# Patient Record
Sex: FEMALE | Race: WHITE | NOT HISPANIC OR LATINO | Employment: FULL TIME | URBAN - METROPOLITAN AREA
[De-identification: names, ages, dates, MRNs, and addresses within clinical notes are randomized per-mention and may not be internally consistent; named-entity substitution may affect disease eponyms.]

---

## 2024-08-05 ENCOUNTER — HOSPITAL ENCOUNTER (EMERGENCY)
Facility: HOSPITAL | Age: 34
Discharge: HOME | End: 2024-08-05
Attending: EMERGENCY MEDICINE | Admitting: EMERGENCY MEDICINE
Payer: COMMERCIAL

## 2024-08-05 VITALS
TEMPERATURE: 97.7 F | HEIGHT: 63 IN | SYSTOLIC BLOOD PRESSURE: 187 MMHG | HEART RATE: 122 BPM | BODY MASS INDEX: 48.73 KG/M2 | DIASTOLIC BLOOD PRESSURE: 149 MMHG | RESPIRATION RATE: 16 BRPM | WEIGHT: 275 LBS | OXYGEN SATURATION: 95 %

## 2024-08-05 DIAGNOSIS — I80.8 SUPERFICIAL THROMBOPHLEBITIS OF RIGHT UPPER EXTREMITY: Primary | ICD-10-CM

## 2024-08-05 PROCEDURE — 99282 EMERGENCY DEPT VISIT SF MDM: CPT

## 2024-08-06 NOTE — ED PROVIDER NOTES
Subjective   History of Present Illness  34-year-old female presents emergency department today with painful vein on the back of her right hand.  She reports that she does get back from a cruise had a lot of nausea and vomiting ended up having to have an IV started after several attempts.  She noticed a painful tender palpable mass on the dorsal aspect of her hand.  No redness no induration.  No chest pain or shortness of breath.    History provided by:  Patient   used: No    Hand Pain  Location:  Dorsal aspect of hand where IV had been started.  Quality:  Tender  Severity:  Moderate  Onset quality:  Gradual  Duration:  2 days  Timing:  Constant  Progression:  Unchanged  Chronicity:  New  Context:  Occurred after an IV was attempted to be started.  Associated symptoms: no shortness of breath and no wheezing        Review of Systems   Respiratory:  Negative for chest tightness, shortness of breath and wheezing.        No past medical history on file.    Allergies   Allergen Reactions    Codeine Hives       No past surgical history on file.    No family history on file.    Social History     Socioeconomic History    Marital status:            Objective   Physical Exam  Vitals and nursing note reviewed.   Constitutional:       General: She is not in acute distress.     Appearance: She is well-developed. She is not diaphoretic.   HENT:      Head: Normocephalic and atraumatic.      Nose: Nose normal.   Eyes:      General: No scleral icterus.     Conjunctiva/sclera: Conjunctivae normal.   Pulmonary:      Effort: Pulmonary effort is normal. No respiratory distress.   Musculoskeletal:         General: Normal range of motion.      Cervical back: Normal range of motion and neck supple.      Comments: Dorsal aspect of the hand revealed 2 palpable chordae thrombophlebitis no redness no induration.  Cap refill less than 2 seconds sensation are intact.   Skin:     General: Skin is warm and dry.  "  Neurological:      Mental Status: She is alert and oriented to person, place, and time.   Psychiatric:         Behavior: Behavior normal.         Procedures           ED Course                                   No results found for this or any previous visit (from the past 24 hour(s)).  Note: In addition to lab results from this visit, the labs listed above may include labs taken at another facility or during a different encounter within the last 24 hours. Please correlate lab times with ED admission and discharge times for further clarification of the services performed during this visit.    No orders to display     Vitals:    08/05/24 1841   BP: (!) 187/149   BP Location: Right arm   Patient Position: Sitting   Pulse: (!) 122   Resp: 16   Temp: 97.7 °F (36.5 °C)   TempSrc: Oral   SpO2: 95%   Weight: 125 kg (275 lb)   Height: 160 cm (63\")     Medications - No data to display  ECG/EMG Results (last 24 hours)       ** No results found for the last 24 hours. **          No orders to display                 Medical Decision Making  Problems Addressed:  Superficial thrombophlebitis of right upper extremity: acute illness or injury        Final diagnoses:   Superficial thrombophlebitis of right upper extremity       ED Disposition  ED Disposition       ED Disposition   Discharge    Condition   Stable    Comment   --               Cuong Parks MD  [address] [address] [phone]               Medication List      No changes were made to your prescriptions during this visit.            Rick Sim PA  08/05/24 2053    "

## 2024-08-23 ENCOUNTER — ANESTHESIA EVENT (OUTPATIENT)
Dept: CARDIOLOGY | Facility: HOSPITAL | Age: 34
End: 2024-08-23
Payer: COMMERCIAL

## 2024-08-23 ENCOUNTER — ANESTHESIA (OUTPATIENT)
Dept: CARDIOLOGY | Facility: HOSPITAL | Age: 34
End: 2024-08-23
Payer: COMMERCIAL

## 2024-08-23 ENCOUNTER — APPOINTMENT (OUTPATIENT)
Dept: GENERAL RADIOLOGY | Facility: HOSPITAL | Age: 34
DRG: 023 | End: 2024-08-23
Payer: COMMERCIAL

## 2024-08-23 ENCOUNTER — HOSPITAL ENCOUNTER (INPATIENT)
Facility: HOSPITAL | Age: 34
LOS: 7 days | Discharge: NURSING, REHAB OR LONG-TERM CARE | DRG: 023 | End: 2024-08-30
Attending: EMERGENCY MEDICINE | Admitting: INTERNAL MEDICINE
Payer: COMMERCIAL

## 2024-08-23 ENCOUNTER — APPOINTMENT (OUTPATIENT)
Dept: CT IMAGING | Facility: HOSPITAL | Age: 34
DRG: 023 | End: 2024-08-23
Payer: COMMERCIAL

## 2024-08-23 DIAGNOSIS — I63.9 ACUTE STROKE DUE TO ISCHEMIA: Primary | ICD-10-CM

## 2024-08-23 DIAGNOSIS — I50.21 ACUTE SYSTOLIC HEART FAILURE: ICD-10-CM

## 2024-08-23 DIAGNOSIS — R06.89: ICD-10-CM

## 2024-08-23 DIAGNOSIS — G47.9 SLEEP DISTURBANCES: ICD-10-CM

## 2024-08-23 DIAGNOSIS — I51.3 LEFT VENTRICULAR THROMBUS: ICD-10-CM

## 2024-08-23 DIAGNOSIS — R13.11 ORAL PHASE DYSPHAGIA: ICD-10-CM

## 2024-08-23 DIAGNOSIS — R40.0 SOMNOLENCE: ICD-10-CM

## 2024-08-23 DIAGNOSIS — R41.841 COGNITIVE COMMUNICATION DEFICIT: ICD-10-CM

## 2024-08-23 DIAGNOSIS — R47.1 DYSARTHRIA: ICD-10-CM

## 2024-08-23 DIAGNOSIS — R53.1 ACUTE LEFT-SIDED WEAKNESS: ICD-10-CM

## 2024-08-23 PROBLEM — I10 HTN (HYPERTENSION): Status: ACTIVE | Noted: 2024-08-23

## 2024-08-23 PROBLEM — I63.511: Status: ACTIVE | Noted: 2024-08-23

## 2024-08-23 PROBLEM — E66.01 MORBID OBESITY WITH BMI OF 45.0-49.9, ADULT: Chronic | Status: ACTIVE | Noted: 2024-08-23

## 2024-08-23 PROBLEM — R56.9 SEIZURE: Status: ACTIVE | Noted: 2024-08-23

## 2024-08-23 PROBLEM — J96.00 ACUTE RESPIRATORY FAILURE: Status: ACTIVE | Noted: 2024-08-23

## 2024-08-23 PROBLEM — J96.90 RESPIRATORY FAILURE: Status: RESOLVED | Noted: 2024-08-23 | Resolved: 2024-08-23

## 2024-08-23 PROBLEM — G47.33 OSA (OBSTRUCTIVE SLEEP APNEA): Chronic | Status: ACTIVE | Noted: 2024-08-23

## 2024-08-23 PROBLEM — E66.01 MORBID OBESITY WITH BMI OF 45.0-49.9, ADULT: Status: ACTIVE | Noted: 2024-08-23

## 2024-08-23 PROBLEM — I16.0 HYPERTENSIVE URGENCY: Status: ACTIVE | Noted: 2024-08-23

## 2024-08-23 PROBLEM — J96.90 RESPIRATORY FAILURE: Status: ACTIVE | Noted: 2024-08-23

## 2024-08-23 PROBLEM — E11.9 T2DM (TYPE 2 DIABETES MELLITUS): Chronic | Status: ACTIVE | Noted: 2024-08-23

## 2024-08-23 PROBLEM — E11.9 T2DM (TYPE 2 DIABETES MELLITUS): Status: ACTIVE | Noted: 2024-08-23

## 2024-08-23 PROBLEM — I10 HTN (HYPERTENSION): Chronic | Status: ACTIVE | Noted: 2024-08-23

## 2024-08-23 PROBLEM — G47.33 OSA (OBSTRUCTIVE SLEEP APNEA): Status: ACTIVE | Noted: 2024-08-23

## 2024-08-23 LAB
ALT SERPL W P-5'-P-CCNC: 15 U/L (ref 1–33)
APTT PPP: 25.3 SECONDS (ref 22–39)
AST SERPL-CCNC: 18 U/L (ref 1–32)
BASOPHILS # BLD AUTO: 0.09 10*3/MM3 (ref 0–0.2)
BASOPHILS NFR BLD AUTO: 0.8 % (ref 0–1.5)
CK SERPL-CCNC: 260 U/L (ref 20–180)
DEPRECATED RDW RBC AUTO: 46.4 FL (ref 37–54)
EOSINOPHIL # BLD AUTO: 0.33 10*3/MM3 (ref 0–0.4)
EOSINOPHIL NFR BLD AUTO: 3 % (ref 0.3–6.2)
ERYTHROCYTE [DISTWIDTH] IN BLOOD BY AUTOMATED COUNT: 12.9 % (ref 12.3–15.4)
ETHANOL BLD-MCNC: <10 MG/DL (ref 0–10)
GLUCOSE BLDC GLUCOMTR-MCNC: 404 MG/DL (ref 70–130)
HCT VFR BLD AUTO: 47.2 % (ref 34–46.6)
HGB BLD-MCNC: 16.1 G/DL (ref 12–15.9)
HOLD SPECIMEN: NORMAL
IMM GRANULOCYTES # BLD AUTO: 0.07 10*3/MM3 (ref 0–0.05)
IMM GRANULOCYTES NFR BLD AUTO: 0.6 % (ref 0–0.5)
LYMPHOCYTES # BLD AUTO: 2.77 10*3/MM3 (ref 0.7–3.1)
LYMPHOCYTES NFR BLD AUTO: 24.8 % (ref 19.6–45.3)
MCH RBC QN AUTO: 33.3 PG (ref 26.6–33)
MCHC RBC AUTO-ENTMCNC: 34.1 G/DL (ref 31.5–35.7)
MCV RBC AUTO: 97.7 FL (ref 79–97)
MONOCYTES # BLD AUTO: 0.85 10*3/MM3 (ref 0.1–0.9)
MONOCYTES NFR BLD AUTO: 7.6 % (ref 5–12)
NEUTROPHILS NFR BLD AUTO: 63.2 % (ref 42.7–76)
NEUTROPHILS NFR BLD AUTO: 7.05 10*3/MM3 (ref 1.7–7)
NRBC BLD AUTO-RTO: 0 /100 WBC (ref 0–0.2)
PLATELET # BLD AUTO: 417 10*3/MM3 (ref 140–450)
PMV BLD AUTO: 8.6 FL (ref 6–12)
RBC # BLD AUTO: 4.83 10*6/MM3 (ref 3.77–5.28)
TROPONIN T SERPL HS-MCNC: 64 NG/L
WBC NRBC COR # BLD AUTO: 11.16 10*3/MM3 (ref 3.4–10.8)
WHOLE BLOOD HOLD COAG: NORMAL
WHOLE BLOOD HOLD SPECIMEN: NORMAL

## 2024-08-23 PROCEDURE — 25810000003 SODIUM CHLORIDE 0.9 % SOLUTION: Performed by: ANESTHESIOLOGY

## 2024-08-23 PROCEDURE — C1887 CATHETER, GUIDING: HCPCS | Performed by: NEUROLOGICAL SURGERY

## 2024-08-23 PROCEDURE — 03CG3ZZ EXTIRPATION OF MATTER FROM INTRACRANIAL ARTERY, PERCUTANEOUS APPROACH: ICD-10-PCS | Performed by: NEUROLOGICAL SURGERY

## 2024-08-23 PROCEDURE — 84484 ASSAY OF TROPONIN QUANT: CPT | Performed by: EMERGENCY MEDICINE

## 2024-08-23 PROCEDURE — 5A1935Z RESPIRATORY VENTILATION, LESS THAN 24 CONSECUTIVE HOURS: ICD-10-PCS | Performed by: EMERGENCY MEDICINE

## 2024-08-23 PROCEDURE — 36415 COLL VENOUS BLD VENIPUNCTURE: CPT

## 2024-08-23 PROCEDURE — 3E03317 INTRODUCTION OF OTHER THROMBOLYTIC INTO PERIPHERAL VEIN, PERCUTANEOUS APPROACH: ICD-10-PCS | Performed by: NURSE PRACTITIONER

## 2024-08-23 PROCEDURE — C2628 CATHETER, OCCLUSION: HCPCS | Performed by: NEUROLOGICAL SURGERY

## 2024-08-23 PROCEDURE — 82077 ASSAY SPEC XCP UR&BREATH IA: CPT | Performed by: NURSE PRACTITIONER

## 2024-08-23 PROCEDURE — 82948 REAGENT STRIP/BLOOD GLUCOSE: CPT

## 2024-08-23 PROCEDURE — 25010000002 DEXAMETHASONE PER 1 MG: Performed by: ANESTHESIOLOGY

## 2024-08-23 PROCEDURE — 0 LABETALOL 5 MG/ML SOLUTION: Performed by: EMERGENCY MEDICINE

## 2024-08-23 PROCEDURE — 0BH17EZ INSERTION OF ENDOTRACHEAL AIRWAY INTO TRACHEA, VIA NATURAL OR ARTIFICIAL OPENING: ICD-10-PCS | Performed by: EMERGENCY MEDICINE

## 2024-08-23 PROCEDURE — 0042T HC CT CEREBRAL PERFUSION W/WO CONTRAST: CPT

## 2024-08-23 PROCEDURE — 85025 COMPLETE CBC W/AUTO DIFF WBC: CPT | Performed by: EMERGENCY MEDICINE

## 2024-08-23 PROCEDURE — C1894 INTRO/SHEATH, NON-LASER: HCPCS | Performed by: NEUROLOGICAL SURGERY

## 2024-08-23 PROCEDURE — C1769 GUIDE WIRE: HCPCS | Performed by: NEUROLOGICAL SURGERY

## 2024-08-23 PROCEDURE — 0 IODIXANOL PER 1 ML: Performed by: NEUROLOGICAL SURGERY

## 2024-08-23 PROCEDURE — 82550 ASSAY OF CK (CPK): CPT | Performed by: NURSE PRACTITIONER

## 2024-08-23 PROCEDURE — 99223 1ST HOSP IP/OBS HIGH 75: CPT | Performed by: NURSE PRACTITIONER

## 2024-08-23 PROCEDURE — 85610 PROTHROMBIN TIME: CPT

## 2024-08-23 PROCEDURE — 70496 CT ANGIOGRAPHY HEAD: CPT

## 2024-08-23 PROCEDURE — 31500 INSERT EMERGENCY AIRWAY: CPT

## 2024-08-23 PROCEDURE — B3161ZZ FLUOROSCOPY OF RIGHT INTERNAL CAROTID ARTERY USING LOW OSMOLAR CONTRAST: ICD-10-PCS | Performed by: NEUROLOGICAL SURGERY

## 2024-08-23 PROCEDURE — 25010000002 TENECTEPLASE PER 50 MG: Performed by: NURSE PRACTITIONER

## 2024-08-23 PROCEDURE — 71045 X-RAY EXAM CHEST 1 VIEW: CPT

## 2024-08-23 PROCEDURE — 70450 CT HEAD/BRAIN W/O DYE: CPT

## 2024-08-23 PROCEDURE — 25010000002 PROPOFOL 1000 MG/100ML EMULSION

## 2024-08-23 PROCEDURE — 94002 VENT MGMT INPAT INIT DAY: CPT

## 2024-08-23 PROCEDURE — 25010000002 PROPOFOL 10 MG/ML EMULSION: Performed by: ANESTHESIOLOGY

## 2024-08-23 PROCEDURE — 99291 CRITICAL CARE FIRST HOUR: CPT

## 2024-08-23 PROCEDURE — 85730 THROMBOPLASTIN TIME PARTIAL: CPT | Performed by: EMERGENCY MEDICINE

## 2024-08-23 PROCEDURE — 70498 CT ANGIOGRAPHY NECK: CPT

## 2024-08-23 PROCEDURE — 93005 ELECTROCARDIOGRAM TRACING: CPT | Performed by: EMERGENCY MEDICINE

## 2024-08-23 PROCEDURE — 25010000002 PHENYLEPHRINE 10 MG/ML SOLUTION: Performed by: ANESTHESIOLOGY

## 2024-08-23 PROCEDURE — 99291 CRITICAL CARE FIRST HOUR: CPT | Performed by: INTERNAL MEDICINE

## 2024-08-23 PROCEDURE — B3131ZZ FLUOROSCOPY OF RIGHT COMMON CAROTID ARTERY USING LOW OSMOLAR CONTRAST: ICD-10-PCS | Performed by: NEUROLOGICAL SURGERY

## 2024-08-23 PROCEDURE — 80047 BASIC METABLC PNL IONIZED CA: CPT

## 2024-08-23 PROCEDURE — 84450 TRANSFERASE (AST) (SGOT): CPT | Performed by: EMERGENCY MEDICINE

## 2024-08-23 PROCEDURE — 85014 HEMATOCRIT: CPT

## 2024-08-23 PROCEDURE — 84460 ALANINE AMINO (ALT) (SGPT): CPT | Performed by: EMERGENCY MEDICINE

## 2024-08-23 PROCEDURE — C1760 CLOSURE DEV, VASC: HCPCS | Performed by: NEUROLOGICAL SURGERY

## 2024-08-23 PROCEDURE — 99222 1ST HOSP IP/OBS MODERATE 55: CPT | Performed by: NEUROLOGICAL SURGERY

## 2024-08-23 PROCEDURE — 61645 PERQ ART M-THROMBECT &/NFS: CPT | Performed by: NEUROLOGICAL SURGERY

## 2024-08-23 PROCEDURE — 25010000002 VASOPRESSIN 20 UNIT/ML SOLUTION: Performed by: ANESTHESIOLOGY

## 2024-08-23 PROCEDURE — 25010000002 HYDRALAZINE PER 20 MG

## 2024-08-23 PROCEDURE — 25510000001 IOPAMIDOL PER 1 ML: Performed by: EMERGENCY MEDICINE

## 2024-08-23 RX ORDER — EPHEDRINE SULFATE 50 MG/ML
INJECTION, SOLUTION INTRAVENOUS AS NEEDED
Status: DISCONTINUED | OUTPATIENT
Start: 2024-08-23 | End: 2024-08-23 | Stop reason: SURG

## 2024-08-23 RX ORDER — ASPIRIN 325 MG
325 TABLET ORAL DAILY
Status: DISCONTINUED | OUTPATIENT
Start: 2024-08-24 | End: 2024-08-24

## 2024-08-23 RX ORDER — SODIUM CHLORIDE 0.9 % (FLUSH) 0.9 %
10 SYRINGE (ML) INJECTION EVERY 12 HOURS SCHEDULED
Status: DISCONTINUED | OUTPATIENT
Start: 2024-08-23 | End: 2024-08-24

## 2024-08-23 RX ORDER — IBUPROFEN 600 MG/1
1 TABLET ORAL
Status: DISCONTINUED | OUTPATIENT
Start: 2024-08-23 | End: 2024-08-25

## 2024-08-23 RX ORDER — ETOMIDATE 2 MG/ML
INJECTION INTRAVENOUS
Status: COMPLETED | OUTPATIENT
Start: 2024-08-23 | End: 2024-08-23

## 2024-08-23 RX ORDER — LABETALOL HYDROCHLORIDE 5 MG/ML
5 INJECTION, SOLUTION INTRAVENOUS ONCE
Status: DISCONTINUED | OUTPATIENT
Start: 2024-08-23 | End: 2024-08-23

## 2024-08-23 RX ORDER — IOPAMIDOL 755 MG/ML
115 INJECTION, SOLUTION INTRAVASCULAR
Status: COMPLETED | OUTPATIENT
Start: 2024-08-23 | End: 2024-08-23

## 2024-08-23 RX ORDER — NICOTINE POLACRILEX 4 MG
15 LOZENGE BUCCAL
Status: DISCONTINUED | OUTPATIENT
Start: 2024-08-23 | End: 2024-08-24

## 2024-08-23 RX ORDER — PHENYLEPHRINE HYDROCHLORIDE 10 MG/ML
INJECTION INTRAVENOUS AS NEEDED
Status: DISCONTINUED | OUTPATIENT
Start: 2024-08-23 | End: 2024-08-23 | Stop reason: SURG

## 2024-08-23 RX ORDER — ROCURONIUM BROMIDE 10 MG/ML
INJECTION, SOLUTION INTRAVENOUS
Status: COMPLETED | OUTPATIENT
Start: 2024-08-23 | End: 2024-08-23

## 2024-08-23 RX ORDER — SODIUM CHLORIDE 9 MG/ML
INJECTION, SOLUTION INTRAVENOUS CONTINUOUS PRN
Status: DISCONTINUED | OUTPATIENT
Start: 2024-08-23 | End: 2024-08-23 | Stop reason: SURG

## 2024-08-23 RX ORDER — IODIXANOL 320 MG/ML
INJECTION, SOLUTION INTRAVASCULAR
Status: DISCONTINUED | OUTPATIENT
Start: 2024-08-23 | End: 2024-08-23 | Stop reason: HOSPADM

## 2024-08-23 RX ORDER — SODIUM CHLORIDE 0.9 % (FLUSH) 0.9 %
10 SYRINGE (ML) INJECTION AS NEEDED
Status: DISCONTINUED | OUTPATIENT
Start: 2024-08-23 | End: 2024-08-24

## 2024-08-23 RX ORDER — HYDRALAZINE HYDROCHLORIDE 20 MG/ML
5 INJECTION INTRAMUSCULAR; INTRAVENOUS ONCE
Status: COMPLETED | OUTPATIENT
Start: 2024-08-23 | End: 2024-08-23

## 2024-08-23 RX ORDER — ASPIRIN 300 MG/1
300 SUPPOSITORY RECTAL DAILY
Status: DISCONTINUED | OUTPATIENT
Start: 2024-08-24 | End: 2024-08-24

## 2024-08-23 RX ORDER — LABETALOL HYDROCHLORIDE 5 MG/ML
INJECTION, SOLUTION INTRAVENOUS
Status: COMPLETED | OUTPATIENT
Start: 2024-08-23 | End: 2024-08-23

## 2024-08-23 RX ORDER — SODIUM CHLORIDE 9 MG/ML
40 INJECTION, SOLUTION INTRAVENOUS AS NEEDED
Status: DISCONTINUED | OUTPATIENT
Start: 2024-08-23 | End: 2024-08-24

## 2024-08-23 RX ORDER — SODIUM CHLORIDE 0.9 % (FLUSH) 0.9 %
10 SYRINGE (ML) INJECTION AS NEEDED
Status: DISCONTINUED | OUTPATIENT
Start: 2024-08-23 | End: 2024-08-25

## 2024-08-23 RX ORDER — ROCURONIUM BROMIDE 10 MG/ML
INJECTION, SOLUTION INTRAVENOUS AS NEEDED
Status: DISCONTINUED | OUTPATIENT
Start: 2024-08-23 | End: 2024-08-23 | Stop reason: SURG

## 2024-08-23 RX ORDER — DEXTROSE MONOHYDRATE 25 G/50ML
10-50 INJECTION, SOLUTION INTRAVENOUS
Status: DISCONTINUED | OUTPATIENT
Start: 2024-08-23 | End: 2024-08-25

## 2024-08-23 RX ORDER — PROPOFOL 10 MG/ML
INJECTION, EMULSION INTRAVENOUS
Status: COMPLETED
Start: 2024-08-23 | End: 2024-08-23

## 2024-08-23 RX ORDER — ATORVASTATIN CALCIUM 40 MG/1
80 TABLET, FILM COATED ORAL NIGHTLY
Status: DISCONTINUED | OUTPATIENT
Start: 2024-08-23 | End: 2024-08-24

## 2024-08-23 RX ORDER — SODIUM CHLORIDE 0.9 % (FLUSH) 0.9 %
10 SYRINGE (ML) INJECTION EVERY 12 HOURS SCHEDULED
Status: DISCONTINUED | OUTPATIENT
Start: 2024-08-23 | End: 2024-08-25

## 2024-08-23 RX ORDER — HYDRALAZINE HYDROCHLORIDE 20 MG/ML
INJECTION INTRAMUSCULAR; INTRAVENOUS
Status: COMPLETED
Start: 2024-08-23 | End: 2024-08-23

## 2024-08-23 RX ORDER — DEXAMETHASONE SODIUM PHOSPHATE 4 MG/ML
INJECTION, SOLUTION INTRA-ARTICULAR; INTRALESIONAL; INTRAMUSCULAR; INTRAVENOUS; SOFT TISSUE AS NEEDED
Status: DISCONTINUED | OUTPATIENT
Start: 2024-08-23 | End: 2024-08-23 | Stop reason: SURG

## 2024-08-23 RX ORDER — SODIUM CHLORIDE 9 MG/ML
40 INJECTION, SOLUTION INTRAVENOUS AS NEEDED
Status: DISCONTINUED | OUTPATIENT
Start: 2024-08-23 | End: 2024-08-25

## 2024-08-23 RX ORDER — NITROGLYCERIN 0.4 MG/1
0.4 TABLET SUBLINGUAL
Status: DISCONTINUED | OUTPATIENT
Start: 2024-08-23 | End: 2024-08-30 | Stop reason: HOSPADM

## 2024-08-23 RX ORDER — SODIUM CHLORIDE 0.9 % (FLUSH) 0.9 %
10 SYRINGE (ML) INJECTION ONCE
Status: COMPLETED | OUTPATIENT
Start: 2024-08-23 | End: 2024-08-23

## 2024-08-23 RX ORDER — SODIUM CHLORIDE 0.9 % (FLUSH) 0.9 %
10 SYRINGE (ML) INJECTION
Status: COMPLETED | OUTPATIENT
Start: 2024-08-23 | End: 2024-08-23

## 2024-08-23 RX ORDER — VASOPRESSIN 20 U/ML
INJECTION PARENTERAL AS NEEDED
Status: DISCONTINUED | OUTPATIENT
Start: 2024-08-23 | End: 2024-08-23 | Stop reason: SURG

## 2024-08-23 RX ADMIN — Medication 10 ML: at 23:32

## 2024-08-23 RX ADMIN — EPHEDRINE SULFATE 10 MG: 50 INJECTION INTRAVENOUS at 21:50

## 2024-08-23 RX ADMIN — INSULIN HUMAN 6.9 UNITS/HR: 1 INJECTION, SOLUTION INTRAVENOUS at 23:31

## 2024-08-23 RX ADMIN — EPHEDRINE SULFATE 10 MG: 50 INJECTION INTRAVENOUS at 21:47

## 2024-08-23 RX ADMIN — EPHEDRINE SULFATE 10 MG: 50 INJECTION INTRAVENOUS at 21:39

## 2024-08-23 RX ADMIN — ROCURONIUM BROMIDE 50 MG: 10 SOLUTION INTRAVENOUS at 21:30

## 2024-08-23 RX ADMIN — DEXAMETHASONE SODIUM PHOSPHATE 8 MG: 4 INJECTION, SOLUTION INTRAMUSCULAR; INTRAVENOUS at 21:36

## 2024-08-23 RX ADMIN — PROPOFOL 10 MCG/KG/MIN: 10 INJECTION, EMULSION INTRAVENOUS at 21:01

## 2024-08-23 RX ADMIN — PHENYLEPHRINE HYDROCHLORIDE 200 MCG: 10 INJECTION INTRAVENOUS at 21:36

## 2024-08-23 RX ADMIN — VASOPRESSIN 0.4 UNITS: 20 INJECTION INTRAVENOUS at 21:56

## 2024-08-23 RX ADMIN — EPHEDRINE SULFATE 10 MG: 50 INJECTION INTRAVENOUS at 21:44

## 2024-08-23 RX ADMIN — IOPAMIDOL 115 ML: 755 INJECTION, SOLUTION INTRAVENOUS at 20:51

## 2024-08-23 RX ADMIN — PROPOFOL 50 MCG/KG/MIN: 10 INJECTION, EMULSION INTRAVENOUS at 22:01

## 2024-08-23 RX ADMIN — HYDRALAZINE HYDROCHLORIDE 5 MG: 20 INJECTION INTRAMUSCULAR; INTRAVENOUS at 21:19

## 2024-08-23 RX ADMIN — PHENYLEPHRINE HYDROCHLORIDE 200 MCG: 10 INJECTION INTRAVENOUS at 21:47

## 2024-08-23 RX ADMIN — Medication 10 ML: at 20:50

## 2024-08-23 RX ADMIN — PHENYLEPHRINE HYDROCHLORIDE 200 MCG: 10 INJECTION INTRAVENOUS at 21:38

## 2024-08-23 RX ADMIN — Medication 25 MG: at 20:51

## 2024-08-23 RX ADMIN — PHENYLEPHRINE HYDROCHLORIDE 200 MCG: 10 INJECTION INTRAVENOUS at 21:43

## 2024-08-23 RX ADMIN — SODIUM CHLORIDE: 9 INJECTION, SOLUTION INTRAVENOUS at 21:25

## 2024-08-23 RX ADMIN — ROCURONIUM BROMIDE 100 MG: 10 SOLUTION INTRAVENOUS at 20:55

## 2024-08-23 RX ADMIN — Medication 20 MG: at 21:12

## 2024-08-23 RX ADMIN — PHENYLEPHRINE HYDROCHLORIDE 200 MCG: 10 INJECTION INTRAVENOUS at 21:51

## 2024-08-23 RX ADMIN — VASOPRESSIN 0.6 UNITS: 20 INJECTION INTRAVENOUS at 21:54

## 2024-08-23 RX ADMIN — Medication 5 MG: at 20:49

## 2024-08-23 RX ADMIN — ROCURONIUM BROMIDE 50 MG: 10 SOLUTION INTRAVENOUS at 21:36

## 2024-08-23 RX ADMIN — ETOMIDATE 38 MG: 40 INJECTION, SOLUTION INTRAVENOUS at 20:53

## 2024-08-23 RX ADMIN — VASOPRESSIN 0.4 UNITS: 20 INJECTION INTRAVENOUS at 22:11

## 2024-08-23 RX ADMIN — Medication 10 ML: at 20:51

## 2024-08-23 NOTE — Clinical Note
A 8 fr sheath was successfully inserted using micropuncture technique with ultrasound guidance into the right femoral artery.

## 2024-08-24 ENCOUNTER — APPOINTMENT (OUTPATIENT)
Dept: CT IMAGING | Facility: HOSPITAL | Age: 34
DRG: 023 | End: 2024-08-24
Payer: COMMERCIAL

## 2024-08-24 ENCOUNTER — APPOINTMENT (OUTPATIENT)
Dept: GENERAL RADIOLOGY | Facility: HOSPITAL | Age: 34
DRG: 023 | End: 2024-08-24
Payer: COMMERCIAL

## 2024-08-24 ENCOUNTER — APPOINTMENT (OUTPATIENT)
Dept: MRI IMAGING | Facility: HOSPITAL | Age: 34
DRG: 023 | End: 2024-08-24
Payer: COMMERCIAL

## 2024-08-24 ENCOUNTER — APPOINTMENT (OUTPATIENT)
Dept: CARDIOLOGY | Facility: HOSPITAL | Age: 34
DRG: 023 | End: 2024-08-24
Payer: COMMERCIAL

## 2024-08-24 PROBLEM — I63.9 ACUTE STROKE DUE TO ISCHEMIA: Status: ACTIVE | Noted: 2024-08-24

## 2024-08-24 LAB
ALBUMIN SERPL-MCNC: 3.7 G/DL (ref 3.5–5.2)
ALBUMIN/GLOB SERPL: 1.4 G/DL
ALP SERPL-CCNC: 60 U/L (ref 39–117)
ALT SERPL W P-5'-P-CCNC: 28 U/L (ref 1–33)
AMPHET+METHAMPHET UR QL: NEGATIVE
AMPHETAMINES UR QL: POSITIVE
ANION GAP SERPL CALCULATED.3IONS-SCNC: 16 MMOL/L (ref 5–15)
APTT PPP: 23.2 SECONDS (ref 60–90)
ARTERIAL PATENCY WRIST A: POSITIVE
AST SERPL-CCNC: 53 U/L (ref 1–32)
ATMOSPHERIC PRESS: ABNORMAL MM[HG]
B PARAPERT DNA SPEC QL NAA+PROBE: NOT DETECTED
B PERT DNA SPEC QL NAA+PROBE: NOT DETECTED
B-HCG UR QL: NEGATIVE
BACTERIA UR QL AUTO: NORMAL /HPF
BARBITURATES UR QL SCN: NEGATIVE
BASE EXCESS BLDA CALC-SCNC: -0.8 MMOL/L (ref 0–2)
BASOPHILS # BLD AUTO: 0.04 10*3/MM3 (ref 0–0.2)
BASOPHILS NFR BLD AUTO: 0.2 % (ref 0–1.5)
BDY SITE: ABNORMAL
BENZODIAZ UR QL SCN: NEGATIVE
BH CV ECHO MEAS - AO MAX PG: 8.3 MMHG
BH CV ECHO MEAS - AO MEAN PG: 4.5 MMHG
BH CV ECHO MEAS - AO ROOT DIAM: 3.5 CM
BH CV ECHO MEAS - AO V2 MAX: 144 CM/SEC
BH CV ECHO MEAS - AO V2 VTI: 24.6 CM
BH CV ECHO MEAS - AVA(I,D): 2.08 CM2
BH CV ECHO MEAS - EDV(CUBED): 216 ML
BH CV ECHO MEAS - EDV(MOD-SP2): 209 ML
BH CV ECHO MEAS - EDV(MOD-SP4): 224 ML
BH CV ECHO MEAS - EF(MOD-BP): 36.7 %
BH CV ECHO MEAS - EF(MOD-SP2): 33 %
BH CV ECHO MEAS - EF(MOD-SP4): 36.2 %
BH CV ECHO MEAS - ESV(CUBED): 110.6 ML
BH CV ECHO MEAS - ESV(MOD-SP2): 140 ML
BH CV ECHO MEAS - ESV(MOD-SP4): 143 ML
BH CV ECHO MEAS - FS: 20 %
BH CV ECHO MEAS - IVS/LVPW: 1 CM
BH CV ECHO MEAS - IVSD: 1.3 CM
BH CV ECHO MEAS - LA DIMENSION: 4.1 CM
BH CV ECHO MEAS - LAT PEAK E' VEL: 10.1 CM/SEC
BH CV ECHO MEAS - LV MASS(C)D: 350.1 GRAMS
BH CV ECHO MEAS - LV MAX PG: 3.7 MMHG
BH CV ECHO MEAS - LV MEAN PG: 2 MMHG
BH CV ECHO MEAS - LV V1 MAX: 95.7 CM/SEC
BH CV ECHO MEAS - LV V1 VTI: 16.3 CM
BH CV ECHO MEAS - LVIDD: 6 CM
BH CV ECHO MEAS - LVIDS: 4.8 CM
BH CV ECHO MEAS - LVOT AREA: 3.1 CM2
BH CV ECHO MEAS - LVOT DIAM: 2 CM
BH CV ECHO MEAS - LVPWD: 1.3 CM
BH CV ECHO MEAS - MED PEAK E' VEL: 9 CM/SEC
BH CV ECHO MEAS - MV DEC SLOPE: 545.5 CM/SEC2
BH CV ECHO MEAS - MV DEC TIME: 0.47 SEC
BH CV ECHO MEAS - MV E MAX VEL: 128.5 CM/SEC
BH CV ECHO MEAS - MV MAX PG: 9 MMHG
BH CV ECHO MEAS - MV MEAN PG: 2.5 MMHG
BH CV ECHO MEAS - MV P1/2T: 76.8 MSEC
BH CV ECHO MEAS - MV V2 VTI: 30.1 CM
BH CV ECHO MEAS - MVA(P1/2T): 2.9 CM2
BH CV ECHO MEAS - MVA(VTI): 1.69 CM2
BH CV ECHO MEAS - RAP SYSTOLE: 3 MMHG
BH CV ECHO MEAS - RVSP: 20 MMHG
BH CV ECHO MEAS - SV(LVOT): 51.1 ML
BH CV ECHO MEAS - SV(MOD-SP2): 69 ML
BH CV ECHO MEAS - SV(MOD-SP4): 81 ML
BH CV ECHO MEAS - TAPSE (>1.6): 2.14 CM
BH CV ECHO MEAS - TR MAX PG: 17.2 MMHG
BH CV ECHO MEAS - TR MAX VEL: 206.5 CM/SEC
BH CV ECHO MEASUREMENTS AVERAGE E/E' RATIO: 13.46
BH CV VAS BP LEFT ARM: NORMAL MMHG
BH CV XLRA - RV BASE: 3.5 CM
BH CV XLRA - RV MID: 2.6 CM
BH CV XLRA - TDI S': 11.9 CM/SEC
BILIRUB SERPL-MCNC: 0.6 MG/DL (ref 0–1.2)
BILIRUB UR QL STRIP: NEGATIVE
BODY TEMPERATURE: 37
BUN SERPL-MCNC: 16 MG/DL (ref 6–20)
BUN/CREAT SERPL: 11.5 (ref 7–25)
BUPRENORPHINE SERPL-MCNC: NEGATIVE NG/ML
C PNEUM DNA NPH QL NAA+NON-PROBE: NOT DETECTED
CALCIUM SPEC-SCNC: 9.1 MG/DL (ref 8.6–10.5)
CANNABINOIDS SERPL QL: POSITIVE
CHLORIDE SERPL-SCNC: 105 MMOL/L (ref 98–107)
CHOLEST SERPL-MCNC: 177 MG/DL (ref 0–200)
CLARITY UR: CLEAR
CO2 BLDA-SCNC: 25.1 MMOL/L (ref 22–33)
CO2 SERPL-SCNC: 20 MMOL/L (ref 22–29)
COCAINE UR QL: NEGATIVE
COHGB MFR BLD: 1.4 % (ref 0–2)
COLOR UR: YELLOW
CPAP: 8 CMH2O
CREAT SERPL-MCNC: 1.39 MG/DL (ref 0.57–1)
D-LACTATE SERPL-SCNC: 1.9 MMOL/L (ref 0.5–2)
D-LACTATE SERPL-SCNC: 3.4 MMOL/L (ref 0.5–2)
D-LACTATE SERPL-SCNC: 3.5 MMOL/L (ref 0.5–2)
D-LACTATE SERPL-SCNC: 3.9 MMOL/L (ref 0.5–2)
D-LACTATE SERPL-SCNC: 4.2 MMOL/L (ref 0.5–2)
DEPRECATED RDW RBC AUTO: 46.5 FL (ref 37–54)
EGFRCR SERPLBLD CKD-EPI 2021: 51.8 ML/MIN/1.73
EOSINOPHIL # BLD AUTO: 0 10*3/MM3 (ref 0–0.4)
EOSINOPHIL NFR BLD AUTO: 0 % (ref 0.3–6.2)
EPAP: 0
ERYTHROCYTE [DISTWIDTH] IN BLOOD BY AUTOMATED COUNT: 13 % (ref 12.3–15.4)
FENTANYL UR-MCNC: NEGATIVE NG/ML
FLUAV SUBTYP SPEC NAA+PROBE: NOT DETECTED
FLUBV RNA ISLT QL NAA+PROBE: NOT DETECTED
GEN 5 2HR TROPONIN T REFLEX: 73 NG/L
GLOBULIN UR ELPH-MCNC: 2.7 GM/DL
GLUCOSE BLDC GLUCOMTR-MCNC: 108 MG/DL (ref 70–130)
GLUCOSE BLDC GLUCOMTR-MCNC: 111 MG/DL (ref 70–130)
GLUCOSE BLDC GLUCOMTR-MCNC: 116 MG/DL (ref 70–130)
GLUCOSE BLDC GLUCOMTR-MCNC: 118 MG/DL (ref 70–130)
GLUCOSE BLDC GLUCOMTR-MCNC: 122 MG/DL (ref 70–130)
GLUCOSE BLDC GLUCOMTR-MCNC: 125 MG/DL (ref 70–130)
GLUCOSE BLDC GLUCOMTR-MCNC: 131 MG/DL (ref 70–130)
GLUCOSE BLDC GLUCOMTR-MCNC: 139 MG/DL (ref 70–130)
GLUCOSE BLDC GLUCOMTR-MCNC: 141 MG/DL (ref 70–130)
GLUCOSE BLDC GLUCOMTR-MCNC: 145 MG/DL (ref 70–130)
GLUCOSE BLDC GLUCOMTR-MCNC: 152 MG/DL (ref 70–130)
GLUCOSE BLDC GLUCOMTR-MCNC: 155 MG/DL (ref 70–130)
GLUCOSE BLDC GLUCOMTR-MCNC: 170 MG/DL (ref 70–130)
GLUCOSE BLDC GLUCOMTR-MCNC: 173 MG/DL (ref 70–130)
GLUCOSE BLDC GLUCOMTR-MCNC: 195 MG/DL (ref 70–130)
GLUCOSE BLDC GLUCOMTR-MCNC: 202 MG/DL (ref 70–130)
GLUCOSE BLDC GLUCOMTR-MCNC: 207 MG/DL (ref 70–130)
GLUCOSE BLDC GLUCOMTR-MCNC: 208 MG/DL (ref 70–130)
GLUCOSE BLDC GLUCOMTR-MCNC: 214 MG/DL (ref 70–130)
GLUCOSE BLDC GLUCOMTR-MCNC: 227 MG/DL (ref 70–130)
GLUCOSE SERPL-MCNC: 206 MG/DL (ref 65–99)
GLUCOSE UR STRIP-MCNC: ABNORMAL MG/DL
HADV DNA SPEC NAA+PROBE: NOT DETECTED
HBA1C MFR BLD: 6.7 % (ref 4.8–5.6)
HCO3 BLDA-SCNC: 23.9 MMOL/L (ref 20–26)
HCOV 229E RNA SPEC QL NAA+PROBE: NOT DETECTED
HCOV HKU1 RNA SPEC QL NAA+PROBE: NOT DETECTED
HCOV NL63 RNA SPEC QL NAA+PROBE: NOT DETECTED
HCOV OC43 RNA SPEC QL NAA+PROBE: NOT DETECTED
HCT VFR BLD AUTO: 44.2 % (ref 34–46.6)
HCT VFR BLD CALC: 45.5 % (ref 38–51)
HCYS SERPL-MCNC: 12 UMOL/L (ref 0–15)
HDLC SERPL-MCNC: 31 MG/DL (ref 40–60)
HGB BLD-MCNC: 14.9 G/DL (ref 12–15.9)
HGB BLDA-MCNC: 14.8 G/DL (ref 14–18)
HGB UR QL STRIP.AUTO: NEGATIVE
HMPV RNA NPH QL NAA+NON-PROBE: NOT DETECTED
HPIV1 RNA ISLT QL NAA+PROBE: NOT DETECTED
HPIV2 RNA SPEC QL NAA+PROBE: NOT DETECTED
HPIV3 RNA NPH QL NAA+PROBE: NOT DETECTED
HPIV4 P GENE NPH QL NAA+PROBE: NOT DETECTED
HYALINE CASTS UR QL AUTO: NORMAL /LPF
IMM GRANULOCYTES # BLD AUTO: 0.12 10*3/MM3 (ref 0–0.05)
IMM GRANULOCYTES NFR BLD AUTO: 0.7 % (ref 0–0.5)
INHALED O2 CONCENTRATION: 30 %
INR PPP: 1.16 (ref 0.89–1.12)
IPAP: 0
KETONES UR QL STRIP: NEGATIVE
LDLC SERPL CALC-MCNC: 115 MG/DL (ref 0–100)
LDLC/HDLC SERPL: 3.58 {RATIO}
LEFT ATRIUM VOLUME INDEX: 26.1 ML/M2
LEUKOCYTE ESTERASE UR QL STRIP.AUTO: NEGATIVE
LYMPHOCYTES # BLD AUTO: 0.91 10*3/MM3 (ref 0.7–3.1)
LYMPHOCYTES NFR BLD AUTO: 5.5 % (ref 19.6–45.3)
M PNEUMO IGG SER IA-ACNC: NOT DETECTED
MCH RBC QN AUTO: 33 PG (ref 26.6–33)
MCHC RBC AUTO-ENTMCNC: 33.7 G/DL (ref 31.5–35.7)
MCV RBC AUTO: 97.8 FL (ref 79–97)
METHADONE UR QL SCN: NEGATIVE
METHGB BLD QL: 0.1 % (ref 0–1.5)
MODALITY: ABNORMAL
MONOCYTES # BLD AUTO: 0.51 10*3/MM3 (ref 0.1–0.9)
MONOCYTES NFR BLD AUTO: 3.1 % (ref 5–12)
MRSA DNA SPEC QL NAA+PROBE: NEGATIVE
NEUTROPHILS NFR BLD AUTO: 15.03 10*3/MM3 (ref 1.7–7)
NEUTROPHILS NFR BLD AUTO: 90.5 % (ref 42.7–76)
NITRITE UR QL STRIP: NEGATIVE
NRBC BLD AUTO-RTO: 0 /100 WBC (ref 0–0.2)
OPIATES UR QL: NEGATIVE
OXYCODONE UR QL SCN: NEGATIVE
OXYHGB MFR BLDV: 95.9 % (ref 94–99)
PAW @ PEAK INSP FLOW SETTING VENT: 0 CMH2O
PCO2 BLDA: 39 MM HG (ref 35–45)
PCO2 TEMP ADJ BLD: 39 MM HG (ref 35–45)
PCP UR QL SCN: NEGATIVE
PH BLDA: 7.4 PH UNITS (ref 7.35–7.45)
PH UR STRIP.AUTO: 6.5 [PH] (ref 5–8)
PH, TEMP CORRECTED: 7.4 PH UNITS
PLATELET # BLD AUTO: 340 10*3/MM3 (ref 140–450)
PMV BLD AUTO: 8.4 FL (ref 6–12)
PO2 BLDA: 83.2 MM HG (ref 83–108)
PO2 TEMP ADJ BLD: 83.2 MM HG (ref 83–108)
POTASSIUM SERPL-SCNC: 4.4 MMOL/L (ref 3.5–5.2)
PROT SERPL-MCNC: 6.4 G/DL (ref 6–8.5)
PROT UR QL STRIP: ABNORMAL
PROTHROMBIN TIME: 14.9 SECONDS (ref 12.2–14.5)
RBC # BLD AUTO: 4.52 10*6/MM3 (ref 3.77–5.28)
RBC # UR STRIP: NORMAL /HPF
REF LAB TEST METHOD: NORMAL
RHINOVIRUS RNA SPEC NAA+PROBE: NOT DETECTED
RSV RNA NPH QL NAA+NON-PROBE: NOT DETECTED
SARS-COV-2 RNA NPH QL NAA+NON-PROBE: NOT DETECTED
SODIUM SERPL-SCNC: 141 MMOL/L (ref 136–145)
SP GR UR STRIP: >=1.03 (ref 1–1.03)
SQUAMOUS #/AREA URNS HPF: NORMAL /HPF
TOTAL RATE: 0 BREATHS/MINUTE
TRICYCLICS UR QL SCN: NEGATIVE
TRIGL SERPL-MCNC: 175 MG/DL (ref 0–150)
TROPONIN T DELTA: 9 NG/L
UFH PPP CHRO-ACNC: 0.1 IU/ML (ref 0.3–0.7)
UROBILINOGEN UR QL STRIP: ABNORMAL
VENTILATOR MODE: ABNORMAL
VLDLC SERPL-MCNC: 31 MG/DL (ref 5–40)
WBC # UR STRIP: NORMAL /HPF
WBC NRBC COR # BLD AUTO: 16.61 10*3/MM3 (ref 3.4–10.8)

## 2024-08-24 PROCEDURE — 25010000002 SULFUR HEXAFLUORIDE MICROSPH 60.7-25 MG RECONSTITUTED SUSPENSION: Performed by: NURSE PRACTITIONER

## 2024-08-24 PROCEDURE — 82948 REAGENT STRIP/BLOOD GLUCOSE: CPT

## 2024-08-24 PROCEDURE — 80307 DRUG TEST PRSMV CHEM ANLYZR: CPT | Performed by: NURSE PRACTITIONER

## 2024-08-24 PROCEDURE — 25010000002 HEPARIN (PORCINE) 25000-0.45 UT/250ML-% SOLUTION: Performed by: NURSE PRACTITIONER

## 2024-08-24 PROCEDURE — 25010000002 AZITHROMYCIN PER 500 MG: Performed by: INTERNAL MEDICINE

## 2024-08-24 PROCEDURE — 85730 THROMBOPLASTIN TIME PARTIAL: CPT | Performed by: NURSE PRACTITIONER

## 2024-08-24 PROCEDURE — 25010000002 CEFTRIAXONE PER 250 MG: Performed by: INTERNAL MEDICINE

## 2024-08-24 PROCEDURE — 81025 URINE PREGNANCY TEST: CPT | Performed by: NURSE PRACTITIONER

## 2024-08-24 PROCEDURE — 85520 HEPARIN ASSAY: CPT | Performed by: NURSE PRACTITIONER

## 2024-08-24 PROCEDURE — 86148 ANTI-PHOSPHOLIPID ANTIBODY: CPT | Performed by: NURSE PRACTITIONER

## 2024-08-24 PROCEDURE — 85302 CLOT INHIBIT PROT C ANTIGEN: CPT | Performed by: NURSE PRACTITIONER

## 2024-08-24 PROCEDURE — 36600 WITHDRAWAL OF ARTERIAL BLOOD: CPT

## 2024-08-24 PROCEDURE — 99233 SBSQ HOSP IP/OBS HIGH 50: CPT | Performed by: INTERNAL MEDICINE

## 2024-08-24 PROCEDURE — 93306 TTE W/DOPPLER COMPLETE: CPT | Performed by: INTERNAL MEDICINE

## 2024-08-24 PROCEDURE — 83605 ASSAY OF LACTIC ACID: CPT | Performed by: NURSE PRACTITIONER

## 2024-08-24 PROCEDURE — 82805 BLOOD GASES W/O2 SATURATION: CPT

## 2024-08-24 PROCEDURE — 5A09457 ASSISTANCE WITH RESPIRATORY VENTILATION, 24-96 CONSECUTIVE HOURS, CONTINUOUS POSITIVE AIRWAY PRESSURE: ICD-10-PCS | Performed by: INTERNAL MEDICINE

## 2024-08-24 PROCEDURE — 84484 ASSAY OF TROPONIN QUANT: CPT | Performed by: EMERGENCY MEDICINE

## 2024-08-24 PROCEDURE — 94799 UNLISTED PULMONARY SVC/PX: CPT

## 2024-08-24 PROCEDURE — 99222 1ST HOSP IP/OBS MODERATE 55: CPT | Performed by: INTERNAL MEDICINE

## 2024-08-24 PROCEDURE — 80053 COMPREHEN METABOLIC PANEL: CPT | Performed by: NURSE PRACTITIONER

## 2024-08-24 PROCEDURE — 0202U NFCT DS 22 TRGT SARS-COV-2: CPT | Performed by: NURSE PRACTITIONER

## 2024-08-24 PROCEDURE — 70450 CT HEAD/BRAIN W/O DYE: CPT

## 2024-08-24 PROCEDURE — 86146 BETA-2 GLYCOPROTEIN ANTIBODY: CPT | Performed by: NURSE PRACTITIONER

## 2024-08-24 PROCEDURE — 70551 MRI BRAIN STEM W/O DYE: CPT

## 2024-08-24 PROCEDURE — 85732 THROMBOPLASTIN TIME PARTIAL: CPT | Performed by: NURSE PRACTITIONER

## 2024-08-24 PROCEDURE — 85610 PROTHROMBIN TIME: CPT | Performed by: NURSE PRACTITIONER

## 2024-08-24 PROCEDURE — 86038 ANTINUCLEAR ANTIBODIES: CPT | Performed by: NURSE PRACTITIONER

## 2024-08-24 PROCEDURE — 85670 THROMBIN TIME PLASMA: CPT | Performed by: NURSE PRACTITIONER

## 2024-08-24 PROCEDURE — 85303 CLOT INHIBIT PROT C ACTIVITY: CPT | Performed by: NURSE PRACTITIONER

## 2024-08-24 PROCEDURE — 83036 HEMOGLOBIN GLYCOSYLATED A1C: CPT | Performed by: NURSE PRACTITIONER

## 2024-08-24 PROCEDURE — 81001 URINALYSIS AUTO W/SCOPE: CPT | Performed by: NURSE PRACTITIONER

## 2024-08-24 PROCEDURE — 82375 ASSAY CARBOXYHB QUANT: CPT

## 2024-08-24 PROCEDURE — 94660 CPAP INITIATION&MGMT: CPT

## 2024-08-24 PROCEDURE — 71045 X-RAY EXAM CHEST 1 VIEW: CPT

## 2024-08-24 PROCEDURE — 25810000003 SODIUM CHLORIDE 0.9 % SOLUTION 250 ML FLEX CONT: Performed by: INTERNAL MEDICINE

## 2024-08-24 PROCEDURE — 85025 COMPLETE CBC W/AUTO DIFF WBC: CPT | Performed by: NURSE PRACTITIONER

## 2024-08-24 PROCEDURE — 85613 RUSSELL VIPER VENOM DILUTED: CPT | Performed by: NURSE PRACTITIONER

## 2024-08-24 PROCEDURE — 25810000003 SODIUM CHLORIDE 0.9 % SOLUTION 250 ML FLEX CONT: Performed by: NURSE PRACTITIONER

## 2024-08-24 PROCEDURE — 85520 HEPARIN ASSAY: CPT

## 2024-08-24 PROCEDURE — 25010000002 NICARDIPINE 2.5 MG/ML SOLUTION 10 ML VIAL: Performed by: NURSE PRACTITIONER

## 2024-08-24 PROCEDURE — 87641 MR-STAPH DNA AMP PROBE: CPT | Performed by: NURSE PRACTITIONER

## 2024-08-24 PROCEDURE — 83090 ASSAY OF HOMOCYSTEINE: CPT | Performed by: NURSE PRACTITIONER

## 2024-08-24 PROCEDURE — 83605 ASSAY OF LACTIC ACID: CPT

## 2024-08-24 PROCEDURE — 99233 SBSQ HOSP IP/OBS HIGH 50: CPT | Performed by: STUDENT IN AN ORGANIZED HEALTH CARE EDUCATION/TRAINING PROGRAM

## 2024-08-24 PROCEDURE — 83050 HGB METHEMOGLOBIN QUAN: CPT

## 2024-08-24 PROCEDURE — 93306 TTE W/DOPPLER COMPLETE: CPT

## 2024-08-24 PROCEDURE — 81241 F5 GENE: CPT | Performed by: NURSE PRACTITIONER

## 2024-08-24 PROCEDURE — 81240 F2 GENE: CPT | Performed by: NURSE PRACTITIONER

## 2024-08-24 PROCEDURE — 85306 CLOT INHIBIT PROT S FREE: CPT | Performed by: NURSE PRACTITIONER

## 2024-08-24 PROCEDURE — 85220 BLOOC CLOT FACTOR V TEST: CPT | Performed by: NURSE PRACTITIONER

## 2024-08-24 PROCEDURE — 80061 LIPID PANEL: CPT | Performed by: NURSE PRACTITIONER

## 2024-08-24 PROCEDURE — 85705 THROMBOPLASTIN INHIBITION: CPT | Performed by: NURSE PRACTITIONER

## 2024-08-24 RX ORDER — DEXMEDETOMIDINE HYDROCHLORIDE 4 UG/ML
.2-1.5 INJECTION, SOLUTION INTRAVENOUS
Status: DISCONTINUED | OUTPATIENT
Start: 2024-08-24 | End: 2024-08-28

## 2024-08-24 RX ORDER — PANTOPRAZOLE SODIUM 40 MG/10ML
40 INJECTION, POWDER, LYOPHILIZED, FOR SOLUTION INTRAVENOUS
Status: DISCONTINUED | OUTPATIENT
Start: 2024-08-25 | End: 2024-08-27

## 2024-08-24 RX ORDER — ATORVASTATIN CALCIUM 40 MG/1
80 TABLET, FILM COATED ORAL NIGHTLY
Status: DISCONTINUED | OUTPATIENT
Start: 2024-08-24 | End: 2024-08-27

## 2024-08-24 RX ORDER — METOPROLOL SUCCINATE 25 MG/1
25 TABLET, EXTENDED RELEASE ORAL
Status: DISCONTINUED | OUTPATIENT
Start: 2024-08-24 | End: 2024-08-26

## 2024-08-24 RX ORDER — LISINOPRIL 40 MG/1
40 TABLET ORAL DAILY
COMMUNITY
End: 2024-08-30 | Stop reason: HOSPADM

## 2024-08-24 RX ORDER — METOPROLOL TARTRATE 1 MG/ML
2.5 INJECTION, SOLUTION INTRAVENOUS EVERY 6 HOURS
Status: DISCONTINUED | OUTPATIENT
Start: 2024-08-24 | End: 2024-08-25

## 2024-08-24 RX ORDER — HEPARIN SODIUM 10000 [USP'U]/100ML
17.5 INJECTION, SOLUTION INTRAVENOUS
Status: DISCONTINUED | OUTPATIENT
Start: 2024-08-24 | End: 2024-08-26

## 2024-08-24 RX ADMIN — AZITHROMYCIN DIHYDRATE 500 MG: 500 INJECTION, POWDER, LYOPHILIZED, FOR SOLUTION INTRAVENOUS at 05:09

## 2024-08-24 RX ADMIN — NICARDIPINE HYDROCHLORIDE 7.5 MG/HR: 25 INJECTION, SOLUTION INTRAVENOUS at 00:42

## 2024-08-24 RX ADMIN — INSULIN HUMAN 1.9 UNITS/HR: 1 INJECTION, SOLUTION INTRAVENOUS at 01:44

## 2024-08-24 RX ADMIN — HEPARIN SODIUM 8 UNITS/KG/HR: 10000 INJECTION, SOLUTION INTRAVENOUS at 09:07

## 2024-08-24 RX ADMIN — METOPROLOL TARTRATE 2.5 MG: 5 INJECTION INTRAVENOUS at 16:13

## 2024-08-24 RX ADMIN — INSULIN HUMAN 1.3 UNITS/HR: 1 INJECTION, SOLUTION INTRAVENOUS at 02:59

## 2024-08-24 RX ADMIN — SULFUR HEXAFLUORIDE 5 ML: KIT at 08:34

## 2024-08-24 RX ADMIN — Medication 10 ML: at 09:21

## 2024-08-24 RX ADMIN — Medication 10 ML: at 21:50

## 2024-08-24 RX ADMIN — INSULIN HUMAN 1.5 UNITS/HR: 1 INJECTION, SOLUTION INTRAVENOUS at 04:51

## 2024-08-24 RX ADMIN — SODIUM CHLORIDE 2000 MG: 900 INJECTION INTRAVENOUS at 05:10

## 2024-08-24 RX ADMIN — INSULIN HUMAN 1.8 UNITS/HR: 1 INJECTION, SOLUTION INTRAVENOUS at 06:16

## 2024-08-24 RX ADMIN — METOPROLOL TARTRATE 2.5 MG: 5 INJECTION INTRAVENOUS at 21:46

## 2024-08-24 NOTE — ANESTHESIA PREPROCEDURE EVALUATION
Anesthesia Evaluation     Patient summary reviewed and Nursing notes reviewed   no history of anesthetic complications:   NPO Solid Status: Waived due to emergency  NPO Liquid Status: Waived due to emergency           Airway   Comment: intubated  Dental - normal exam     Pulmonary    (+) ,decreased breath sounds    ROS comment: Respiratory failure  Intubated in ER  Cardiovascular - normal exam    ECG reviewed    (+) hypertension      Neuro/Psych    ROS Comment: MCA occlusion     acute left-sided weakness, dysarthria, and facial droop by EMS   GI/Hepatic/Renal/Endo    (+) morbid obesity, renal disease (single kidney)-, diabetes mellitus    Musculoskeletal     Abdominal    Substance History      OB/GYN          Other                      Anesthesia Plan    ASA 4 - emergent     general       Anesthetic plan, risks, benefits, and alternatives have been provided, discussed and informed consent has been obtained with: other.    CODE STATUS:

## 2024-08-24 NOTE — PROGRESS NOTES
NEUROSURGERY CONSULTATION    Referring Provider: Alma Song    Patient Care Team:  Cuong Parks MD as PCP - General (Family Medicine)    Chief Complaint: Stroke    History of Present Illness: This unfortunate 34-year-old female with a vaping history and a history of reported teratoma who presented with acute onset of left-sided hemiplegia.  CTA was obtained that demonstrated a right-sided MCA occlusion.  She developed respiratory failure and required emergency intubation in the emergency room.  I was consulted for MCA stroke perfusion was quite horrible and she had NIH stroke scale of 11      Review of Systems:  Unobtainable secondary to patient's state    History:  History reviewed. No pertinent past medical history., History reviewed. No pertinent surgical history., History reviewed. No pertinent family history.,  , (Not in a hospital admission)   Allergies:  Codeine      Physical Exam:  Vital Signs: Blood pressure (!) 168/127, pulse 104, resp. rate 20, SpO2 98%.  Patient is critically on ventilator  Has significant facial plethora  Pupils are symmetric  She is chemically paralyzed and sedated is a GCS of 3T she is markedly obese    Data Review:    CTA CT perfusion demonstrates right-sided holohemispheric deficit with a M1 occlusion    Diagnosis:  Right MCA syndrome  Morbid obesity  History of teratoma      Treatment Recommendations:  1.  Urgent mechanical thrombectomy right MCA is indicated      Lemuel Medina MD  08/23/24  22:10 EDT

## 2024-08-24 NOTE — PLAN OF CARE
Problem: Skin Injury Risk Increased  Goal: Skin Health and Integrity  Intervention: Optimize Skin Protection  Recent Flowsheet Documentation  Taken 8/24/2024 0600 by Latonya Pleitez RN  Pressure Reduction Techniques:   heels elevated off bed   pressure points protected   weight shift assistance provided  Head of Bed (HOB) Positioning: HOB at 30-45 degrees  Pressure Reduction Devices:   heel offloading device utilized   positioning supports utilized   pressure-redistributing mattress utilized  Skin Protection:   adhesive use limited   incontinence pads utilized   silicone foam dressing in place   skin-to-device areas padded   skin-to-skin areas padded   tubing/devices free from skin contact  Taken 8/24/2024 0400 by Latonya Pleitez RN  Pressure Reduction Techniques:   positioned off wounds   pressure points protected   weight shift assistance provided  Head of Bed (HOB) Positioning: HOB at 30 degrees  Pressure Reduction Devices:   heel offloading device utilized   positioning supports utilized   pressure-redistributing mattress utilized  Skin Protection:   adhesive use limited   incontinence pads utilized   silicone foam dressing in place   skin-to-device areas padded   skin-to-skin areas padded   tubing/devices free from skin contact  Taken 8/24/2024 0200 by Latonya Pleitez RN  Pressure Reduction Techniques:   weight shift assistance provided   pressure points protected   positioned off wounds  Head of Bed (HOB) Positioning: HOB at 30 degrees  Pressure Reduction Devices:   heel offloading device utilized   positioning supports utilized  Skin Protection:   adhesive use limited   incontinence pads utilized   protective footwear used   pulse oximeter probe site changed   skin-to-device areas padded   skin-to-skin areas padded   transparent dressing maintained   tubing/devices free from skin contact  Taken 8/24/2024 0000 by Latonya Pleitez RN  Pressure Reduction Techniques:   weight shift assistance provided   pressure points  protected   heels elevated off bed  Head of Bed (HOB) Positioning: HOB at 30 degrees  Pressure Reduction Devices:   heel offloading device utilized   positioning supports utilized   pressure-redistributing mattress utilized  Skin Protection:   adhesive use limited   incontinence pads utilized   silicone foam dressing in place   skin-to-device areas padded   skin-to-skin areas padded   tubing/devices free from skin contact  Taken 8/23/2024 2220 by Latonya Pleitez RN  Pressure Reduction Techniques:   frequent weight shift encouraged   weight shift assistance provided  Head of Bed (HOB) Positioning: HOB at 20 degrees  Pressure Reduction Devices:   positioning supports utilized   pressure-redistributing mattress utilized   heel offloading device utilized  Skin Protection:   adhesive use limited   incontinence pads utilized   protective footwear used   silicone foam dressing in place   skin sealant/moisture barrier applied   skin-to-device areas padded   skin-to-skin areas padded   tubing/devices free from skin contact   Goal Outcome Evaluation:

## 2024-08-24 NOTE — PROGRESS NOTES
Stroke Progress Note       Chief Complaint: Left-sided weakness    Subjective    Subjective     Subjective:  The patient is lying down in the bed in NAD. Family were at the bedside. The patient is intubated but not sedated.  The findings from the echo and the MRI of the brain were discussed with the  at the bedside and was updated regarding the need to start heparin drip.  All questions from the patient's  were answered.  No other concerns from the nursing staff.    No other acute complains at this time    Review of Systems   Unable to obtain secondary to intubation  Objective      Temp:  [98.4 °F (36.9 °C)] 98.4 °F (36.9 °C)  Heart Rate:  [] 97  Resp:  [20] 20  BP: ()/() 134/87  FiO2 (%):  [100 %] 100 %        Objective    GEN: intubated but not sedated  HEENT: Moist mucous membranes  CV: RRR, no peripheral edema  PULM: intubated, non-labored breathing, symmetric chest expansion  ABD: Soft, non-distended  EXT: no clubbing, cyanosis, edema appreciated  SKIN: no rashes appreciated    NEURO:  Mental Status: intubated but not sedated. Not responding to verbal stimuli but some response to noxious stimuli  Speech: CHAPARRO 2/2 intubation  CN 2-12: pupils are --- mm in diameter and sluggishly reacting to light. Gaze deviation present/absen  Motor and sensory:  Normal muscle tone and bulk.  The patient was able to mildly right upper extremity and bilateral lower extremities to noxious stimuli.  Minimal movement in the left upper extremity to noxious stimuli.  Reflexes: 1+ throughout; plantars downgoing b/l  Coordination: CHAPARRO 2/2 intubation  Gait/Station: CHAPARRO 2/2 intubation  Cortical: CHAPARRO 2/2 intubation      Results Review:    I reviewed the patient's new clinical results.    WBC   Date Value Ref Range Status   08/23/2024 11.16 (H) 3.40 - 10.80 10*3/mm3 Final     RBC   Date Value Ref Range Status   08/23/2024 4.83 3.77 - 5.28 10*6/mm3 Final     Hemoglobin   Date Value Ref Range Status   08/23/2024  16.1 (H) 12.0 - 15.9 g/dL Final     Hematocrit   Date Value Ref Range Status   08/23/2024 47.2 (H) 34.0 - 46.6 % Final     MCV   Date Value Ref Range Status   08/23/2024 97.7 (H) 79.0 - 97.0 fL Final     MCH   Date Value Ref Range Status   08/23/2024 33.3 (H) 26.6 - 33.0 pg Final     MCHC   Date Value Ref Range Status   08/23/2024 34.1 31.5 - 35.7 g/dL Final     RDW   Date Value Ref Range Status   08/23/2024 12.9 12.3 - 15.4 % Final     RDW-SD   Date Value Ref Range Status   08/23/2024 46.4 37.0 - 54.0 fl Final     MPV   Date Value Ref Range Status   08/23/2024 8.6 6.0 - 12.0 fL Final     Platelets   Date Value Ref Range Status   08/23/2024 417 140 - 450 10*3/mm3 Final     Neutrophil %   Date Value Ref Range Status   08/23/2024 63.2 42.7 - 76.0 % Final     Lymphocyte %   Date Value Ref Range Status   08/23/2024 24.8 19.6 - 45.3 % Final     Monocyte %   Date Value Ref Range Status   08/23/2024 7.6 5.0 - 12.0 % Final     Eosinophil %   Date Value Ref Range Status   08/23/2024 3.0 0.3 - 6.2 % Final     Basophil %   Date Value Ref Range Status   08/23/2024 0.8 0.0 - 1.5 % Final     Immature Grans %   Date Value Ref Range Status   08/23/2024 0.6 (H) 0.0 - 0.5 % Final     Neutrophils, Absolute   Date Value Ref Range Status   08/23/2024 7.05 (H) 1.70 - 7.00 10*3/mm3 Final     Lymphocytes, Absolute   Date Value Ref Range Status   08/23/2024 2.77 0.70 - 3.10 10*3/mm3 Final     Monocytes, Absolute   Date Value Ref Range Status   08/23/2024 0.85 0.10 - 0.90 10*3/mm3 Final     Eosinophils, Absolute   Date Value Ref Range Status   08/23/2024 0.33 0.00 - 0.40 10*3/mm3 Final     Basophils, Absolute   Date Value Ref Range Status   08/23/2024 0.09 0.00 - 0.20 10*3/mm3 Final     Immature Grans, Absolute   Date Value Ref Range Status   08/23/2024 0.07 (H) 0.00 - 0.05 10*3/mm3 Final     nRBC   Date Value Ref Range Status   08/23/2024 0.0 0.0 - 0.2 /100 WBC Final       Lab Results   Component Value Date    GLUCOSE 206 (H) 08/24/2024     BUN 16 08/24/2024    CREATININE 1.39 (H) 08/24/2024     08/24/2024    K 4.4 08/24/2024     08/24/2024    CALCIUM 9.1 08/24/2024    PROTEINTOT 6.4 08/24/2024    ALBUMIN 3.7 08/24/2024    ALT 28 08/24/2024    AST 53 (H) 08/24/2024    ALKPHOS 60 08/24/2024    BILITOT 0.6 08/24/2024    GLOB 2.7 08/24/2024    AGRATIO 1.4 08/24/2024    BCR 11.5 08/24/2024    ANIONGAP 16.0 (H) 08/24/2024    EGFR 51.8 (L) 08/24/2024     MRI Brain Without Contrast    Result Date: 8/24/2024  Impression: Patchy areas of diffusion restriction present within the right periventricular white matter, the right basal ganglia and the right temporal lobe and the right occipital lobe consistent with recent or acute ischemia. Given the degree of signal intensity in presence of FLAIR signal change, findings likely more subacute. No evidence of hemorrhage. No significant mass effect or midline shift. Electronically Signed: Kayla Negron MD  8/24/2024 4:29 AM EDT  Workstation ID: [id number]    XR Chest 1 View    Result Date: 8/24/2024  Impression: Retraction of the endotracheal tube with the tip in the mid trachea. Improved aeration of the lungs bilaterally with mild pulmonary edema pattern present. Electronically Signed: Kayla Negron MD  8/24/2024 2:50 AM EDT  Workstation ID: [id number]    XR Chest 1 View    Result Date: 8/23/2024  Impression: 1.Tip of the endotracheal tube extends into the right mainstem bronchus. Recommend retraction to the mid thoracic trachea. I called this result to Dr. Lezama at 9:20 p.m. on 8/23/2024, and he was aware of this finding. 2.Diffuse bilateral airspace opacities, which may be due to pulmonary edema, atelectasis, and/or pneumonia. Electronically Signed: Latanya Evans  8/23/2024 9:32 PM EDT  Workstation ID: [id number]    CT Angiogram Head w AI Analysis of LVO    Result Date: 8/23/2024  There is a very severe stenosis of the right M1 segment just proximal to the trifurcation with some distal reconstitution of flow. CT  perfusion demonstrates slow flow in the right MCA territory with no decreased vertebral blood flow or blood volume noted. Neurosurgical consult recommended. Findings were discussed with Dr. Garcia at 9:03 p.m. on 8/23/2024 Electronically Signed: David Fernández MD  8/23/2024 9:05 PM EDT  Workstation ID: [id number]    CT Angiogram Neck    Result Date: 8/23/2024  There is a very severe stenosis of the right M1 segment just proximal to the trifurcation with some distal reconstitution of flow. CT perfusion demonstrates slow flow in the right MCA territory with no decreased vertebral blood flow or blood volume noted. Neurosurgical consult recommended. Findings were discussed with Dr. Garcia at 9:03 p.m. on 8/23/2024 Electronically Signed: David Fernández MD  8/23/2024 9:05 PM EDT  Workstation ID: [id number]    CT CEREBRAL PERFUSION WITH & WITHOUT CONTRAST    Result Date: 8/23/2024  There is a very severe stenosis of the right M1 segment just proximal to the trifurcation with some distal reconstitution of flow. CT perfusion demonstrates slow flow in the right MCA territory with no decreased vertebral blood flow or blood volume noted. Neurosurgical consult recommended. Findings were discussed with Dr. Garcia at 9:03 p.m. on 8/23/2024 Electronically Signed: David Fernández MD  8/23/2024 9:05 PM EDT  Workstation ID: [id number]    CT Head Without Contrast Stroke Protocol    Result Date: 8/23/2024  Impression: No acute intracranial finding. Electronically Signed: Medardo Wheat  8/23/2024 8:35 PM EDT  Workstation ID: [id number]     -MRI brain from 8-24-24 images were personally reviewed and showed an acute ischemic stroke in the R putamen and R head of caudate.   -LDL from 8-24-24 was 115  -A1c from 8-24-24 was 6.7  -Echo on 8-24-24 with concern for IV thrombus  -Na on 8-24-24 am was 142    Assessment/Plan   This is a 34-year-old white female, right-handed with multiple vascular risk factor including hypertension, diabetes, right renal  atrophy obesity who presents to [facility] ED via EMS from [place] as a stroke alert for right MCA syndrome.  Patient was a candidate for IV thrombolytic therapy within the window discussed benefits and risk with patient and family both agreeable to receive medication patient received medication after controlling blood pressure tenecteplase at 2051 with blood pressure at 167/105.  Patient was a candidate for mechanical thrombectomy as CTA head and neck revealed right M1 occlusion.  Cath Lab was initiated. At that time patient started to have altered mental status patient was intubated to protect her airway.       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  -Risk factor: Hypertension, hypertension lipidemia, obesity, diabetes, marijuana use, on progesterone(Mirena IUD)  -MRI brain from 8-24-24 images were personally reviewed and showed an acute ischemic stroke in the R putamen and R head of caudate.   -LDL from 8-24-24 was 115  -A1c from 8-24-24 was 6.7  -Na on 8-24-24 am was 142  -Echo on 8-24-24 with concern for IV thrombus    Recommendations:  - Given the IV thrombus on the ECHO, will start heparin gtt (low dose, no bolus protocol)  - Discontinue Aspirin 81 mg.  -TIA\ischemic stroke with IV thrombolytic therapy order set in place  -Postprocedural order set in place  -Systolic blood pressure goal less than 140 (130-150) Cardene drip ordered to maintain goal.  -CT head 24 hours ordered and pending scheduled 8/24/2024 at 2100  -NIH\neurocheck per protocol  -PT\OT\SLP evaluation  -Continue high intensity statin Lipitor 80 mg p.o. at night and daily for secondary stroke prevention  -Neurology stroke will continue to follow-up.     #Essential hypertension  -Patient was hypertensive on admission 190/106, received 5 mg of labetalol IV,  currently on Cardene drip to keep systolic blood pressure less than 140 (130-150).  Management by intensivist.     #Diabetes hypertension complicated by hyperglycemia  -On admission blood glucose was reported 400 will recommend diabetes lab rule out any ketones currently patient is on insulin drip management by intensivist.  -A1c ordered and pending  -Blood glucose goal 1 40-1 80, avoid hypoglycemia-diabetes education is needed when appropriate.  Worsen outcome     #Marijuana use  #Substance abuse  -Patient was reported to use marijuana.  -Counseling on cessation  -UDS ordered and revealed methamphetamine and THC in the urine.     #Nicotine product use via vaping  -Daily vaping  -Counseling on cessation     #Morbid obesity  -BMI 49.44  -Complicates all aspects of care  -Counseling on healthy diet exercise and weight loss when appropriate.     #Medication noncompliance  -Discussed with patient and family importance of compliance with medication, verbalized understanding.    Stroke will continue to follow. Please call for any further questions or concerns  =================================  Crow Day MD, Msc, PhD  Vascular Neurologist  [facility]

## 2024-08-24 NOTE — SIGNIFICANT NOTE
08/24/24 1030   SLP Deferred Reason   SLP Deferred Reason Unable to evaluate, medical status change  (Events and chart reviewed. Pt is orally intubated. SLP will place on hold. D/w RN.)

## 2024-08-24 NOTE — PROGRESS NOTES
Intensive Care Follow-up     Hospital:  LOS: 1 day   Ms. Keya Hamilton, 34 y.o. female is followed for:   Cerebral infarction due to stenosis of right middle cerebral artery        Subjective     Keya Hamilton is a 34 y.o. female with PMH of T2DM, right renal atrophy, HTN, morbid obesity, and SCOTT on PAP who presents to [facility] ED on 8/23/24 for evaluation of somnolence, dysarthria, left-hemiparesis, and rightward gaze deviation concerning for stroke.      The patient began to experience the aforementioned symptoms with LKW reportedly at 1900. She presented with NIH 11. CT head unrevealing and CTP displayed a R MCA territory perfusion deficit with the CTA head/neck revealing severe stenosis of the right M1 segment just proximal to the trifurcation with some distal reconstitution flow. EKG shows ST and BP was elevated at 185/148 given a total of 25 mg IV labetalol and 5 mg IV hydralazine. She was evaluated by our stroke neurology services deemed a candidate for thrombolytic therapy given TNKase. She will be admitted to the ICU for higher level of care.      Labs did show an initial HS troponin of 64. EKG did show sinus tach without obvious ischemic changes.      In the ED she was intubated. ETT does appear to project into the right mainstem also noted to have diffuse airspace disease bilaterally. CK was elevated at 260.   Interval History:  The chart has been reviewed.  The patient has remained afebrile.  Chest x-ray has been reviewed and does show improvement in the bilateral congestion.  The patient is currently on 100% FiO2 with a PEEP of 14.  I have been able to rapidly de-escalate her oxygen and PEEP support at the bedside.  She is following commands.  Currently on a heparin drip secondary to the finding of a intracardiac thrombus.    The patient's past medical, surgical and social history were reviewed and updated in Epic as appropriate.        Objective     Infusions:  dexmedetomidine, 0.2-1.5  "mcg/kg/hr  heparin, 8 Units/kg/hr, Last Rate: 8 Units/kg/hr (08/24/24 0907)  insulin, 0-100 Units/hr, Last Rate: 5.5 Units/hr (08/24/24 1022)  niCARdipine, 5-15 mg/hr, Last Rate: 7.5 mg/hr (08/24/24 0042)  Pharmacy to Dose Heparin,   propofol, 5-50 mcg/kg/min, Last Rate: 15 mcg/kg/min (08/24/24 0043)      Medications:  atorvastatin, 80 mg, Nasogastric, Nightly  [START ON 8/25/2024] azithromycin, 500 mg, Intravenous, Q24H  [START ON 8/25/2024] cefTRIAXone, 2,000 mg, Intravenous, Q24H  [START ON 8/25/2024] pantoprazole, 40 mg, Intravenous, QAM AC  sodium chloride, 10 mL, Intravenous, Q12H        Vital Sign Min/Max for last 24 hours  Temp  Min: 98.4 °F (36.9 °C)  Max: 98.4 °F (36.9 °C)   BP  Min: 86/51  Max: 187/131   Pulse  Min: 92  Max: 115   Resp  Min: 20  Max: 23   SpO2  Min: 92 %  Max: 100 %   No data recorded       Input/Output for last 24 hour shift  08/23 0701 - 08/24 0700  In: 866.2 [I.V.:516.2]  Out: 1050 [Urine:1050]   Mode: VC+/AC  FiO2 (%):  [100 %] 100 %  S RR:  [10-20] 20  S VT:  [360 mL] 360 mL  PEEP/CPAP (cm H2O):  [14 cm H20] 14 cm H20  MAP (cm H2O):  [16-18] 16  Objective:  General Appearance:  Uncomfortable, ill-appearing and in no acute distress.    Vital signs: (most recent): Blood pressure 134/73, pulse 99, temperature 98.4 °F (36.9 °C), temperature source Oral, resp. rate 23, height 160 cm (62.99\"), weight 127 kg (279 lb 1.6 oz), SpO2 100%.    HEENT: (Endotracheal tube in place.)    Lungs:  Normal effort and normal respiratory rate.  Breath sounds clear to auscultation.  She is not in respiratory distress.    Heart: Normal rate.  Regular rhythm.  S1 normal and S2 normal.  No murmur.   Chest: Symmetric chest wall expansion.   Abdomen: Abdomen is soft and non-distended.  Bowel sounds are normal.   There is no abdominal tenderness.   There is no mass.   Neurological: (Arousable on the ventilator.  Follows commands.).    Pupils:  Pupils are equal, round, and reactive to light.  Pupils are equal. "   Skin:  Warm.                Results from last 7 days   Lab Units 08/24/24  0833 08/23/24  2055   WBC 10*3/mm3 16.61* 11.16*   HEMOGLOBIN g/dL 14.9 16.1*   PLATELETS 10*3/mm3 340 417     Results from last 7 days   Lab Units 08/24/24  0102   SODIUM mmol/L 141   POTASSIUM mmol/L 4.4   CO2 mmol/L 20.0*   BUN mg/dL 16   CREATININE mg/dL 1.39*   GLUCOSE mg/dL 206*     Estimated Creatinine Clearance: 75.4 mL/min (A) (by C-G formula based on SCr of 1.39 mg/dL (H)).            I reviewed the patient's results and images.     Assessment & Plan   Impression        R M1 stenosis    T2DM    HTN    Morbid obesity with BMI of 45.0-49.9, adult    SCOTT    Hypertensive urgency    R/O Seizure    Acute respiratory failure    Acute stroke due to ischemia       Plan        Continue with heparin drip for now.  Continue to wean oxygen support and we will place her on spontaneous mode and attempts to liberate from the ventilator today.  Continue with antimicrobial therapy for now though I suspect we are dealing more with respiratory failure secondary to pulmonary edema rather than actual pneumonia.  Patient does remain at very high risk of worsening secondary to acute CVA and respiratory failure.    Plan of care and goals reviewed with mulitdisciplinary/antibiotic stewardship team during rounds.   I discussed the patient's findings and my recommendations with patient, family, nursing staff, and consulting provider     High level of risk due to:  drug(s) requiring intensive monitoring for toxicity, parenteral controlled substances, and decision to de-escalate care.        Blayne Haddad MD, [facility]  Pulmonology and Critical Care Medicine

## 2024-08-24 NOTE — CONSULTS
[facility]   [address], [address]  [place], [place], [address]    [phone]  [email or web address]           INPATIENT CONSULTATION NOTE    Patient Care Team:  Patient Care Team:  Cuong Parks MD as PCP - General (Family Medicine)    Requesting Provider and Reason for consultation: The patient is being seen today at the request of Dr. Haddad for LV thrombus.     Chief complaint:   Chief Complaint   Patient presents with    Stroke            Subjective:     Cardiac focused problem list:  LV thrombus  Echocardiogram, 8/24/2024:  LVEF 36.7%. LVH. Diastolic dysfunction noted. Thrombus present in LV 2.22 cm x 0.96 cm. Dilated LA. Indeterminate bubble study.   Right MCA syndrome  Presented to [facility] ED with left sided hemiparesis, dysarthria, somnolence, rightward gaze deviation, 8/23/2024. CT head unrevealing, CTP displayed right MCA perfusion deficit with M1 occlusion.  Status post TNK.   Status post urgent mechanical thrombectomy, 8/23/2024, Dr. Medina.   Hypertension   Type 2 diabetes mellitus   Right renal atrophy  SCOTT on CPAP  Substance abuse   Obesity     HPI:      Keya Hamilton is a 34 y.o. female.  Patient presented to [facility] ED with left sided hemiparesis, somnolence, dysarthria, and rightward gaze deviation.  CT head unremarkable.  CTP displayed a right MCA territory  perfusion deficit with the CTA head/nec revealing severe stenosis of the right M1 segment.  Deemed suitable candidate for thrombolytic therapy and received TNK.  Was intubated due to inability to protect airway. Then urgently taken to the cath lab for mechanical thrombectomy per Dr. Medina.  Echocardiogram revealed LV thrombus measuring 2.22 cm x 0.96 cm. Patient started in heparin infusion and cardiology consulted for management of LV thrombus.     Conversation with patient's  via telephone.  He reports that they recently went on a cruise in early July.  His wife was ill when they returned  "with respiratory symptoms.  Was evaluated in the ED at [place] mid July and treated for pneumonia.  He reports a second ED visit 1 week later for superficial thrombophlebitis from IV site.  He reports that the patient was evaluated by her primary care 2 days ago for a regular checkup.  Discussed urine drug screen positive for THC and methamphetamines.  Patient's has been reports no known history of meth use, considers \"laced\" marijuana as possibility.  Patient remains intubated today.  Echocardiogram also with decreased EF of 36%.    Review of Systems:  As noted in the HPI    PFSH:  Patient Active Problem List   Diagnosis    R M1 stenosis    T2DM    HTN    Morbid obesity with BMI of 45.0-49.9, adult    SCOTT    Hypertensive urgency    R/O Seizure    Acute respiratory failure    Acute stroke due to ischemia       No current facility-administered medications on file prior to encounter.     Current Outpatient Medications on File Prior to Encounter   Medication Sig Dispense Refill    lisinopril (PRINIVIL,ZESTRIL) 40 MG tablet Take 1 tablet by mouth Daily.         Social History     Socioeconomic History    Marital status:    Tobacco Use    Smoking status: Never    Smokeless tobacco: Never   Vaping Use    Vaping status: Every Day    Substances: Nicotine    Devices: Disposable   Substance and Sexual Activity    Alcohol use: Not Currently    Drug use: Yes     Types: Marijuana     Comment: occasional    Sexual activity: Defer            Objective:     Vital Sign Min/Max for last 24 hours  Temp  Min: 98.4 °F (36.9 °C)  Max: 98.4 °F (36.9 °C)   BP  Min: 86/51  Max: 187/131   Pulse  Min: 92  Max: 115   Resp  Min: 20  Max: 23   SpO2  Min: 92 %  Max: 100 %   No data recorded      Intake/Output Summary (Last 24 hours) at 8/24/2024 1203  Last data filed at 8/24/2024 0600  Gross per 24 hour   Intake 866.24 ml   Output 1050 ml   Net -183.76 ml           Vitals:    08/24/24 1100   BP: 134/73   Pulse: 99   Resp:    Temp:  "   SpO2: 100%     CONSTITUTIONAL: No acute distress  RESPIRATORY: Mechanically ventilated. Clear to auscultation bilaterally without wheezing or rales  CARDIOVASCULAR: Regular rate and rhythm with normal S1 and S2. Soft systolic murmur noted.  PERIPHERAL VASCULAR: No carotid bruit bilaterally.  Normal radial pulse. There is mild lower extremity edema bilaterally.    Labs and radiologic results:  Today's results were reviewed by myself.    Cardiac Data:    EKG 8/23/2024:  Sinus tachycardia with nonspecific ST and T wave changes.     Results for orders placed during the hospital encounter of 08/23/24    Adult Transthoracic Echo Complete W/ Cont if Necessary Per Protocol (With Agitated Saline)    Interpretation Summary  Images from the original result were not included.      Left ventricular systolic function is moderately decreased. Calculated left ventricular EF = 36.7%  global hypokinesis.  LV cavity mildly dilated.  RV size and function normal    Left ventricular wall thickness is consistent with hypertrophy.    Left ventricular diastolic dysfunction is noted.    There is a thrombus present in the left ventricle in apex. 2.22 cm by 0.96 cm    The left atrial cavity is dilated.    Estimated right ventricular systolic pressure from tricuspid regurgitation is normal (<35 mmHg).    Indeterminate bubble study      Tele:  Sinus tachycardia          Assessment and Plan:     Problem list:    R M1 stenosis    T2DM    HTN    Morbid obesity with BMI of 45.0-49.9, adult    SCOTT    Hypertensive urgency    R/O Seizure    Acute respiratory failure    Acute stroke due to ischemia      ASSESSMENT:  LV thrombus  Echocardiogram shows decreased LVEF 36%, LV thrombus, indeterminate bubble study.   Acute HFrEF  New diagnosis.  Unclear etiology  Possible contributing causes include recent pneumonia (returned from cruise from [place], given antibiotics), positive meth and marijuana and UDS  LVEF 36% with global hypokinesis on  echo.   CVA secondary to right MCA occlusion   Status post thrombolytic therapy, TNK  Status post urgent mechanical thrombectomy, right MCA  Hypertension   Type 2 diabetes mellitus   ANA  Acute respiratory failure   Substance abuse   UDS positive for THC, methamphetamines (possibly laced marijuana?, no known history of meth use per patient's )    PLAN:  LV thrombus noted on echocardiogram, likely source of stroke.   Continue heparin infusion.   Cause of LV thrombus is systolic HF; new diagnosis; chronicity not known.  Possibly due to recent pneumonia following return from a cruise, [place].  Addition of heart failure medications as tolerated.  Off nicardipine drip currently.  Start low-dose beta-blocker, Toprol 25 mg daily.  Consider addition of Jardiance 10 mg daily tomorrow.  If renal function stable would add Entresto, spironolactone next.    Scribed for Dr. Hooker by Faustina Reyes, APRN. 8/24/2024  12:03 EDT      I, Dion Hooker MD, personally performed the services as scribed by the above named individual. I have made any necessary edits and it is both accurate and complete.     Dion Hooker MD, MSc, FACC, [facility]  Interventional Cardiology  [facility]

## 2024-08-24 NOTE — ED TRIAGE NOTES
Patient presents for possible stroke. Last known normal is 1900 today. Patient was sitting at the computer when she started experiencing left sided weakness, slurred speech, sonorous breathing, and altered mental status

## 2024-08-24 NOTE — PROGRESS NOTES
HEPARIN INFUSION  Keya Hamilton is a  34 y.o. female receiving heparin infusion.     Therapy for (VTE/Cardiac):  Cardiac  ?  IV thrombus on ECHO; heparin gtt protocol ordered per Stroke-Neuro  Patient Weight:  127 kg  Initial Bolus (Y/N):  NO BOLUSES EVER  Any Bolus (Y/N):  NO BOLUSES EVER        Signs or Symptoms of Bleeding: No S/Sx overt bleeding noted - d/w RN.    Cardiac or Other (Not VTE)  Initial rate: 12 units/kg/hr (Max 1,000 units/hr)   Anti Xa Infusion Hold time Change infusion Dose (Units/kg/hr) Next Anti Xa or aPTT Level Due   < 0.11 None Increase by  3 Units/kg/hr 6 hours   0.11- 0.19 None Increase by  2 Units/kg/hr 6 hours   0.2 - 0.29 0 None Increase by  1 Units/kg/hr 6 hours   0.3 - 0.5 0 None No Change 6 hours (after 2 consecutive levels in range check qAM)   0.51 - 0.6 0 None Decrease by  1 Units/kg/hr 6 hours   0.61 - 0.8 0 30 Minutes Decrease by  2 Units/kg/hr 6 hours   0.81 - 1 0 60 Minutes Decrease by  3 Units/kg/hr 6 hours   >1 0 Hold  After Anti Xa less than 0.5 decrease previous rate by  4 Units/kg/hr  Every 2 hours until Anti Xa  less than 0.5 then when infusion restarts in 6 hours     Recommend Xa every 6 hours.     Results from last 7 days   Lab Units 08/23/24  2055   HEMOGLOBIN g/dL 16.1*   HEMATOCRIT % 47.2*   PLATELETS 10*3/mm3 417       Date   Time   Anti-Xa Current Rate (Unit/kg/hr) Bolus   (Units) Rate Change   (Unit/kg/hr) New Rate (Unit/kg/hr) Next   Anti-Xa Comments  Pump Check Daily   8/24 0826 STAT 0 -- +8 8 1500 Rate verified; D/w ALEYDA Aparicio       --           --           --           --           --           --           --           --           --           --           --           --           --           --           --           --           --           --           --           --         Diony Fernandez [facility]  8/24/2024  08:26 EDT

## 2024-08-24 NOTE — CONSULTS
Stroke Consult Note    Patient Name: Keya Hamilton   MRN: [id number]  Age: 34 y.o.  Sex: female  : 1990    Primary Care Physician: Cuong Parks MD  Referring Physician:  Teja CAMARA    TIME STROKE TEAM CALLED:  EST     TIME PATIENT SEEN:  EST    Handedness: Right   Race:     Chief Complaint/Reason for Consultation: Right MCA syndrome    HPI:   Keya Hamilton 34-year-old white female, right-handed with significant health diagnosis for diabetes, 1 kidney, hypertension and obesity, SCOTT on CPAP, substance use marijuana, daily vaping who presents to [facility] ED via EMS from [place] for left hemiparesis, dysarthria, right gaze, right partial hemianopsia, L side neglect -- right MCA syndrome.  Upon arrival patient was taken immediately to CT suite for stat CT head without contrast which was negative for acute abnormality, CTA CTP in process.  Upon exam patient noted to have somnolence, dysarthria, left-hemiparesis, and rightward gaze.   Patient blood pressure was elevated 195/106, CT head without contrast negative for acute abnormality no hemorrhage, patient was a candidate for TNK, discussed risk and benefit of IV thrombolytic therapy with patient patient is agreeable to receive it. current NIH 11, given 10 mg of labetalol brought the blood pressure down to 156/106 TNK was given at , CTA head and neck remarkable for right MCA syndrome M1 occlusion Cath Lab was initiated, anesthesia was as well alerted as patient started to have level of conscious decline, risk for inability to protect her airway patient was intubation/    Patient does live with her , daughter, brother.  Patient is independent with all ADL.  Noncompliant with her medication.  Daily vape and THC use.  Denied any use of illicit drug or alcohol use.    Last Known Normal Date/Time:  EST     Review of Systems   Constitutional:  Positive for activity change.   HENT:  Positive for trouble swallowing.    Eyes:  Positive  for visual disturbance.   Respiratory:  Positive for shortness of breath.    Cardiovascular: Negative.    Gastrointestinal: Negative.    Endocrine: Negative.    Genitourinary: Negative.    Musculoskeletal:  Positive for gait problem.   Skin: Negative.    Allergic/Immunologic: Negative.    Neurological:  Positive for facial asymmetry, speech difficulty and weakness.   Hematological: Negative.    Psychiatric/Behavioral: Negative.        No past medical history on file.  No past surgical history on file.  No family history on file.  Social History     Socioeconomic History    Marital status:      Allergies   Allergen Reactions    Codeine Hives     Prior to Admission medications    Not on File         Heart Rate:  [107-111] 109  BP: (164-168)/(115-121) 168/121  Neurological Exam  Mental Status  Arousable to verbal stimuli. Oriented to person, place and time. Oriented to person, place, and time. Moderate dysarthria present. Expressive aphasia present. Attention and concentration are normal.    Cranial Nerves  CN II: Right homonymous hemianopsia. Left homonymous hemianopsia.  CN III, IV, VI: Pupils equal round and reactive to light bilaterally.  CN V:  Right: Normal corneal reflex.  Left: Diminished sensation of the entire left side of the face. Normal corneal reflex.  CN VII:  Left: There is central facial weakness.  CN VIII: Intact hearing bilateral.  CN XI:  Left: Sternocleidomastoid strength is weak.  CN XII: Tongue midline without atrophy or fasciculations.    Motor  Normal muscle bulk throughout. No fasciculations present. Increased muscle tone. No abnormal involuntary movements. Left pronator drift. Left hemiparesis.  Patient noted to be posturing decorticate left arm.    Sensory  Light touch abnormality:     Reflexes  Right Plantar: downgoing  Left Plantar: upgoing    Coordination    Unable to participate.    Gait   Abnormal gait.  Unable to participate.      Physical Exam  Constitutional:       General:  She is in acute distress.      Appearance: She is obese. She is ill-appearing.      Comments: Drowsy, opens eyes for verbal stimulation, follow simple commands   HENT:      Head: Normocephalic and atraumatic.      Mouth/Throat:      Mouth: Mucous membranes are moist.   Eyes:      Pupils: Pupils are equal, round, and reactive to light.   Cardiovascular:      Rate and Rhythm: Regular rhythm. Tachycardia present.   Pulmonary:      Effort: Respiratory distress present.      Comments: Patient was intubated  Abdominal:      Tenderness: There is no abdominal tenderness.   Musculoskeletal:         General: Normal range of motion.      Cervical back: Normal range of motion and neck supple.   Skin:     General: Skin is warm and dry.      Capillary Refill: Capillary refill takes less than 2 seconds.   Neurological:      Mental Status: She is oriented to person, place, and time.      Cranial Nerves: Cranial nerve deficit and dysarthria present.      Sensory: Sensory deficit present.      Motor: Weakness present.      Coordination: Coordination abnormal.      Gait: Gait abnormal.      Deep Tendon Reflexes: Reflexes abnormal.   Psychiatric:         Mood and Affect: Mood normal.         Behavior: Behavior normal.         Thought Content: Thought content normal.         Judgment: Judgment normal.         Acute Stroke Data    Thrombolytic Inclusion / Exclusion Criteria    Time: 21:10 EDT  Person Administering Scale: MICHELLE Pinto    Inclusion Criteria  [x]   18 years of age or greater   []   Onset of symptoms < 4.5 hours before beginning treatment (stroke onset = time patient was last seen well or without symptoms).   []   Diagnosis of acute ischemic stroke causing measurable disabling deficit (Complete Hemianopia, Any Aphasia, Visual or Sensory Extinction, Any weakness limiting sustained effort against gravity)   []   Any remaining deficit considered potentially disabling in view of patient and practitioner   Exclusion  criteria (Do not proceed with Alteplase if any are checked under exclusion criteria)  []   Onset unknown or GREATER than 4.5 hours   []   ICH on CT/MRI   []   CT demonstrates hypodensity representing acute or subacute infarct   []   Significant head trauma or prior stroke in the previous 3 months   []   Symptoms suggestive of subarachnoid hemorrhage   []   History of un-ruptured intracranial aneurysm GREATER than 10 mm   []   Recent intracranial or intraspinal surgery within the last 3 months   []   Arterial puncture at a non-compressible site in the previous 7 days   []   Active internal bleeding   []   Acute bleeding tendency   []   Platelet count LESS than 100,000 for known hematological diseases such as leukemia, thrombocytopenia or chronic cirrhosis   []   Current use of anticoagulant with INR GREATER than 1.7 or PT GREATER than 15 seconds, aPTT GREATER than 40 seconds   []   Heparin received within 48 hours, resulting in abnormally elevated aPTT GREATER than upper limit of normal   []   Current use of direct thrombin inhibitors or direct factor Xa inhibitors in the past 48 hours   []   Elevated blood pressure refractory to treatment (systolic GREATER than 185 mm/Hg or diastolic  GREATER than 110 mm/Hg   []   Suspected infective endocarditis and aortic arch dissection   []   Current use of therapeutic treatment dose of low-molecular-weight heparin (LMWH) within the previous 24 hours   []   Structural GI malignancy or bleed   Relative exclusion for all patients  []   Only minor non-disabling symptoms   []   Pregnancy   []   Seizure at onset with postictal residual neurological impairments   []   Major surgery or previous trauma within past 14 days   []   History of previous spontaneous ICH, intracranial neoplasm, or AV malformation   []   Postpartum (within previous 14 days)   []   Recent GI or urinary tract hemorrhage (within previous 21 days)   []   Recent acute MI (within previous 3 months)   []   History of  un-ruptured intracranial aneurysm LESS than 10 mm   []   History of ruptured intracranial aneurysm   []   Blood glucose LESS than 50 mg/dL (2.7 mmol/L)   []   Dural puncture within the last 7 days   []   Known GREATER than 10 cerebral microbleeds   Additional exclusions for patients with symptoms onset between 3 and 4.5 hours.  []   Age > 80.   []   On any anticoagulants regardless of INR  >>> Warfarin (Coumadin), Heparin, Enoxaparin (Lovenox), fondaparinux (Arixtra), bivalirudin (Angiomax), Argatroban, dabigatran (Pradaxa), rivaroxaban (Xarelto), or apixaban (Eliquis)   []   Severe stroke (NIHSS > 25).   []   History of BOTH diabetes and previous ischemic stroke.   [x]   The risks and benefits have been discussed with the patient or family related to the administration of IV thrombolytic therapy for stroke symptoms.   [x]   I have discussed and reviewed the patient's case and imaging with the attending prior to IV thrombolytic therapy.   2051 Time IV thrombolytic administered       Hospital Meds:  Scheduled- [START ON 8/24/2024] aspirin, 325 mg, Oral, Daily   Or  [START ON 8/24/2024] aspirin, 300 mg, Rectal, Daily  atorvastatin, 80 mg, Oral, Nightly  labetalol, 5 mg, Intravenous, Once  Propofol, , ,   tenecteplase for stroke, 25 mg, Intravenous, Once   Followed by  sodium chloride, 10 mL, Intravenous, Once  sodium chloride, 10 mL, Intravenous, Q12H      Infusions- propofol, 5-50 mcg/kg/min       PRNs-   Propofol    sodium chloride    sodium chloride    sodium chloride    Functional Status Prior to Current Stroke/Rochelle Score: 0    NIH Stroke Scale  Time: 21:10 EDT  Person Administering Scale: MICHELLE Pinto    Interval: baseline  1a. Level of Consciousness: 0-->Alert, keenly responsive  1b. LOC Questions: 0-->Answers both questions correctly  1c. LOC Commands: 1-->Performs one task correctly  2. Best Gaze: 1-->Partial gaze palsy, gaze is abnormal in one or both eyes, but forced deviation or total gaze  paresis is not present  3. Visual: 1-->Partial hemianopia  4. Facial Palsy: 2-->Partial paralysis (total or near-total paralysis of lower face)  5a. Motor Arm, Left: 2-->Some effort against gravity, limb cannot get to or maintain (if cued) 90 (or 45) degrees, drifts down to bed, but has some effort against gravity  5b. Motor Arm, Right: 0-->No drift, limb holds 90 (or 45) degrees for full 10 secs  6a. Motor Leg, Left: 2-->Some effort against gravity, leg falls to bed by 5 secs, but has some effort against gravity  6b. Motor Leg, Right: 0-->No drift, leg holds 30 degree position for full 5 secs  7. Limb Ataxia: 0-->Absent  8. Sensory: 0-->Normal, no sensory loss  9. Best Language: 1-->Mild-to-moderate aphasia, some obvious loss of fluency or facility of comprehension, without significant limitation on ideas expressed or form of expression. Reduction of speech and/or comprehension, however, makes conversation. . . (see row details)  10. Dysarthria: 1-->Mild-to-moderate dysarthria, patient slurs at least some words and, at worst, can be understood with some difficulty  11. Extinction and Inattention (formerly Neglect): 0-->No abnormality    Total (NIH Stroke Scale): 11       Results Reviewed:  I have personally reviewed current lab, radiology, and data and agree with results.  Glucose 404-207  Sodium 140  Potassium 4.3  BUN 12  Creatinine 0.9  GFR 90  AST 18  ALT 15  WBC 11.16  H&H 16.1\47.2  Platelet 417    XR Chest 1 View    Result Date: 8/23/2024  Impression: 1.Tip of the endotracheal tube extends into the right mainstem bronchus. Recommend retraction to the mid thoracic trachea. I called this result to Dr. Lezama at 9:20 p.m. on 8/23/2024, and he was aware of this finding. 2.Diffuse bilateral airspace opacities, which may be due to pulmonary edema, atelectasis, and/or pneumonia. Electronically Signed: Latanya Evans  8/23/2024 9:32 PM EDT  Workstation ID: [id number]    CT Angiogram Head w AI Analysis of LVO    Result  Date: 8/23/2024  There is a very severe stenosis of the right M1 segment just proximal to the trifurcation with some distal reconstitution of flow. CT perfusion demonstrates slow flow in the right MCA territory with no decreased vertebral blood flow or blood volume noted. Neurosurgical consult recommended. Findings were discussed with Dr. Garcia at 9:03 p.m. on 8/23/2024 Electronically Signed: David Fernández MD  8/23/2024 9:05 PM EDT  Workstation ID: [id number]    CT Angiogram Neck    Result Date: 8/23/2024  There is a very severe stenosis of the right M1 segment just proximal to the trifurcation with some distal reconstitution of flow. CT perfusion demonstrates slow flow in the right MCA territory with no decreased vertebral blood flow or blood volume noted. Neurosurgical consult recommended. Findings were discussed with Dr. Garcia at 9:03 p.m. on 8/23/2024 Electronically Signed: David Fernández MD  8/23/2024 9:05 PM EDT  Workstation ID: [id number]    CT CEREBRAL PERFUSION WITH & WITHOUT CONTRAST    Result Date: 8/23/2024  There is a very severe stenosis of the right M1 segment just proximal to the trifurcation with some distal reconstitution of flow. CT perfusion demonstrates slow flow in the right MCA territory with no decreased vertebral blood flow or blood volume noted. Neurosurgical consult recommended. Findings were discussed with Dr. Garcia at 9:03 p.m. on 8/23/2024 Electronically Signed: David Fernández MD  8/23/2024 9:05 PM EDT  Workstation ID: [id number]    CT Head Without Contrast Stroke Protocol    Result Date: 8/23/2024  Impression: No acute intracranial finding. Electronically Signed: Medardo Wheat  8/23/2024 8:35 PM EDT  Workstation ID: [id number]          Assessment/Plan:    This is a 34-year-old white female, right-handed with multiple vascular risk factor including hypertension, diabetes, right renal atrophy obesity who presents to [facility] ED via EMS from [place] as a stroke alert for right MCA syndrome.   Patient was a candidate for IV thrombolytic therapy within the window discussed benefits and risk with patient and family both agreeable to receive medication patient received medication after controlling blood pressure tenecteplase at 2051 with blood pressure at 167/105.  Patient was a candidate for mechanical thrombectomy as CTA head and neck revealed right M1 occlusion.  Cath Lab was initiated. At that time patient started to have altered mental status patient was intubated to protect her airway.      Antiplatelet PTA: None  Anticoagulant PTA: None        Right MCA syndrome secondary to right M1 occlusion status post TNK status post MT per Dr. Adam CAPELLAN 3 full recannulization.  -Risk factor: Hypertension, hypertension lipidemia, obesity, diabetes, marijuana use, on progesterone(Mirena IUD)  -Etiology possibly cardioembolic versus hypercoagulable states versus Esus  -TIA\ischemic stroke with IV thrombolytic therapy order set in place  -Postprocedural order set in place  -Systolic blood pressure goal less than 140 (130-150) Cardene drip ordered to maintain goal.  -CT head a.m. dual energy status post mechanical thrombectomy scheduled at 5 AM.  -CT head 24 hours ordered and pending scheduled 8/24/2024 at 2100  -Aspirin 81 mg p.o. or 300 mg rectal 24 hours status post TNK protocol scheduled 8/24/2024 at 2100 if CT head without contrast negative for hemorrhage.  -Admit to the ICU.  -MRI ordered and pending  -Echo ordered and pending  -A1c and lipid profile ordered and pending  -NIH\neurocheck per protocol  -PT\OT\SLP evaluation  -Hypercoagulable labs ordered and pending  -Will initiate high intensity statin Lipitor 80 mg p.o. at night and daily for secondary stroke prevention  -Neurology stroke will continue to follow-up.    Essential hypertension  -Patient was hypertensive on admission 190/106, received 5 mg of labetalol IV, currently on Cardene drip to keep systolic blood pressure less than 140 (130-150).   Management by intensivist.    Diabetes hypertension complicated by hyperglycemia  -On admission blood glucose was reported 400 will recommend diabetes lab rule out any ketones currently patient is on insulin drip management by intensivist.  -A1c ordered and pending  -Blood glucose goal 1 40-1 80, avoid hypoglycemia-diabetes education is needed when appropriate.  Worsen outcome    Marijuana use  Substance abuse  -Patient was reported to use marijuana.  -Counseling on cessation  -UDS ordered and revealed methamphetamine and THC in the urine.    Nicotine product use via vaping  -Daily vaping  -Counseling on cessation    Morbid obesity  -BMI 49.44  -Complicates all aspects of care  -Counseling on healthy diet exercise and weight loss when appropriate.    Medication noncompliance  -Discussed with patient and family importance of compliance with medication, verbalized understanding.    I discussed plan of care with patient, family, ED physician, Dr. Medina neurointerventionist,  neurology attending.  Neurology stroke will continue to follow-up.  Thank you for letting us participate in patient care    MICHELLE Pinto  August 23, 2024  21:10 EDT

## 2024-08-24 NOTE — PROGRESS NOTES
"  [facility]    NEUROSURGERY PROGRESS NOTE    08/24/24    Chief Complaint: Stroke    Subjective: Keya Hamilton is a 34 y.o. female who unfortunately suffered a CVA secondary to a right-sided MCA occlusion.  She presented to [facility] ED yesterday via EMS from [place] for left hemiparesis, rightward gaze, dysarthria, and right partial hemianopsia consistent with right MCA syndrome.  Patient blood pressure on arrival 195/106.  TNK was given at 2051 yesterday.  She was ultimately intubated due to inability to protect her airway.  Ultimately Cath Lab was initiated and she underwent urgent mechanical thrombectomy with Dr. Medina.    She was seen and examined at the bedside this morning.  Family is at the bedside.  NIH 19 this morning.  She is intubated and mechanically ventilated.  She is somewhat able to follow commands.  Still presenting with left hemiparesis.  No events overnight.    1 Day Post-Op    Objective:     /91   Pulse 102   Temp 98.4 °F (36.9 °C) (Oral)   Resp 23   Ht 160 cm (62.99\")   Wt 127 kg (279 lb 1.6 oz)   LMP  (LMP Unknown)   SpO2 99%   BMI 49.45 kg/m²        Physical Exam  General: She is intubated and mechanically ventilated.  She is able to follow prompted and one-step commands, concentric on the right side.  No response to commands on the left side.  Overall she is drowsy secondary to sedation.  I was able to get her to move her right upper and right lower extremities.  She was able to hold up her right lower extremity against gravity.  Unable to hold her left lower extremity against gravity or left upper extremity.   strength intact on the right side only.  Pupils are pinpoint but reactive.  Groin incision is healing as expected.        Intake/Output Summary (Last 24 hours) at 8/24/2024 0928  Last data filed at 8/24/2024 0600  Gross per 24 hour   Intake 866.24 ml   Output 1050 ml   Net -183.76 ml "         Current Facility-Administered Medications:     atorvastatin (LIPITOR) tablet 80 mg, 80 mg, Oral, Nightly, Olayinka May, APRN    azithromycin (ZITHROMAX) 500 mg in sodium chloride 0.9 % 250 mL IVPB-VTB, 500 mg, Intravenous, Q24H, Cinthia Banegas MD, 500 mg at 08/24/24 0509    cefTRIAXone (ROCEPHIN) 2,000 mg in sodium chloride 0.9 % 100 mL MBP, 2,000 mg, Intravenous, Q24H, Cinthia Banegas MD, Last Rate: 200 mL/hr at 08/24/24 0510, 2,000 mg at 08/24/24 0510    dexmedetomidine (PRECEDEX) 400 mcg in 100 mL NS infusion, 0.2-1.5 mcg/kg/hr, Intravenous, Titrated, Cinthia Banegas MD    dextrose (D50W) (25 g/50 mL) IV injection 10-50 mL, 10-50 mL, Intravenous, Q15 Min PRN, Aida Holbrook APRN    dextrose (GLUTOSE) oral gel 15 g, 15 g, Oral, Q15 Min PRN, Aida Holbrook APRN    glucagon (GLUCAGEN) injection 1 mg, 1 mg, Intramuscular, Q15 Min PRN, Aida Holbrook APRN    heparin 67892 units/250 mL (100 units/mL) in 0.45 % NaCl infusion, 8 Units/kg/hr, Intravenous, Titrated, Josie Delgado APRN, Last Rate: 10.16 mL/hr at 08/24/24 0907, 8 Units/kg/hr at 08/24/24 0907    insulin regular 1 unit/mL in 0.9% sodium chloride (Glucommander), 0-100 Units/hr, Intravenous, Titrated, Aida Holbrook APRN, Last Rate: 4.2 mL/hr at 08/24/24 0827, 4.2 Units/hr at 08/24/24 0827    niCARdipine (CARDENE) 25mg in 250mL NS infusion, 5-15 mg/hr, Intravenous, Titrated, Olayinka May, APRNEAL, Last Rate: 75 mL/hr at 08/24/24 0042, 7.5 mg/hr at 08/24/24 0042    nitroglycerin (NITROSTAT) SL tablet 0.4 mg, 0.4 mg, Sublingual, Q5 Min PRN, Alma Bhagat, APRN    Pharmacy to Dose Heparin, , Does not apply, Continuous PRN, Josie Delgado, APRNEAL    Potassium Replacement - Follow Nurse / BPA Driven Protocol, , Does not apply, PRN, Aida Holbrook, APRN    propofol (DIPRIVAN) infusion 10 mg/mL 100 mL, 5-50 mcg/kg/min, Intravenous, Titrated, Alton Lezama MD, Last Rate: 11.25 mL/hr at 08/24/24 0043, 15  mcg/kg/min at 08/24/24 0043    sodium chloride 0.9 % flush 10 mL, 10 mL, Intravenous, PRN, Alton Lezama MD    sodium chloride 0.9 % flush 10 mL, 10 mL, Intravenous, Q12H, Ghanim-Moustafa, May, APRN, 10 mL at 08/24/24 0921    sodium chloride 0.9 % flush 10 mL, 10 mL, Intravenous, PRN, Ghanim-Moustafa, May, APRN    sodium chloride 0.9 % flush 10 mL, 10 mL, Intravenous, Q12H, Mala Holbrooka W, APRN, 10 mL at 08/23/24 2332    sodium chloride 0.9 % flush 10 mL, 10 mL, Intravenous, PRN, Aida Holbrook W, APRN    sodium chloride 0.9 % infusion 40 mL, 40 mL, Intravenous, PRN, Ghanim-Moustafa, May, APRN    sodium chloride 0.9 % infusion 40 mL, 40 mL, Intravenous, PRN, Sheron, Aida W, APRN    Laboratory Results:        Lab 08/24/24  0833 08/24/24  0611 08/24/24  0102 08/23/24  2055   WBC 16.61*  --   --  11.16*   HEMOGLOBIN 14.9  --   --  16.1*   HEMATOCRIT 44.2  --   --  47.2*   PLATELETS 340  --   --  417   NEUTROS ABS 15.03*  --   --  7.05*   IMMATURE GRANS (ABS) 0.12*  --   --  0.07*   LYMPHS ABS 0.91  --   --  2.77   MONOS ABS 0.51  --   --  0.85   EOS ABS 0.00  --   --  0.33   MCV 97.8*  --   --  97.7*   LACTATE 3.9* 3.4* 3.5*  --    PROTIME 14.9*  --   --   --    APTT 23.2*  --   --  25.3   HEPARIN ANTI-XA 0.10*  --   --   --          Lab 08/24/24  0102   SODIUM 141   POTASSIUM 4.4   CHLORIDE 105   CO2 20.0*   ANION GAP 16.0*   BUN 16   CREATININE 1.39*   EGFR 51.8*   GLUCOSE 206*   CALCIUM 9.1   HEMOGLOBIN A1C 6.70*         Lab 08/24/24  0102 08/23/24 2055   TOTAL PROTEIN 6.4  --    ALBUMIN 3.7  --    GLOBULIN 2.7  --    ALT (SGPT) 28 15   AST (SGOT) 53* 18   BILIRUBIN 0.6  --    ALK PHOS 60  --          Lab 08/24/24  0833 08/24/24  0102 08/23/24 2055   HSTROP T  --  73* 64*   PROTIME 14.9*  --   --    INR 1.16*  --   --          Lab 08/24/24 0102   CHOLESTEROL 177   LDL CHOL 115*   HDL CHOL 31*   TRIGLYCERIDES 175*             Brief Urine Lab Results  (Last result in the past 365 days)        Color   Clarity    Blood   Leuk Est   Nitrite   Protein   CREAT   Urine HCG        08/24/24 0124 Yellow   Clear   Negative   Negative   Negative   100 mg/dL (2+)           08/24/24 0124               Negative             Microbiology Results (last 10 days)       Procedure Component Value - Date/Time    Respiratory Panel PCR w/COVID-19(SARS-CoV-2) KAVEH/BRIAN/DOROTHEA/PAD/COR/VASYL In-House, NP Swab in UTM/VTM, 2 HR TAT - Swab, Nasopharynx [[id number]]  (Normal) Collected: 08/24/24 0109    Lab Status: Final result Specimen: Swab from Nasopharynx Updated: 08/24/24 0242     ADENOVIRUS, PCR Not Detected     Coronavirus 229E Not Detected     Coronavirus HKU1 Not Detected     Coronavirus NL63 Not Detected     Coronavirus OC43 Not Detected     COVID19 Not Detected     Human Metapneumovirus Not Detected     Human Rhinovirus/Enterovirus Not Detected     Influenza A PCR Not Detected     Influenza B PCR Not Detected     Parainfluenza Virus 1 Not Detected     Parainfluenza Virus 2 Not Detected     Parainfluenza Virus 3 Not Detected     Parainfluenza Virus 4 Not Detected     RSV, PCR Not Detected     Bordetella pertussis pcr Not Detected     Bordetella parapertussis PCR Not Detected     Chlamydophila pneumoniae PCR Not Detected     Mycoplasma pneumo by PCR Not Detected    Narrative:      In the setting of a positive respiratory panel with a viral infection PLUS a negative procalcitonin without other underlying concern for bacterial infection, consider observing off antibiotics or discontinuation of antibiotics and continue supportive care. If the respiratory panel is positive for atypical bacterial infection (Bordetella pertussis, Chlamydophila pneumoniae, or Mycoplasma pneumoniae), consider antibiotic de-escalation to target atypical bacterial infection.    MRSA Screen, PCR (Inpatient) - Swab, Nares [[id number]]  (Normal) Collected: 08/24/24 0109    Lab Status: Final result Specimen: Swab from Nares Updated: 08/24/24 0728     MRSA PCR Negative    Narrative:       The negative predictive value of this diagnostic test is high and should only be used to consider de-escalating anti-MRSA therapy. A positive result may indicate colonization with MRSA and must be correlated clinically.  MRSA Negative                     Diagnostic Imaging: I personally reviewed and interpreted the new imaging.    Assessment and plan:  1.  Status postop day 1 urgent mechanical thrombectomy, right MCA  2.  Right MCA syndrome  3.  Morbid obesity    She was found to have LV thrombus. Patient started on heparin this morning. Continue blood pressure parameters of 130-150 and stroke recommendations per stroke neurology. Groin incision looks dry and intact this morning. Discussed plan of care moving forward with family at the bedside this morning. Neurosurgery will continue to follow.       Any copied data from previous notes included in the (1) HPI, (2) PE, (3) MDM and/or Assessment and Plan has been reviewed and accurate as of 08/24/24.    Nadeen Nettles PA-C  08/24/24  09:28 EDT

## 2024-08-24 NOTE — ED PROVIDER NOTES
Subjective   History of Present Illness  34-year-old female who presents via EMS for evaluation of left-sided weakness.  The patient reported was sitting at her computer at 7 PM was noted to be normal by her .  Upon returning a short time later the patient was noted to be somnolent, with minimal responsiveness.  Was noted to have acute left-sided weakness, dysarthria, and facial droop by EMS and route.  Blood sugar was 167 and route.  The patient has a preceding history of diabetes, a single kidney, and hypertension.  She takes lisinopril for hypertension.  Systolics were between 150-190 enroute.  The patient is able to talk and interact.  She does appear somnolent.  She reports that she had a surgery removal of her kidney which she was 5 to 6 years of age but is not sure why.  She denies a previous history of seizures.  No chest pain or abdominal pain.  No fever or infectious symptoms.  No reported change in bowel or urinary function.  She does not take anticoagulation.  No other acute complaints.      Review of Systems   Constitutional:  Negative for chills, fatigue and fever.   HENT:  Negative for congestion, ear pain, postnasal drip, sinus pressure and sore throat.    Eyes:  Negative for pain, redness and visual disturbance.   Respiratory:  Negative for cough, chest tightness and shortness of breath.    Cardiovascular:  Negative for chest pain, palpitations and leg swelling.   Gastrointestinal:  Negative for abdominal pain, anal bleeding, blood in stool, diarrhea, nausea and vomiting.   Endocrine: Negative for polydipsia and polyuria.   Genitourinary:  Negative for difficulty urinating, dysuria, frequency and urgency.   Musculoskeletal:  Negative for arthralgias, back pain and neck pain.   Skin:  Negative for pallor and rash.   Allergic/Immunologic: Negative for environmental allergies and immunocompromised state.   Neurological:  Positive for speech difficulty and weakness. Negative for dizziness and  headaches.   Hematological:  Negative for adenopathy.   Psychiatric/Behavioral:  Negative for confusion, self-injury and suicidal ideas. The patient is not nervous/anxious.    All other systems reviewed and are negative.      Past Medical History:   Diagnosis Date    Diabetes mellitus     Hypertension     Sleep apnea        Allergies   Allergen Reactions    Codeine Hives       History reviewed. No pertinent surgical history.    History reviewed. No pertinent family history.    Social History     Socioeconomic History    Marital status:    Tobacco Use    Smoking status: Never    Smokeless tobacco: Never   Vaping Use    Vaping status: Every Day    Substances: Nicotine    Devices: Disposable   Substance and Sexual Activity    Alcohol use: Not Currently    Drug use: Yes     Types: Marijuana     Comment: occasional    Sexual activity: Defer           Objective   Physical Exam  Vitals and nursing note reviewed.   Constitutional:       General: She is not in acute distress.     Appearance: Normal appearance. She is well-developed. She is not toxic-appearing or diaphoretic.   HENT:      Head: Normocephalic and atraumatic.      Right Ear: External ear normal.      Left Ear: External ear normal.      Nose: Nose normal.   Eyes:      General: Lids are normal.      Pupils: Pupils are equal, round, and reactive to light.   Neck:      Trachea: No tracheal deviation.   Cardiovascular:      Rate and Rhythm: Normal rate and regular rhythm.      Pulses: No decreased pulses.      Heart sounds: Normal heart sounds. No murmur heard.     No friction rub. No gallop.   Pulmonary:      Effort: Pulmonary effort is normal. No respiratory distress.      Breath sounds: Normal breath sounds. No decreased breath sounds, wheezing, rhonchi or rales.   Abdominal:      General: Bowel sounds are normal.      Palpations: Abdomen is soft.      Tenderness: There is no abdominal tenderness. There is no guarding or rebound.   Musculoskeletal:          General: No deformity. Normal range of motion.      Cervical back: Normal range of motion and neck supple.   Lymphadenopathy:      Cervical: No cervical adenopathy.   Skin:     General: Skin is warm and dry.      Findings: No rash.   Neurological:      Mental Status: She is alert and oriented to person, place, and time.      GCS: GCS eye subscore is 4. GCS verbal subscore is 5. GCS motor subscore is 6.      Cranial Nerves: No cranial nerve deficit.      Sensory: No sensory deficit.      Comments: Left lower facial droop, left upper extremity and left lower extremity weakness.    Dysarthria.    GCS 15.    Psychiatric:         Speech: Speech normal.         Behavior: Behavior normal.         Thought Content: Thought content normal.         Judgment: Judgment normal.         Critical Care    Performed by: Alton Lezama MD  Authorized by: Alton Lezama MD    Critical care provider statement:     Critical care time (minutes):  45    Critical care time was exclusive of:  Separately billable procedures and treating other patients    Critical care was necessary to treat or prevent imminent or life-threatening deterioration of the following conditions:  CNS failure or compromise    Critical care was time spent personally by me on the following activities:  Discussions with consultants, development of treatment plan with patient or surrogate, evaluation of patient's response to treatment, examination of patient, obtaining history from patient or surrogate, ordering and performing treatments and interventions, ordering and review of laboratory studies, ordering and review of radiographic studies, pulse oximetry, re-evaluation of patient's condition, review of old charts and blood draw for specimens    Care discussed with: admitting provider    Intubation    Date/Time: 8/23/2024 9:00 PM    Performed by: Alton Lezama MD  Authorized by: Alton Lezama MD    Consent:     Consent obtained:  Emergent  situation and verbal    Consent given by:  Patient    Risks, benefits, and alternatives were discussed: yes      Risks discussed:  Aspiration, brain injury, dental trauma, bleeding, hypoxia and laryngeal injury    Alternatives discussed:  No treatment, alternative treatment and observation  Universal protocol:     Procedure explained and questions answered to patient or proxy's satisfaction: yes      Relevant documents present and verified: yes      Test results available: yes      Imaging studies available: yes      Required blood products, implants, devices, and special equipment available: yes      Site/side marked: yes      Immediately prior to procedure, a time out was called: yes      Patient identity confirmed:  Verbally with patient and arm band  Pre-procedure details:     Indications: airway protection and altered consciousness      Patient status:  Altered mental status    Look externally: no concerns      Mouth opening - incisor distance:  2 finger widths    Hyoid-mental distance: 3 or more finger widths      Hyoid-thyroid distance: 2 or more finger widths      Mallampati score:  II    Obstruction: none      Neck mobility: reduced      Pharmacologic strategy: RSI      Induction agents:  Etomidate    Paralytics:  Rocuronium  Procedure details:     Preoxygenation:  Nonrebreather mask    CPR in progress: no      Number of attempts:  1  Successful intubation attempt details:     Intubation method:  Oral    Intubation technique: video assisted      Laryngoscope blade:  Mac 3    Bougie used: no      Grade view: II      Tube size (mm):  7.5    Tube type:  Cuffed    Tube visualized through cords: yes    Placement assessment:     ETT at teeth/gumline (cm):  25    Tube secured with:  ETT valencia    Breath sounds:  Equal    Placement verification: CXR verification      Placement verification comment:  This x-ray showed the tube to be deep.  It had slid down to 27 cm at the lip when being secured by respiratory.  It  was retracted to 25 cm.    CXR findings:  Mainstem    Tube repositioned: yes    Post-procedure details:     Procedure completion:  Tolerated well, no immediate complications             ED Course  ED Course as of 08/24/24 1627   Fri Aug 23, 2024   2042 The patient presents with last known well time of roughly 7 PM.  She has acute left-sided weakness left facial droop, somnolence, and dysarthria.  She has no preceding history of seizures.  Blood sugar was 167 en route.  She was hypertensive with systolics between 150-190 en route.  Initial CT scan head without contrast shows no acute abnormalities.  She is considered a tenecteplase candidate.  I discussed the risk and benefits of TNK and she agrees to proceed with tenecteplase.    I am concerned for acute ischemic stroke versus Ap's paralysis secondary to new onset seizure. [NS]      ED Course User Index  [NS] Alton Lezama MD                          Total (NIH Stroke Scale): 17                  Medical Decision Making  Differential diagnosis includes Ap's paralysis, new onset seizures, acute stroke, intracranial hemorrhage, intracranial mass, sepsis, dehydration, renal insufficiency, other unspecified etiology.    Viral respiratory panel is negative.    Labs show troponin that was elevated and trending up on recheck.  Lactic acid level was also elevated.  Urine drug screen positive for marijuana and methamphetamines.    Urine does not appear consistent with a urinary tract infection but does appear consistent with dehydration.    Labs show leukocytosis and elevated H&H concerning for dehydration.    CT scan of the head without contrast shows no acute intracranial abnormality.  CT perfusion shows very severe stenosis of the right M1 segment just proximal to trifurcation with some distal reconstitution of flow.  CT perfusion also demonstrates slow flow in the right MCA territory with no decreased vertebral blood flow or blood volume noted.  CT angiogram  the head and neck show severe stenosis of the right M1 segment just proximal to the trifurcation with some distal reconstitution of flow.    The stroke service was present at bedside.  The desire to administer tenecteplase was reported and the pharmacist was consulted while the patient was still in the CT suite.    Medication was administered for blood pressure control.    The patient slowly become more obtunded during the ER course therefore intubation was performed.    Dr. Medina of the neurosurgery service has been consulted and will proceed with Cath Lab intervention.    The patient's airway with was stabilized here in the ER.  She remained otherwise stable  but critical here in the ER    Problems Addressed:  Acute left-sided weakness: complicated acute illness or injury with systemic symptoms that poses a threat to life or bodily functions  Acute stroke due to ischemia: complicated acute illness or injury with systemic symptoms that poses a threat to life or bodily functions  Compromised respiratory status: complicated acute illness or injury with systemic symptoms that poses a threat to life or bodily functions  Dysarthria: complicated acute illness or injury with systemic symptoms that poses a threat to life or bodily functions  Somnolence: complicated acute illness or injury    Amount and/or Complexity of Data Reviewed  Independent Historian: spouse     Details: Children and  provide additional information.  External Data Reviewed: labs, radiology and ECG.  Labs: ordered. Decision-making details documented in ED Course.  Radiology: ordered and independent interpretation performed. Decision-making details documented in ED Course.  ECG/medicine tests: ordered and independent interpretation performed. Decision-making details documented in ED Course.     Details: EKG independently interpreted by myself shows sinus tachycardia, no acute abnormalities    Risk  Prescription drug management.        Final  diagnoses:   Acute stroke due to ischemia   Acute left-sided weakness   Dysarthria   Somnolence   Compromised respiratory status       ED Disposition  ED Disposition       None            No follow-up provider specified.       Medication List      No changes were made to your prescriptions during this visit.            Alton Lezama MD  08/24/24 4816

## 2024-08-24 NOTE — H&P
CRITICAL CARE ADMISSION NOTE     Patient's name Keya Hamilton MRN: [id number] : 1990-34 y.o.-female  Admission date 2024  Length of stay 0  ICU LOS 3h    REASON FOR CONSULTATION / MAIN COMPLAINT  Cerebral infarction due to stenosis of right middle cerebral artery   Stroke     HISTORY OF MAIN COMPLAINT    Keya Hamilton is a 34 y.o. female with PMH of T2DM, right renal atrophy, HTN, morbid obesity, and SCOTT on PAP who presents to [facility] ED on 24 for evaluation of somnolence, dysarthria, left-hemiparesis, and rightward gaze deviation concerning for stroke.     The patient began to experience the aforementioned symptoms with LKW reportedly at 1900. She presented with NIH 11. CT head unrevealing and CTP displayed a R MCA territory perfusion deficit with the CTA head/neck revealing severe stenosis of the right M1 segment just proximal to the trifurcation with some distal reconstitution flow. EKG shows ST and BP was elevated at 185/148 given a total of 25 mg IV labetalol and 5 mg IV hydralazine. She was evaluated by our stroke neurology services deemed a candidate for thrombolytic therapy given TNKase. She will be admitted to the ICU for higher level of care.     Labs did show an initial HS troponin of 64. EKG did show sinus tach without obvious ischemic changes.     In the ED she was intubated. ETT does appear to project into the right mainstem also noted to have diffuse airspace disease bilaterally. CK was elevated at 260.     Time spent: 10 minutes  Electronically signed by MICHELLE Dalal, 24, 9:41 PM EDT.    PAST MEDICAL HISTORY:  History reviewed. No pertinent past medical history.    PAST SURGICAL HISTORY:  History reviewed. No pertinent surgical history.    FAMILY HISTORY:  History reviewed. No pertinent family history.    SOCIAL HISTORY:  unknown    ALLERGIES:  Allergies   Allergen Reactions    Codeine Hives     HOME MEDS INCLUDE  No current outpatient medications    SCHEDULED  MEDS:  aspirin, 325 mg, Oral, Daily   Or  aspirin, 300 mg, Rectal, Daily  atorvastatin, 80 mg, Oral, Nightly  sodium chloride, 10 mL, Intravenous, Q12H  sodium chloride, 10 mL, Intravenous, Q12H      CONTINUOUS INFUSIONS:  dexmedetomidine, 0.2-1.5 mcg/kg/hr  insulin, 0-100 Units/hr, Last Rate: 1.9 Units/hr (08/24/24 0144)  niCARdipine, 5-15 mg/hr, Last Rate: 7.5 mg/hr (08/24/24 0042)  propofol, 5-50 mcg/kg/min, Last Rate: 15 mcg/kg/min (08/24/24 0043)       REVIEW OF SYSTEMS:  Unable to assess, patient is sedated    PHYSICAL EXAMINATION:    Temp:  [98.4 °F (36.9 °C)] 98.4 °F (36.9 °C)  Heart Rate:  [] 97  Resp:  [20] 20  BP: ()/() 112/67  FiO2 (%):  [100 %] 100 %  No intake or output data in the 24 hours ending 08/23/24 2150  S RR:  [10-12] 12    General appearance: ill appearing  Trachea: midline  Lymphatic: no lymphadenopathy.  Chest: intubated, full vent support  Heart: regular rate and rhythm, S1, S2 normal  Abdomen: soft, obese  Genitourinary: ordonez placed  Neurological: sedated  Psychiatric: sedated  Skin: warm, dry    DATA REVIEW:    1. Medical chart reviewed in detail  2. I reviewed the actual EKG rhythm strips and Pulse Oximetry data on the monitors  3. I reviewed today's lab values and the report of the most recent Chest X ray.  4. In addition, I have independently reviewed the actual image of the most recent chest Xray    Hematology:  Results from last 7 days   Lab Units 08/23/24 2055   WBC 10*3/mm3 11.16*   HEMOGLOBIN g/dL 16.1*   HEMATOCRIT % 47.2*   PLATELETS 10*3/mm3 417     Chemistry:  Estimated Creatinine Clearance: 75.4 mL/min (A) (by C-G formula based on SCr of 1.39 mg/dL (H)).          Hepatic Panel:  Results from last 7 days   Lab Units 08/23/24 2055   AST (SGOT) U/L 18   ALT (SGPT) U/L 15     Coagulation Labs:  Results from last 7 days   Lab Units 08/23/24 2055   APTT seconds 25.3      Cardiac Labs:  Results from last 7 days   Lab Units 08/23/24 2055   CK TOTAL U/L 260*    HSTROP T ng/L 64*     Biomarkers:  Results from last 7 days   Lab Units 08/24/24  0102   LACTATE mmol/L 3.5*     U/A  Results from last 7 days   Lab Units 08/24/24  0124   COLOR UA  Yellow   CLARITY UA  Clear   PH, URINE  6.5   SPECIFIC GRAVITY, URINE  >=1.030   GLUCOSE UA  500 mg/dL (2+)*   KETONES UA  Negative   BILIRUBIN UA  Negative   PROTEIN UA  100 mg/dL (2+)*   BLOOD UA  Negative   LEUKOCYTES UA  Negative   NITRITE UA  Negative   UROBILINOGEN UA  0.2 E.U./dL     Results from last 7 days   Lab Units 08/24/24  0124   RBC UA /HPF 0-2   WBC UA /HPF 0-2   BACTERIA UA /HPF None Seen   SQUAM EPITHEL UA /HPF 0-2   HYALINE CASTS UA /LPF None Seen     Images:  XR Chest 1 View    Result Date: 8/23/2024  Impression: 1.Tip of the endotracheal tube extends into the right mainstem bronchus. Recommend retraction to the mid thoracic trachea. I called this result to Dr. Lezama at 9:20 p.m. on 8/23/2024, and he was aware of this finding. 2.Diffuse bilateral airspace opacities, which may be due to pulmonary edema, atelectasis, and/or pneumonia. Electronically Signed: Latanya Evans  8/23/2024 9:32 PM EDT  Workstation ID: [id number]    CT Angiogram Head w AI Analysis of LVO    Result Date: 8/23/2024  There is a very severe stenosis of the right M1 segment just proximal to the trifurcation with some distal reconstitution of flow. CT perfusion demonstrates slow flow in the right MCA territory with no decreased vertebral blood flow or blood volume noted. Neurosurgical consult recommended. Findings were discussed with Dr. Garcia at 9:03 p.m. on 8/23/2024 Electronically Signed: David Fernández MD  8/23/2024 9:05 PM EDT  Workstation ID: [id number]    CT Angiogram Neck    Result Date: 8/23/2024  There is a very severe stenosis of the right M1 segment just proximal to the trifurcation with some distal reconstitution of flow. CT perfusion demonstrates slow flow in the right MCA territory with no decreased vertebral blood flow or blood volume  noted. Neurosurgical consult recommended. Findings were discussed with Dr. Garcia at 9:03 p.m. on 8/23/2024 Electronically Signed: David Fernández MD  8/23/2024 9:05 PM EDT  Workstation ID: [id number]    CT CEREBRAL PERFUSION WITH & WITHOUT CONTRAST    Result Date: 8/23/2024  There is a very severe stenosis of the right M1 segment just proximal to the trifurcation with some distal reconstitution of flow. CT perfusion demonstrates slow flow in the right MCA territory with no decreased vertebral blood flow or blood volume noted. Neurosurgical consult recommended. Findings were discussed with Dr. Garcia at 9:03 p.m. on 8/23/2024 Electronically Signed: David Fernández MD  8/23/2024 9:05 PM EDT  Workstation ID: [id number]    CT Head Without Contrast Stroke Protocol    Result Date: 8/23/2024  Impression: No acute intracranial finding. Electronically Signed: Medardo Wheat  8/23/2024 8:35 PM EDT  Workstation ID: [id number]       ASSESSMENT & PLAN     R M1 stenosis    T2DM    HTN    Morbid obesity with BMI of 45.0-49.9, adult    SCOTT    Hypertensive urgency    R/O Seizure    Acute respiratory failure   ANA    35 yo F with h/o morbid obesity, DM, R renal atrophy, HTN, SCOTT presented with somnolence, dysarthria and L-sided weakness. She was found to have a R MCA stenosis. She was thought to be a candidate for thrombolysis, and received TNK and then underwent a R MCA embolectomy. She was intubated. She was sedated with propofol. Patient also tested positive for THC and meth.     Neuro - we will unsedate, reassess neuro status, high risk for WD considering tox, CT at 24 hrs or earlier if warranted, ASA at 24 hrs, statin  CV - monitor hemodynamics, maintain SBP<180, probnp pending, echo pending  Pulm - full vent support, hypoxic, high O2 requirement   - ANA, lactic acidosis, unable to hydrate more due to profound hypoxia  GI - GI ppx, NPO  ID - rocephin/azithro empirically  Hem onc - s/p TNK, monitor coags and CBC  Endo - monitor BG,  ISS      Bowel regimen: prn  Diet: NPO Diet NPO Type: Strict NPO  GI ppx: ppi  DVT PPX: VTE Prophylaxis:  Mechanical VTE prophylaxis orders are present.       Advance Directives: FULL    Dispo: ICU    I have spent a total of 60 critical care minutes in the management of this patient, apart from any time taken to perform necessary procedures. Critical care time includes time reviewing chart, images, report of images, rounding with nurse, respiratory therapist and pharmacist, and discussions with available family at bedside.    Signed: Cinthia Banegas MD  21:50 EDT 8/23/2024

## 2024-08-24 NOTE — ANESTHESIA POSTPROCEDURE EVALUATION
Patient: Keya Hamilton    Procedure Summary       Date: 08/23/24 Room / Location: [facility] /  [facility]    Anesthesia Start: 2125 Anesthesia Stop: 2223    Procedure: IR mechanical thrombectomy Diagnosis:     Providers: Lemuel Medina MD Provider: Rylie Enciso MD    Anesthesia Type: general ASA Status: 4 - Emergent            Anesthesia Type: general    Vitals  Vitals Value Taken Time   /124 08/23/24 2230   Temp     Pulse 103 08/23/24 2235   Resp     SpO2 95 % 08/23/24 2235   Vitals shown include unfiled device data.        Post Anesthesia Care and Evaluation    Patient location during evaluation: ICU  Patient participation: complete - patient cannot participate  Post-procedure mental status: sedated and intubated.  Pain score: 0  Pain management: adequate    Airway patency: patent  Anesthetic complications: No anesthetic complications  PONV Status: none  Cardiovascular status: hemodynamically stable and acceptable  Respiratory status: ETT, intubated and ventilator  Hydration status: acceptable

## 2024-08-25 PROBLEM — I50.21 ACUTE SYSTOLIC HEART FAILURE: Status: ACTIVE | Noted: 2024-08-25

## 2024-08-25 PROBLEM — I51.3 LEFT VENTRICULAR THROMBUS: Status: ACTIVE | Noted: 2024-08-25

## 2024-08-25 LAB
ANION GAP SERPL CALCULATED.3IONS-SCNC: 14 MMOL/L (ref 5–15)
ARTERIAL PATENCY WRIST A: ABNORMAL
ATMOSPHERIC PRESS: ABNORMAL MM[HG]
BASE EXCESS BLDA CALC-SCNC: 1.4 MMOL/L (ref 0–2)
BASOPHILS # BLD AUTO: 0.05 10*3/MM3 (ref 0–0.2)
BASOPHILS NFR BLD AUTO: 0.2 % (ref 0–1.5)
BDY SITE: ABNORMAL
BODY TEMPERATURE: 37
BUN SERPL-MCNC: 17 MG/DL (ref 6–20)
BUN/CREAT SERPL: 14.8 (ref 7–25)
CALCIUM SPEC-SCNC: 9 MG/DL (ref 8.6–10.5)
CHLORIDE SERPL-SCNC: 108 MMOL/L (ref 98–107)
CO2 BLDA-SCNC: 26.2 MMOL/L (ref 22–33)
CO2 SERPL-SCNC: 22 MMOL/L (ref 22–29)
COHGB MFR BLD: 1.7 % (ref 0–2)
CREAT SERPL-MCNC: 1.15 MG/DL (ref 0.57–1)
DEPRECATED RDW RBC AUTO: 51.2 FL (ref 37–54)
EGFRCR SERPLBLD CKD-EPI 2021: 65 ML/MIN/1.73
EOSINOPHIL # BLD AUTO: 0.01 10*3/MM3 (ref 0–0.4)
EOSINOPHIL NFR BLD AUTO: 0 % (ref 0.3–6.2)
EPAP: 0
ERYTHROCYTE [DISTWIDTH] IN BLOOD BY AUTOMATED COUNT: 13.4 % (ref 12.3–15.4)
GLUCOSE BLDC GLUCOMTR-MCNC: 101 MG/DL (ref 70–130)
GLUCOSE BLDC GLUCOMTR-MCNC: 105 MG/DL (ref 70–130)
GLUCOSE BLDC GLUCOMTR-MCNC: 107 MG/DL (ref 70–130)
GLUCOSE BLDC GLUCOMTR-MCNC: 116 MG/DL (ref 70–130)
GLUCOSE BLDC GLUCOMTR-MCNC: 118 MG/DL (ref 70–130)
GLUCOSE BLDC GLUCOMTR-MCNC: 119 MG/DL (ref 70–130)
GLUCOSE BLDC GLUCOMTR-MCNC: 126 MG/DL (ref 70–130)
GLUCOSE BLDC GLUCOMTR-MCNC: 134 MG/DL (ref 70–130)
GLUCOSE BLDC GLUCOMTR-MCNC: 140 MG/DL (ref 70–130)
GLUCOSE BLDC GLUCOMTR-MCNC: 248 MG/DL (ref 70–130)
GLUCOSE BLDC GLUCOMTR-MCNC: 441 MG/DL (ref 70–130)
GLUCOSE SERPL-MCNC: 143 MG/DL (ref 65–99)
HCO3 BLDA-SCNC: 25.1 MMOL/L (ref 20–26)
HCT VFR BLD AUTO: 46.1 % (ref 34–46.6)
HCT VFR BLD CALC: 46.6 % (ref 38–51)
HGB BLD-MCNC: 14.7 G/DL (ref 12–15.9)
HGB BLDA-MCNC: 15.2 G/DL (ref 14–18)
IMM GRANULOCYTES # BLD AUTO: 0.18 10*3/MM3 (ref 0–0.05)
IMM GRANULOCYTES NFR BLD AUTO: 0.9 % (ref 0–0.5)
INHALED O2 CONCENTRATION: 28 %
IPAP: 0
LYMPHOCYTES # BLD AUTO: 2.82 10*3/MM3 (ref 0.7–3.1)
LYMPHOCYTES NFR BLD AUTO: 13.9 % (ref 19.6–45.3)
MAGNESIUM SERPL-MCNC: 2 MG/DL (ref 1.6–2.6)
MCH RBC QN AUTO: 33 PG (ref 26.6–33)
MCHC RBC AUTO-ENTMCNC: 31.9 G/DL (ref 31.5–35.7)
MCV RBC AUTO: 103.4 FL (ref 79–97)
METHGB BLD QL: 0.2 % (ref 0–1.5)
MODALITY: ABNORMAL
MONOCYTES # BLD AUTO: 1.24 10*3/MM3 (ref 0.1–0.9)
MONOCYTES NFR BLD AUTO: 6.1 % (ref 5–12)
NEUTROPHILS NFR BLD AUTO: 16.06 10*3/MM3 (ref 1.7–7)
NEUTROPHILS NFR BLD AUTO: 78.9 % (ref 42.7–76)
NRBC BLD AUTO-RTO: 0 /100 WBC (ref 0–0.2)
NT-PROBNP SERPL-MCNC: 1011 PG/ML (ref 0–450)
OXYHGB MFR BLDV: 91.8 % (ref 94–99)
PAW @ PEAK INSP FLOW SETTING VENT: 0 CMH2O
PCO2 BLDA: 36.2 MM HG (ref 35–45)
PCO2 TEMP ADJ BLD: 36.2 MM HG (ref 35–45)
PH BLDA: 7.45 PH UNITS (ref 7.35–7.45)
PH, TEMP CORRECTED: 7.45 PH UNITS
PLATELET # BLD AUTO: 374 10*3/MM3 (ref 140–450)
PMV BLD AUTO: 8.7 FL (ref 6–12)
PO2 BLDA: 62.9 MM HG (ref 83–108)
PO2 TEMP ADJ BLD: 62.9 MM HG (ref 83–108)
POTASSIUM SERPL-SCNC: 4.3 MMOL/L (ref 3.5–5.2)
PROCALCITONIN SERPL-MCNC: 0.15 NG/ML (ref 0–0.25)
RBC # BLD AUTO: 4.46 10*6/MM3 (ref 3.77–5.28)
SODIUM SERPL-SCNC: 144 MMOL/L (ref 136–145)
TOTAL RATE: 0 BREATHS/MINUTE
UFH PPP CHRO-ACNC: 0.1 IU/ML (ref 0.3–0.7)
UFH PPP CHRO-ACNC: 0.24 IU/ML (ref 0.3–0.7)
UFH PPP CHRO-ACNC: 0.33 IU/ML (ref 0.3–0.7)
WBC NRBC COR # BLD AUTO: 20.36 10*3/MM3 (ref 3.4–10.8)

## 2024-08-25 PROCEDURE — 82948 REAGENT STRIP/BLOOD GLUCOSE: CPT

## 2024-08-25 PROCEDURE — 97162 PT EVAL MOD COMPLEX 30 MIN: CPT

## 2024-08-25 PROCEDURE — 97530 THERAPEUTIC ACTIVITIES: CPT

## 2024-08-25 PROCEDURE — 99233 SBSQ HOSP IP/OBS HIGH 50: CPT | Performed by: INTERNAL MEDICINE

## 2024-08-25 PROCEDURE — 94799 UNLISTED PULMONARY SVC/PX: CPT

## 2024-08-25 PROCEDURE — 25810000003 SODIUM CHLORIDE 0.9 % SOLUTION 250 ML FLEX CONT: Performed by: INTERNAL MEDICINE

## 2024-08-25 PROCEDURE — 82375 ASSAY CARBOXYHB QUANT: CPT

## 2024-08-25 PROCEDURE — 92610 EVALUATE SWALLOWING FUNCTION: CPT

## 2024-08-25 PROCEDURE — 85520 HEPARIN ASSAY: CPT

## 2024-08-25 PROCEDURE — 97535 SELF CARE MNGMENT TRAINING: CPT

## 2024-08-25 PROCEDURE — 25010000002 NICARDIPINE 2.5 MG/ML SOLUTION 10 ML VIAL

## 2024-08-25 PROCEDURE — 63710000001 INSULIN GLARGINE PER 5 UNITS: Performed by: INTERNAL MEDICINE

## 2024-08-25 PROCEDURE — 82805 BLOOD GASES W/O2 SATURATION: CPT

## 2024-08-25 PROCEDURE — 25010000002 CEFTRIAXONE PER 250 MG: Performed by: INTERNAL MEDICINE

## 2024-08-25 PROCEDURE — 92523 SPEECH SOUND LANG COMPREHEN: CPT

## 2024-08-25 PROCEDURE — 25010000002 HYDRALAZINE PER 20 MG: Performed by: NURSE PRACTITIONER

## 2024-08-25 PROCEDURE — 99233 SBSQ HOSP IP/OBS HIGH 50: CPT | Performed by: STUDENT IN AN ORGANIZED HEALTH CARE EDUCATION/TRAINING PROGRAM

## 2024-08-25 PROCEDURE — 84145 PROCALCITONIN (PCT): CPT | Performed by: INTERNAL MEDICINE

## 2024-08-25 PROCEDURE — 36600 WITHDRAWAL OF ARTERIAL BLOOD: CPT

## 2024-08-25 PROCEDURE — 80048 BASIC METABOLIC PNL TOTAL CA: CPT | Performed by: INTERNAL MEDICINE

## 2024-08-25 PROCEDURE — 83735 ASSAY OF MAGNESIUM: CPT | Performed by: INTERNAL MEDICINE

## 2024-08-25 PROCEDURE — 63710000001 INSULIN LISPRO (HUMAN) PER 5 UNITS: Performed by: INTERNAL MEDICINE

## 2024-08-25 PROCEDURE — 25010000002 AZITHROMYCIN PER 500 MG: Performed by: INTERNAL MEDICINE

## 2024-08-25 PROCEDURE — 25010000002 HEPARIN (PORCINE) 25000-0.45 UT/250ML-% SOLUTION

## 2024-08-25 PROCEDURE — 97166 OT EVAL MOD COMPLEX 45 MIN: CPT

## 2024-08-25 PROCEDURE — 83880 ASSAY OF NATRIURETIC PEPTIDE: CPT | Performed by: HOSPITALIST

## 2024-08-25 PROCEDURE — 83050 HGB METHEMOGLOBIN QUAN: CPT

## 2024-08-25 PROCEDURE — 25810000003 SODIUM CHLORIDE 0.9 % SOLUTION 250 ML FLEX CONT

## 2024-08-25 PROCEDURE — 94660 CPAP INITIATION&MGMT: CPT

## 2024-08-25 PROCEDURE — 85025 COMPLETE CBC W/AUTO DIFF WBC: CPT | Performed by: NURSE PRACTITIONER

## 2024-08-25 RX ORDER — INSULIN LISPRO 100 [IU]/ML
2-7 INJECTION, SOLUTION INTRAVENOUS; SUBCUTANEOUS
Status: DISCONTINUED | OUTPATIENT
Start: 2024-08-25 | End: 2024-08-30 | Stop reason: HOSPADM

## 2024-08-25 RX ORDER — IBUPROFEN 600 MG/1
1 TABLET ORAL
Status: DISCONTINUED | OUTPATIENT
Start: 2024-08-25 | End: 2024-08-30 | Stop reason: HOSPADM

## 2024-08-25 RX ORDER — METOPROLOL TARTRATE 1 MG/ML
5 INJECTION, SOLUTION INTRAVENOUS EVERY 6 HOURS
Status: DISCONTINUED | OUTPATIENT
Start: 2024-08-25 | End: 2024-08-26

## 2024-08-25 RX ORDER — DEXTROSE MONOHYDRATE 25 G/50ML
25 INJECTION, SOLUTION INTRAVENOUS
Status: DISCONTINUED | OUTPATIENT
Start: 2024-08-25 | End: 2024-08-30 | Stop reason: HOSPADM

## 2024-08-25 RX ORDER — ACETAMINOPHEN 650 MG/1
650 SUPPOSITORY RECTAL EVERY 6 HOURS PRN
Status: DISCONTINUED | OUTPATIENT
Start: 2024-08-25 | End: 2024-08-30 | Stop reason: HOSPADM

## 2024-08-25 RX ORDER — NICOTINE POLACRILEX 4 MG
15 LOZENGE BUCCAL
Status: DISCONTINUED | OUTPATIENT
Start: 2024-08-25 | End: 2024-08-30 | Stop reason: HOSPADM

## 2024-08-25 RX ORDER — HYDRALAZINE HYDROCHLORIDE 20 MG/ML
10 INJECTION INTRAMUSCULAR; INTRAVENOUS EVERY 6 HOURS PRN
Status: DISCONTINUED | OUTPATIENT
Start: 2024-08-25 | End: 2024-08-30 | Stop reason: HOSPADM

## 2024-08-25 RX ORDER — ACETAMINOPHEN 325 MG/1
650 TABLET ORAL EVERY 6 HOURS PRN
Status: DISCONTINUED | OUTPATIENT
Start: 2024-08-25 | End: 2024-08-30 | Stop reason: HOSPADM

## 2024-08-25 RX ADMIN — HYDRALAZINE HYDROCHLORIDE 10 MG: 20 INJECTION INTRAMUSCULAR; INTRAVENOUS at 18:21

## 2024-08-25 RX ADMIN — METOPROLOL TARTRATE 5 MG: 5 INJECTION INTRAVENOUS at 10:31

## 2024-08-25 RX ADMIN — Medication 10 ML: at 09:30

## 2024-08-25 RX ADMIN — SACUBITRIL AND VALSARTAN 1 TABLET: 24; 26 TABLET, FILM COATED ORAL at 20:57

## 2024-08-25 RX ADMIN — METOPROLOL TARTRATE 5 MG: 5 INJECTION INTRAVENOUS at 16:31

## 2024-08-25 RX ADMIN — AZITHROMYCIN DIHYDRATE 500 MG: 500 INJECTION, POWDER, LYOPHILIZED, FOR SOLUTION INTRAVENOUS at 09:29

## 2024-08-25 RX ADMIN — NICARDIPINE HYDROCHLORIDE 5 MG/HR: 25 INJECTION, SOLUTION INTRAVENOUS at 08:12

## 2024-08-25 RX ADMIN — ACETAMINOPHEN 650 MG: 650 SUPPOSITORY RECTAL at 05:09

## 2024-08-25 RX ADMIN — PANTOPRAZOLE SODIUM 40 MG: 40 INJECTION, POWDER, FOR SOLUTION INTRAVENOUS at 09:25

## 2024-08-25 RX ADMIN — ATORVASTATIN CALCIUM 80 MG: 40 TABLET, FILM COATED ORAL at 20:57

## 2024-08-25 RX ADMIN — METOPROLOL TARTRATE 5 MG: 5 INJECTION INTRAVENOUS at 22:27

## 2024-08-25 RX ADMIN — INSULIN LISPRO 3 UNITS: 100 INJECTION, SOLUTION INTRAVENOUS; SUBCUTANEOUS at 12:06

## 2024-08-25 RX ADMIN — METOPROLOL TARTRATE 2.5 MG: 5 INJECTION INTRAVENOUS at 04:27

## 2024-08-25 RX ADMIN — HEPARIN SODIUM 14 UNITS/KG/HR: 10000 INJECTION, SOLUTION INTRAVENOUS at 04:27

## 2024-08-25 RX ADMIN — SODIUM CHLORIDE 2000 MG: 900 INJECTION INTRAVENOUS at 09:25

## 2024-08-25 RX ADMIN — ACETAMINOPHEN 650 MG: 325 TABLET ORAL at 17:39

## 2024-08-25 RX ADMIN — EMPAGLIFLOZIN 10 MG: 10 TABLET, FILM COATED ORAL at 10:31

## 2024-08-25 RX ADMIN — NICARDIPINE HYDROCHLORIDE 5 MG/HR: 25 INJECTION, SOLUTION INTRAVENOUS at 00:52

## 2024-08-25 RX ADMIN — INSULIN GLARGINE 10 UNITS: 100 INJECTION, SOLUTION SUBCUTANEOUS at 12:07

## 2024-08-25 RX ADMIN — HEPARIN SODIUM 18 UNITS/KG/HR: 10000 INJECTION, SOLUTION INTRAVENOUS at 17:39

## 2024-08-25 RX ADMIN — SACUBITRIL AND VALSARTAN 1 TABLET: 24; 26 TABLET, FILM COATED ORAL at 10:31

## 2024-08-25 NOTE — PROGRESS NOTES
Intensive Care Follow-up     Hospital:  LOS: 2 days   Ms. Keya Hamilton, 34 y.o. female is followed for:   Cerebral infarction due to stenosis of right middle cerebral artery        Subjective     Keya Hamilton is a 34 y.o. female with PMH of T2DM, right renal atrophy, HTN, morbid obesity, and SCOTT on PAP who presents to [facility] ED on 8/23/24 for evaluation of somnolence, dysarthria, left-hemiparesis, and rightward gaze deviation concerning for stroke.      The patient began to experience the aforementioned symptoms with LKW reportedly at 1900. She presented with NIH 11. CT head unrevealing and CTP displayed a R MCA territory perfusion deficit with the CTA head/neck revealing severe stenosis of the right M1 segment just proximal to the trifurcation with some distal reconstitution flow. EKG shows ST and BP was elevated at 185/148 given a total of 25 mg IV labetalol and 5 mg IV hydralazine. She was evaluated by our stroke neurology services deemed a candidate for thrombolytic therapy given TNKase. She will be admitted to the ICU for higher level of care.      Labs did show an initial HS troponin of 64. EKG did show sinus tach without obvious ischemic changes.      In the ED she was intubated. ETT does appear to project into the right mainstem also noted to have diffuse airspace disease bilaterally. CK was elevated at 260.   Interval History:  The chart is been reviewed.  The patient has remained afebrile.  She was extubated successfully yesterday and is currently on several liters nasal cannula.  She is drowsy but easily arousable and follows commands.  She remains with some weakness of her left upper extremity.  She remains on heparin, insulin, and Cardene drips.  Currently on 0.3 units/h of insulin.    The patient's past medical, surgical and social history were reviewed and updated in Epic as appropriate.        Objective     Infusions:  dexmedetomidine, 0.2-1.5 mcg/kg/hr  heparin, 17 Units/kg/hr, Last Rate: 17  "Units/kg/hr (08/25/24 0748)  insulin, 0-100 Units/hr, Last Rate: 0.3 Units/hr (08/25/24 0752)  niCARdipine, 5-15 mg/hr, Last Rate: 5 mg/hr (08/25/24 0812)  Pharmacy to Dose Heparin,       Medications:  atorvastatin, 80 mg, Nasogastric, Nightly  azithromycin, 500 mg, Intravenous, Q24H  cefTRIAXone, 2,000 mg, Intravenous, Q24H  empagliflozin, 10 mg, Oral, Daily  [Held by provider] metoprolol succinate XL, 25 mg, Oral, Q24H  metoprolol tartrate, 5 mg, Intravenous, Q6H  pantoprazole, 40 mg, Intravenous, QAM AC  sacubitril-valsartan, 1 tablet, Oral, Q12H  sodium chloride, 10 mL, Intravenous, Q12H        Vital Sign Min/Max for last 24 hours  Temp  Min: 98.1 °F (36.7 °C)  Max: 98.8 °F (37.1 °C)   BP  Min: 106/69  Max: 160/112   Pulse  Min: 103  Max: 125   Resp  Min: 18  Max: 30   SpO2  Min: 90 %  Max: 99 %   Flow (L/min)  Min: 2  Max: 4       Input/Output for last 24 hour shift  08/24 0701 - 08/25 0700  In: 799.1 [I.V.:614.5]  Out: 2085 [Urine:2085]   Mode: PS  FiO2 (%):  [40 %] 40 %  PEEP/CPAP (cm H2O):  [5 cm H20] 5 cm H20  MA SUP:  [8 cm H20] 8 cm H20  MAP (cm H2O):  [7.5-9.8] 9.8  Objective:  General Appearance:  Uncomfortable, ill-appearing, in no acute distress and not in pain.    Vital signs: (most recent): Blood pressure 134/93, pulse 112, temperature 98.8 °F (37.1 °C), temperature source Oral, resp. rate 26, height 160 cm (62.99\"), weight 121 kg (267 lb 10.2 oz), SpO2 96%.    Lungs:  Normal effort and normal respiratory rate.  She is not in respiratory distress.  There are decreased breath sounds.    Heart: Tachycardia.  Regular rhythm.  S1 normal and S2 normal.  No murmur.   Chest: Symmetric chest wall expansion.   Abdomen: Abdomen is soft and non-distended.  Bowel sounds are normal.   There is no abdominal tenderness.   There is no mass.   Neurological: (Arousable.  Answers most questions.  Speech fluent.  She does remain weak with minimal antigravity movement of the left upper extremity and approximately 3 out " of 5  strength.).    Pupils:  Pupils are equal, round, and reactive to light.  Pupils are equal.   Skin:  Warm.                Results from last 7 days   Lab Units 08/25/24  0542 08/24/24  0833 08/23/24  2055   WBC 10*3/mm3 20.36* 16.61* 11.16*   HEMOGLOBIN g/dL 14.7 14.9 16.1*   PLATELETS 10*3/mm3 374 340 417     Results from last 7 days   Lab Units 08/25/24  0542 08/24/24  0102   SODIUM mmol/L 144 141   POTASSIUM mmol/L 4.3 4.4   CO2 mmol/L 22.0 20.0*   BUN mg/dL 17 16   CREATININE mg/dL 1.15* 1.39*   MAGNESIUM mg/dL 2.0  --    GLUCOSE mg/dL 143* 206*     Estimated Creatinine Clearance: 88.5 mL/min (A) (by C-G formula based on SCr of 1.15 mg/dL (H)).    Results from last 7 days   Lab Units 08/25/24  0538   PH, ARTERIAL pH units 7.450   PCO2, ARTERIAL mm Hg 36.2   PO2 ART mm Hg 62.9*         I reviewed the patient's results and images.     Assessment & Plan   Impression        R M1 stenosis    T2DM    HTN    Morbid obesity with BMI of 45.0-49.9, adult    SCOTT    Hypertensive urgency    R/O Seizure    Acute respiratory failure    Acute stroke due to ischemia    Acute systolic heart failure    Left ventricular thrombus       Plan        Will wean Cardene and transition from insulin drip over to subcutaneous coverage.  Continue with heparin infusion.  Currently subtherapeutic.  Mobilize with physical and Occupational Therapy.  Advance diet once cleared by speech therapy.  Will go ahead and check a procalcitonin.  Continue antimicrobials for now.  I suspect that her bilateral infiltrates were most likely pulmonary edema.  Recheck chest x-ray tomorrow morning.  She will remain in the intensive care unit secondary to her high risk of worsening secondary to acute CVA as well as respiratory failure.    Plan of care and goals reviewed with mulitdisciplinary/antibiotic stewardship team during rounds.   I discussed the patient's findings and my recommendations with patient and nursing staff     High level of risk due to:   drug(s) requiring intensive monitoring for toxicity and parenteral controlled substances.        Blayne Haddad MD, [facility]  Pulmonology and Critical Care Medicine

## 2024-08-25 NOTE — PLAN OF CARE
Goal Outcome Evaluation:  Plan of Care Reviewed With: patient, spouse        Progress: no change  Outcome Evaluation: PT initial evaluation completed. Pt demnstrates L sided weakness (UE >LE) and decreased sensation/ coordination LLE, impacting independence re: functional mobility and warranting further skilled PT services to promote PLOF. Min x2 for STS and short distance gait to recliner. Recommend IP rehab at d/c. Pt very motivated to participate and progress.      Anticipated Discharge Disposition (PT): inpatient rehabilitation facility

## 2024-08-25 NOTE — PLAN OF CARE
Goal Outcome Evaluation:  Plan of Care Reviewed With: patient           Outcome Evaluation: OT initial eval and expanded chart review completed. Pt presents with multiple comorbidities and decreased strength, balane, activity tolerance, vision and coordination limiting independence with ADL's and mobility from baseline status. Recommend continued skilled OT services and transfer to IRF at d/c.      Anticipated Discharge Disposition (OT): inpatient rehabilitation facility

## 2024-08-25 NOTE — PROGRESS NOTES
Stroke Progress Note       Chief Complaint: Left-sided weakness    Subjective    Subjective     Subjective:  The patient is lying down in the bed in NAD.   was at the bedside. The patient was extubated yesterday.  Updated  while patient conditions all questions concerns were answered.  No other concerns from the nursing staff.    No other acute complains at this time    Review of Systems   Constitutional: No fatigue  Eyes: Negative for redness.   Respiratory: Negative for cough.    Musculoskeletal: Negative for joint swelling.   Neurological: Negative for tremors.     Objective      Temp:  [98.1 °F (36.7 °C)-98.8 °F (37.1 °C)] 98.8 °F (37.1 °C)  Heart Rate:  [] 116  Resp:  [18-30] 26  BP: (106-160)/() 144/104  FiO2 (%):  [40 %-100 %] 40 %        Objective    GEN: Laying in the bed in no acute distress  HEENT: Moist mucous membranes  CV: RRR, no peripheral edema  PULM: non-labored breathing, symmetric chest expansion  ABD: Soft, non-distended  EXT: no clubbing, cyanosis, edema appreciated  SKIN: no rashes appreciated    NEURO:  MENTAL STATUS: AAOx1, arouses to tactile stimulus, able to follow most commands  LANG/SPEECH: The patient was not talking much but was able to say bye at the end of the visit  CN:    II: Pupils equal and reactive,     III, IV, VI: Limited as patient was sleeping frequently    V: Limited as patient was sleeping frequently    VII: Mild left facial droop    VIII: normal hearing to speech    IX, X: normal palatal elevation, no uvular deviation    XI: Can shrug shoulders bilaterally    XII: midline tongue protrusion    MOTOR: The patient was able to move her right upper and lower extremities against gravity.  She was able to squeeze my hand with her left hand (3/5).  The patient was able to bend her knee on the left lower extremity.  REFLEXES: no Monahan's, no clonus  SENSORY: Normal to light touch all throughout   COORDINATION: Deferred  STATION: Not assessed due to  patient condition  GAIT: Not assessed due to patient condition    Results Review:    I reviewed the patient's new clinical results.    WBC   Date Value Ref Range Status   08/25/2024 20.36 (H) 3.40 - 10.80 10*3/mm3 Final     RBC   Date Value Ref Range Status   08/25/2024 4.46 3.77 - 5.28 10*6/mm3 Final     Hemoglobin   Date Value Ref Range Status   08/25/2024 14.7 12.0 - 15.9 g/dL Final     Hematocrit   Date Value Ref Range Status   08/25/2024 46.1 34.0 - 46.6 % Final     MCV   Date Value Ref Range Status   08/25/2024 103.4 (H) 79.0 - 97.0 fL Final     MCH   Date Value Ref Range Status   08/25/2024 33.0 26.6 - 33.0 pg Final     MCHC   Date Value Ref Range Status   08/25/2024 31.9 31.5 - 35.7 g/dL Final     RDW   Date Value Ref Range Status   08/25/2024 13.4 12.3 - 15.4 % Final     RDW-SD   Date Value Ref Range Status   08/25/2024 51.2 37.0 - 54.0 fl Final     MPV   Date Value Ref Range Status   08/25/2024 8.7 6.0 - 12.0 fL Final     Platelets   Date Value Ref Range Status   08/25/2024 374 140 - 450 10*3/mm3 Final     Neutrophil %   Date Value Ref Range Status   08/25/2024 78.9 (H) 42.7 - 76.0 % Final     Lymphocyte %   Date Value Ref Range Status   08/25/2024 13.9 (L) 19.6 - 45.3 % Final     Monocyte %   Date Value Ref Range Status   08/25/2024 6.1 5.0 - 12.0 % Final     Eosinophil %   Date Value Ref Range Status   08/25/2024 0.0 (L) 0.3 - 6.2 % Final     Basophil %   Date Value Ref Range Status   08/25/2024 0.2 0.0 - 1.5 % Final     Immature Grans %   Date Value Ref Range Status   08/25/2024 0.9 (H) 0.0 - 0.5 % Final     Neutrophils, Absolute   Date Value Ref Range Status   08/25/2024 16.06 (H) 1.70 - 7.00 10*3/mm3 Final     Lymphocytes, Absolute   Date Value Ref Range Status   08/25/2024 2.82 0.70 - 3.10 10*3/mm3 Final     Monocytes, Absolute   Date Value Ref Range Status   08/25/2024 1.24 (H) 0.10 - 0.90 10*3/mm3 Final     Eosinophils, Absolute   Date Value Ref Range Status   08/25/2024 0.01 0.00 - 0.40 10*3/mm3  Final     Basophils, Absolute   Date Value Ref Range Status   08/25/2024 0.05 0.00 - 0.20 10*3/mm3 Final     Immature Grans, Absolute   Date Value Ref Range Status   08/25/2024 0.18 (H) 0.00 - 0.05 10*3/mm3 Final     nRBC   Date Value Ref Range Status   08/25/2024 0.0 0.0 - 0.2 /100 WBC Final       Lab Results   Component Value Date    GLUCOSE 143 (H) 08/25/2024    BUN 17 08/25/2024    CREATININE 1.15 (H) 08/25/2024     08/25/2024    K 4.3 08/25/2024     (H) 08/25/2024    CALCIUM 9.0 08/25/2024    PROTEINTOT 6.4 08/24/2024    ALBUMIN 3.7 08/24/2024    ALT 28 08/24/2024    AST 53 (H) 08/24/2024    ALKPHOS 60 08/24/2024    BILITOT 0.6 08/24/2024    GLOB 2.7 08/24/2024    AGRATIO 1.4 08/24/2024    BCR 14.8 08/25/2024    ANIONGAP 14.0 08/25/2024    EGFR 65.0 08/25/2024     CT Head Without Contrast    Result Date: 8/24/2024  Impression: Known right-sided infarcts are better seen on prior MRI. No evidence of hemorrhage. Electronically Signed: Denis Gaines MD  8/24/2024 9:09 AM EDT  Workstation ID: [id number]    MRI Brain Without Contrast    Result Date: 8/24/2024  Impression: Patchy areas of diffusion restriction present within the right periventricular white matter, the right basal ganglia and the right temporal lobe and the right occipital lobe consistent with recent or acute ischemia. Given the degree of signal intensity in presence of FLAIR signal change, findings likely more subacute. No evidence of hemorrhage. No significant mass effect or midline shift. Electronically Signed: Kayla Negron MD  8/24/2024 4:29 AM EDT  Workstation ID: [id number]    XR Chest 1 View    Result Date: 8/24/2024  Impression: Retraction of the endotracheal tube with the tip in the mid trachea. Improved aeration of the lungs bilaterally with mild pulmonary edema pattern present. Electronically Signed: Kayla Negron MD  8/24/2024 2:50 AM EDT  Workstation ID: [id number]    XR Chest 1 View    Result Date: 8/23/2024  Impression: 1.Tip of  the endotracheal tube extends into the right mainstem bronchus. Recommend retraction to the mid thoracic trachea. I called this result to Dr. Lezama at 9:20 p.m. on 8/23/2024, and he was aware of this finding. 2.Diffuse bilateral airspace opacities, which may be due to pulmonary edema, atelectasis, and/or pneumonia. Electronically Signed: Latanya Evans  8/23/2024 9:32 PM EDT  Workstation ID: [id number]    CT Angiogram Head w AI Analysis of LVO    Result Date: 8/23/2024  There is a very severe stenosis of the right M1 segment just proximal to the trifurcation with some distal reconstitution of flow. CT perfusion demonstrates slow flow in the right MCA territory with no decreased vertebral blood flow or blood volume noted. Neurosurgical consult recommended. Findings were discussed with Dr. Garcia at 9:03 p.m. on 8/23/2024 Electronically Signed: David Fernández MD  8/23/2024 9:05 PM EDT  Workstation ID: [id number]    CT Angiogram Neck    Result Date: 8/23/2024  There is a very severe stenosis of the right M1 segment just proximal to the trifurcation with some distal reconstitution of flow. CT perfusion demonstrates slow flow in the right MCA territory with no decreased vertebral blood flow or blood volume noted. Neurosurgical consult recommended. Findings were discussed with Dr. Garcia at 9:03 p.m. on 8/23/2024 Electronically Signed: David Fernández MD  8/23/2024 9:05 PM EDT  Workstation ID: [id number]    CT CEREBRAL PERFUSION WITH & WITHOUT CONTRAST    Result Date: 8/23/2024  There is a very severe stenosis of the right M1 segment just proximal to the trifurcation with some distal reconstitution of flow. CT perfusion demonstrates slow flow in the right MCA territory with no decreased vertebral blood flow or blood volume noted. Neurosurgical consult recommended. Findings were discussed with Dr. Garcia at 9:03 p.m. on 8/23/2024 Electronically Signed: David Fernández MD  8/23/2024 9:05 PM EDT  Workstation ID: [id number]    CT Head  Without Contrast Stroke Protocol    Result Date: 8/23/2024  Impression: No acute intracranial finding. Electronically Signed: Medardo Wheat  8/23/2024 8:35 PM EDT  Workstation ID: [id number]   Results for orders placed during the hospital encounter of 08/23/24    Adult Transthoracic Echo Complete W/ Cont if Necessary Per Protocol (With Agitated Saline)    Interpretation Summary  Images from the original result were not included.      Left ventricular systolic function is moderately decreased. Calculated left ventricular EF = 36.7%  global hypokinesis.  LV cavity mildly dilated.  RV size and function normal    Left ventricular wall thickness is consistent with hypertrophy.    Left ventricular diastolic dysfunction is noted.    There is a thrombus present in the left ventricle in apex. 2.22 cm by 0.96 cm    The left atrial cavity is dilated.    Estimated right ventricular systolic pressure from tricuspid regurgitation is normal (<35 mmHg).    Indeterminate bubble study    -MRI brain from 8-24-24 images were personally reviewed and showed an acute ischemic stroke in the R putamen and R head of caudate.   -LDL from 8-24-24 was 115  -A1c from 8-24-24 was 6.7  -Echo on 8-24-24 with concern for IV thrombus  -Na on 8-24-24 am was 142  -Repeated CTH on 8-24-24 images were personally reviewed and showed evolving R BG AIS with no concern for hemorrhagic conversion at this time.      Assessment/Plan   This is a 34-year-old white female, right-handed with multiple vascular risk factor including hypertension, diabetes, right renal atrophy obesity who presents to [facility] ED via EMS from [place] as a stroke alert for right MCA syndrome.  Patient was a candidate for IV thrombolytic therapy within the window discussed benefits and risk with patient and family both agreeable to receive medication patient received medication after controlling blood pressure tenecteplase at 2051 with blood pressure at 167/105.  Patient was a candidate  for mechanical thrombectomy as CTA head and neck revealed right M1 occlusion.  Cath Lab was initiated. At that time patient started to have altered mental status patient was intubated to protect her airway.       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  -Risk factor: Hypertension, hypertension lipidemia, obesity, diabetes, marijuana use, on progesterone(Mirena IUD)  -MRI brain from 8-24-24 images were personally reviewed and showed an acute ischemic stroke in the R putamen and R head of caudate.   -LDL from 8-24-24 was 115  -A1c from 8-24-24 was 6.7  -Na on 8-24-24 am was 142  -Echo on 8-24-24 with concern for IV thrombus  -Repeated CTH on 8-24-24 images were personally reviewed and showed evolving R BG AIS with no concern for hemorrhagic conversion at this time.  -Most recent Xa level in the morning of August 25, 2024 was 0.1    Recommendations:  -Continue heparin gtt (low dose, no bolus protocol) Target Xa level of 0.3-0.5.  -Continue high intensity statin Lipitor 80 mg p.o. at night and daily for secondary stroke prevention.  -Target sodium level of 145-150.  -Target blood pressure of less than 140/90.  -TIA\ischemic stroke with IV thrombolytic therapy order set in place  -Postprocedural order set in place  -NIH\neurocheck per protocol  -PT\OT\SLP evaluation  -Neurology stroke will continue to follow-up.     #Essential hypertension  -Patient was hypertensive on admission 190/106, received 5 mg of labetalol IV, currently on Cardene drip to keep systolic blood pressure less than 140 (130-150).  Management by intensivist.     #Diabetes hypertension complicated by hyperglycemia  -On admission blood glucose was reported 400 will recommend diabetes lab rule out any ketones currently patient is on insulin drip management by  intensivist.  -A1c ordered and pending  -Blood glucose goal 1 40-1 80, avoid hypoglycemia-diabetes education is needed when appropriate.  Worsen outcome     #Marijuana use  #Substance abuse  -Patient was reported to use marijuana.  -Counseling on cessation  -UDS ordered and revealed methamphetamine and THC in the urine.     #Nicotine product use via vaping  -Daily vaping  -Counseling on cessation     #Morbid obesity  -BMI 49.44  -Complicates all aspects of care  -Counseling on healthy diet exercise and weight loss when appropriate.     #Medication noncompliance  -Discussed with patient and family importance of compliance with medication, verbalized understanding.    Stroke will continue to follow. Please call for any further questions or concerns  =================================  Crow Day MD, Msc, PhD  Vascular Neurologist  [facility]

## 2024-08-25 NOTE — PROGRESS NOTES
[facility] Cardiology    Inpatient Progress Note      Chief Complaint/Reason for service:    HF         Subjective:       Extubated, able to be awoken    Past medical, surgical, social and family history reviewed in the patient's electronic medical record.         Objective:      Infusions:  dexmedetomidine, 0.2-1.5 mcg/kg/hr  heparin, 17 Units/kg/hr, Last Rate: 17 Units/kg/hr (08/25/24 0748)  insulin, 0-100 Units/hr, Last Rate: 0.3 Units/hr (08/25/24 0752)  niCARdipine, 5-15 mg/hr, Last Rate: 5 mg/hr (08/25/24 0812)  Pharmacy to Dose Heparin,          Medications:    Current Facility-Administered Medications:     acetaminophen (TYLENOL) suppository 650 mg, 650 mg, Rectal, Q6H PRN, Russ Gorman PA-C, 650 mg at 08/25/24 0509    atorvastatin (LIPITOR) tablet 80 mg, 80 mg, Nasogastric, Nightly, Cinthia Banegas MD    azithromycin (ZITHROMAX) 500 mg in sodium chloride 0.9 % 250 mL IVPB-VTB, 500 mg, Intravenous, Q24H, Cinthia Banegas MD, 500 mg at 08/25/24 0929    cefTRIAXone (ROCEPHIN) 2,000 mg in sodium chloride 0.9 % 100 mL MBP, 2,000 mg, Intravenous, Q24H, Cinthia Banegas MD, Last Rate: 200 mL/hr at 08/25/24 0925, 2,000 mg at 08/25/24 0925    dexmedetomidine (PRECEDEX) 400 mcg in 100 mL NS infusion, 0.2-1.5 mcg/kg/hr, Intravenous, Titrated, Cinthia Banegas MD    dextrose (D50W) (25 g/50 mL) IV injection 10-50 mL, 10-50 mL, Intravenous, Q15 Min PRN, Aida Holbrook APRN    empagliflozin (JARDIANCE) tablet 10 mg, 10 mg, Oral, Daily, Dion Hooker MD    glucagon (GLUCAGEN) injection 1 mg, 1 mg, Intramuscular, Q15 Min PRN, Aida Holbrook APRN    heparin 75996 units/250 mL (100 units/mL) in 0.45 % NaCl infusion, 17 Units/kg/hr, Intravenous, Titrated, Chapin Villavicencio, PharmD, Last Rate: 21.5 mL/hr at 08/25/24 0748, 17 Units/kg/hr at 08/25/24 0748    insulin regular 1 unit/mL in 0.9% sodium chloride (Glucommander), 0-100 Units/hr, Intravenous, Titrated, Aida Holbrook,  MICHELLE, Last Rate: 0.3 mL/hr at 08/25/24 0752, 0.3 Units/hr at 08/25/24 0752    [Held by provider] metoprolol succinate XL (TOPROL-XL) 24 hr tablet 25 mg, 25 mg, Oral, Q24H, Faustina Reyes APRN    metoprolol tartrate (LOPRESSOR) injection 5 mg, 5 mg, Intravenous, Q6H, Dion Hooker MD    niCARdipine (CARDENE) 25mg in 250mL NS infusion, 5-15 mg/hr, Intravenous, Titrated, Stanton Alonso APRN, Last Rate: 50 mL/hr at 08/25/24 0812, 5 mg/hr at 08/25/24 0812    nitroglycerin (NITROSTAT) SL tablet 0.4 mg, 0.4 mg, Sublingual, Q5 Min PRN, Olayinka May, MICHELLE    pantoprazole (PROTONIX) injection 40 mg, 40 mg, Intravenous, [facility], Cinthia Banegas MD, 40 mg at 08/25/24 0925    Pharmacy to Dose Heparin, , Does not apply, Continuous PRN, Josie Delgado APRN    Potassium Replacement - Follow Nurse / BPA Driven Protocol, , Does not apply, PRNEAL, Aida Holbrook APRN    sacubitril-valsartan (ENTRESTO) 24-26 MG tablet 1 tablet, 1 tablet, Oral, Q12H, Dion Hooker MD    sodium chloride 0.9 % flush 10 mL, 10 mL, Intravenous, Q12H, Aida Holbrook APRN, 10 mL at 08/25/24 0930    sodium chloride 0.9 % flush 10 mL, 10 mL, Intravenous, PRN, Aida Holbrook APRN    sodium chloride 0.9 % infusion 40 mL, 40 mL, Intravenous, Sheron FUENTES Sara W, APRN    Vital Sign Min/Max for last 24 hours  Temp  Min: 98.1 °F (36.7 °C)  Max: 98.8 °F (37.1 °C)   BP  Min: 106/69  Max: 160/112   Pulse  Min: 99  Max: 125   Resp  Min: 18  Max: 30   SpO2  Min: 90 %  Max: 100 %   No data recorded      Intake/Output Summary (Last 24 hours) at 8/25/2024 0941  Last data filed at 8/25/2024 0630  Gross per 24 hour   Intake 799.1 ml   Output 1685 ml   Net -885.9 ml           CONSTITUTIONAL: No acute distress    Labs/studies:  Available lab and imaging results were reviewed by myself today    Results for orders placed during the hospital encounter of 08/23/24    Adult Transthoracic Echo Complete W/ Cont if Necessary Per Protocol (With Agitated  Saline)    Interpretation Summary  Images from the original result were not included.      Left ventricular systolic function is moderately decreased. Calculated left ventricular EF = 36.7%  global hypokinesis.  LV cavity mildly dilated.  RV size and function normal    Left ventricular wall thickness is consistent with hypertrophy.    Left ventricular diastolic dysfunction is noted.    There is a thrombus present in the left ventricle in apex. 2.22 cm by 0.96 cm    The left atrial cavity is dilated.    Estimated right ventricular systolic pressure from tricuspid regurgitation is normal (<35 mmHg).    Indeterminate bubble study      Tele: Sinus rhythm         Assessment/Plan:         R M1 stenosis    T2DM    HTN    Morbid obesity with BMI of 45.0-49.9, adult    SCOTT    Hypertensive urgency    R/O Seizure    Acute respiratory failure    Acute stroke due to ischemia       ASSESSMENT:  LV thrombus  Echocardiogram shows decreased LVEF 36%, LV thrombus, indeterminate bubble study.   Acute HFrEF, severe exacerbation  New diagnosis.  Unclear etiology  Possible contributing causes include recent pneumonia (returned from cruise from [place], given antibiotics), positive meth and marijuana and UDS  LVEF 36% with global hypokinesis on echo.   CVA secondary to right MCA occlusion   Status post thrombolytic therapy, TNK  Status post urgent mechanical thrombectomy, right MCA  Hypertension   Type 2 diabetes mellitus   ANA  Acute respiratory failure   Substance abuse   UDS positive for THC, methamphetamines (possibly laced marijuana?, no known history of meth use per patient's )    PLAN:  Continue heparin  Speech swallow testing today  Increased metoprolol to 5 mg every 6 hours IV push  Initiate Jardiance and Entresto if able to swallow safely or NG placed  If able to obtain Jardiance/Entresto when stable, will consider spironolactone in the next couple days  Daily BMP to monitor for drug toxicity  Continue statin    Future ischemic evaluation and possible cardiac MRI  Findings and plan discussed with patient's  at bedside       Dion Hooker MD, MSc, FACC, [facility]  Interventional Cardiology  [facility]

## 2024-08-25 NOTE — THERAPY EVALUATION
Acute Care - Speech Language Pathology   Swallow Initial Evaluation [facility]  Clinical Swallow Evaluation  Cognitive-Communication Evaluation     Patient Name: Keya Hmailton  : 1990  MRN: [id number]  Today's Date: 2024               Admit Date: 2024    Visit Dx:     ICD-10-CM ICD-9-CM   1. Acute stroke due to ischemia  I63.9 434.91   2. Acute left-sided weakness  R53.1 728.87   3. Dysarthria  R47.1 784.51   4. Somnolence  R40.0 780.09   5. Compromised respiratory status  R06.89 786.09   6. Oral phase dysphagia  R13.11 787.21   7. Cognitive communication deficit  R41.841 799.52     Patient Active Problem List   Diagnosis    R M1 stenosis    T2DM    HTN    Morbid obesity with BMI of 45.0-49.9, adult    SCOTT    Hypertensive urgency    R/O Seizure    Acute respiratory failure    Acute stroke due to ischemia     Past Medical History:   Diagnosis Date    Diabetes mellitus     Hypertension     Sleep apnea      History reviewed. No pertinent surgical history.    SLP Recommendation and Plan  SLP Swallowing Diagnosis: mild, oral dysphagia (24)  SLP Diet Recommendation: soft to chew textures, chopped, thin liquids, other (see comments) (can advance to regular solids if pt prefers. Pt opted chopped for ease of intake.) (24)  Recommended Precautions and Strategies: general aspiration precautions (24)  SLP Rec. for Method of Medication Administration: meds whole, with puree (24)     Monitor for Signs of Aspiration: yes, notify SLP if any concerns (24)     Swallow Criteria for Skilled Therapeutic Interventions Met: demonstrates skilled criteria (24)  Anticipated Discharge Disposition (SLP): inpatient rehabilitation facility (24)  Rehab Potential/Prognosis, Swallowing: good, to achieve stated therapy goals (24)  Therapy Frequency (Swallow): 5 days per week (24)  Predicted Duration Therapy Intervention (Days): 1  week (08/25/24 0930)  Oral Care Recommendations: Oral Care BID/PRN, Toothbrush (08/25/24 0930)                                        Plan of Care Reviewed With: patient, spouse      SWALLOW EVALUATION (Last 72 Hours)       SLP Adult Swallow Evaluation       Row Name 08/25/24 0930                   Rehab Evaluation    Document Type evaluation  -SM        Subjective Information no complaints  -SM        Patient Observations alert;cooperative  -SM        Patient Effort good  -SM           General Information    Patient Profile Reviewed yes  -SM        Pertinent History Of Current Problem R MCA CVA s/p TNKase. Intubated 8/23-8/24.  -SM        Current Method of Nutrition NPO  -SM        Prior Level of Function-Communication WFL  -        Prior Level of Function-Swallowing safe, efficient swallowing in all situations  -SM        Plans/Goals Discussed with patient;spouse/S.O.;agreed upon  -        Barriers to Rehab none identified  -        Patient's Goals for Discharge return to PO diet  -        Family Goals for Discharge patient able to return to PO diet  -SM           Pain    Additional Documentation Pain Scale: Numbers Pre/Post-Treatment (Group)  -SM           Pain Scale: Numbers Pre/Post-Treatment    Pretreatment Pain Rating 2/10  -SM        Posttreatment Pain Rating 2/10  -SM        Pain Location - Side/Orientation Bilateral  -SM        Pain Location generalized  -SM        Pain Location - back  -SM        Pre/Posttreatment Pain Comment Discomfort per pt. Did not have any needs.  -SM           Oral Musculature and Cranial Nerve Assessment    Mandibular Impairment Detail, Cranial Nerve V (Trigeminal) reduced strength bilaterally;other (see comments)  -SM        Oral Labial or Buccal Impairment, Detail, Cranial Nerve VII (Facial): left labial droop  -SM        Lingual Impairment, Detail. Cranial Nerves IX, XII (Glossopharyngeal and Hypoglossal) reduced strength left  -SM        Velar Impairment, Detail,  Cranial Nerves X, XI (Vagus and Accessory) other (see comments)  -           Clinical Swallow Eval    Oral Prep Phase impaired  -        Oral Transit impaired  -        Oral Residue impaired  -        Pharyngeal Phase no overt signs/symptoms of pharyngeal impairment  -           Oral Prep Concerns    Oral Prep Concerns increased prep time  -        Increased Prep Time regular consistencies  -           Oral Transit Concerns    Oral Transit Concerns increased oral transit time  -        Increased Oral Transit Time regular consistencies  -SM           Oral Residue Concerns    Oral Residue Concerns lateral sulcus residue, left  -SM        Lateral Sulcus Residue, Left regular consistencies  -        Oral Residue Concerns, Comment cleared with liquid wash  -           SLP Evaluation Clinical Impression    SLP Swallowing Diagnosis mild;oral dysphagia  -        Functional Impact risk of aspiration/pneumonia  -        Rehab Potential/Prognosis, Swallowing good, to achieve stated therapy goals  -        Swallow Criteria for Skilled Therapeutic Interventions Met demonstrates skilled criteria  -           Recommendations    Therapy Frequency (Swallow) 5 days per week  -        Predicted Duration Therapy Intervention (Days) 1 week  -        SLP Diet Recommendation soft to chew textures;chopped;thin liquids;other (see comments)  can advance to regular solids if pt prefers. Pt opted chopped for ease of intake.  -        Recommended Precautions and Strategies general aspiration precautions  -        Oral Care Recommendations Oral Care BID/PRN;Toothbrush  -        SLP Rec. for Method of Medication Administration meds whole;with puree  -        Monitor for Signs of Aspiration yes;notify SLP if any concerns  -        Anticipated Discharge Disposition (SLP) inpatient rehabilitation facility  -                  User Key  (r) = Recorded By, (t) = Taken By, (c) = Cosigned By      Initials Name  Effective Dates    Reyna Hernandez, MS CCC-SLP 02/03/23 -                     EDUCATION  The patient has been educated in the following areas:   Dysphagia (Swallowing Impairment) Modified Diet Instruction.        SLP GOALS       Row Name 08/25/24 0988             (LTG) Patient will demonstrate functional swallow for    Diet Texture (Demonstrate functional swallow) regular textures  -SM      Liquid viscosity (Demonstrate functional swallow) thin liquids  -SM      [place] (Demonstrate functional swallow) independently (over 90% accuracy)  -SM      Time Frame (Demonstrate functional swallow) 1 week  -SM         (STG) Patient will tolerate trials of    Consistencies Trialed (Tolerate trials) soft to chew (chopped) textures;thin liquids  -SM      Desired Outcome (Tolerate trials) with adequate oral prep/transit/clearance  -SM      [place] (Tolerate trials) independently (over 90% accuracy)  -SM      Time Frame (Tolerate trials) 1 week  -SM         (STG) Patient will tolerate therapeutic trials of    Consistencies Trialed (Tolerate therapeutic trials) regular textures  -SM      Desired Outcome (Tolerate therapeutic trials) with adequate oral prep/transit/clearance;without signs/symptoms of aspiration  -SM      [place] (Tolerate therapeutic trials) independently (over 90% accuracy)  -SM      Time Frame (Tolerate therapeutic trials) 1 week  -SM         (STG) Labial Strengthening Goal 1 (SLP)    Activity (Labial Strengthening Goal 1, SLP) increase labial tone  -SM      Increase Labial Tone swallow trials;labial resistance exercises  -SM      [place]/Accuracy (Labial Strengthening Goal 1, SLP) independently (over 90% accuracy)  -SM      Time Frame (Labial Strengthening Goal 1, SLP) 1 week  -SM         (STG) Lingual Strengthening Goal 1 (SLP)    Activity (Lingual Strengthening Goal 1, SLP) increase lingual tone/sensation/control/coordination/movement  -SM      Increase Lingual  Tone/Sensation/Control/Coordination/Movement swallow trials;lingual resistance exercises  -      [place]/Accuracy (Lingual Strengthening Goal 1, SLP) independently (over 90% accuracy)  -SM      Time Frame (Lingual Strengthening Goal 1, SLP) 1 week  -         (STG) Swallow Compensatory Strategies Goal 1 (SLP)    Activity (Swallow Compensatory Strategies/Techniques Goal 1, SLP) aspiration precautions;food/liquid placed on stronger right side  -      [place]/Accuracy (Swallow Compensatory Strategies/Techniques Goal 1, SLP) independently (over 90% accuracy)  -SM      Time Frame (Swallow Compensatory Strategies/Techniques Goal 1, SLP) 1 week  -SM         Patient will demonstrate functional cognitive-linguistic skills for return to discharge environment    [place] with minimal cues  -SM      Time frame 1 week  -SM         SLP Diagnostic Treatment     Patient will participate in further assessment in the following areas reading comprehension;graphic expression;higher-level cognitive-linguistic  -      Time Frame (Diagnostic) 1 week  -SM         Comprehend Questions Goal 1 (SLP)    Improve Ability to Comprehend Questions Goal 1 (SLP) complex yes/no questions;complex wh questions;100%;independently (over 90% accuracy)  -SM      Time Frame (Comprehend Questions Goal 1, SLP) 1 week  -SM         Articulation Goal 1 (SLP)    Improve Articulation Goal 1 (SLP) by over-articulating at word level;90%;with minimal cues (75-90%)  -SM      Time Frame (Articulation Goal 1, SLP) 1 week  -SM         Right Hemisphere Function Goal 1 (SLP)    Improve Right Hemisphere Function Through Goal 1 (SLP) demonstrate awareness of communication partner in left visual field;100%;independently (over 90% accuracy)  -      Time Frame (Right Hemisphere Function Goal 1, SLP) 1 week  -                User Key  (r) = Recorded By, (t) = Taken By, (c) = Cosigned By      Initials Name Provider Type    SM Reyna Curry, MS  CCC-SLP Speech and Language Pathologist                         Time Calculation:    Time Calculation- SLP       Row Name 24 1054             Time Calculation- SLP    SLP Start Time 0930  -SM      SLP Received On 24  -SM         Untimed Charges    96475-AR Eval Speech and Production w/ Language Minutes 26  -SM      63314-YO Eval Oral Pharyng Swallow Minutes 25  -SM         Total Minutes    Untimed Charges Total Minutes 51  -SM       Total Minutes 51  -SM                User Key  (r) = Recorded By, (t) = Taken By, (c) = Cosigned By      Initials Name Provider Type    Reyna Hernandez, MS CCC-SLP Speech and Language Pathologist                    Therapy Charges for Today       Code Description Service Date Service Provider Modifiers Qty    01778903416 HC ST EVAL ORAL PHARYNG SWALLOW 2 2024 Reyna Curry MS CCC-SLP GN 1    [id number] HC ST EVAL SPEECH AND PROD W LANG  2 2024 Reyna Curyr MS CCC-SLP GN 1                 Reyna Curry MS CCC-SLP  2024   and Acute Care - Speech Language Pathology Initial Evaluation  [facility]     Patient Name: Keya Hamilton  : 1990  MRN: [id number]  Today's Date: 2024               Admit Date: 2024     Visit Dx:    ICD-10-CM ICD-9-CM   1. Acute stroke due to ischemia  I63.9 434.91   2. Acute left-sided weakness  R53.1 728.87   3. Dysarthria  R47.1 784.51   4. Somnolence  R40.0 780.09   5. Compromised respiratory status  R06.89 786.09   6. Oral phase dysphagia  R13.11 787.21   7. Cognitive communication deficit  R41.841 799.52     Patient Active Problem List   Diagnosis    R M1 stenosis    T2DM    HTN    Morbid obesity with BMI of 45.0-49.9, adult    SCOTT    Hypertensive urgency    R/O Seizure    Acute respiratory failure    Acute stroke due to ischemia     Past Medical History:   Diagnosis Date    Diabetes mellitus     Hypertension     Sleep apnea      History reviewed. No pertinent surgical history.    SLP  Recommendation and Plan  SLP Diagnosis: cognitive-linguistic disorder, mild, moderate, dysarthria (08/25/24 0930)        Monitor for Signs of Aspiration: yes, notify SLP if any concerns (08/25/24 0930)  Swallow Criteria for Skilled Therapeutic Interventions Met: demonstrates skilled criteria (08/25/24 0930)  SLC Criteria for Skilled Therapy Interventions Met: yes (08/25/24 0930)  Anticipated Discharge Disposition (SLP): inpatient rehabilitation facility (08/25/24 0930)     Therapy Frequency (Swallow): 5 days per week (08/25/24 0930)  Therapy Frequency (SLP SLC): 5 days per week (08/25/24 0930)  Predicted Duration Therapy Intervention (Days): 1 week (08/25/24 0930)  Oral Care Recommendations: Oral Care BID/PRN, Toothbrush (08/25/24 0930)                          Plan of Care Reviewed With: patient, spouse (08/25/24 2131)      SLP EVALUATION (Last 72 Hours)       SLP SLC Evaluation       Row Name 08/25/24 0930                   Comprehension Assessment/Intervention    Comprehension Assessment/Intervention Auditory Comprehension  -SM           Auditory Comprehension Assessment/Intervention    Answers Questions (Communication) yes/no;wh questions;personal;simple;WFL;other (see comments)  -SM        Able to Follow Commands (Communication) 1-step;WFL  -SM        Narrative Discourse conversational level;mild impairment  -SM        Successful Auditory Strategies (Communication) repetition  -SM        Auditory Comprehension Communication, Comment Sleepy, may be primary impact. Slow to respond at times.  -SM           Expression Assessment/Intervention    Expression Assessment/Intervention verbal expression  -SM           Verbal Expression Assessment/Intervention    Conversational Discourse/Fluency WFL  -SM           Motor Speech Assessment/Intervention    Motor Speech Function mild impairment;moderate impairment  -SM        Characteristics Consistent with Dysarthria decreased articulation  -SM        Speech intelligibility  90%;in quiet environment;with unfamiliar listener  -SM           Cursory Voice Assessment/Intervention    Quality and Resonance (Voice) WFL  -SM           Cognitive Assessment Intervention- SLP    Orientation Status (Cognition) person;time;situation;WFL;place;other (see comments)  delayed response and prompting for place  -SM        Attention (Cognitive) selective;WFL;sustained;mild impairment  -SM        Right Hemisphere Function left neglect  -SM        Cognition, Comment Pt sleepy, will continue dx tx next session.  -SM           SLP Evaluation Clinical Impressions    SLP Diagnosis cognitive-linguistic disorder;mild;moderate;dysarthria  -SM        Rehab Potential/Prognosis good  -SM        SLC Criteria for Skilled Therapy Interventions Met yes  -SM        Functional Impact difficulty completing home management task  -SM           Recommendations    Therapy Frequency (SLP SLC) 5 days per week  -SM                  User Key  (r) = Recorded By, (t) = Taken By, (c) = Cosigned By      Initials Name Effective Dates    Reyna Hernandez MS CCC-SLP 02/03/23 -                        EDUCATION  The patient has been educated in the following areas:     Cognitive Impairment Communication Impairment.           SLP GOALS       Row Name 08/25/24 1530             (LTG) Patient will demonstrate functional swallow for    Diet Texture (Demonstrate functional swallow) regular textures  -SM      Liquid viscosity (Demonstrate functional swallow) thin liquids  -SM      [place] (Demonstrate functional swallow) independently (over 90% accuracy)  -SM      Time Frame (Demonstrate functional swallow) 1 week  -SM         (STG) Patient will tolerate trials of    Consistencies Trialed (Tolerate trials) soft to chew (chopped) textures;thin liquids  -SM      Desired Outcome (Tolerate trials) with adequate oral prep/transit/clearance  -SM      [place] (Tolerate trials) independently (over 90% accuracy)  -SM      Time Frame  (Tolerate trials) 1 week  -SM         (STG) Patient will tolerate therapeutic trials of    Consistencies Trialed (Tolerate therapeutic trials) regular textures  -SM      Desired Outcome (Tolerate therapeutic trials) with adequate oral prep/transit/clearance;without signs/symptoms of aspiration  -SM      [place] (Tolerate therapeutic trials) independently (over 90% accuracy)  -SM      Time Frame (Tolerate therapeutic trials) 1 week  -SM         (STG) Labial Strengthening Goal 1 (SLP)    Activity (Labial Strengthening Goal 1, SLP) increase labial tone  -SM      Increase Labial Tone swallow trials;labial resistance exercises  -SM      [place]/Accuracy (Labial Strengthening Goal 1, SLP) independently (over 90% accuracy)  -SM      Time Frame (Labial Strengthening Goal 1, SLP) 1 week  -SM         (STG) Lingual Strengthening Goal 1 (SLP)    Activity (Lingual Strengthening Goal 1, SLP) increase lingual tone/sensation/control/coordination/movement  -SM      Increase Lingual Tone/Sensation/Control/Coordination/Movement swallow trials;lingual resistance exercises  -SM      [place]/Accuracy (Lingual Strengthening Goal 1, SLP) independently (over 90% accuracy)  -SM      Time Frame (Lingual Strengthening Goal 1, SLP) 1 week  -SM         (STG) Swallow Compensatory Strategies Goal 1 (SLP)    Activity (Swallow Compensatory Strategies/Techniques Goal 1, SLP) aspiration precautions;food/liquid placed on stronger right side  -SM      [place]/Accuracy (Swallow Compensatory Strategies/Techniques Goal 1, SLP) independently (over 90% accuracy)  -SM      Time Frame (Swallow Compensatory Strategies/Techniques Goal 1, SLP) 1 week  -SM         Patient will demonstrate functional cognitive-linguistic skills for return to discharge environment    [place] with minimal cues  -SM      Time frame 1 week  -SM         SLP Diagnostic Treatment     Patient will participate in further assessment in the following areas reading  comprehension;graphic expression;higher-level cognitive-linguistic  -      Time Frame (Diagnostic) 1 week  -SM         Comprehend Questions Goal 1 (SLP)    Improve Ability to Comprehend Questions Goal 1 (SLP) complex yes/no questions;complex wh questions;100%;independently (over 90% accuracy)  -      Time Frame (Comprehend Questions Goal 1, SLP) 1 week  -SM         Articulation Goal 1 (SLP)    Improve Articulation Goal 1 (SLP) by over-articulating at word level;90%;with minimal cues (75-90%)  -      Time Frame (Articulation Goal 1, SLP) 1 week  -SM         Right Hemisphere Function Goal 1 (SLP)    Improve Right Hemisphere Function Through Goal 1 (SLP) demonstrate awareness of communication partner in left visual field;100%;independently (over 90% accuracy)  -      Time Frame (Right Hemisphere Function Goal 1, SLP) 1 week  -                User Key  (r) = Recorded By, (t) = Taken By, (c) = Cosigned By      Initials Name Provider Type    Reyna Hernandez MS CCC-SLP Speech and Language Pathologist                              Time Calculation:      Time Calculation- SLP       Row Name 08/25/24 1054             Time Calculation- SLP    SLP Start Time 0930  -      SLP Received On 08/25/24  -         Untimed Charges    92891-DD Eval Speech and Production w/ Language Minutes 26  -SM      54043-IP Eval Oral Pharyng Swallow Minutes 25  -SM         Total Minutes    Untimed Charges Total Minutes 51  -SM       Total Minutes 51  -SM                User Key  (r) = Recorded By, (t) = Taken By, (c) = Cosigned By      Initials Name Provider Type    Reyna Hernandez MS CCC-SLP Speech and Language Pathologist                    Therapy Charges for Today       Code Description Service Date Service Provider Modifiers Qty    49313368751 HC ST EVAL ORAL PHARYNG SWALLOW 2 8/25/2024 Reyna Curry MS CCC-SLP GN 1    [id number] HC ST EVAL SPEECH AND PROD W LANG  2 8/25/2024 Reyna Curry MS  CCC-SLP GN 1                       Reyna Curry, MS CCC-SLP  8/25/2024

## 2024-08-25 NOTE — PLAN OF CARE
Goal Outcome Evaluation:  Plan of Care Reviewed With: patient, spouse                  Anticipated Discharge Disposition (SLP): inpatient rehabilitation facility    SLP Diagnosis: cognitive-linguistic disorder, mild, moderate, dysarthria (08/25/24 0930)     SLP Swallowing Diagnosis: mild, oral dysphagia (08/25/24 0930)

## 2024-08-25 NOTE — THERAPY EVALUATION
Patient Name: Keya Hamilton  : 1990    MRN: [id number]                              Today's Date: 2024       Admit Date: 2024    Visit Dx:     ICD-10-CM ICD-9-CM   1. Acute stroke due to ischemia  I63.9 434.91   2. Acute left-sided weakness  R53.1 728.87   3. Dysarthria  R47.1 784.51   4. Somnolence  R40.0 780.09   5. Compromised respiratory status  R06.89 786.09   6. Oral phase dysphagia  R13.11 787.21   7. Cognitive communication deficit  R41.841 799.52     Patient Active Problem List   Diagnosis    R M1 stenosis    T2DM    HTN    Morbid obesity with BMI of 45.0-49.9, adult    SCOTT    Hypertensive urgency    R/O Seizure    Acute respiratory failure    Acute stroke due to ischemia    Acute systolic heart failure    Left ventricular thrombus     Past Medical History:   Diagnosis Date    Diabetes mellitus     Hypertension     Sleep apnea      History reviewed. No pertinent surgical history.   General Information       Row Name 24 0932          OT Time and Intention    Document Type evaluation  -     Mode of Treatment occupational therapy  -       Row Name 24 0932          General Information    Patient Profile Reviewed yes  -JR     Prior Level of Function independent:;gait;transfer;bed mobility;ADL's;driving;using stairs  -     Existing Precautions/Restrictions fall;oxygen therapy device and L/min;other (see comments)  L sided weakness, decreased vision L eye  -     Barriers to Rehab medically complex;visual deficit  -       Row Name 24 0932          Living Environment    People in Home spouse  -       Row Name 24 0932          Home Main Entrance    Number of Stairs, Main Entrance other (see comments)  15 to enter  -JR     Stair Railings, Main Entrance railing on left side (ascending)  -       Row Name 24 0932          Stairs Within Home, Primary    Number of Stairs, Within Home, Primary other (see comments)  bedroom upstairs  -     Stair Railings,  Within Home, Primary railing on left side (ascending)  -       Row Name 08/25/24 0932          Cognition    Orientation Status (Cognition) oriented x 3  -       Row Name 08/25/24 0932          Safety Issues, Functional Mobility    Safety Issues Affecting Function (Mobility) awareness of need for assistance;insight into deficits/self-awareness;safety precaution awareness;safety precautions follow-through/compliance;sequencing abilities  -     Impairments Affecting Function (Mobility) balance;coordination;endurance/activity tolerance;grasp;motor control;motor planning;visual/perceptual;strength;shortness of breath;postural/trunk control  -               User Key  (r) = Recorded By, (t) = Taken By, (c) = Cosigned By      Initials Name Provider Type     Tg, Mariaa MURRELL, OT Occupational Therapist                     Mobility/ADL's       Row Name 08/25/24 0940          Bed Mobility    Bed Mobility supine-sit  -     Supine-Sit Uriah (Bed Mobility) moderate assist (50% patient effort);verbal cues  -     Bed Mobility, Safety Issues decreased use of arms for pushing/pulling;decreased use of legs for bridging/pushing;impaired trunk control for bed mobility  -     Assistive Device (Bed Mobility) bed rails;draw sheet;head of bed elevated  -     Comment, (Bed Mobility) Pt required increased time and verbal cues for supine to sit on EOB. Pt required assist for positioning EOB.  -       Row Name 08/25/24 0940          Transfers    Transfers sit-stand transfer;bed-chair transfer  -       Row Name 08/25/24 0940          Bed-Chair Transfer    Bed-Chair Uriah (Transfers) minimum assist (75% patient effort);2 person assist;verbal cues  -     Assistive Device (Bed-Chair Transfers) other (see comments)  BUE support  -       Row Name 08/25/24 0940          Sit-Stand Transfer    Sit-Stand Uriah (Transfers) minimum assist (75% patient effort);2 person assist;verbal cues  -     Assistive  Device (Sit-Stand Transfers) other (see comments)  B UE support  -     Comment, (Sit-Stand Transfer) Pt required verbal cues and increased time for transfer to chair.  -[facility] Name 08/25/24 0940          Activities of Daily Living    BADL Assessment/Intervention lower body dressing  -[facility] Name 08/25/24 0940          Lower Body Dressing Assessment/Training    Yell Level (Lower Body Dressing) don;socks;dependent (less than 25% patient effort)  -     Position (Lower Body Dressing) supine  -               User Key  (r) = Recorded By, (t) = Taken By, (c) = Cosigned By      Initials Name Provider Type    JR Tg, Mariaa MURRELL, OT Occupational Therapist                   Obj/Interventions       [facility] Name 08/25/24 0944          Sensory Assessment (Somatosensory)    Sensory Assessment (Somatosensory) UE sensation intact  -[facility] Name 08/25/24 0944          Vision Assessment/Intervention    Vision Assessment Comment Pt with R gaze preference, unable to ID # of fingers in L eye, able to track in all quadrants, decreased peripheral vision B eyes, R >L  -[facility] Name 08/25/24 0944          Range of Motion Comprehensive    General Range of Motion bilateral upper extremity ROM WFL  -[facility] Name 08/25/24 0944          Strength Comprehensive (MMT)    Comment, General Manual Muscle Testing (MMT) Assessment L UE shoulder flexion 1+/5, biceps/triceps 0/5, supination/pronation 1+/5,  2+/5, R UE 4+/5  -[facility] Name 08/25/24 0944          Motor Skills    Motor Skills coordination  -     Coordination left;upper extremity;fine motor deficit;gross motor deficit;severe impairment  -[facility] Name 08/25/24 0944          Balance    Balance Assessment sitting static balance;standing dynamic balance  -     Static Sitting Balance minimal assist  -     Position, Sitting Balance sitting edge of bed  -     Dynamic Standing Balance minimal assist;2-person assist  -     Position/Device  Used, Standing Balance supported  -JR     Comment, Balance slight lean to L EOB  -JR               User Key  (r) = Recorded By, (t) = Taken By, (c) = Cosigned By      Initials Name Provider Type    Mariaa Gregg, OT Occupational Therapist                   Goals/Plan       Row Name 08/25/24 0954          Bed Mobility Goal 1 (OT)    Activity/Assistive Device (Bed Mobility Goal 1, OT) bed mobility activities, all  -JR     [place] Level/Cues Needed (Bed Mobility Goal 1, OT) minimum assist (75% or more patient effort);verbal cues required  -JR     Time Frame (Bed Mobility Goal 1, OT) short term goal (STG);5 days  -JR     Progress/Outcomes (Bed Mobility Goal 1, OT) new goal  -       Row Name 08/25/24 0954          Strength Goal 1 (OT)    Strength Goal 1 (OT) Pt to increase L UE strength by 1/2 muscle grade to support ADL independence.  -JR     Time Frame (Strength Goal 1, OT) long term goal (LTG);by discharge  -JR     Progress/Outcome (Strength Goal 1, OT) new goal  -       Row Name 08/25/24 0954          Problem Specific Goal 1 (OT)    Problem Specific Goal 1 (OT) Pt to attend to visual stimuli on L with min verbal cues to support ADL independence.  -JR     Time Frame (Problem Specific Goal 1, OT) long term goal (LTG);by discharge  -JR     Progress/Outcome (Problem Specific Goal 1, OT) new goal  -       Row Name 08/25/24 0954          Therapy Assessment/Plan (OT)    Planned Therapy Interventions (OT) activity tolerance training;adaptive equipment training;BADL retraining;functional balance retraining;neuromuscular control/coordination retraining;patient/caregiver education/training;transfer/mobility retraining;strengthening exercise;ROM/therapeutic exercise;occupation/activity based interventions  -JR               User Key  (r) = Recorded By, (t) = Taken By, (c) = Cosigned By      Initials Name Provider Type    Mariaa Gregg, OT Occupational Therapist                   Clinical Impression        [facility] Name 08/25/24 0952          Pain Assessment    Pretreatment Pain Rating 0/10 - no pain  -JR     Posttreatment Pain Rating 0/10 - no pain  -JR     Additional Documentation Pain Scale: Word Pre/Post-Treatment (Group)  -       [facility] Name 08/25/24 0952          Plan of Care Review    Plan of Care Reviewed With patient  -JR     Outcome Evaluation OT initial eval and expanded chart review completed. Pt presents with multiple comorbidities and decreased strength, balane, activity tolerance, vision and coordination limiting independence with ADL's and mobility from baseline status. Recommend continued skilled OT services and transfer to IRF at d/c.  -       [facility] Name 08/25/24 0952          Therapy Assessment/Plan (OT)    Patient/Family Therapy Goal Statement (OT) go home  -     Rehab Potential (OT) good, to achieve stated therapy goals  -     Criteria for Skilled Therapeutic Interventions Met (OT) skilled treatment is necessary;meets criteria  -     Therapy Frequency (OT) daily  -JR     Predicted Duration of Therapy Intervention (OT) 10 days  -       [facility] Name 08/25/24 0952          Therapy Plan Review/Discharge Plan (OT)    Anticipated Discharge Disposition (OT) inpatient rehabilitation facility  -[facility] Name 08/25/24 0952          Vital Signs    Pre Systolic BP Rehab 137  -JR     Pre Treatment Diastolic BP 96  -JR     Post Systolic BP Rehab 134  -JR     Post Treatment Diastolic BP 90  -JR     Pretreatment Heart Rate (beats/min) 113  -JR     Posttreatment Heart Rate (beats/min) 113  -JR     Pre SpO2 (%) 95  -JR     O2 Delivery Pre Treatment nasal cannula  -JR     Post SpO2 (%) 93  -JR     O2 Delivery Post Treatment nasal cannula  -JR     Pre Patient Position Supine  -JR     Intra Patient Position Standing  -JR     Post Patient Position Sitting  -       [facility] Name 08/25/24 0952          Positioning and Restraints    Pre-Treatment Position in bed  -JR     Post Treatment Position chair  -JR     In Chair  notified nsg;reclined;call light within reach;encouraged to call for assist;exit alarm on;with family/caregiver;waffle cushion;on mechanical lift sling;legs elevated;LUE elevated  -               User Key  (r) = Recorded By, (t) = Taken By, (c) = Cosigned By      Initials Name Provider Type    JR Mariaa Mas, OT Occupational Therapist                   Outcome Measures       Row Name 08/25/24 0956          How much help from another is currently needed...    Putting on and taking off regular lower body clothing? 1  -JR     Bathing (including washing, rinsing, and drying) 2  -JR     Toileting (which includes using toilet bed pan or urinal) 1  -JR     Putting on and taking off regular upper body clothing 2  -JR     Taking care of personal grooming (such as brushing teeth) 3  -JR     Eating meals 3  -JR     AM-PAC 6 Clicks Score (OT) 12  -       Row Name 08/25/24 1003          How much help from another person do you currently need...    Turning from your back to your side while in flat bed without using bedrails? 3  -LS     Moving from lying on back to sitting on the side of a flat bed without bedrails? 2  -LS     Moving to and from a bed to a chair (including a wheelchair)? 3  -LS     Standing up from a chair using your arms (e.g., wheelchair, bedside chair)? 3  -LS     Climbing 3-5 steps with a railing? 1  -LS     To walk in hospital room? 3  -LS     AM-PAC 6 Clicks Score (PT) 15  -LS     Highest Level of Mobility Goal 4 --> Transfer to chair/commode  -       Row Name 08/25/24 1003 08/25/24 0956       Modified Tonio Scale    Pre-Stroke Modified Tonio Scale 0 - No Symptoms at all.  -LS --    Modified Tonio Scale 4 - Moderately severe disability.  Unable to walk without assistance, and unable to attend to own bodily needs without assistance.  -LS 4 - Moderately severe disability.  Unable to walk without assistance, and unable to attend to own bodily needs without assistance.  -      Row Name 08/25/24  1003 08/25/24 0956       Functional Assessment    Outcome Measure Options AM-PAC 6 Clicks Basic Mobility (PT);Modified Tonio  -LS AM-PAC 6 Clicks Daily Activity (OT);Modified Tonio  -JR              User Key  (r) = Recorded By, (t) = Taken By, (c) = Cosigned By      Initials Name Provider Type    Mariaa Gregg, OT Occupational Therapist    Latanya Leonardo, PT Physical Therapist                    Occupational Therapy Education       Title: PT OT SLP Therapies (In Progress)       Topic: Occupational Therapy (In Progress)       Point: ADL training (Done)       Description:   Instruct learner(s) on proper safety adaptation and remediation techniques during self care or transfers.   Instruct in proper use of assistive devices.                  Learning Progress Summary             Patient Acceptance, E, VU by  at 8/25/2024 0825    Comment: role of therapy, ongoing treatment plan, discharge recs   Family Acceptance, E, VU by  at 8/25/2024 0825    Comment: role of therapy, ongoing treatment plan, discharge recs                         Point: Home exercise program (Done)       Description:   Instruct learner(s) on appropriate technique for monitoring, assisting and/or progressing therapeutic exercises/activities.                  Learning Progress Summary             Patient Acceptance, E, VU by  at 8/25/2024 0825    Comment: role of therapy, ongoing treatment plan, discharge recs   Family Acceptance, E, VU by  at 8/25/2024 0825    Comment: role of therapy, ongoing treatment plan, discharge recs                         Point: Precautions (Not Started)       Description:   Instruct learner(s) on prescribed precautions during self-care and functional transfers.                  Learner Progress:  Not documented in this visit.              Point: Body mechanics (Not Started)       Description:   Instruct learner(s) on proper positioning and spine alignment during self-care, functional mobility activities  and/or exercises.                  Learner Progress:  Not documented in this visit.                              User Key       Initials Effective Dates Name Provider Type Discipline     02/03/23 -  Mariaa Mas, OT Occupational Therapist OT                  OT Recommendation and Plan  Planned Therapy Interventions (OT): activity tolerance training, adaptive equipment training, BADL retraining, functional balance retraining, neuromuscular control/coordination retraining, patient/caregiver education/training, transfer/mobility retraining, strengthening exercise, ROM/therapeutic exercise, occupation/activity based interventions  Therapy Frequency (OT): daily  Plan of Care Review  Plan of Care Reviewed With: patient  Outcome Evaluation: OT initial eval and expanded chart review completed. Pt presents with multiple comorbidities and decreased strength, balane, activity tolerance, vision and coordination limiting independence with ADL's and mobility from baseline status. Recommend continued skilled OT services and transfer to IRF at d/c.     Time Calculation:   Evaluation Complexity (OT)  Review Occupational Profile/Medical/Therapy History Complexity: expanded/moderate complexity  Assessment, Occupational Performance/Identification of Deficit Complexity: 3-5 performance deficits  Clinical Decision Making Complexity (OT): detailed assessment/moderate complexity  Overall Complexity of Evaluation (OT): moderate complexity     Time Calculation- OT       Row Name 08/25/24 0957             Time Calculation- OT    OT Start Time 0825  -JR      OT Received On 08/25/24  -JR      OT Goal Re-Cert Due Date 09/04/24  -         Timed Charges    98821 - OT Self Care/Mgmt Minutes 8  -JR         Untimed Charges    OT Eval/Re-eval Minutes 36  -JR         Total Minutes    Timed Charges Total Minutes 8  -JR      Untimed Charges Total Minutes 36  -JR       Total Minutes 44  -JR                User Key  (r) = Recorded By, (t) = Taken  By, (c) = Cosigned By      Initials Name Provider Type    JR Mariaa Mas, OT Occupational Therapist                  Therapy Charges for Today       Code Description Service Date Service Provider Modifiers Qty    [id number] HC OT SELF CARE/MGMT/TRAIN EA 15 MIN 8/25/2024 Mariaa Mas, OT GO 1    [id number]  OT EVAL MOD COMPLEXITY 3 8/25/2024 Mariaa Mas OT GO 1                 Mariaa Mas, OT  8/25/2024

## 2024-08-25 NOTE — THERAPY EVALUATION
Patient Name: Keya Hamilton  : 1990    MRN: [id number]                              Today's Date: 2024       Admit Date: 2024    Visit Dx:     ICD-10-CM ICD-9-CM   1. Acute stroke due to ischemia  I63.9 434.91   2. Acute left-sided weakness  R53.1 728.87   3. Dysarthria  R47.1 784.51   4. Somnolence  R40.0 780.09   5. Compromised respiratory status  R06.89 786.09     Patient Active Problem List   Diagnosis    R M1 stenosis    T2DM    HTN    Morbid obesity with BMI of 45.0-49.9, adult    SCOTT    Hypertensive urgency    R/O Seizure    Acute respiratory failure    Acute stroke due to ischemia     Past Medical History:   Diagnosis Date    Diabetes mellitus     Hypertension     Sleep apnea      History reviewed. No pertinent surgical history.   General Information       Row Name 24 0952          Physical Therapy Time and Intention    Document Type evaluation  -LS     Mode of Treatment physical therapy  -       Row Name 2452          General Information    Patient Profile Reviewed yes  -LS     Prior Level of Function independent:;all household mobility;ADL's  -LS     Existing Precautions/Restrictions fall;oxygen therapy device and L/min;other (see comments)  L weakness, L visual deficits  -     Barriers to Rehab medically complex;visual deficit  -       Row Name 2452          Living Environment    People in Home spouse  -       Row Name 2452          Home Main Entrance    Number of Stairs, Main Entrance other (see comments)  15  -LS       Row Name 2452          Stairs Within Home, Primary    Stairs, Within Home, Primary 15; bedroom upstairs  -     Number of Stairs, Within Home, Primary other (see comments)  -LS       Row Name 2452          Cognition    Orientation Status (Cognition) oriented x 4  -LS       Row Name 2452          Safety Issues, Functional Mobility    Impairments Affecting Function (Mobility)  balance;coordination;endurance/activity tolerance;motor control;motor planning;visual/perceptual;strength;shortness of breath;postural/trunk control;sensation/sensory awareness  -LS               User Key  (r) = Recorded By, (t) = Taken By, (c) = Cosigned By      Initials Name Provider Type    LS Latanya Shelley, PT Physical Therapist                   Mobility       Row Name 08/25/24 0954          Bed Mobility    Comment, (Bed Mobility) EOB with OT upon transition to PT evaluation.  -LS       Row Name 08/25/24 0954          Sit-Stand Transfer    Sit-Stand [place] (Transfers) minimum assist (75% patient effort);2 person assist;verbal cues  -LS     Assistive Device (Sit-Stand Transfers) other (see comments)  BUE support  -LS       Row Name 08/25/24 0954          Gait/Stairs (Locomotion)    [place] Level (Gait) minimum assist (75% patient effort);2 person assist;verbal cues  -LS     Assistive Device (Gait) other (see comments)  BUE support  -LS     Patient was able to Ambulate yes  -LS     Distance in Feet (Gait) 3  -LS     Deviations/Abnormal Patterns (Gait) weight shifting decreased  -LS     Comment, (Gait/Stairs) EOB to recliner. VC for upright posture and appropriate advancement of LEs.  -LS               User Key  (r) = Recorded By, (t) = Taken By, (c) = Cosigned By      Initials Name Provider Type    Latanya Leonardo, PT Physical Therapist                   Obj/Interventions       Row Name 08/25/24 0957          Range of Motion Comprehensive    General Range of Motion bilateral lower extremity ROM WFL  -LS       Row Name 08/25/24 0957          Strength Comprehensive (MMT)    General Manual Muscle Testing (MMT) Assessment lower extremity strength deficits identified  -LS     Comment, General Manual Muscle Testing (MMT) Assessment RLE 4+/5; LLE grossly 3+/5  -LS       Row Name 08/25/24 0957          Motor Skills    Motor Skills coordination  -LS     Coordination left;lower extremity;minimal  impairment;heel to shin  -       Row Name 08/25/24 0957          Balance    Balance Assessment standing static balance  -LS     Static Sitting Balance minimal assist  -LS     Position, Sitting Balance sitting in chair  -LS     Static Standing Balance minimal assist;2-person assist;verbal cues  -     Balance Interventions standing;dynamic;weight shifting activity  -LS     Comment, Balance R/L weightshifting at EOB prior to transfer to recliner  -       Row Name 08/25/24 0957          Sensory Assessment (Somatosensory)    Sensory Assessment (Somatosensory) left LE  -LS     Left LE Sensory Assessment light touch awareness;impaired  -LS               User Key  (r) = Recorded By, (t) = Taken By, (c) = Cosigned By      Initials Name Provider Type     Latanya Shelley, PT Physical Therapist                   Goals/Plan       [facility] Name 08/25/24 1002          Bed Mobility Goal 1 (PT)    Activity/Assistive Device (Bed Mobility Goal 1, PT) sit to supine/supine to sit  -LS     Price Level/Cues Needed (Bed Mobility Goal 1, PT) supervision required  -LS     Time Frame (Bed Mobility Goal 1, PT) 1 week;short term goal (STG)  -       Row Name 08/25/24 1002          Transfer Goal 1 (PT)    Activity/Assistive Device (Transfer Goal 1, PT) sit-to-stand/stand-to-sit  -LS     Price Level/Cues Needed (Transfer Goal 1, PT) supervision required;contact guard required  -LS     Time Frame (Transfer Goal 1, PT) 2 weeks  -       Row Name 08/25/24 1002          Gait Training Goal 1 (PT)    Activity/Assistive Device (Gait Training Goal 1, PT) gait (walking locomotion);assistive device use  -LS     Price Level (Gait Training Goal 1, PT) contact guard required  -LS     Distance (Gait Training Goal 1, PT) 150  -LS     Time Frame (Gait Training Goal 1, PT) 2 weeks;long term goal (LTG)  -       Row Name 08/25/24 1002          Therapy Assessment/Plan (PT)    Planned Therapy Interventions (PT) balance training;bed mobility  training;gait training;home exercise program;stair training;transfer training;strengthening;patient/family education  -               User Key  (r) = Recorded By, (t) = Taken By, (c) = Cosigned By      Initials Name Provider Type    Latanya Leonardo, PT Physical Therapist                   Clinical Impression       Row Name 08/25/24 0959          Pain    Pretreatment Pain Rating 0/10 - no pain  -LS     Posttreatment Pain Rating 0/10 - no pain  -LS       Row Name 08/25/24 0959          Plan of Care Review    Plan of Care Reviewed With patient;spouse  -     Progress no change  -LS     Outcome Evaluation PT initial evaluation completed. Pt demnstrates L sided weakness (UE >LE) and decreased sensation/ coordination LLE, impacting independence re: functional mobility and warranting further skilled PT services to promote PLOF. Min x2 for STS and short distance gait to recliner. Recommend IP rehab at d/c. Pt very motivated to participate and progress.  -       Row Name 08/25/24 0959          Therapy Assessment/Plan (PT)    Patient/Family Therapy Goals Statement (PT) return to PLOF  -LS     Rehab Potential (PT) good, to achieve stated therapy goals  -     Criteria for Skilled Interventions Met (PT) yes;skilled treatment is necessary  -LS     Therapy Frequency (PT) daily  -LS     Predicted Duration of Therapy Intervention (PT) 2 weeks  -       Row Name 08/25/24 0959          Vital Signs    Pre Systolic BP Rehab 137  -LS     Pre Treatment Diastolic BP 96  -LS     Post Systolic BP Rehab 134  -LS     Post Treatment Diastolic BP 90  -LS     Pretreatment Heart Rate (beats/min) 114  -LS     Posttreatment Heart Rate (beats/min) 115  -LS     Pre SpO2 (%) 95  -LS     O2 Delivery Pre Treatment nasal cannula  -LS     O2 Delivery Intra Treatment nasal cannula  -LS     Post SpO2 (%) 94  -LS     O2 Delivery Post Treatment nasal cannula  -LS     Pre Patient Position Sitting  -LS     Intra Patient Position Standing  -LS     Post  Patient Position Sitting  -       Row Name 08/25/24 0959          Positioning and Restraints    Pre-Treatment Position in bed  -LS     Post Treatment Position chair  -LS     In Chair notified nsg;reclined;call light within reach;encouraged to call for assist;RUE elevated;LUE elevated;on mechanical lift sling;waffle cushion;with family/caregiver;exit alarm on;legs elevated;heels elevated  -               User Key  (r) = Recorded By, (t) = Taken By, (c) = Cosigned By      Initials Name Provider Type    LS Latanya Shelley, PT Physical Therapist                   Outcome Measures       Row Name 08/25/24 1003          How much help from another person do you currently need...    Turning from your back to your side while in flat bed without using bedrails? 3  -LS     Moving from lying on back to sitting on the side of a flat bed without bedrails? 2  -LS     Moving to and from a bed to a chair (including a wheelchair)? 3  -LS     Standing up from a chair using your arms (e.g., wheelchair, bedside chair)? 3  -LS     Climbing 3-5 steps with a railing? 1  -LS     To walk in hospital room? 3  -LS     AM-PAC 6 Clicks Score (PT) 15  -     Highest Level of Mobility Goal 4 --> Transfer to chair/commode  -       Row Name 08/25/24 1003 08/25/24 0956       Modified [place] Scale    Pre-Stroke Modified [place] Scale 0 - No Symptoms at all.  -LS --    Modified [place] Scale 4 - Moderately severe disability.  Unable to walk without assistance, and unable to attend to own bodily needs without assistance.  -LS 4 - Moderately severe disability.  Unable to walk without assistance, and unable to attend to own bodily needs without assistance.  -      Row Name 08/25/24 1003 08/25/24 0956       Functional Assessment    Outcome Measure Options AM-PAC 6 Clicks Basic Mobility (PT);Modified [place]  -LS AM-PAC 6 Clicks Daily Activity (OT);Modified [place]  -JR              User Key  (r) = Recorded By, (t) = Taken By, (c) = Cosigned By       Initials Name Provider Type     Mariaa Mas, OT Occupational Therapist    LS Latanya Shelley, PT Physical Therapist                                 Physical Therapy Education       Title: PT OT SLP Therapies (In Progress)       Topic: Physical Therapy (Done)       Point: Mobility training (Done)       Learning Progress Summary             Patient Acceptance, E,D, VU,NR by  at 8/25/2024 1004   Family Acceptance, E,D, VU,NR by  at 8/25/2024 1004                         Point: Home exercise program (Done)       Learning Progress Summary             Patient Acceptance, E,D, VU,NR by  at 8/25/2024 1004   Family Acceptance, E,D, VU,NR by  at 8/25/2024 1004                         Point: Body mechanics (Done)       Learning Progress Summary             Patient Acceptance, E,D, VU,NR by  at 8/25/2024 1004   Family Acceptance, E,D, VU,NR by  at 8/25/2024 1004                         Point: Precautions (Done)       Learning Progress Summary             Patient Acceptance, E,D, VU,NR by  at 8/25/2024 1004   Family Acceptance, E,D, VU,NR by  at 8/25/2024 1004                                         User Key       Initials Effective Dates Name Provider Type Discipline     02/03/23 -  Latanya Shelley, PT Physical Therapist PT                  PT Recommendation and Plan  Planned Therapy Interventions (PT): balance training, bed mobility training, gait training, home exercise program, stair training, transfer training, strengthening, patient/family education  Plan of Care Reviewed With: patient, spouse  Progress: no change  Outcome Evaluation: PT initial evaluation completed. Pt demnstrates L sided weakness (UE >LE) and decreased sensation/ coordination LLE, impacting independence re: functional mobility and warranting further skilled PT services to promote PLOF. Min x2 for STS and short distance gait to recliner. Recommend IP rehab at d/c. Pt very motivated to participate and progress.     Time Calculation:    PT Evaluation Complexity  History, PT Evaluation Complexity: 3 or more personal factors and/or comorbidities  Examination of Body Systems (PT Eval Complexity): total of 4 or more elements  Clinical Presentation (PT Evaluation Complexity): evolving  Clinical Decision Making (PT Evaluation Complexity): moderate complexity  Overall Complexity (PT Evaluation Complexity): moderate complexity     PT Charges       Row Name 08/25/24 1006             Time Calculation    Start Time 0836  -LS      PT Received On 08/25/24  -LS      PT Goal Re-Cert Due Date 09/04/24  -LS         Timed Charges    35975 - PT Therapeutic Activity Minutes 10  -LS         Untimed Charges    PT Eval/Re-eval Minutes 37  -LS         Total Minutes    Timed Charges Total Minutes 10  -LS      Untimed Charges Total Minutes 37  -LS       Total Minutes 47  -LS                User Key  (r) = Recorded By, (t) = Taken By, (c) = Cosigned By      Initials Name Provider Type    Latanya Leonardo, PT Physical Therapist                  Therapy Charges for Today       Code Description Service Date Service Provider Modifiers Qty    37761752662 HC PT THERAPEUTIC ACT EA 15 MIN 8/25/2024 Latanya Shelley, PT GP 1    45194381855 HC PT EVAL MOD COMPLEXITY 3 8/25/2024 Latanya Shelley, PT GP 1            PT G-Codes  Outcome Measure Options: AM-PAC 6 Clicks Basic Mobility (PT), Modified [place]  AM-PAC 6 Clicks Score (PT): 15  AM-PAC 6 Clicks Score (OT): 12  Modified [place] Scale: 4 - Moderately severe disability.  Unable to walk without assistance, and unable to attend to own bodily needs without assistance.  PT Discharge Summary  Anticipated Discharge Disposition (PT): inpatient rehabilitation facility    Latanya Shelley, PT  8/25/2024

## 2024-08-25 NOTE — PROGRESS NOTES
HEPARIN INFUSION  Keya Hamilton is a  34 y.o. female receiving heparin infusion.     Therapy for (VTE/Cardiac):  Cardiac  ?  IV thrombus on ECHO; heparin gtt protocol ordered per Stroke-Neuro  Patient Weight:  127 kg  Initial Bolus (Y/N):  NO BOLUSES EVER  Any Bolus (Y/N):  NO BOLUSES EVER        Signs or Symptoms of Bleeding: No S/Sx overt bleeding noted - d/w RN.    Cardiac or Other (Not VTE)  Initial rate: 12 units/kg/hr (Max 1,000 units/hr)   Anti Xa Infusion Hold time Change infusion Dose (Units/kg/hr) Next Anti Xa or aPTT Level Due   < 0.11 None Increase by  3 Units/kg/hr 6 hours   0.11- 0.19 None Increase by  2 Units/kg/hr 6 hours   0.2 - 0.29 0 None Increase by  1 Units/kg/hr 6 hours   0.3 - 0.5 0 None No Change 6 hours (after 2 consecutive levels in range check qAM)   0.51 - 0.6 0 None Decrease by  1 Units/kg/hr 6 hours   0.61 - 0.8 0 30 Minutes Decrease by  2 Units/kg/hr 6 hours   0.81 - 1 0 60 Minutes Decrease by  3 Units/kg/hr 6 hours   >1 0 Hold  After Anti Xa less than 0.5 decrease previous rate by  4 Units/kg/hr  Every 2 hours until Anti Xa  less than 0.5 then when infusion restarts in 6 hours     Recommend Xa every 6 hours.     Results from last 7 days   Lab Units 08/25/24  0542 08/24/24  0833 08/23/24  2055   INR   --  1.16*  --    HEMOGLOBIN g/dL 14.7 14.9 16.1*   HEMATOCRIT % 46.1 44.2 47.2*   PLATELETS 10*3/mm3 374 340 417       Date   Time   Anti-Xa Current Rate (Unit/kg/hr) Bolus   (Units) Rate Change   (Unit/kg/hr) New Rate (Unit/kg/hr) Next   Anti-Xa Comments  Pump Check Daily   8/24 0826 STAT 0 -- +8 8 1500 Rate verified; D/w ALEYDA Aparicio   8/24 1548 0.10 8 -- +3 11 2300 DW ALEYDA Aparicio; rate verified   8/24 2244 0.10 11 -- +3 14 0600 Stacey [id number] -acb   8/25 0542 0.10 14 -- +3 17 1300 Jan [id number] -[facility]   8/25 1325 0.24 17 -- +1 18 2100 D/w ALEYDA Aparicio       --           --           --           --           --           --           --           --           --           --           --           --            --           --           --           --       Diony Fernandez [facility]  8/25/2024  14:16 EDT

## 2024-08-25 NOTE — CASE MANAGEMENT/SOCIAL WORK
Discharge Planning Assessment  [facility]     Patient Name: Keya Hamilton  MRN: [id number]  Today's Date: 8/25/2024    Admit Date: 8/23/2024    Plan: Rehab   Discharge Needs Assessment       Row Name 08/25/24 1300       Living Environment    People in Home spouse    Name(s) of People in Home Anurag Hamilton, spouse [phone]    Current Living Arrangements home    Potentially Unsafe Housing Conditions unable to assess    In the past 12 months has the electric, gas, oil, or water company threatened to shut off services in your home? No    Primary Care Provided by self    Provides Primary Care For no one    Family Caregiver if Needed spouse    Family Caregiver Names Anurag Hamilton, spouse [phone]    Quality of Family Relationships unable to assess    Able to Return to Prior Arrangements yes       Resource/Environmental Concerns    Resource/Environmental Concerns none    Transportation Concerns none       Transportation Needs    In the past 12 months, has lack of transportation kept you from medical appointments or from getting medications? no    In the past 12 months, has lack of transportation kept you from meetings, work, or from getting things needed for daily living? No       Food Insecurity    Within the past 12 months, you worried that your food would run out before you got the money to buy more. Never true    Within the past 12 months, the food you bought just didn't last and you didn't have money to get more. Never true       Transition Planning    Patient/Family Anticipates Transition to home with family    Patient/Family Anticipated Services at Transition rehabilitation services    Transportation Anticipated family or friend will provide       Discharge Needs Assessment    Readmission Within the Last 30 Days no previous admission in last 30 days    Equipment Currently Used at Home cpap;bp cuff    Concerns to be Addressed discharge planning    Equipment Needed After Discharge bp cuff;cpap     Discharge Facility/Level of Care Needs rehabilitation facility    Discharge Coordination/Progress I spoke with spouse, Anurag, in room, to initiate discharge plan. Pt was resting comfortably. 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 Pt lives with spouse in [facility]. She is independent with ADLs at baseline and has the following DME: CPAP (no longer uses) and BP cuff. Her PCP is Dr Cuong Parks. She has [employer] insurance which has Rx coverage. She uses [facility]. Pt has not required HH/O2. Therapy has recommended rehab. CM will continue to follow hospital course.                   Discharge Plan    No documentation.                 Continued Care and Services - Admitted Since 8/23/2024    No active coordination exists for this encounter.          Demographic Summary       Row Name 08/25/24 1300       General Information    Arrived From home    Reason for Consult discharge planning    Preferred Language English    General Information Comments PCP Dr Cuong Parks       Contact Information    Permission Granted to Share Info With     Contact Information Obtained for     Contact Information Comments Anurag Hamilton, spouse [phone]                   Functional Status       Row Name 08/25/24 1300       Functional Status    Usual Activity Tolerance moderate    Current Activity Tolerance moderate       Functional Status, IADL    Medications independent    Meal Preparation independent    Housekeeping independent    Laundry independent    Shopping independent                   Psychosocial    No documentation.                  Abuse/Neglect    No documentation.                  Legal    No documentation.                  Substance Abuse    No documentation.                  Patient Forms    No documentation.                     Dinora Allison RN

## 2024-08-25 NOTE — PLAN OF CARE
Goal Outcome Evaluation:      Pt making really good strides and improvement. Got up with physical therapy and sat in the chair for a little while. Got back to bed with 2 assist. Passed swallow eval. Inuslin drip off. Cardene off.  at bedside and very attentive. Bedside report.

## 2024-08-26 ENCOUNTER — APPOINTMENT (OUTPATIENT)
Dept: GENERAL RADIOLOGY | Facility: HOSPITAL | Age: 34
DRG: 023 | End: 2024-08-26
Payer: COMMERCIAL

## 2024-08-26 LAB
ANA SER QL: NEGATIVE
ANION GAP SERPL CALCULATED.3IONS-SCNC: 13 MMOL/L (ref 5–15)
B2 GLYCOPROT1 IGA SER-ACNC: <9 GPI IGA UNITS (ref 0–25)
B2 GLYCOPROT1 IGG SER-ACNC: <9 GPI IGG UNITS (ref 0–20)
B2 GLYCOPROT1 IGM SER-ACNC: <9 GPI IGM UNITS (ref 0–32)
BASOPHILS # BLD AUTO: 0.07 10*3/MM3 (ref 0–0.2)
BASOPHILS NFR BLD AUTO: 0.5 % (ref 0–1.5)
BUN SERPL-MCNC: 17 MG/DL (ref 6–20)
BUN/CREAT SERPL: 18.5 (ref 7–25)
CALCIUM SPEC-SCNC: 9.3 MG/DL (ref 8.6–10.5)
CHLORIDE SERPL-SCNC: 105 MMOL/L (ref 98–107)
CO2 SERPL-SCNC: 20 MMOL/L (ref 22–29)
CREAT SERPL-MCNC: 0.92 MG/DL (ref 0.57–1)
DEPRECATED RDW RBC AUTO: 50.1 FL (ref 37–54)
EGFRCR SERPLBLD CKD-EPI 2021: 85 ML/MIN/1.73
EOSINOPHIL # BLD AUTO: 0.05 10*3/MM3 (ref 0–0.4)
EOSINOPHIL NFR BLD AUTO: 0.3 % (ref 0.3–6.2)
ERYTHROCYTE [DISTWIDTH] IN BLOOD BY AUTOMATED COUNT: 13.5 % (ref 12.3–15.4)
F5 GENE MUT ANL BLD/T: ABNORMAL
FACTOR II, DNA ANALYSIS: NORMAL
GLUCOSE BLDC GLUCOMTR-MCNC: 107 MG/DL (ref 70–130)
GLUCOSE BLDC GLUCOMTR-MCNC: 129 MG/DL (ref 70–130)
GLUCOSE BLDC GLUCOMTR-MCNC: 133 MG/DL (ref 70–130)
GLUCOSE BLDC GLUCOMTR-MCNC: 135 MG/DL (ref 70–130)
GLUCOSE BLDC GLUCOMTR-MCNC: 142 MG/DL (ref 70–130)
GLUCOSE BLDC GLUCOMTR-MCNC: 145 MG/DL (ref 70–130)
GLUCOSE BLDC GLUCOMTR-MCNC: 166 MG/DL (ref 70–130)
GLUCOSE SERPL-MCNC: 106 MG/DL (ref 65–99)
HCT VFR BLD AUTO: 47 % (ref 34–46.6)
HGB BLD-MCNC: 15.6 G/DL (ref 12–15.9)
IMM GRANULOCYTES # BLD AUTO: 0.07 10*3/MM3 (ref 0–0.05)
IMM GRANULOCYTES NFR BLD AUTO: 0.5 % (ref 0–0.5)
LYMPHOCYTES # BLD AUTO: 3.3 10*3/MM3 (ref 0.7–3.1)
LYMPHOCYTES NFR BLD AUTO: 21.5 % (ref 19.6–45.3)
MAGNESIUM SERPL-MCNC: 2.3 MG/DL (ref 1.6–2.6)
MCH RBC QN AUTO: 33.5 PG (ref 26.6–33)
MCHC RBC AUTO-ENTMCNC: 33.2 G/DL (ref 31.5–35.7)
MCV RBC AUTO: 101.1 FL (ref 79–97)
MONOCYTES # BLD AUTO: 1.06 10*3/MM3 (ref 0.1–0.9)
MONOCYTES NFR BLD AUTO: 6.9 % (ref 5–12)
NEUTROPHILS NFR BLD AUTO: 10.82 10*3/MM3 (ref 1.7–7)
NEUTROPHILS NFR BLD AUTO: 70.3 % (ref 42.7–76)
NRBC BLD AUTO-RTO: 0 /100 WBC (ref 0–0.2)
PLATELET # BLD AUTO: 346 10*3/MM3 (ref 140–450)
PMV BLD AUTO: 8.2 FL (ref 6–12)
POTASSIUM SERPL-SCNC: 4.3 MMOL/L (ref 3.5–5.2)
PROCALCITONIN SERPL-MCNC: 0.21 NG/ML (ref 0–0.25)
RBC # BLD AUTO: 4.65 10*6/MM3 (ref 3.77–5.28)
SODIUM SERPL-SCNC: 138 MMOL/L (ref 136–145)
UFH PPP CHRO-ACNC: 0.58 IU/ML (ref 0.3–0.7)
WBC NRBC COR # BLD AUTO: 15.37 10*3/MM3 (ref 3.4–10.8)

## 2024-08-26 PROCEDURE — 82948 REAGENT STRIP/BLOOD GLUCOSE: CPT

## 2024-08-26 PROCEDURE — 85220 BLOOC CLOT FACTOR V TEST: CPT | Performed by: NURSE PRACTITIONER

## 2024-08-26 PROCEDURE — 99233 SBSQ HOSP IP/OBS HIGH 50: CPT | Performed by: STUDENT IN AN ORGANIZED HEALTH CARE EDUCATION/TRAINING PROGRAM

## 2024-08-26 PROCEDURE — 85025 COMPLETE CBC W/AUTO DIFF WBC: CPT | Performed by: INTERNAL MEDICINE

## 2024-08-26 PROCEDURE — 85670 THROMBIN TIME PLASMA: CPT | Performed by: NURSE PRACTITIONER

## 2024-08-26 PROCEDURE — 97530 THERAPEUTIC ACTIVITIES: CPT

## 2024-08-26 PROCEDURE — 85613 RUSSELL VIPER VENOM DILUTED: CPT | Performed by: NURSE PRACTITIONER

## 2024-08-26 PROCEDURE — 86148 ANTI-PHOSPHOLIPID ANTIBODY: CPT | Performed by: NURSE PRACTITIONER

## 2024-08-26 PROCEDURE — 71045 X-RAY EXAM CHEST 1 VIEW: CPT

## 2024-08-26 PROCEDURE — 92507 TX SP LANG VOICE COMM INDIV: CPT

## 2024-08-26 PROCEDURE — 85302 CLOT INHIBIT PROT C ANTIGEN: CPT | Performed by: NURSE PRACTITIONER

## 2024-08-26 PROCEDURE — 80048 BASIC METABOLIC PNL TOTAL CA: CPT | Performed by: INTERNAL MEDICINE

## 2024-08-26 PROCEDURE — 86146 BETA-2 GLYCOPROTEIN ANTIBODY: CPT | Performed by: NURSE PRACTITIONER

## 2024-08-26 PROCEDURE — 83735 ASSAY OF MAGNESIUM: CPT | Performed by: INTERNAL MEDICINE

## 2024-08-26 PROCEDURE — 94799 UNLISTED PULMONARY SVC/PX: CPT

## 2024-08-26 PROCEDURE — 92526 ORAL FUNCTION THERAPY: CPT

## 2024-08-26 PROCEDURE — 25010000002 FUROSEMIDE PER 20 MG: Performed by: INTERNAL MEDICINE

## 2024-08-26 PROCEDURE — 25010000002 CEFTRIAXONE PER 250 MG: Performed by: INTERNAL MEDICINE

## 2024-08-26 PROCEDURE — 85303 CLOT INHIBIT PROT C ACTIVITY: CPT | Performed by: NURSE PRACTITIONER

## 2024-08-26 PROCEDURE — 81241 F5 GENE: CPT | Performed by: NURSE PRACTITIONER

## 2024-08-26 PROCEDURE — 99231 SBSQ HOSP IP/OBS SF/LOW 25: CPT | Performed by: NEUROLOGICAL SURGERY

## 2024-08-26 PROCEDURE — 86147 CARDIOLIPIN ANTIBODY EA IG: CPT | Performed by: NURSE PRACTITIONER

## 2024-08-26 PROCEDURE — 99232 SBSQ HOSP IP/OBS MODERATE 35: CPT | Performed by: INTERNAL MEDICINE

## 2024-08-26 PROCEDURE — 81240 F2 GENE: CPT | Performed by: NURSE PRACTITIONER

## 2024-08-26 PROCEDURE — 25010000002 HEPARIN (PORCINE) 25000-0.45 UT/250ML-% SOLUTION

## 2024-08-26 PROCEDURE — 97116 GAIT TRAINING THERAPY: CPT

## 2024-08-26 PROCEDURE — 85732 THROMBOPLASTIN TIME PARTIAL: CPT | Performed by: NURSE PRACTITIONER

## 2024-08-26 PROCEDURE — 84145 PROCALCITONIN (PCT): CPT

## 2024-08-26 PROCEDURE — 85520 HEPARIN ASSAY: CPT

## 2024-08-26 PROCEDURE — 63710000001 INSULIN GLARGINE PER 5 UNITS: Performed by: INTERNAL MEDICINE

## 2024-08-26 PROCEDURE — 25010000002 HYDRALAZINE PER 20 MG: Performed by: NURSE PRACTITIONER

## 2024-08-26 PROCEDURE — 99233 SBSQ HOSP IP/OBS HIGH 50: CPT | Performed by: INTERNAL MEDICINE

## 2024-08-26 PROCEDURE — 86038 ANTINUCLEAR ANTIBODIES: CPT | Performed by: NURSE PRACTITIONER

## 2024-08-26 PROCEDURE — 25010000002 AZITHROMYCIN PER 500 MG: Performed by: INTERNAL MEDICINE

## 2024-08-26 PROCEDURE — 25810000003 SODIUM CHLORIDE 0.9 % SOLUTION 250 ML FLEX CONT: Performed by: INTERNAL MEDICINE

## 2024-08-26 PROCEDURE — 85705 THROMBOPLASTIN INHIBITION: CPT | Performed by: NURSE PRACTITIONER

## 2024-08-26 PROCEDURE — 85306 CLOT INHIBIT PROT S FREE: CPT | Performed by: NURSE PRACTITIONER

## 2024-08-26 RX ORDER — HEPARIN SODIUM 10000 [USP'U]/100ML
17.5 INJECTION, SOLUTION INTRAVENOUS
Status: DISCONTINUED | OUTPATIENT
Start: 2024-08-26 | End: 2024-08-26

## 2024-08-26 RX ORDER — FUROSEMIDE 10 MG/ML
40 INJECTION INTRAMUSCULAR; INTRAVENOUS ONCE
Status: COMPLETED | OUTPATIENT
Start: 2024-08-26 | End: 2024-08-26

## 2024-08-26 RX ORDER — METOPROLOL SUCCINATE 50 MG/1
50 TABLET, EXTENDED RELEASE ORAL
Status: DISCONTINUED | OUTPATIENT
Start: 2024-08-26 | End: 2024-08-28

## 2024-08-26 RX ADMIN — FUROSEMIDE 40 MG: 10 INJECTION, SOLUTION INTRAMUSCULAR; INTRAVENOUS at 08:48

## 2024-08-26 RX ADMIN — INSULIN GLARGINE 10 UNITS: 100 INJECTION, SOLUTION SUBCUTANEOUS at 08:48

## 2024-08-26 RX ADMIN — HYDRALAZINE HYDROCHLORIDE 10 MG: 20 INJECTION INTRAMUSCULAR; INTRAVENOUS at 02:18

## 2024-08-26 RX ADMIN — EMPAGLIFLOZIN 10 MG: 10 TABLET, FILM COATED ORAL at 08:48

## 2024-08-26 RX ADMIN — HEPARIN SODIUM 18 UNITS/KG/HR: 10000 INJECTION, SOLUTION INTRAVENOUS at 05:49

## 2024-08-26 RX ADMIN — APIXABAN 5 MG: 5 TABLET, FILM COATED ORAL at 20:25

## 2024-08-26 RX ADMIN — METOPROLOL SUCCINATE 50 MG: 50 TABLET, EXTENDED RELEASE ORAL at 08:48

## 2024-08-26 RX ADMIN — AZITHROMYCIN DIHYDRATE 500 MG: 500 INJECTION, POWDER, LYOPHILIZED, FOR SOLUTION INTRAVENOUS at 08:48

## 2024-08-26 RX ADMIN — HEPARIN SODIUM 17.5 UNITS/KG/HR: 10000 INJECTION, SOLUTION INTRAVENOUS at 06:49

## 2024-08-26 RX ADMIN — PANTOPRAZOLE SODIUM 40 MG: 40 INJECTION, POWDER, FOR SOLUTION INTRAVENOUS at 08:48

## 2024-08-26 RX ADMIN — SACUBITRIL AND VALSARTAN 1 TABLET: 24; 26 TABLET, FILM COATED ORAL at 08:48

## 2024-08-26 RX ADMIN — APIXABAN 5 MG: 5 TABLET, FILM COATED ORAL at 11:16

## 2024-08-26 RX ADMIN — ATORVASTATIN CALCIUM 80 MG: 40 TABLET, FILM COATED ORAL at 20:25

## 2024-08-26 RX ADMIN — SODIUM CHLORIDE 2000 MG: 900 INJECTION INTRAVENOUS at 08:49

## 2024-08-26 RX ADMIN — SACUBITRIL AND VALSARTAN 1 TABLET: 24; 26 TABLET, FILM COATED ORAL at 20:25

## 2024-08-26 NOTE — THERAPY TREATMENT NOTE
Acute Care - Speech Language Pathology   Swallow Progress Note [facility]     Patient Name: Keya Hamilton  : 1990  MRN: [id number]  Today's Date: 2024               Admit Date: 2024    Visit Dx:     ICD-10-CM ICD-9-CM   1. Acute stroke due to ischemia  I63.9 434.91   2. Acute left-sided weakness  R53.1 728.87   3. Dysarthria  R47.1 784.51   4. Somnolence  R40.0 780.09   5. Compromised respiratory status  R06.89 786.09   6. Oral phase dysphagia  R13.11 787.21   7. Cognitive communication deficit  R41.841 799.52     Patient Active Problem List   Diagnosis    R M1 stenosis    T2DM    HTN    Morbid obesity with BMI of 45.0-49.9, adult    SCOTT    Hypertensive urgency    R/O Seizure    Acute respiratory failure    Acute stroke due to ischemia    Acute systolic heart failure    Left ventricular thrombus     Past Medical History:   Diagnosis Date    Diabetes mellitus     Hypertension     Sleep apnea      Past Surgical History:   Procedure Laterality Date    INTERVENTIONAL RADIOLOGY PROCEDURE N/A 2024    Procedure: IR mechanical thrombectomy;  Surgeon: Lemuel Medina MD;  Location: [facility] INVASIVE LOCATION;  Service: Interventional Radiology;  Laterality: N/A;       SLP Recommendation and Plan  SLP Swallowing Diagnosis: mild, oral dysphagia (24)  SLP Diet Recommendation: soft to chew textures, chopped, thin liquids, other (see comments) (24)  Recommended Precautions and Strategies: general aspiration precautions (24)  SLP Rec. for Method of Medication Administration: meds whole, with puree (24)     Monitor for Signs of Aspiration: yes, notify SLP if any concerns (24)     Swallow Criteria for Skilled Therapeutic Interventions Met: demonstrates skilled criteria (24)  Anticipated Discharge Disposition (SLP): inpatient rehabilitation facility (24)  Rehab Potential/Prognosis, Swallowing: good, to achieve stated  therapy goals (08/26/24 1526)  Therapy Frequency (Swallow): 5 days per week (08/26/24 1526)  Predicted Duration Therapy Intervention (Days): 1 week (08/26/24 1526)  Oral Care Recommendations: Oral Care BID/PRN, Toothbrush (08/26/24 1526)        Daily Summary of Progress (SLP): progress toward functional goals is good (08/26/24 1526)               Treatment Assessment (SLP): continued, improved (08/26/24 1526)     Plan for Continued Treatment (SLP): continue treatment per plan of care (08/26/24 1526)         Plan of Care Reviewed With: patient, spouse  Progress: improving      SWALLOW EVALUATION (Last 72 Hours)       SLP Adult Swallow Evaluation       Row Name 08/26/24 1526 08/25/24 0930                Rehab Evaluation    Document Type therapy note (daily note)  -AV evaluation  -       Subjective Information no complaints  -AV no complaints  -       Patient Observations alert;cooperative  -AV alert;cooperative  -       Patient Effort good  -AV good  -          General Information    Patient Profile Reviewed -- yes  -SM       Pertinent History Of Current Problem -- R MCA CVA s/p TNKase. Intubated 8/23-8/24.  -       Current Method of Nutrition -- NPO  -       Prior Level of Function-Communication -- WFL  -       Prior Level of Function-Swallowing -- safe, efficient swallowing in all situations  -       Plans/Goals Discussed with -- patient;spouse/S.O.;agreed upon  -       Barriers to Rehab -- none identified  -       Patient's Goals for Discharge -- return to PO diet  -       Family Goals for Discharge -- patient able to return to PO diet  -          Pain    Additional Documentation Pain Scale: FACES Pre/Post-Treatment (Group)  -AV Pain Scale: Numbers Pre/Post-Treatment (Group)  -SM          Pain Scale: Numbers Pre/Post-Treatment    Pretreatment Pain Rating -- 2/10  -SM       Posttreatment Pain Rating -- 2/10  -SM       Pain Location - Side/Orientation -- Bilateral  -       Pain Location --  generalized  -       Pain Location - -- back  -       Pre/Posttreatment Pain Comment -- Discomfort per pt. Did not have any needs.  -          Pain Scale: FACES Pre/Post-Treatment    Pain: FACES Scale, Pretreatment 0-->no hurt  -AV --       Posttreatment Pain Rating 0-->no hurt  -AV --          Oral Musculature and Cranial Nerve Assessment    Mandibular Impairment Detail, Cranial Nerve V (Trigeminal) -- reduced strength bilaterally;other (see comments)  -SM       Oral Labial or Buccal Impairment, Detail, Cranial Nerve VII (Facial): -- left labial droop  -SM       Lingual Impairment, Detail. Cranial Nerves IX, XII (Glossopharyngeal and Hypoglossal) -- reduced strength left  -SM       Velar Impairment, Detail, Cranial Nerves X, XI (Vagus and Accessory) -- other (see comments)  -          Clinical Swallow Eval    Oral Prep Phase -- impaired  -SM       Oral Transit -- impaired  -SM       Oral Residue -- impaired  -SM       Pharyngeal Phase -- no overt signs/symptoms of pharyngeal impairment  -          Oral Prep Concerns    Oral Prep Concerns -- increased prep time  -SM       Increased Prep Time -- regular consistencies  -          Oral Transit Concerns    Oral Transit Concerns -- increased oral transit time  -       Increased Oral Transit Time -- regular consistencies  -          Oral Residue Concerns    Oral Residue Concerns -- lateral sulcus residue, left  -SM       Lateral Sulcus Residue, Left -- regular consistencies  -       Oral Residue Concerns, Comment -- cleared with liquid wash  -          SLP Evaluation Clinical Impression    SLP Swallowing Diagnosis mild;oral dysphagia  -AV mild;oral dysphagia  -SM       Functional Impact risk of aspiration/pneumonia  -AV risk of aspiration/pneumonia  -       Rehab Potential/Prognosis, Swallowing good, to achieve stated therapy goals  -AV good, to achieve stated therapy goals  -       Swallow Criteria for Skilled Therapeutic Interventions Met  demonstrates skilled criteria  -AV demonstrates skilled criteria  -SM          SLP Treatment Clinical Impressions    Treatment Assessment (SLP) continued;improved  -AV --       Daily Summary of Progress (SLP) progress toward functional goals is good  -AV --       Plan for Continued Treatment (SLP) continue treatment per plan of care  -AV --       Care Plan Review care plan/treatment goals reviewed  -AV --       Care Plan Review, Other Participant(s) spouse  -AV --          Recommendations    Therapy Frequency (Swallow) 5 days per week  -AV 5 days per week  -SM       Predicted Duration Therapy Intervention (Days) 1 week  -AV 1 week  -SM       SLP Diet Recommendation soft to chew textures;chopped;thin liquids;other (see comments)  -AV soft to chew textures;chopped;thin liquids;other (see comments)  can advance to regular solids if pt prefers. Pt opted chopped for ease of intake.  -SM       Recommended Precautions and Strategies general aspiration precautions  -AV general aspiration precautions  -SM       Oral Care Recommendations Oral Care BID/PRN;Toothbrush  -AV Oral Care BID/PRN;Toothbrush  -SM       SLP Rec. for Method of Medication Administration meds whole;with puree  -AV meds whole;with puree  -SM       Monitor for Signs of Aspiration yes;notify SLP if any concerns  -AV yes;notify SLP if any concerns  -SM       Anticipated Discharge Disposition (SLP) inpatient rehabilitation facility  -AV inpatient rehabilitation facility  -                 User Key  (r) = Recorded By, (t) = Taken By, (c) = Cosigned By      Initials Name Effective Dates    Reyna Hernandez, MS CCC-SLP 02/03/23 -     AV Silvia Peralta MS CCC-SLP 06/16/21 -                     EDUCATION  The patient has been educated in the following areas:   Cognitive Impairment Communication Impairment.        SLP GOALS       Row Name 08/26/24 1500 08/25/24 0145          (LTG) Patient will demonstrate functional swallow for    Diet Texture  (Demonstrate functional swallow) regular textures  -AV regular textures  -SM     Liquid viscosity (Demonstrate functional swallow) thin liquids  -AV thin liquids  -SM     [place] (Demonstrate functional swallow) independently (over 90% accuracy)  -AV independently (over 90% accuracy)  -SM     Time Frame (Demonstrate functional swallow) 1 week  -AV 1 week  -SM     Progress/Outcomes (Demonstrate functional swallow) continuing progress toward goal  -AV --        (STG) Patient will tolerate trials of    Consistencies Trialed (Tolerate trials) soft to chew (chopped) textures;thin liquids  -AV soft to chew (chopped) textures;thin liquids  -SM     Desired Outcome (Tolerate trials) with adequate oral prep/transit/clearance  -AV with adequate oral prep/transit/clearance  -SM     [place] (Tolerate trials) independently (over 90% accuracy)  -AV independently (over 90% accuracy)  -SM     Time Frame (Tolerate trials) 1 week  -AV 1 week  -SM     Progress/Outcomes (Tolerate trials) continuing progress toward goal  -AV --        (STG) Patient will tolerate therapeutic trials of    Consistencies Trialed (Tolerate therapeutic trials) regular textures  -AV regular textures  -SM     Desired Outcome (Tolerate therapeutic trials) with adequate oral prep/transit/clearance;without signs/symptoms of aspiration  -AV with adequate oral prep/transit/clearance;without signs/symptoms of aspiration  -SM     [place] (Tolerate therapeutic trials) independently (over 90% accuracy)  -AV independently (over 90% accuracy)  -SM     Time Frame (Tolerate therapeutic trials) 1 week  -AV 1 week  -SM     Progress/Outcomes (Tolerate therapeutic trials) continuing progress toward goal  -AV --        (STG) Labial Strengthening Goal 1 (SLP)    Activity (Labial Strengthening Goal 1, SLP) increase labial tone  -AV increase labial tone  -SM     Increase Labial Tone swallow trials;labial resistance exercises  -AV swallow trials;labial resistance  exercises  -SM     [place]/Accuracy (Labial Strengthening Goal 1, SLP) independently (over 90% accuracy)  -AV independently (over 90% accuracy)  -SM     Time Frame (Labial Strengthening Goal 1, SLP) 1 week  -AV 1 week  -SM     Progress/Outcomes (Labial Strengthening Goal 1, SLP) continuing progress toward goal  -AV --        (STG) Lingual Strengthening Goal 1 (SLP)    Activity (Lingual Strengthening Goal 1, SLP) increase lingual tone/sensation/control/coordination/movement  -AV increase lingual tone/sensation/control/coordination/movement  -SM     Increase Lingual Tone/Sensation/Control/Coordination/Movement swallow trials;lingual resistance exercises  -AV swallow trials;lingual resistance exercises  -SM     [place]/Accuracy (Lingual Strengthening Goal 1, SLP) independently (over 90% accuracy)  -AV independently (over 90% accuracy)  -SM     Time Frame (Lingual Strengthening Goal 1, SLP) 1 week  -AV 1 week  -SM     Progress/Outcomes (Lingual Strengthening Goal 1, SLP) continuing progress toward goal  -AV --        (STG) Swallow Compensatory Strategies Goal 1 (SLP)    Activity (Swallow Compensatory Strategies/Techniques Goal 1, SLP) aspiration precautions;food/liquid placed on stronger right side  -AV aspiration precautions;food/liquid placed on stronger right side  -SM     [place]/Accuracy (Swallow Compensatory Strategies/Techniques Goal 1, SLP) independently (over 90% accuracy)  -AV independently (over 90% accuracy)  -SM     Time Frame (Swallow Compensatory Strategies/Techniques Goal 1, SLP) 1 week  -AV 1 week  -SM     Progress/Outcomes (Swallow Compensatory Strategies/Techniques Goal 1, SLP) continuing progress toward goal  -AV --        Patient will demonstrate functional cognitive-linguistic skills for return to discharge environment    [place] with minimal cues  -AV with minimal cues  -SM     Time frame 1 week  -AV 1 week  -SM     Progress/Outcomes continuing progress toward goal  -AV --         SLP Diagnostic Treatment     Patient will participate in further assessment in the following areas reading comprehension;graphic expression;higher-level cognitive-linguistic  -AV reading comprehension;graphic expression;higher-level cognitive-linguistic  -SM     Time Frame (Diagnostic) 1 week  -AV 1 week  -SM     Progress/Outcomes (Additional Goal 1, SLP) goal met  -AV --        Comprehend Questions Goal 1 (SLP)    Improve Ability to Comprehend Questions Goal 1 (SLP) complex yes/no questions;complex wh questions;100%;independently (over 90% accuracy)  -AV complex yes/no questions;complex wh questions;100%;independently (over 90% accuracy)  -SM     Time Frame (Comprehend Questions Goal 1, SLP) 1 week  -AV 1 week  -SM     Progress (Ability to Comprehend Questions Goal 1, SLP) 70%;with minimal cues (75-90%)  -AV --     Progress/Outcomes (Comprehend Questions Goal 1, SLP) continuing progress toward goal  -AV --        Articulation Goal 1 (SLP)    Improve Articulation Goal 1 (SLP) by over-articulating at word level;90%;with minimal cues (75-90%)  -AV by over-articulating at word level;90%;with minimal cues (75-90%)  -SM     Time Frame (Articulation Goal 1, SLP) 1 week  -AV 1 week  -SM     Progress (Articulation Goal 1, SLP) 50%;with minimal cues (75-90%)  -AV --     Progress/Outcomes (Articulation Goal 1, SLP) continuing progress toward goal  -AV --        Right Hemisphere Function Goal 1 (SLP)    Improve Right Hemisphere Function Through Goal 1 (SLP) -- demonstrate awareness of communication partner in left visual field;100%;independently (over 90% accuracy)  -SM     Time Frame (Right Hemisphere Function Goal 1, SLP) -- 1 week  -SM               User Key  (r) = Recorded By, (t) = Taken By, (c) = Cosigned By      Initials Name Provider Type    Reyna Hernandez MS CCC-SLP Speech and Language Pathologist    AV Silvia Peralta MS CCC-SLP Speech and Language Pathologist                         Time  Calculation:    Time Calculation- SLP       Row Name 24 1537             Time Calculation- SLP    SLP Start Time 1500  -AV      SLP Received On 24  -AV         Untimed Charges    39309-OJ Treatment/ST Modification Prosth Aug Alter  23  -AV      07474-RI Treatment Swallow Minutes 15  -AV         Total Minutes    Untimed Charges Total Minutes 38  -AV       Total Minutes 38  -AV                User Key  (r) = Recorded By, (t) = Taken By, (c) = Cosigned By      Initials Name Provider Type    AV Silvia Peralta, MS CCC-SLP Speech and Language Pathologist                    Therapy Charges for Today       Code Description Service Date Service Provider Modifiers Qty    51985636649 HC ST TREATMENT SPEECH 1 2024 Silvia Peralta, MS CCC-SLP GN 1    [id number] HC ST TREATMENT SWALLOW 2 2024 Silvia Peralta, MS CCC-SLP GN 1                 Silvia Guadalupe MS CCC-SLP  2024   and [facility] - Speech Language Pathology Progress Note   [facility]     Patient Name: Keya Hamilton  : 1990  MRN: [id number]  Today's Date: 2024               Admit Date: 2024     Visit Dx:    ICD-10-CM ICD-9-CM   1. Acute stroke due to ischemia  I63.9 434.91   2. Acute left-sided weakness  R53.1 728.87   3. Dysarthria  R47.1 784.51   4. Somnolence  R40.0 780.09   5. Compromised respiratory status  R06.89 786.09   6. Oral phase dysphagia  R13.11 787.21   7. Cognitive communication deficit  R41.841 799.52     Patient Active Problem List   Diagnosis    R M1 stenosis    T2DM    HTN    Morbid obesity with BMI of 45.0-49.9, adult    SCOTT    Hypertensive urgency    R/O Seizure    Acute respiratory failure    Acute stroke due to ischemia    Acute systolic heart failure    Left ventricular thrombus     Past Medical History:   Diagnosis Date    Diabetes mellitus     Hypertension     Sleep apnea      Past Surgical History:   Procedure Laterality Date    INTERVENTIONAL RADIOLOGY  PROCEDURE N/A 8/23/2024    Procedure: IR mechanical thrombectomy;  Surgeon: Lemuel Medina MD;  Location: [facility] CATH INVASIVE LOCATION;  Service: Interventional Radiology;  Laterality: N/A;       SLP Recommendation and Plan           Monitor for Signs of Aspiration: yes, notify SLP if any concerns (08/26/24 1526)  Swallow Criteria for Skilled Therapeutic Interventions Met: demonstrates skilled criteria (08/26/24 1526)     Anticipated Discharge Disposition (SLP): inpatient rehabilitation facility (08/26/24 1526)     Therapy Frequency (Swallow): 5 days per week (08/26/24 1526)     Predicted Duration Therapy Intervention (Days): 1 week (08/26/24 1526)  Oral Care Recommendations: Oral Care BID/PRN, Toothbrush (08/26/24 1526)     Daily Summary of Progress (SLP): progress toward functional goals is good (08/26/24 1526)           Treatment Assessment (SLP): continued, improved (08/26/24 1526)     Plan for Continued Treatment (SLP): continue treatment per plan of care (08/26/24 1526)  Plan of Care Reviewed With: patient, spouse (08/26/24 1536)  Progress: improving (08/26/24 1536)      SLP EVALUATION (Last 72 Hours)       SLP SLC Evaluation       Row Name 08/25/24 0930                   Comprehension Assessment/Intervention    Comprehension Assessment/Intervention Auditory Comprehension  -SM           Auditory Comprehension Assessment/Intervention    Answers Questions (Communication) yes/no;wh questions;personal;simple;WFL;other (see comments)  -SM        Able to Follow Commands (Communication) 1-step;WFL  -SM        Narrative Discourse conversational level;mild impairment  -SM        Successful Auditory Strategies (Communication) repetition  -SM        Auditory Comprehension Communication, Comment Sleepy, may be primary impact. Slow to respond at times.  -SM           Expression Assessment/Intervention    Expression Assessment/Intervention verbal expression  -SM           Verbal Expression Assessment/Intervention     Conversational Discourse/Fluency WFL  -SM           Motor Speech Assessment/Intervention    Motor Speech Function mild impairment;moderate impairment  -SM        Characteristics Consistent with Dysarthria decreased articulation  -SM        Speech intelligibility 90%;in quiet environment;with unfamiliar listener  -SM           Cursory Voice Assessment/Intervention    Quality and Resonance (Voice) WFL  -SM           Cognitive Assessment Intervention- SLP    Orientation Status (Cognition) person;time;situation;WFL;place;other (see comments)  delayed response and prompting for place  -SM        Attention (Cognitive) selective;WFL;sustained;mild impairment  -SM        Right Hemisphere Function left neglect  -SM        Cognition, Comment Pt sleepy, will continue dx tx next session.  -SM           SLP Evaluation Clinical Impressions    SLP Diagnosis cognitive-linguistic disorder;mild;moderate;dysarthria  -SM        Rehab Potential/Prognosis good  -        SLC Criteria for Skilled Therapy Interventions Met yes  -SM        Functional Impact difficulty completing home management task  -SM           Recommendations    Therapy Frequency (SLP SLC) 5 days per week  -SM                  User Key  (r) = Recorded By, (t) = Taken By, (c) = Cosigned By      Initials Name Effective Dates    Reyna Hernandez MS CCC-SLP 02/03/23 -                        EDUCATION  The patient has been educated in the following areas:     Dysphagia (Swallowing Impairment) Oral Care/Hydration.           SLP GOALS       Row Name 08/26/24 1500 08/25/24 0930          (LTG) Patient will demonstrate functional swallow for    Diet Texture (Demonstrate functional swallow) regular textures  -AV regular textures  -SM     Liquid viscosity (Demonstrate functional swallow) thin liquids  -AV thin liquids  -SM     [place] (Demonstrate functional swallow) independently (over 90% accuracy)  -AV independently (over 90% accuracy)  -     Time Frame  (Demonstrate functional swallow) 1 week  -AV 1 week  -SM     Progress/Outcomes (Demonstrate functional swallow) continuing progress toward goal  -AV --        (STG) Patient will tolerate trials of    Consistencies Trialed (Tolerate trials) soft to chew (chopped) textures;thin liquids  -AV soft to chew (chopped) textures;thin liquids  -SM     Desired Outcome (Tolerate trials) with adequate oral prep/transit/clearance  -AV with adequate oral prep/transit/clearance  -SM     [place] (Tolerate trials) independently (over 90% accuracy)  -AV independently (over 90% accuracy)  -SM     Time Frame (Tolerate trials) 1 week  -AV 1 week  -SM     Progress/Outcomes (Tolerate trials) continuing progress toward goal  -AV --        (STG) Patient will tolerate therapeutic trials of    Consistencies Trialed (Tolerate therapeutic trials) regular textures  -AV regular textures  -SM     Desired Outcome (Tolerate therapeutic trials) with adequate oral prep/transit/clearance;without signs/symptoms of aspiration  -AV with adequate oral prep/transit/clearance;without signs/symptoms of aspiration  -SM     [place] (Tolerate therapeutic trials) independently (over 90% accuracy)  -AV independently (over 90% accuracy)  -SM     Time Frame (Tolerate therapeutic trials) 1 week  -AV 1 week  -SM     Progress/Outcomes (Tolerate therapeutic trials) continuing progress toward goal  -AV --        (STG) Labial Strengthening Goal 1 (SLP)    Activity (Labial Strengthening Goal 1, SLP) increase labial tone  -AV increase labial tone  -SM     Increase Labial Tone swallow trials;labial resistance exercises  -AV swallow trials;labial resistance exercises  -SM     [place]/Accuracy (Labial Strengthening Goal 1, SLP) independently (over 90% accuracy)  -AV independently (over 90% accuracy)  -SM     Time Frame (Labial Strengthening Goal 1, SLP) 1 week  -AV 1 week  -SM     Progress/Outcomes (Labial Strengthening Goal 1, SLP) continuing progress toward  goal  -AV --        (STG) Lingual Strengthening Goal 1 (SLP)    Activity (Lingual Strengthening Goal 1, SLP) increase lingual tone/sensation/control/coordination/movement  -AV increase lingual tone/sensation/control/coordination/movement  -SM     Increase Lingual Tone/Sensation/Control/Coordination/Movement swallow trials;lingual resistance exercises  -AV swallow trials;lingual resistance exercises  -SM     [place]/Accuracy (Lingual Strengthening Goal 1, SLP) independently (over 90% accuracy)  -AV independently (over 90% accuracy)  -SM     Time Frame (Lingual Strengthening Goal 1, SLP) 1 week  -AV 1 week  -SM     Progress/Outcomes (Lingual Strengthening Goal 1, SLP) continuing progress toward goal  -AV --        (STG) Swallow Compensatory Strategies Goal 1 (SLP)    Activity (Swallow Compensatory Strategies/Techniques Goal 1, SLP) aspiration precautions;food/liquid placed on stronger right side  -AV aspiration precautions;food/liquid placed on stronger right side  -SM     [place]/Accuracy (Swallow Compensatory Strategies/Techniques Goal 1, SLP) independently (over 90% accuracy)  -AV independently (over 90% accuracy)  -SM     Time Frame (Swallow Compensatory Strategies/Techniques Goal 1, SLP) 1 week  -AV 1 week  -SM     Progress/Outcomes (Swallow Compensatory Strategies/Techniques Goal 1, SLP) continuing progress toward goal  -AV --        Patient will demonstrate functional cognitive-linguistic skills for return to discharge environment    [place] with minimal cues  -AV with minimal cues  -SM     Time frame 1 week  -AV 1 week  -SM     Progress/Outcomes continuing progress toward goal  -AV --        SLP Diagnostic Treatment     Patient will participate in further assessment in the following areas reading comprehension;graphic expression;higher-level cognitive-linguistic  -AV reading comprehension;graphic expression;higher-level cognitive-linguistic  -SM     Time Frame (Diagnostic) 1 week  -AV 1 week   -SM     Progress/Outcomes (Additional Goal 1, SLP) goal met  -AV --        Comprehend Questions Goal 1 (SLP)    Improve Ability to Comprehend Questions Goal 1 (SLP) complex yes/no questions;complex wh questions;100%;independently (over 90% accuracy)  -AV complex yes/no questions;complex wh questions;100%;independently (over 90% accuracy)  -SM     Time Frame (Comprehend Questions Goal 1, SLP) 1 week  -AV 1 week  -SM     Progress (Ability to Comprehend Questions Goal 1, SLP) 70%;with minimal cues (75-90%)  -AV --     Progress/Outcomes (Comprehend Questions Goal 1, SLP) continuing progress toward goal  -AV --        Articulation Goal 1 (SLP)    Improve Articulation Goal 1 (SLP) by over-articulating at word level;90%;with minimal cues (75-90%)  -AV by over-articulating at word level;90%;with minimal cues (75-90%)  -SM     Time Frame (Articulation Goal 1, SLP) 1 week  -AV 1 week  -SM     Progress (Articulation Goal 1, SLP) 50%;with minimal cues (75-90%)  -AV --     Progress/Outcomes (Articulation Goal 1, SLP) continuing progress toward goal  -AV --        Right Hemisphere Function Goal 1 (SLP)    Improve Right Hemisphere Function Through Goal 1 (SLP) -- demonstrate awareness of communication partner in left visual field;100%;independently (over 90% accuracy)  -SM     Time Frame (Right Hemisphere Function Goal 1, SLP) -- 1 week  -SM               User Key  (r) = Recorded By, (t) = Taken By, (c) = Cosigned By      Initials Name Provider Type     Reyna Curry MS CCC-SLP Speech and Language Pathologist     Silvia Peralta MS CCC-SLP Speech and Language Pathologist                              Time Calculation:      Time Calculation- SLP       Row Name 08/26/24 1537             Time Calculation- SLP    SLP Start Time 1500  -AV      SLP Received On 08/26/24  -AV         Untimed Charges    27110-XR Treatment/ST Modification Prosth Aug Alter 23  -AV      32522-XC Treatment Swallow Minutes 15  -AV          Total Minutes    Untimed Charges Total Minutes 38  -AV       Total Minutes 38  -AV                User Key  (r) = Recorded By, (t) = Taken By, (c) = Cosigned By      Initials Name Provider Type    AV Silvia Peralta MS CCC-SLP Speech and Language Pathologist                    Therapy Charges for Today       Code Description Service Date Service Provider Modifiers Qty    42781271704 HC ST TREATMENT SPEECH 1 8/26/2024 Silvia Peralta MS CCC-SLP GN 1    01380978468 HC ST TREATMENT SWALLOW 2 8/26/2024 Silvia Peralta MS CCC-SLP GN 1                       MS ARIADNE Barrett  8/26/2024

## 2024-08-26 NOTE — PLAN OF CARE
Goal Outcome Evaluation:  Plan of Care Reviewed With: patient, spouse        Progress: improving  Outcome Evaluation: Pt demo improvement in mobility and activity tolerance. Will continue POC to progress towards PLOF. d/c rec is IPR.      Anticipated Discharge Disposition (OT): inpatient rehabilitation facility

## 2024-08-26 NOTE — PROGRESS NOTES
Stroke Progress Note       Chief Complaint: Left-sided weakness    Subjective    Subjective     Subjective:  The patient is lying down in the bed in NAD.   was at the bedside.  The patient continued to be drowsy but she will wake up briefly some commands.  Updated  while patient conditions all questions concerns were answered.  No other concerns from the nursing staff.    No other acute complains at this time    Review of Systems   Constitutional: No fatigue  Eyes: Negative for redness.   Respiratory: Negative for cough.    Musculoskeletal: Negative for joint swelling.   Neurological: Negative for tremors.     Objective      Temp:  [97.6 °F (36.4 °C)-98.3 °F (36.8 °C)] 98 °F (36.7 °C)  Heart Rate:  [] 98  Resp:  [22-28] 22  BP: (116-153)/() 129/83        Objective    GEN: Laying in the bed in no acute distress  HEENT: Moist mucous membranes  CV: RRR, no peripheral edema  PULM: non-labored breathing, symmetric chest expansion  ABD: Soft, non-distended  EXT: no clubbing, cyanosis, edema appreciated  SKIN: no rashes appreciated    NEURO:  MENTAL STATUS: AAOx1, arouses to tactile stimulus, able to follow most commands  LANG/SPEECH: The patient was not talking much but was able to say bye at the end of the visit  CN:    II: Pupils equal and reactive,     III, IV, VI: Limited as patient was sleeping frequently    V: Limited as patient was sleeping frequently    VII: Mild left facial droop    VIII: normal hearing to speech    IX, X: normal palatal elevation, no uvular deviation    XI: Can shrug shoulders bilaterally    XII: midline tongue protrusion    MOTOR: The patient was able to move her right upper and lower extremities against gravity.  She was able to squeeze my hand with her left hand (2/5).  The patient was able to bend her knee on the left lower extremity.  REFLEXES: no Monahan's, no clonus  SENSORY: Normal to light touch all throughout   COORDINATION: Deferred  STATION: Not assessed due  to patient condition  GAIT: Not assessed due to patient condition    Results Review:    I reviewed the patient's new clinical results.    WBC   Date Value Ref Range Status   08/26/2024 15.37 (H) 3.40 - 10.80 10*3/mm3 Final     RBC   Date Value Ref Range Status   08/26/2024 4.65 3.77 - 5.28 10*6/mm3 Final     Hemoglobin   Date Value Ref Range Status   08/26/2024 15.6 12.0 - 15.9 g/dL Final     Hematocrit   Date Value Ref Range Status   08/26/2024 47.0 (H) 34.0 - 46.6 % Final     MCV   Date Value Ref Range Status   08/26/2024 101.1 (H) 79.0 - 97.0 fL Final     MCH   Date Value Ref Range Status   08/26/2024 33.5 (H) 26.6 - 33.0 pg Final     MCHC   Date Value Ref Range Status   08/26/2024 33.2 31.5 - 35.7 g/dL Final     RDW   Date Value Ref Range Status   08/26/2024 13.5 12.3 - 15.4 % Final     RDW-SD   Date Value Ref Range Status   08/26/2024 50.1 37.0 - 54.0 fl Final     MPV   Date Value Ref Range Status   08/26/2024 8.2 6.0 - 12.0 fL Final     Platelets   Date Value Ref Range Status   08/26/2024 346 140 - 450 10*3/mm3 Final     Neutrophil %   Date Value Ref Range Status   08/26/2024 70.3 42.7 - 76.0 % Final     Lymphocyte %   Date Value Ref Range Status   08/26/2024 21.5 19.6 - 45.3 % Final     Monocyte %   Date Value Ref Range Status   08/26/2024 6.9 5.0 - 12.0 % Final     Eosinophil %   Date Value Ref Range Status   08/26/2024 0.3 0.3 - 6.2 % Final     Basophil %   Date Value Ref Range Status   08/26/2024 0.5 0.0 - 1.5 % Final     Immature Grans %   Date Value Ref Range Status   08/26/2024 0.5 0.0 - 0.5 % Final     Neutrophils, Absolute   Date Value Ref Range Status   08/26/2024 10.82 (H) 1.70 - 7.00 10*3/mm3 Final     Lymphocytes, Absolute   Date Value Ref Range Status   08/26/2024 3.30 (H) 0.70 - 3.10 10*3/mm3 Final     Monocytes, Absolute   Date Value Ref Range Status   08/26/2024 1.06 (H) 0.10 - 0.90 10*3/mm3 Final     Eosinophils, Absolute   Date Value Ref Range Status   08/26/2024 0.05 0.00 - 0.40 10*3/mm3  Final     Basophils, Absolute   Date Value Ref Range Status   08/26/2024 0.07 0.00 - 0.20 10*3/mm3 Final     Immature Grans, Absolute   Date Value Ref Range Status   08/26/2024 0.07 (H) 0.00 - 0.05 10*3/mm3 Final     nRBC   Date Value Ref Range Status   08/26/2024 0.0 0.0 - 0.2 /100 WBC Final       Lab Results   Component Value Date    GLUCOSE 106 (H) 08/26/2024    BUN 17 08/26/2024    CREATININE 0.92 08/26/2024     08/26/2024    K 4.3 08/26/2024     08/26/2024    CALCIUM 9.3 08/26/2024    PROTEINTOT 6.4 08/24/2024    ALBUMIN 3.7 08/24/2024    ALT 28 08/24/2024    AST 53 (H) 08/24/2024    ALKPHOS 60 08/24/2024    BILITOT 0.6 08/24/2024    GLOB 2.7 08/24/2024    AGRATIO 1.4 08/24/2024    BCR 18.5 08/26/2024    ANIONGAP 13.0 08/26/2024    EGFR 85.0 08/26/2024     XR Chest 1 View    Result Date: 8/26/2024  Impression: 1.Interval removal of the endotracheal tube. 2.Evidence for increasing atelectasis versus infiltrates within the mid to lower lungs bilaterally. Electronically Signed: Edouard Krishna MD  8/26/2024 7:21 AM EDT  Workstation ID: [id number]    CT Head Without Contrast    Result Date: 8/25/2024  Impression: Evolving right-sided infarcts predominate of the basal ganglia. There is some minimal internal hyperdensity likely reflecting spared tissue or petechial hemorrhage. No overt hemorrhagic transformation. Electronically Signed: Denis Gaines MD  8/25/2024 8:38 AM EDT  Workstation ID: [id number]   Results for orders placed during the hospital encounter of 08/23/24    Adult Transthoracic Echo Complete W/ Cont if Necessary Per Protocol (With Agitated Saline)    Interpretation Summary  Images from the original result were not included.      Left ventricular systolic function is moderately decreased. Calculated left ventricular EF = 36.7%  global hypokinesis.  LV cavity mildly dilated.  RV size and function normal    Left ventricular wall thickness is consistent with hypertrophy.    Left ventricular  diastolic dysfunction is noted.    There is a thrombus present in the left ventricle in apex. 2.22 cm by 0.96 cm    The left atrial cavity is dilated.    Estimated right ventricular systolic pressure from tricuspid regurgitation is normal (<35 mmHg).    Indeterminate bubble study    -MRI brain from 8-24-24 images were personally reviewed and showed an acute ischemic stroke in the R putamen and R head of caudate.   -LDL from 8-24-24 was 115  -A1c from 8-24-24 was 6.7  -Echo on 8-24-24 with concern for IV thrombus  -Na on 8-24-24 am was 142  -Repeated CTH on 8-24-24 images were personally reviewed and showed evolving R BG AIS with no concern for hemorrhagic conversion at this time.      Assessment/Plan   This is a 34-year-old white female, right-handed with multiple vascular risk factor including hypertension, diabetes, right renal atrophy obesity who presents to [facility] ED via EMS from [place] as a stroke alert for right MCA syndrome.  Patient was a candidate for IV thrombolytic therapy within the window discussed benefits and risk with patient and family both agreeable to receive medication patient received medication after controlling blood pressure tenecteplase at 2051 with blood pressure at 167/105.  Patient was a candidate for mechanical thrombectomy as CTA head and neck revealed right M1 occlusion.  Cath Lab was initiated. At that time patient started to have altered mental status patient was intubated to protect her airway.       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  -Risk factor: Hypertension, hypertension lipidemia, obesity, diabetes, marijuana use, on progesterone(Mirena IUD)  -MRI brain from 8-24-24 images were personally reviewed and showed an acute ischemic stroke in the R putamen and R head of caudate.   -LDL from  8-24-24 was 115  -A1c from 8-24-24 was 6.7  -Na on 8-24-24 am was 142  -Echo on 8-24-24 with concern for IV thrombus  -Repeated CTH on 8-24-24 images were personally reviewed and showed evolving R BG AIS with no concern for hemorrhagic conversion at this time.  -Most recent Xa level in the morning of August 25, 2024 was 0.1    Recommendations:  -Switch heparin gtt to eliquis PO 5 mg BID for the intraventricular cardiac thrombus. Continue as per cardiology.  -Continue high intensity statin Lipitor 80 mg p.o. at night and daily for secondary stroke prevention.  -Target sodium level of 145-150.  -Target blood pressure of less than 140/90.  -If no Afib detected while on daily consider cardiac monitor for 14 days upon discharge.  -Would recommend checking for hypercoagulability disorders.  -TIA\ischemic stroke with IV thrombolytic therapy order set in place  -Postprocedural order set in place  -NIH\neurocheck per protocol  -PT\OT\SLP evaluation  -Neurology stroke will continue to follow-up.     #Essential hypertension  -Patient was hypertensive on admission 190/106, received 5 mg of labetalol IV, currently on Cardene drip to keep systolic blood pressure less than 140 (130-150).  Management by intensivist.     #Diabetes hypertension complicated by hyperglycemia  -On admission blood glucose was reported 400 will recommend diabetes lab rule out any ketones currently patient is on insulin drip management by intensivist.  -A1c ordered and pending  -Blood glucose less than 180, avoid hypoglycemia-diabetes education is needed when appropriate.  Worsen outcome     #Marijuana use  #Substance abuse  -Patient was reported to use marijuana.  -Counseling on cessation  -UDS ordered and revealed methamphetamine and THC in the urine.     #Nicotine product use via vaping  -Daily vaping  -Counseling on cessation     #Morbid obesity  -BMI 49.44  -Complicates all aspects of care  -Counseling on healthy diet exercise and weight loss when  appropriate.     #Medication noncompliance  -Discussed with patient and family importance of compliance with medication, verbalized understanding.    Stroke will continue to follow. Please call for any further questions or concerns  =================================  Crow Day MD, Msc, PhD  Vascular Neurologist  [facility]

## 2024-08-26 NOTE — PLAN OF CARE
Goal Outcome Evaluation:  Plan of Care Reviewed With: patient, spouse        Progress: improving         Anticipated Discharge Disposition (SLP): inpatient rehabilitation facility          SLP Swallowing Diagnosis: mild, oral dysphagia (08/26/24 1526)  Treatment Assessment (SLP): continued, improved (08/26/24 1526)     Plan for Continued Treatment (SLP): continue treatment per plan of care (08/26/24 1526)

## 2024-08-26 NOTE — THERAPY TREATMENT NOTE
Patient Name: Keya Hamilton  : 1990    MRN: [id number]                              Today's Date: 2024       Admit Date: 2024    Visit Dx:     ICD-10-CM ICD-9-CM   1. Acute stroke due to ischemia  I63.9 434.91   2. Acute left-sided weakness  R53.1 728.87   3. Dysarthria  R47.1 784.51   4. Somnolence  R40.0 780.09   5. Compromised respiratory status  R06.89 786.09   6. Oral phase dysphagia  R13.11 787.21   7. Cognitive communication deficit  R41.841 799.52     Patient Active Problem List   Diagnosis    R M1 stenosis    T2DM    HTN    Morbid obesity with BMI of 45.0-49.9, adult    SCOTT    Hypertensive urgency    R/O Seizure    Acute respiratory failure    Acute stroke due to ischemia    Acute systolic heart failure    Left ventricular thrombus     Past Medical History:   Diagnosis Date    Diabetes mellitus     Hypertension     Sleep apnea      Past Surgical History:   Procedure Laterality Date    INTERVENTIONAL RADIOLOGY PROCEDURE N/A 2024    Procedure: IR mechanical thrombectomy;  Surgeon: Lemuel Medina MD;  Location: [facility] INVASIVE LOCATION;  Service: Interventional Radiology;  Laterality: N/A;      General Information       Row Name 24 1524          Physical Therapy Time and Intention    Document Type therapy note (daily note)  -ES     Mode of Treatment physical therapy  -ES       Row Name 24 1524          General Information    Patient Profile Reviewed yes  -ES     Existing Precautions/Restrictions fall;oxygen therapy device and L/min;other (see comments)  L weakness, L visual deficits  -ES     Barriers to Rehab medically complex;visual deficit  -ES       Row Name 24 1524          Cognition    Orientation Status (Cognition) oriented x 4  -ES       Row Name 24 1524          Safety Issues, Functional Mobility    Safety Issues Affecting Function (Mobility) awareness of need for assistance;insight into deficits/self-awareness;safety precaution  awareness;safety precautions follow-through/compliance;sequencing abilities  -ES     Impairments Affecting Function (Mobility) balance;coordination;endurance/activity tolerance;motor control;motor planning;visual/perceptual;strength;shortness of breath;postural/trunk control;sensation/sensory awareness  -ES               User Key  (r) = Recorded By, (t) = Taken By, (c) = Cosigned By      Initials Name Provider Type    Aleisha Smith PT Physical Therapist                   Mobility       Row Name 08/26/24 1525          Bed Mobility    Comment, (Bed Mobility) received UIC  -ES       Row Name 08/26/24 1525          Sit-Stand Transfer    Sit-Stand Cotton (Transfers) minimum assist (75% patient effort);verbal cues  -ES     Assistive Device (Sit-Stand Transfers) other (see comments)  UE support  -ES       Row Name 08/26/24 1525          Gait/Stairs (Locomotion)    Cotton Level (Gait) minimum assist (75% patient effort);verbal cues;1 person to manage equipment  -ES     Assistive Device (Gait) other (see comments)  BUE support  -ES     Patient was able to Ambulate yes  -ES     Distance in Feet (Gait) 30  -ES     Deviations/Abnormal Patterns (Gait) bilateral deviations;left sided deviations;gait speed decreased;stride length decreased;weight shifting decreased  -ES     Left Sided Gait Deviations foot drop/toe drag  -ES     Comment, (Gait/Stairs) Pt amb with Annette and BUE support + chair follow. Required v/c for clearance of LLE and awareness to L side. Further mob limited by fatigue.  -ES               User Key  (r) = Recorded By, (t) = Taken By, (c) = Cosigned By      Initials Name Provider Type    Aleisha Smith PT Physical Therapist                   Obj/Interventions       Row Name 08/26/24 1526          Balance    Balance Assessment sitting static balance;sitting dynamic balance;sit to stand dynamic balance;standing static balance;standing dynamic balance  -ES     Static Sitting Balance contact  guard  -ES     Dynamic Sitting Balance minimal assist;verbal cues  -ES     Position, Sitting Balance supported;sitting in chair  -ES     Sit to Stand Dynamic Balance minimal assist;verbal cues  -ES     Static Standing Balance minimal assist  -ES     Dynamic Standing Balance minimal assist;2-person assist;verbal cues  -ES     Position/Device Used, Standing Balance supported  -ES     Balance Interventions sitting;standing;sit to stand;supported;static;dynamic;occupation based/functional task  -ES               User Key  (r) = Recorded By, (t) = Taken By, (c) = Cosigned By      Initials Name Provider Type    ES Aleisha Salcedo, PT Physical Therapist                   Goals/Plan    No documentation.                  Clinical Impression       Row Name 08/26/24 1526          Pain    Pretreatment Pain Rating 0/10 - no pain  -ES     Posttreatment Pain Rating 0/10 - no pain  -ES     Pre/Posttreatment Pain Comment tolerated  -ES     Pain Intervention(s) Repositioned;Ambulation/increased activity  -ES       Row Name 08/26/24 1526          Plan of Care Review    Plan of Care Reviewed With patient;spouse  -ES     Progress improving  -ES     Outcome Evaluation Pt demonstrated improved mobility this date, ambulating with Annette and BUE support. Pt continues to be limited by L side weakness/inattention and visual deficits warranting skilled IPPT services. PT rec IRF at d/c.  -ES       Row Name 08/26/24 1526          Therapy Assessment/Plan (PT)    Rehab Potential (PT) good, to achieve stated therapy goals  -ES     Criteria for Skilled Interventions Met (PT) yes;skilled treatment is necessary  -ES     Therapy Frequency (PT) daily  -ES     Predicted Duration of Therapy Intervention (PT) 10 days  -ES       Row Name 08/26/24 1526          Vital Signs    Pre Systolic BP Rehab 120  -ES     Pre Treatment Diastolic BP 91  -ES     Post Systolic BP Rehab 130  -ES     Post Treatment Diastolic BP 89  -ES     Pretreatment Heart Rate (beats/min)  104  -ES     Posttreatment Heart Rate (beats/min) 101  -ES     Pre SpO2 (%) 97  -ES     O2 Delivery Pre Treatment nasal cannula  -ES     O2 Delivery Intra Treatment nasal cannula  -ES     Post SpO2 (%) 99  -ES     O2 Delivery Post Treatment nasal cannula  -ES     Pre Patient Position Sitting  -ES     Intra Patient Position Standing  -ES     Post Patient Position Sitting  -ES       Row Name 08/26/24 1526          Positioning and Restraints    Pre-Treatment Position sitting in chair/recliner  -ES     Post Treatment Position chair  -ES     In Chair notified nsg;reclined;sitting;call light within reach;encouraged to call for assist;exit alarm on;waffle cushion;on mechanical lift sling;legs elevated;heels elevated;with family/caregiver  -ES               User Key  (r) = Recorded By, (t) = Taken By, (c) = Cosigned By      Initials Name Provider Type    Aleisha Smith, ELAN Physical Therapist                   Outcome Measures       Row Name 08/26/24 1529 08/26/24 0800       How much help from another person do you currently need...    Turning from your back to your side while in flat bed without using bedrails? 3  -ES 3  -QH    Moving from lying on back to sitting on the side of a flat bed without bedrails? 3  -ES 2  -QH    Moving to and from a bed to a chair (including a wheelchair)? 3  -ES 3  -QH    Standing up from a chair using your arms (e.g., wheelchair, bedside chair)? 3  -ES 3  -QH    Climbing 3-5 steps with a railing? 2  -ES 1  -QH    To walk in hospital room? 2  -ES 3  -QH    AM-PAC 6 Clicks Score (PT) 16  -ES 15  -QH    Highest Level of Mobility Goal 5 --> Static standing  -ES 4 --> Transfer to chair/commode  -QH      Row Name 08/26/24 1529          Functional Assessment    Outcome Measure Options AM-PAC 6 Clicks Basic Mobility (PT)  -ES               User Key  (r) = Recorded By, (t) = Taken By, (c) = Cosigned By      Initials Name Provider Type    Aleisha Smith, PT Physical Therapist    Q [place],  Asmita, RN Registered Nurse                                 Physical Therapy Education       Title: PT OT SLP Therapies (In Progress)       Topic: Physical Therapy (Done)       Point: Mobility training (Done)       Learning Progress Summary             Patient Acceptance, E,D, VU,NR by  at 8/25/2024 1004   Family Acceptance, E,D, VU,NR by LS at 8/25/2024 1004                         Point: Home exercise program (Done)       Learning Progress Summary             Patient Acceptance, E,D, VU,NR by LS at 8/25/2024 1004   Family Acceptance, E,D, VU,NR by LS at 8/25/2024 1004                         Point: Body mechanics (Done)       Learning Progress Summary             Patient Acceptance, E,D, VU,NR by  at 8/25/2024 1004   Family Acceptance, E,D, VU,NR by LS at 8/25/2024 1004                         Point: Precautions (Done)       Learning Progress Summary             Patient Acceptance, E,D, VU,NR by LS at 8/25/2024 1004   Family Acceptance, E,D, VU,NR by LS at 8/25/2024 1004                                         User Key       Initials Effective Dates Name Provider Type Discipline     02/03/23 -  Latanya Shelley, PT Physical Therapist PT                  PT Recommendation and Plan     Plan of Care Reviewed With: patient, spouse  Progress: improving  Outcome Evaluation: Pt demonstrated improved mobility this date, ambulating with Annette and BUE support. Pt continues to be limited by L side weakness/inattention and visual deficits warranting skilled IPPT services. PT rec IRF at d/c.     Time Calculation:         PT Charges       Row Name 08/26/24 1529             Time Calculation    Start Time 1439  -ES      PT Received On 08/26/24  -ES      PT Goal Re-Cert Due Date 09/04/24  -ES         Time Calculation- PT    Total Timed Code Minutes- PT 13 minute(s)  -ES         Timed Charges    48993 - Gait Training Minutes  13  -ES         Total Minutes    Timed Charges Total Minutes 13  -ES       Total Minutes 13  -ES                 User Key  (r) = Recorded By, (t) = Taken By, (c) = Cosigned By      Initials Name Provider Type    ES Aleisha Salcedo PT Physical Therapist                  Therapy Charges for Today       Code Description Service Date Service Provider Modifiers Qty    96082085959 HC GAIT TRAINING EA 15 MIN 8/26/2024 Aleisha Salcedo PT GP 1            PT G-Codes  Outcome Measure Options: AM-PAC 6 Clicks Basic Mobility (PT)  AM-PAC 6 Clicks Score (PT): 16  AM-PAC 6 Clicks Score (OT): 12  Modified Tonio Scale: 4 - Moderately severe disability.  Unable to walk without assistance, and unable to attend to own bodily needs without assistance.  PT Discharge Summary  Anticipated Discharge Disposition (PT): inpatient rehabilitation facility    Aleisha Salcedo PT  8/26/2024

## 2024-08-26 NOTE — CONSULTS
Diabetes Education    Patient Name:  Keya Hamilton  YOB: 1990  MRN: [id number]  Admit Date:  8/23/2024    Chart review for diabetes educator consult for stroke protocol.     At the time of this review patient A1c is 6.7%. Patient was sleeping with CPAP on. Spouse at bedside. Unable to perform education due to patient being drowsy. Will attempt as able.    Electronically signed by:  Tonie Jimenez RN  08/26/24 14:42 EDT

## 2024-08-26 NOTE — CASE MANAGEMENT/SOCIAL WORK
Continued Stay Note   Uma     Patient Name: Keya Hamilton  MRN: [id number]  Today's Date: 8/26/2024    Admit Date: 8/23/2024    Plan: [facility] acute   Discharge Plan       Row Name 08/26/24 1244       Plan    Plan [facility] acute    Patient/Family in Agreement with Plan yes    Plan Comments Spoke with patient's spouse at bedside to discuss therapy recommendations for IRF and he is agreeable to [facility].  Referral given to April with [facility].  CM faxed Mr. Hamilton's FMLA papers to [facility] office for completion.  CM will continue to follow.    Final Discharge Disposition Code 62 - inpatient rehab facility                   Discharge Codes    No documentation.                       Nicky Castellanos RN

## 2024-08-26 NOTE — PLAN OF CARE
Neurointerventional Metrics    Last Known Well: 1900    [facility] Arrival (ED/transfer):  2042    Code Stroke Initiated (Inpatient):  n/a    Initial NIHSS: 11    Baseline MRS:  0    IV thrombolytic:  Yes    CT Head: 2021    CT Perfusion: 2032    Arrival to Cath Lab:  2125    Groin Access: 2137    Initial Thrombectomy/Intervention (stent retriever deployment/aspiration/IA tPA):  2144    Reperfusion:  2150    Final Angiogram:  2150    End of Procedure:  2202    Pre-Procedure TICI flow:  0    Post-Procedure TICI flow:  3    Emboli to New Vascular Territory: No

## 2024-08-26 NOTE — PROGRESS NOTES
"Critical Care Note     LOS: 3 days   Patient Care Team:  Cuong Parks MD as PCP - General (Family Medicine)    Chief Complaint/Reason for visit:    Chief Complaint   Patient presents with    Stroke   Diabetes mellitus type 2  Hypertension  Acute respiratory failure   systolic heart failure  Left ventricular thrombus        Subjective     Interval History:     She wore CPAP last night.  This morning she is somnolent.  Her NIH stroke scale is a 7.  She remains on a heparin drip    Review of Systems:    All systems were reviewed and negative except as noted in subjective.    Medical history, surgical history, social history, family history reviewed    Objective     Intake/Output:    Intake/Output Summary (Last 24 hours) at 8/26/2024 1609  Last data filed at 8/26/2024 1400  Gross per 24 hour   Intake 2096.02 ml   Output 6975 ml   Net -4878.98 ml       Nutrition:  Diet: Cardiac, Diabetic; Healthy Heart (2-3 Na+); Consistent Carbohydrate; Texture: Soft to Chew (NDD 3); Soft to Chew: Chopped Meat; Fluid Consistency: Thin (IDDSI 0)    Infusions:  dexmedetomidine, 0.2-1.5 mcg/kg/hr  niCARdipine, 5-15 mg/hr, Last Rate: 5 mg/hr (08/25/24 0812)        Mechanical Ventilator Settings:            Resp Rate (Set): 20  Pressure Support (cm H2O): 8 cm H20  FiO2 (%): 40 %  PEEP/CPAP (cm H2O): 5 cm H20    Minute Ventilation (L/min) (Obs): 2.24 L/min  Resp Rate (Observed) Vent: 29  I:E Ratio (Set): 1:2.33  I:E Ratio (Obs): 14.29:1    PIP Observed (cm H2O): 15 cm H2O  Plateau Pressure (cm H2O): 25 cm H2O    Telemetry: Sinus rhythm             Vital Signs  Blood pressure 129/83, pulse 98, temperature 98 °F (36.7 °C), temperature source Axillary, resp. rate 22, height 160 cm (62.99\"), weight 117 kg (258 lb 6.1 oz), SpO2 97%.    Physical Exam:  General Appearance:  Overweight young woman   Head:  No visible trauma   Eyes:          No jaundice   Ears:     Throat: Oral mucosa moist   Neck: No carotid bruit   Back:      Lungs:   " Respirations are shallow, equal without wheeze     Heart:  regular rhythm, S1, S2 auscultated   Abdomen:   Bowel sounds present, soft   Rectal:   Deferred   Extremities: Trace edema   Pulses:    Skin: Multiple tattoos   Lymph nodes:    Neurologic: She is somnolent but will arouse and follow simple commands.  He will not answer my questions but she did answer some of the nursing questions.  Left arm weakness and left facial droop.  Interval: baseline (shift change)  1a. Level of Consciousness: 2-->Not alert, requires repeated stimulation to attend, or is obtunded and requires strong or painful stimulation to make movements (not stereotyped)  1b. LOC Questions: 0-->Answers both questions correctly  1c. LOC Commands: 0-->Performs both tasks correctly  2. Best Gaze: 0-->Normal  3. Visual: 1-->Partial hemianopia  4. Facial Palsy: 2-->Partial paralysis (total or near-total paralysis of lower face)  5a. Motor Arm, Left: 2-->Some effort against gravity, limb cannot get to or maintain (if cued) 90 (or 45) degrees, drifts down to bed, but has some effort against gravity  5b. Motor Arm, Right: 0-->No drift, limb holds 90 (or 45) degrees for full 10 secs  6a. Motor Leg, Left: 0-->No drift, leg holds 30 degree position for full 5 secs  6b. Motor Leg, Right: 0-->No drift, leg holds 30 degree position for full 5 secs  7. Limb Ataxia: 0-->Absent  8. Sensory: 1-->Mild-to-moderate sensory loss, patient feels pinprick is less sharp or is dull on the affected side, or there is a loss of superficial pain with pinprick, but patient is aware of being touched  9. Best Language: 0-->No aphasia, normal  10. Dysarthria: 1-->Mild-to-moderate dysarthria, patient slurs at least some words and, at worst, can be understood with some difficulty  11. Extinction and Inattention (formerly Neglect): 0-->No abnormality    Total (NIH Stroke Scale): 9       Results Review:     I reviewed the patient's new clinical results.   Results from last 7 days   Lab  "Units 08/26/24  0459 08/25/24  0542 08/24/24  0102 08/23/24 2055   SODIUM mmol/L 138 144 141  --    POTASSIUM mmol/L 4.3 4.3 4.4  --    CHLORIDE mmol/L 105 108* 105  --    CO2 mmol/L 20.0* 22.0 20.0*  --    BUN mg/dL 17 17 16  --    CREATININE mg/dL 0.92 1.15* 1.39*  --    CALCIUM mg/dL 9.3 9.0 9.1  --    BILIRUBIN mg/dL  --   --  0.6  --    ALK PHOS U/L  --   --  60  --    ALT (SGPT) U/L  --   --  28 15   AST (SGOT) U/L  --   --  53* 18   GLUCOSE mg/dL 106* 143* 206*  --      Results from last 7 days   Lab Units 08/26/24  0459 08/25/24  0542 08/24/24  0833   WBC 10*3/mm3 15.37* 20.36* 16.61*   HEMOGLOBIN g/dL 15.6 14.7 14.9   HEMATOCRIT % 47.0* 46.1 44.2   PLATELETS 10*3/mm3 346 374 340     Results from last 7 days   Lab Units 08/25/24  0538   PH, ARTERIAL pH units 7.450   PO2 ART mm Hg 62.9*   PCO2, ARTERIAL mm Hg 36.2   HCO3 ART mmol/L 25.1     No results found for: \"BLOODCX\"  No results found for: \"URINECX\"    I reviewed the patient's new imaging including images and reports.    XR CHEST 1 VW    Date of Exam: 8/26/2024 3:36 AM EDT    Indication: resp insuff    Comparison: 8/24/2024 at 0233 hours    Findings:  The endotracheal tube has been removed. There is interval increase in atelectasis versus infiltrates within the mid to lower lungs bilaterally. No pleural effusions are seen. The cardiac silhouette and mediastinum are stable. Stable cardiomegaly is  noted.   Impression:     Impression:  1.Interval removal of the endotracheal tube.  2.Evidence for increasing atelectasis versus infiltrates within the mid to lower lungs bilaterally.      Electronically Signed: Edouard Krishna MD   8/26/2024 7:21 AM EDT   Interpretation Summary         Left ventricular systolic function is moderately decreased. Calculated left ventricular EF = 36.7%  global hypokinesis.  LV cavity mildly dilated.  RV size and function normal    Left ventricular wall thickness is consistent with hypertrophy.    Left ventricular diastolic " dysfunction is noted.    There is a thrombus present in the left ventricle in apex. 2.22 cm by 0.96 cm    The left atrial cavity is dilated.    Estimated right ventricular systolic pressure from tricuspid regurgitation is normal (<35 mmHg).    Indeterminate bubble study    All medications reviewed.   apixaban, 5 mg, Oral, Q12H  atorvastatin, 80 mg, Nasogastric, Nightly  cefTRIAXone, 2,000 mg, Intravenous, Q24H  empagliflozin, 10 mg, Oral, Daily  insulin glargine, 10 Units, Subcutaneous, Daily  insulin lispro, 2-7 Units, Subcutaneous, 4x Daily AC & at Bedtime  metoprolol succinate XL, 50 mg, Oral, Q24H  pantoprazole, 40 mg, Intravenous, QAM AC  sacubitril-valsartan, 1 tablet, Oral, Q12H          Assessment & Plan       R M1 stenosis    T2DM    HTN    Morbid obesity with BMI of 45.0-49.9, adult    SCOTT    Hypertensive urgency    R/O Seizure    Acute respiratory failure    Acute stroke due to ischemia    Acute systolic heart failure    Left ventricular thrombus    34-year-old woman with diabetes, hypertension, right renal atrophy, presenting with somnolence, dysarthria, left hemiparesis.  Imaging revealed a right middle cerebral artery perfusion deficit and severe stenosis of the right M1 segment.  Blood pressure was elevated and treated with labetalol and hydralazine.  She was then given TNKase.  She required intubation in the emergency room.  She was extubated on August 25.  Her NIH stroke scale is a 9.  Blood pressures ranging from 124-139 today on a Cardene drip.  Echo revealed an EF of 36.7% with clot in the left ventricle apex.  She was placed on a heparin drip.  She is heterozygous for factor V Leiden  Her hemoglobin A1c is 6.7.  Blood glucoses have been in the 100s.  She passed her speech assessment and diet has been ordered.  Urine drug screen was positive for methamphetamine and marijuana.  In addition she does have a vape.        PLAN:    Transition heparin drip to Eliquis   Statin  Jardiance  Sliding scale  insulin  PT, OT, speech therapy  CPAP at night  Encourage vaping cessation  Entresto  Metoprolol  Hydralazine as needed  If she does well today and needs no additional blood pressure control transfer to the floor    VTE Prophylaxis: anticoagulated    Stress Ulcer Prophylaxis: Protonix    Katerin Barney MD  08/26/24  16:09 EDT      Time: Critical care 25 min  I personally provided care to this critically ill patient as documented above.  Critical care time does not include time spent on separately billed procedures.  None of my critical care time was concurrent with other critical care providers.

## 2024-08-26 NOTE — PROGRESS NOTES
[facility] Cardiology    Inpatient Progress Note      Chief Complaint/Reason for service:    HF         Subjective:       No acute events overnight    Past medical, surgical, social and family history reviewed in the patient's electronic medical record.         Objective:      Infusions:  dexmedetomidine, 0.2-1.5 mcg/kg/hr  heparin, 18 Units/kg/hr, Last Rate: 18 Units/kg/hr (08/25/24 1739)  niCARdipine, 5-15 mg/hr, Last Rate: 5 mg/hr (08/25/24 0812)  Pharmacy to Dose Heparin,          Medications:    Current Facility-Administered Medications:     acetaminophen (TYLENOL) suppository 650 mg, 650 mg, Rectal, Q6H PRN, Russ Gorman PA-C, 650 mg at 08/25/24 0509    acetaminophen (TYLENOL) tablet 650 mg, 650 mg, Oral, Q6H PRN, Quynh Jovel, APRN, 650 mg at 08/25/24 1739    atorvastatin (LIPITOR) tablet 80 mg, 80 mg, Nasogastric, Nightly, Cinthia Banegas MD, 80 mg at 08/25/24 2057    azithromycin (ZITHROMAX) 500 mg in sodium chloride 0.9 % 250 mL IVPB-VTB, 500 mg, Intravenous, Q24H, Cinthia Banegas MD, 500 mg at 08/25/24 0929    cefTRIAXone (ROCEPHIN) 2,000 mg in sodium chloride 0.9 % 100 mL MBP, 2,000 mg, Intravenous, Q24H, Cinthia Banegas MD, Last Rate: 200 mL/hr at 08/25/24 0925, 2,000 mg at 08/25/24 0925    dexmedetomidine (PRECEDEX) 400 mcg in 100 mL NS infusion, 0.2-1.5 mcg/kg/hr, Intravenous, Titrated, Cinthia Banegas MD    dextrose (D50W) (25 g/50 mL) IV injection 25 g, 25 g, Intravenous, Q15 Min PRN, Blayne Haddad MD    dextrose (GLUTOSE) oral gel 15 g, 15 g, Oral, Q15 Min PRN, Blayne Haddad MD    empagliflozin (JARDIANCE) tablet 10 mg, 10 mg, Oral, Daily, Dion Hooker MD, 10 mg at 08/25/24 1031    glucagon (GLUCAGEN) injection 1 mg, 1 mg, Intramuscular, Q15 Min PRN, Blayne Haddad MD    heparin 79514 units/250 mL (100 units/mL) in 0.45 % NaCl infusion, 18 Units/kg/hr, Intravenous, Titrated, Diony Fernandez, [facility], Last Rate: 22.8 mL/hr at  08/25/24 1739, 18 Units/kg/hr at 08/25/24 1739    hydrALAZINE (APRESOLINE) injection 10 mg, 10 mg, Intravenous, Q6H PRN, Quynh Jovel APRN, 10 mg at 08/25/24 1821    insulin glargine (LANTUS, SEMGLEE) injection 10 Units, 10 Units, Subcutaneous, Daily, Blayne Haddad MD, 10 Units at 08/25/24 1207    Insulin Lispro (humaLOG) injection 2-7 Units, 2-7 Units, Subcutaneous, 4x Daily AC & at Bedtime, Blayne Haddad MD, 3 Units at 08/25/24 1206    [Held by provider] metoprolol succinate XL (TOPROL-XL) 24 hr tablet 25 mg, 25 mg, Oral, Q24H, Faustina Reyes APRN    metoprolol tartrate (LOPRESSOR) injection 5 mg, 5 mg, Intravenous, Q6H, Dion Hooker MD, 5 mg at 08/25/24 2227    niCARdipine (CARDENE) 25mg in 250mL NS infusion, 5-15 mg/hr, Intravenous, Titrated, Stanton Alonso APRN, Last Rate: 50 mL/hr at 08/25/24 0812, 5 mg/hr at 08/25/24 0812    nitroglycerin (NITROSTAT) SL tablet 0.4 mg, 0.4 mg, Sublingual, Q5 Min PRN, Alma Bhagat APRN    pantoprazole (PROTONIX) injection 40 mg, 40 mg, Intravenous, QA Makenzie COLEY Natalia, MD, 40 mg at 08/25/24 0925    Pharmacy to Dose Heparin, , Does not apply, Continuous PRN, Josie Delgado APRN    sacubitril-valsartan (ENTRESTO) 24-26 MG tablet 1 tablet, 1 tablet, Oral, Q12H, Dion Hooker MD, 1 tablet at 08/25/24 2057    Vital Sign Min/Max for last 24 hours  Temp  Min: 97.7 °F (36.5 °C)  Max: 98.8 °F (37.1 °C)   BP  Min: 105/80  Max: 145/105   Pulse  Min: 95  Max: 118   Resp  Min: 20  Max: 30   SpO2  Min: 91 %  Max: 100 %   No data recorded      Intake/Output Summary (Last 24 hours) at 8/26/2024 0159  Last data filed at 8/26/2024 0141  Gross per 24 hour   Intake 1685.72 ml   Output 4325 ml   Net -2639.28 ml           CONSTITUTIONAL: No acute distress  Peripheral vascular: No significant lower extremity edema    Labs/studies:  Available lab and imaging results were reviewed by myself today    Results for orders placed during the hospital  encounter of 08/23/24    Adult Transthoracic Echo Complete W/ Cont if Necessary Per Protocol (With Agitated Saline)    Interpretation Summary  Images from the original result were not included.      Left ventricular systolic function is moderately decreased. Calculated left ventricular EF = 36.7%  global hypokinesis.  LV cavity mildly dilated.  RV size and function normal    Left ventricular wall thickness is consistent with hypertrophy.    Left ventricular diastolic dysfunction is noted.    There is a thrombus present in the left ventricle in apex. 2.22 cm by 0.96 cm    The left atrial cavity is dilated.    Estimated right ventricular systolic pressure from tricuspid regurgitation is normal (<35 mmHg).    Indeterminate bubble study      Tele: Sinus rhythm         Assessment/Plan:         R M1 stenosis    T2DM    HTN    Morbid obesity with BMI of 45.0-49.9, adult    SCOTT    Hypertensive urgency    R/O Seizure    Acute respiratory failure    Acute stroke due to ischemia    Acute systolic heart failure    Left ventricular thrombus       ASSESSMENT:  LV thrombus  Echocardiogram shows decreased LVEF 36%, LV thrombus, indeterminate bubble study.   Acute HFrEF, severe exacerbation  New diagnosis.  Unclear etiology  Possible contributing causes include recent pneumonia (returned from cruise from [place], given antibiotics), positive meth and marijuana and UDS  LVEF 36% with global hypokinesis on echo.   CVA secondary to right MCA occlusion   Status post thrombolytic therapy, TNK  Status post urgent mechanical thrombectomy, right MCA  Hypertension   Type 2 diabetes mellitus   ANA  Acute respiratory failure   Substance abuse   UDS positive for THC, methamphetamines (possibly laced marijuana?, no known history of meth use per patient's )    PLAN:  Continue heparin; switch to apixaban 5 mg twice daily when no additional invasive procedures are planned  Lasix 40mg IVP today  Switched metoprolol IV to Toprol XL  50 mg once daily  Continue Jardiance and Entresto  Daily BMP while admitted  Will consider adding spironolactone in the next couple days  Continue statin   Future ischemic evaluation and possible cardiac MRI      Dion Hooker MD, MSc, FACC, [facility]  Interventional Cardiology  [facility]

## 2024-08-26 NOTE — THERAPY TREATMENT NOTE
Patient Name: Keya Hamilton  : 1990    MRN: [id number]                              Today's Date: 2024       Admit Date: 2024    Visit Dx:     ICD-10-CM ICD-9-CM   1. Acute stroke due to ischemia  I63.9 434.91   2. Acute left-sided weakness  R53.1 728.87   3. Dysarthria  R47.1 784.51   4. Somnolence  R40.0 780.09   5. Compromised respiratory status  R06.89 786.09   6. Oral phase dysphagia  R13.11 787.21   7. Cognitive communication deficit  R41.841 799.52     Patient Active Problem List   Diagnosis    R M1 stenosis    T2DM    HTN    Morbid obesity with BMI of 45.0-49.9, adult    SCOTT    Hypertensive urgency    R/O Seizure    Acute respiratory failure    Acute stroke due to ischemia    Acute systolic heart failure    Left ventricular thrombus     Past Medical History:   Diagnosis Date    Diabetes mellitus     Hypertension     Sleep apnea      Past Surgical History:   Procedure Laterality Date    INTERVENTIONAL RADIOLOGY PROCEDURE N/A 2024    Procedure: IR mechanical thrombectomy;  Surgeon: Lemuel Medina MD;  Location: [facility] INVASIVE LOCATION;  Service: Interventional Radiology;  Laterality: N/A;      General Information       Row Name 24 1536          OT Time and Intention    Document Type therapy note (daily note)  -     Mode of Treatment occupational therapy  -[facility] Name 24 1536          General Information    Patient Profile Reviewed yes  -     Existing Precautions/Restrictions fall;oxygen therapy device and L/min;other (see comments)  L weakness, L visual deficits  -     Barriers to Rehab medically complex;visual deficit  -       Row Name 24 1536          Cognition    Orientation Status (Cognition) oriented x 4  -       Row Name 24 1536          Safety Issues, Functional Mobility    Safety Issues Affecting Function (Mobility) awareness of need for assistance;safety precaution awareness;safety precautions  follow-through/compliance;insight into deficits/self-awareness;sequencing abilities  -     Impairments Affecting Function (Mobility) balance;coordination;endurance/activity tolerance;motor control;motor planning;visual/perceptual;strength;shortness of breath;postural/trunk control;sensation/sensory awareness  -               User Key  (r) = Recorded By, (t) = Taken By, (c) = Cosigned By      Initials Name Provider Type     Loretta Pleitez OT Occupational Therapist                     Mobility/ADL's       Row Name 08/26/24 1536          Bed Mobility    Supine-Sit Bladen (Bed Mobility) minimum assist (75% patient effort);1 person assist;verbal cues;nonverbal cues (demo/gesture)  -     Assistive Device (Bed Mobility) bed rails;head of bed elevated  -[facility] Name 08/26/24 1536          Bed-Chair Transfer    Bed-Chair Bladen (Transfers) minimum assist (75% patient effort);2 person assist;verbal cues  -     Assistive Device (Bed-Chair Transfers) other (see comments)  BUE support  -[facility] Name 08/26/24 1536          Sit-Stand Transfer    Sit-Stand Bladen (Transfers) minimum assist (75% patient effort);verbal cues  -     Assistive Device (Sit-Stand Transfers) other (see comments)  BUE support  -       Row Name 08/26/24 1536          Functional Mobility    Functional Mobility- Ind. Level verbal cues required;contact guard assist;1 person  -     Functional Mobility- Device other (see comments)  UE support  -     Functional Mobility-Distance (Feet) --  < HH distances  -[facility] Name 08/26/24 1536          Activities of Daily Living    BADL Assessment/Intervention lower body dressing  -KL       Row Name 08/26/24 1536          Lower Body Dressing Assessment/Training    Bladen Level (Lower Body Dressing) don;shoes/slippers;minimum assist (75% patient effort)  -     Position (Lower Body Dressing) edge of bed sitting  -               User Key  (r) = Recorded By, (t) = Taken  By, (c) = Cosigned By      Initials Name Provider Type    Loretta Villalta OT Occupational Therapist                   Obj/Interventions       Row Name 08/26/24 1539          Shoulder (Therapeutic Exercise)    Shoulder AROM (Therapeutic Exercise) left;flexion;3 repetitions;sitting  -[facility] Name 08/26/24 1539          Balance    Static Sitting Balance contact guard  -     Dynamic Sitting Balance minimal assist  -     Position, Sitting Balance supported;sitting edge of bed  -     Static Standing Balance minimal assist  -     Dynamic Standing Balance minimal assist;2-person assist  -     Position/Device Used, Standing Balance supported  -     Balance Interventions sitting;standing;sit to stand;supported;static;dynamic;occupation based/functional task  -               User Key  (r) = Recorded By, (t) = Taken By, (c) = Cosigned By      Initials Name Provider Type    Loretta Villalta OT Occupational Therapist                   Goals/Plan    No documentation.                  Clinical Impression       [facility] Name 08/26/24 1540          Pain Assessment    Pretreatment Pain Rating 0/10 - no pain  -     Posttreatment Pain Rating 0/10 - no pain  -KL       Row Name 08/26/24 1540          Plan of Care Review    Plan of Care Reviewed With patient;spouse  -     Progress improving  -     Outcome Evaluation Pt demo improvement in mobility and activity tolerance. Will continue POC to progress towards PLOF. d/c rec is IPR.  -       Row Name 08/26/24 1540          Therapy Plan Review/Discharge Plan (OT)    Anticipated Discharge Disposition (OT) inpatient rehabilitation facility  -       Row Name 08/26/24 1540          Vital Signs    Pre Systolic BP Rehab 120  -KL     Pre Treatment Diastolic BP 91  -KL     Post Systolic BP Rehab 130  -KL     Post Treatment Diastolic BP 89  -KL     Pretreatment Heart Rate (beats/min) 104  -KL     Posttreatment Heart Rate (beats/min) 101  -KL     Pre SpO2 (%) 97  -KL     O2  Delivery Pre Treatment nasal cannula  -KL     Post SpO2 (%) 99  -KL     O2 Delivery Post Treatment nasal cannula  -KL     Pre Patient Position Supine  -KL     Intra Patient Position Standing  -KL     Post Patient Position Sitting  -       Row Name 08/26/24 1540          Positioning and Restraints    Pre-Treatment Position in bed  -KL     Post Treatment Position chair  -KL     In Chair notified nsg;reclined;sitting;call light within reach;encouraged to call for assist;exit alarm on;with family/caregiver;waffle cushion;on mechanical lift sling  -               User Key  (r) = Recorded By, (t) = Taken By, (c) = Cosigned By      Initials Name Provider Type    Loretta Villalta OT Occupational Therapist                   Outcome Measures       Row Name 08/26/24 1546          How much help from another is currently needed...    Putting on and taking off regular lower body clothing? 1  -KL     Bathing (including washing, rinsing, and drying) 2  -KL     Toileting (which includes using toilet bed pan or urinal) 2  -KL     Putting on and taking off regular upper body clothing 2  -KL     Taking care of personal grooming (such as brushing teeth) 3  -KL     Eating meals 3  -KL     AM-PAC 6 Clicks Score (OT) 13  -KL       Row Name 08/26/24 1529 08/26/24 0800       How much help from another person do you currently need...    Turning from your back to your side while in flat bed without using bedrails? 3  -ES 3  -QH    Moving from lying on back to sitting on the side of a flat bed without bedrails? 3  -ES 2  -QH    Moving to and from a bed to a chair (including a wheelchair)? 3  -ES 3  -QH    Standing up from a chair using your arms (e.g., wheelchair, bedside chair)? 3  -ES 3  -QH    Climbing 3-5 steps with a railing? 2  -ES 1  -QH    To walk in hospital room? 2  -ES 3  -QH    AM-PAC 6 Clicks Score (PT) 16  -ES 15  -QH    Highest Level of Mobility Goal 5 --> Static standing  -ES 4 --> Transfer to chair/commode  -QH      Row Name  08/26/24 1546 08/26/24 1529       Functional Assessment    Outcome Measure Options AM-PAC 6 Clicks Daily Activity (OT)  -SATISH AM-PAC 6 Clicks Basic Mobility (PT)  -ES              User Key  (r) = Recorded By, (t) = Taken By, (c) = Cosigned By      Initials Name Provider Type    Aleisha Smith, PT Physical Therapist    Asmita Laws, RN Registered Nurse    Loretta Villalta OT Occupational Therapist                    Occupational Therapy Education       Title: PT OT SLP Therapies (In Progress)       Topic: Occupational Therapy (In Progress)       Point: ADL training (In Progress)       Description:   Instruct learner(s) on proper safety adaptation and remediation techniques during self care or transfers.   Instruct in proper use of assistive devices.                  Learning Progress Summary             Patient Acceptance, E, NR by SATISH at 8/26/2024 1547    Acceptance, E, VU by  at 8/25/2024 0825    Comment: role of therapy, ongoing treatment plan, discharge recs   Family Acceptance, E, VU by  at 8/25/2024 0825    Comment: role of therapy, ongoing treatment plan, discharge recs                         Point: Home exercise program (Done)       Description:   Instruct learner(s) on appropriate technique for monitoring, assisting and/or progressing therapeutic exercises/activities.                  Learning Progress Summary             Patient Acceptance, E, VU by  at 8/25/2024 0825    Comment: role of therapy, ongoing treatment plan, discharge recs   Family Acceptance, E, VU by  at 8/25/2024 0825    Comment: role of therapy, ongoing treatment plan, discharge recs                         Point: Precautions (In Progress)       Description:   Instruct learner(s) on prescribed precautions during self-care and functional transfers.                  Learning Progress Summary             Patient Acceptance, E, NR by SATISH at 8/26/2024 1547                         Point: Body mechanics (In Progress)        Description:   Instruct learner(s) on proper positioning and spine alignment during self-care, functional mobility activities and/or exercises.                  Learning Progress Summary             Patient Acceptance, E, NR by  at 8/26/2024 1547                                         User Key       Initials Effective Dates Name Provider Type Discipline    JR 02/03/23 -  Mariaa Mas OT Occupational Therapist OT    SATISH 02/05/24 -  Loretta Pleitez OT Occupational Therapist OT                  OT Recommendation and Plan     Plan of Care Review  Plan of Care Reviewed With: patient, spouse  Progress: improving  Outcome Evaluation: Pt demo improvement in mobility and activity tolerance. Will continue POC to progress towards PLOF. d/c rec is IPR.     Time Calculation:         Time Calculation- OT       Row Name 08/26/24 1547 08/26/24 1529          Time Calculation- OT    OT Start Time 1430  -KL --     OT Received On 08/26/24  - --        Timed Charges    47830 - Gait Training Minutes  -- 13  -ES     72693 - OT Therapeutic Activity Minutes 15  -KL --        Total Minutes    Timed Charges Total Minutes 15  -KL 13  -ES      Total Minutes 15  -KL 13  -ES               User Key  (r) = Recorded By, (t) = Taken By, (c) = Cosigned By      Initials Name Provider Type    ES Aleisha Salcedo, PT Physical Therapist     Loretta Pleitez OT Occupational Therapist                  Therapy Charges for Today       Code Description Service Date Service Provider Modifiers Qty    [id number] HC OT THERAPEUTIC ACT EA 15 MIN 8/26/2024 Loretta Pleitez OT GO 1                 Loretta Pleitez OT  8/26/2024

## 2024-08-26 NOTE — PROGRESS NOTES
HOD# : 3    No events last night      R M1 stenosis    T2DM    HTN    Morbid obesity with BMI of 45.0-49.9, adult    SCOTT    Hypertensive urgency    R/O Seizure    Acute respiratory failure    Acute stroke due to ischemia    Acute systolic heart failure    Left ventricular thrombus      Temp:  [97.6 °F (36.4 °C)-98.4 °F (36.9 °C)] 97.7 °F (36.5 °C)  Heart Rate:  [] 98  Resp:  [22-28] 22  BP: (105-153)/() 130/91  I/O last 3 completed shifts:  In: 2439.1 [P.O.:420; I.V.:1484.5; IV Piggyback:534.6]  Out: 6185 [Urine:6185]  I/O this shift:  In: 336 [I.V.:336]  Out: 2200 [Urine:2200]  Vital signs were reviewed and documented in the chart      EXAM   Patient is moving left upper and lower extremity with hemiplegia    Face is symmetric mild motor facial weakness    Pupils symmetric    Speech is dysarthric but all points of examination are improved  Groin is intact  Assessment and plan    1.  Status post right-sided MCA carotid stroke status post embolectomy embolic in nature    2.  Marked improvement    Plan    1.  Rehab    2.  Likely require anticoagulation given embolic nature of stroke I will leave that up to neurology and cardiology to sort out    Quite pleased with progress answered 's questions

## 2024-08-26 NOTE — PLAN OF CARE
Goal Outcome Evaluation:  Plan of Care Reviewed With: patient, spouse        Progress: improving  Outcome Evaluation: Pt demonstrated improved mobility this date, ambulating with Annette and BUE support. Pt continues to be limited by L side weakness/inattention and visual deficits warranting skilled IPPT services. PT rec IRF at d/c.      Anticipated Discharge Disposition (PT): inpatient rehabilitation facility

## 2024-08-27 ENCOUNTER — APPOINTMENT (OUTPATIENT)
Dept: CT IMAGING | Facility: HOSPITAL | Age: 34
DRG: 023 | End: 2024-08-27
Payer: COMMERCIAL

## 2024-08-27 LAB
ANION GAP SERPL CALCULATED.3IONS-SCNC: 14 MMOL/L (ref 5–15)
APTT SCREEN TO CONFIRM RATIO: 1.09 RATIO (ref 0–1.34)
BUN BLDA-MCNC: 13 MG/DL (ref 8–26)
BUN SERPL-MCNC: 23 MG/DL (ref 6–20)
BUN/CREAT SERPL: 23.2 (ref 7–25)
CA-I BLDA-SCNC: 0.98 MMOL/L (ref 1.2–1.32)
CALCIUM SPEC-SCNC: 9.4 MG/DL (ref 8.6–10.5)
CHLORIDE BLDA-SCNC: 112 MMOL/L (ref 98–109)
CHLORIDE SERPL-SCNC: 105 MMOL/L (ref 98–107)
CO2 BLDA-SCNC: 19 MMOL/L (ref 24–29)
CO2 SERPL-SCNC: 20 MMOL/L (ref 22–29)
CONFIRM APTT/NORMAL: 40.8 SEC (ref 0–47.6)
CREAT BLDA-MCNC: 0.8 MG/DL (ref 0.6–1.3)
CREAT SERPL-MCNC: 0.99 MG/DL (ref 0.57–1)
EGFRCR SERPLBLD CKD-EPI 2021: 100.5 ML/MIN/1.73
EGFRCR SERPLBLD CKD-EPI 2021: 77.9 ML/MIN/1.73
FACT V ACT/NOR PPP: 85 % (ref 70–150)
GLUCOSE BLDC GLUCOMTR-MCNC: 110 MG/DL (ref 70–130)
GLUCOSE BLDC GLUCOMTR-MCNC: 122 MG/DL (ref 70–130)
GLUCOSE BLDC GLUCOMTR-MCNC: 124 MG/DL (ref 70–130)
GLUCOSE BLDC GLUCOMTR-MCNC: 157 MG/DL (ref 70–130)
GLUCOSE BLDC GLUCOMTR-MCNC: 201 MG/DL (ref 70–130)
GLUCOSE SERPL-MCNC: 144 MG/DL (ref 65–99)
HCT VFR BLDA CALC: 36 % (ref 38–51)
HGB BLDA-MCNC: 12.2 G/DL (ref 12–17)
INR PPP: 1.1 (ref 0.8–1.2)
LA 2 SCREEN W REFLEX-IMP: NORMAL
POTASSIUM BLDA-SCNC: 4.9 MMOL/L (ref 3.5–4.9)
POTASSIUM SERPL-SCNC: 4.5 MMOL/L (ref 3.5–5.2)
PROT C ACT/NOR PPP: 137 % (ref 73–180)
PROT S ACT/NOR PPP: 80 % (ref 63–140)
PROTHROMBIN TIME: 12.6 SECONDS (ref 12.8–15.2)
SCREEN APTT: 26.4 SEC (ref 0–43.5)
SCREEN DRVVT: 39.4 SEC (ref 0–47)
SODIUM BLD-SCNC: 145 MMOL/L (ref 138–146)
SODIUM SERPL-SCNC: 139 MMOL/L (ref 136–145)
THROMBIN TIME: 19 SEC (ref 0–23)

## 2024-08-27 PROCEDURE — 63710000001 INSULIN LISPRO (HUMAN) PER 5 UNITS: Performed by: INTERNAL MEDICINE

## 2024-08-27 PROCEDURE — 99233 SBSQ HOSP IP/OBS HIGH 50: CPT | Performed by: NURSE PRACTITIONER

## 2024-08-27 PROCEDURE — 99232 SBSQ HOSP IP/OBS MODERATE 35: CPT | Performed by: NURSE PRACTITIONER

## 2024-08-27 PROCEDURE — 82948 REAGENT STRIP/BLOOD GLUCOSE: CPT

## 2024-08-27 PROCEDURE — 25010000002 CEFTRIAXONE PER 250 MG: Performed by: INTERNAL MEDICINE

## 2024-08-27 PROCEDURE — 94799 UNLISTED PULMONARY SVC/PX: CPT

## 2024-08-27 PROCEDURE — 63710000001 INSULIN GLARGINE PER 5 UNITS: Performed by: INTERNAL MEDICINE

## 2024-08-27 PROCEDURE — 80048 BASIC METABOLIC PNL TOTAL CA: CPT | Performed by: INTERNAL MEDICINE

## 2024-08-27 PROCEDURE — 70450 CT HEAD/BRAIN W/O DYE: CPT

## 2024-08-27 PROCEDURE — 99232 SBSQ HOSP IP/OBS MODERATE 35: CPT | Performed by: INTERNAL MEDICINE

## 2024-08-27 RX ORDER — PANTOPRAZOLE SODIUM 40 MG/1
40 TABLET, DELAYED RELEASE ORAL
Status: DISCONTINUED | OUTPATIENT
Start: 2024-08-28 | End: 2024-08-30 | Stop reason: HOSPADM

## 2024-08-27 RX ORDER — ATORVASTATIN CALCIUM 40 MG/1
80 TABLET, FILM COATED ORAL NIGHTLY
Status: DISCONTINUED | OUTPATIENT
Start: 2024-08-27 | End: 2024-08-30 | Stop reason: HOSPADM

## 2024-08-27 RX ORDER — AMANTADINE HYDROCHLORIDE 100 MG/1
100 CAPSULE, GELATIN COATED ORAL EVERY 12 HOURS SCHEDULED
Status: DISCONTINUED | OUTPATIENT
Start: 2024-08-27 | End: 2024-08-30 | Stop reason: HOSPADM

## 2024-08-27 RX ADMIN — APIXABAN 5 MG: 5 TABLET, FILM COATED ORAL at 22:51

## 2024-08-27 RX ADMIN — ACETAMINOPHEN 650 MG: 325 TABLET ORAL at 00:49

## 2024-08-27 RX ADMIN — SODIUM CHLORIDE 2000 MG: 900 INJECTION INTRAVENOUS at 08:08

## 2024-08-27 RX ADMIN — INSULIN GLARGINE 10 UNITS: 100 INJECTION, SOLUTION SUBCUTANEOUS at 08:07

## 2024-08-27 RX ADMIN — ATORVASTATIN CALCIUM 80 MG: 40 TABLET, FILM COATED ORAL at 22:51

## 2024-08-27 RX ADMIN — EMPAGLIFLOZIN 10 MG: 10 TABLET, FILM COATED ORAL at 09:43

## 2024-08-27 RX ADMIN — PANTOPRAZOLE SODIUM 40 MG: 40 INJECTION, POWDER, FOR SOLUTION INTRAVENOUS at 08:08

## 2024-08-27 RX ADMIN — INSULIN LISPRO 2 UNITS: 100 INJECTION, SOLUTION INTRAVENOUS; SUBCUTANEOUS at 12:04

## 2024-08-27 RX ADMIN — METOPROLOL SUCCINATE 50 MG: 50 TABLET, EXTENDED RELEASE ORAL at 09:43

## 2024-08-27 RX ADMIN — SACUBITRIL AND VALSARTAN 1 TABLET: 49; 51 TABLET, FILM COATED ORAL at 22:51

## 2024-08-27 RX ADMIN — SACUBITRIL AND VALSARTAN 1 TABLET: 24; 26 TABLET, FILM COATED ORAL at 09:43

## 2024-08-27 RX ADMIN — SACUBITRIL AND VALSARTAN 1 TABLET: 24; 26 TABLET, FILM COATED ORAL at 12:04

## 2024-08-27 RX ADMIN — AMANTADINE HYDROCHLORIDE 100 MG: 100 CAPSULE ORAL at 22:51

## 2024-08-27 RX ADMIN — APIXABAN 5 MG: 5 TABLET, FILM COATED ORAL at 09:43

## 2024-08-27 RX ADMIN — INSULIN LISPRO 3 UNITS: 100 INJECTION, SOLUTION INTRAVENOUS; SUBCUTANEOUS at 08:06

## 2024-08-27 RX ADMIN — AMANTADINE HYDROCHLORIDE 100 MG: 100 CAPSULE ORAL at 13:59

## 2024-08-27 NOTE — PROGRESS NOTES
"Critical Care Note     LOS: 4 days   Patient Care Team:  Cuong Parks MD as PCP - General (Family Medicine)    Chief Complaint/Reason for visit:    Chief Complaint   Patient presents with    Stroke       Subjective     Interval History: No acute issues overnight. Sleepy this morning. Nursing staff states that  is requesting a medication for depression. She is not on any home meds for depression.     History taken from: patient    Review of Systems:    All systems were reviewed and negative except as noted in subjective.    Medical history, surgical history, social history, family history reviewed    Objective     Intake/Output:    Intake/Output Summary (Last 24 hours) at 8/27/2024 0755  Last data filed at 8/27/2024 0200  Gross per 24 hour   Intake 456 ml   Output 4275 ml   Net -3819 ml       Nutrition:  Diet: Cardiac, Diabetic; Healthy Heart (2-3 Na+); Consistent Carbohydrate; Texture: Soft to Chew (NDD 3); Soft to Chew: Chopped Meat; Fluid Consistency: Thin (IDDSI 0)    Infusions:  dexmedetomidine, 0.2-1.5 mcg/kg/hr  niCARdipine, 5-15 mg/hr, Last Rate: 5 mg/hr (08/25/24 0812)        Mechanical Ventilator Settings:            Resp Rate (Set): 20  Pressure Support (cm H2O): 8 cm H20  FiO2 (%): 40 %  PEEP/CPAP (cm H2O): 5 cm H20    Minute Ventilation (L/min) (Obs): 2.24 L/min  Resp Rate (Observed) Vent: 29  I:E Ratio (Set): 1:2.33  I:E Ratio (Obs): 14.29:1    PIP Observed (cm H2O): 15 cm H2O  Plateau Pressure (cm H2O): 25 cm H2O    Telemetry: NSR             Vital Signs  Blood pressure (!) 142/102, pulse 104, temperature 99.3 °F (37.4 °C), temperature source Axillary, resp. rate 20, height 160 cm (62.99\"), weight 117 kg (258 lb 6.1 oz), SpO2 93%.    Physical Exam:   General Appearance:  WD/WN/NAD   Head:  Atraumatic.    Lungs:   CTA. No wheezes, rhonchi or rales.     Heart:  RRR. S1, S2 normal. No r/m/g.    Abdomen:   S/NT/ND. + BS.    Rectal:   Deferred   Extremities: L extremities weaker than right.  " "  Pulses: DP 2+ bilaterally.    Skin: Warm, dry.    Neurologic: Sleepy. Arouses easily and follows commands.       Results Review:     I reviewed the patient's new clinical results.   Results from last 7 days   Lab Units 08/27/24  0358 08/26/24  0459 08/25/24  0542 08/24/24  0102 08/24/24  0102 08/23/24  2055   SODIUM mmol/L 139 138 144   < > 141  --    POTASSIUM mmol/L 4.5 4.3 4.3   < > 4.4  --    CHLORIDE mmol/L 105 105 108*   < > 105  --    CO2 mmol/L 20.0* 20.0* 22.0   < > 20.0*  --    BUN mg/dL 23* 17 17   < > 16  --    CREATININE mg/dL 0.99 0.92 1.15*   < > 1.39*  --    CALCIUM mg/dL 9.4 9.3 9.0   < > 9.1  --    BILIRUBIN mg/dL  --   --   --   --  0.6  --    ALK PHOS U/L  --   --   --   --  60  --    ALT (SGPT) U/L  --   --   --   --  28 15   AST (SGOT) U/L  --   --   --   --  53* 18   GLUCOSE mg/dL 144* 106* 143*   < > 206*  --     < > = values in this interval not displayed.     Results from last 7 days   Lab Units 08/26/24  0459 08/25/24  0542 08/24/24  0833   WBC 10*3/mm3 15.37* 20.36* 16.61*   HEMOGLOBIN g/dL 15.6 14.7 14.9   HEMATOCRIT % 47.0* 46.1 44.2   PLATELETS 10*3/mm3 346 374 340     Results from last 7 days   Lab Units 08/25/24  0538   PH, ARTERIAL pH units 7.450   PO2 ART mm Hg 62.9*   PCO2, ARTERIAL mm Hg 36.2   HCO3 ART mmol/L 25.1     No results found for: \"BLOODCX\"  No results found for: \"URINECX\"    I reviewed the patient's new imaging including images and reports.      All medications reviewed.   apixaban, 5 mg, Oral, Q12H  atorvastatin, 80 mg, Nasogastric, Nightly  cefTRIAXone, 2,000 mg, Intravenous, Q24H  empagliflozin, 10 mg, Oral, Daily  insulin glargine, 10 Units, Subcutaneous, Daily  insulin lispro, 2-7 Units, Subcutaneous, 4x Daily AC & at Bedtime  metoprolol succinate XL, 50 mg, Oral, Q24H  pantoprazole, 40 mg, Intravenous, QAM AC  sacubitril-valsartan, 1 tablet, Oral, Q12H          Assessment & Plan       R M1 stenosis    T2DM    HTN    Morbid obesity with BMI of 45.0-49.9, adult   "  SCOTT    Hypertensive urgency    R/O Seizure    Acute respiratory failure    Acute stroke due to ischemia    Acute systolic heart failure    Left ventricular thrombus    34-year-old woman with diabetes, hypertension, right renal atrophy who presented with somnolence, dysarthria, left hemiparesis.  Imaging revealed a right middle cerebral artery perfusion deficit and severe stenosis of the right M1 segment.  Blood pressure was elevated and treated with labetalol and hydralazine.  She was then given TNKase.  She required intubation in the emergency room.  She was extubated on August 25.  Echo revealed an EF of 36.7% with clot in the left ventricle apex.  She was placed on a heparin drip which was transitioned to Eliquis on 8/26/24. She is heterozygous for factor V Leiden. Her hemoglobin A1c is 6.7.  She passed her speech assessment and diet has been ordered.  Urine drug screen was positive for methamphetamine and marijuana.  In addition she does vape.    PLAN:    Continue Eliquis and high dose statin for CVA  Continue Jardiance for DM  Sliding scale insulin for DM  PT, OT, speech therapy  CPAP at night  Encourage vaping cessation  Continue Entresto and Toprolol for HFrEF and HTN per cardiology. Might benefit from using Coreg instead of Toprolol for better BP control but will defer to cards.   Continue hydralazine PRN for HTN.  Transfer to floor  Consider addition of SSRI at outpatient follow up with neurology given its impact on sodium, will defer for now.      VTE Prophylaxis: anticoagulated     Stress Ulcer Prophylaxis: Protonix    Quynh Jovel, MICHELLE  08/27/24  07:55 EDT      Time:  10 minutes  I personally provided care to this critically ill patient as documented above.  Critical care time does not include time spent on separately billed procedures.  None of my critical care time was concurrent with other critical care providers.

## 2024-08-27 NOTE — PROGRESS NOTES
[facility] Cardiology    Inpatient Progress Note      Chief Complaint/Reason for service:    HF         Subjective:       No acute events overnight.  Ambulated today.  Tired    Past medical, surgical, social and family history reviewed in the patient's electronic medical record.         Objective:      Infusions:  dexmedetomidine, 0.2-1.5 mcg/kg/hr  niCARdipine, 5-15 mg/hr, Last Rate: 5 mg/hr (08/25/24 0812)         Medications:    Current Facility-Administered Medications:     acetaminophen (TYLENOL) suppository 650 mg, 650 mg, Rectal, Q6H PRN, Russ Gorman PA-C, 650 mg at 08/25/24 0509    acetaminophen (TYLENOL) tablet 650 mg, 650 mg, Oral, Q6H PRN, Quynh Jovel APRN, 650 mg at 08/27/24 0049    apixaban (ELIQUIS) tablet 5 mg, 5 mg, Oral, Q12H, Katerin Barney MD, 5 mg at 08/27/24 0943    atorvastatin (LIPITOR) tablet 80 mg, 80 mg, Nasogastric, Nightly, Cinthia Banegas MD, 80 mg at 08/26/24 2025    cefTRIAXone (ROCEPHIN) 2,000 mg in sodium chloride 0.9 % 100 mL MBP, 2,000 mg, Intravenous, Q24H, Cinthia Banegas MD, Last Rate: 200 mL/hr at 08/27/24 0808, 2,000 mg at 08/27/24 0808    dexmedetomidine (PRECEDEX) 400 mcg in 100 mL NS infusion, 0.2-1.5 mcg/kg/hr, Intravenous, Titrated, Cinthia Banegas MD    dextrose (D50W) (25 g/50 mL) IV injection 25 g, 25 g, Intravenous, Q15 Min PRN, Blayne Haddad MD    dextrose (GLUTOSE) oral gel 15 g, 15 g, Oral, Q15 Min PRN, Blayne Haddad MD    empagliflozin (JARDIANCE) tablet 10 mg, 10 mg, Oral, Daily, Dion Hooker MD, 10 mg at 08/27/24 0943    glucagon (GLUCAGEN) injection 1 mg, 1 mg, Intramuscular, Q15 Min PRN, Blayne Haddad MD    hydrALAZINE (APRESOLINE) injection 10 mg, 10 mg, Intravenous, Q6H PRN, Quynh Jovel, APRN, 10 mg at 08/26/24 0218    insulin glargine (LANTUS, SEMGLEE) injection 10 Units, 10 Units, Subcutaneous, Daily, Blayne Haddad MD, 10 Units at 08/27/24 0807     Insulin Lispro (humaLOG) injection 2-7 Units, 2-7 Units, Subcutaneous, 4x Daily AC & at Bedtime, Blayne Haddad MD, 3 Units at 08/27/24 0806    metoprolol succinate XL (TOPROL-XL) 24 hr tablet 50 mg, 50 mg, Oral, Q24H, Dion Hooker MD, 50 mg at 08/27/24 0943    niCARdipine (CARDENE) 25mg in 250mL NS infusion, 5-15 mg/hr, Intravenous, Titrated, Stanton Alonso APRN, Last Rate: 50 mL/hr at 08/25/24 0812, 5 mg/hr at 08/25/24 0812    nitroglycerin (NITROSTAT) SL tablet 0.4 mg, 0.4 mg, Sublingual, Q5 Min PRN, Alma Bhagat, APRN    pantoprazole (PROTONIX) injection 40 mg, 40 mg, Intravenous, QAM AC, Cinthia Banegas MD, 40 mg at 08/27/24 0808    sacubitril-valsartan (ENTRESTO) 24-26 MG tablet 1 tablet, 1 tablet, Oral, Q12H, Dion Hooker MD, 1 tablet at 08/27/24 0943    Vital Sign Min/Max for last 24 hours  Temp  Min: 98 °F (36.7 °C)  Max: 99.3 °F (37.4 °C)   BP  Min: 120/91  Max: 146/115   Pulse  Min: 96  Max: 105   Resp  Min: 20  Max: 22   SpO2  Min: 93 %  Max: 99 %   No data recorded      Intake/Output Summary (Last 24 hours) at 8/27/2024 0952  Last data filed at 8/27/2024 0200  Gross per 24 hour   Intake 400 ml   Output 3775 ml   Net -3375 ml           CONSTITUTIONAL: No acute distress    Labs/studies:  Available lab and imaging results were reviewed by myself today    Results for orders placed during the hospital encounter of 08/23/24    Adult Transthoracic Echo Complete W/ Cont if Necessary Per Protocol (With Agitated Saline)    Interpretation Summary  Images from the original result were not included.      Left ventricular systolic function is moderately decreased. Calculated left ventricular EF = 36.7%  global hypokinesis.  LV cavity mildly dilated.  RV size and function normal    Left ventricular wall thickness is consistent with hypertrophy.    Left ventricular diastolic dysfunction is noted.    There is a thrombus present in the left ventricle in apex. 2.22 cm by 0.96 cm    The left atrial  cavity is dilated.    Estimated right ventricular systolic pressure from tricuspid regurgitation is normal (<35 mmHg).    Indeterminate bubble study      Tele: Sinus rhythm         Assessment/Plan:         R M1 stenosis    T2DM    HTN    Morbid obesity with BMI of 45.0-49.9, adult    SCOTT    Hypertensive urgency    R/O Seizure    Acute respiratory failure    Acute stroke due to ischemia    Acute systolic heart failure    Left ventricular thrombus       ASSESSMENT:  LV thrombus  Echocardiogram shows decreased LVEF 36%, LV thrombus, indeterminate bubble study.   Acute HFrEF, severe exacerbation  New diagnosis.  Unclear etiology  Possible contributing causes include recent pneumonia (returned from cruise from [place], given antibiotics), positive meth and marijuana and UDS  LVEF 36% with global hypokinesis on echo.   CVA secondary to right MCA occlusion   Status post thrombolytic therapy, TNK  Status post urgent mechanical thrombectomy, right MCA  Hypertension   Type 2 diabetes mellitus   ANA  Acute respiratory failure  Suspected sleep apnea   Substance abuse   UDS positive for THC, methamphetamines (possibly laced marijuana?, no known history of meth use per patient's )    PLAN:  Apixaban for LV thrombus  Deferring Lasix today  Continue Toprol, Jardiance   Increasing Entresto  Will consider adding spironolactone, but K+ may be too high with increased Entresto  Daily BMP while admitted; monitoring Cr and K+ with new meds  Continue statin     Awaiting transfer to telemetry  Sleep med referral/sleep study/outpatient SCOTT management  Future ischemic evaluation (cardiac CTA) and possible cardiac MRI      Dion Hooker MD, MSc, FACC, Psychiatric  Interventional Cardiology  [facility]

## 2024-08-27 NOTE — CONSULTS
Attempted to see patient, but being attended by other staff at time of encounter. Will follow and see as able.

## 2024-08-27 NOTE — PROGRESS NOTES
Stroke Progress Note       Chief Complaint: Left-sided weakness    Subjective    Subjective     Subjective:    Sitting up in the chair.  Continues to be drowsy.  Wakes up briefly with continuous stimulation and follows commands.  Per the patient's , she [place] yesterday.  Exam is stable from previous.    Review of Systems   Constitutional: No fatigue  Eyes: Negative for redness.   Respiratory: Negative for cough.    Musculoskeletal: Negative for joint swelling.   Neurological: Negative for tremors.     Objective      Temp:  [98 °F (36.7 °C)-99.3 °F (37.4 °C)] 99.3 °F (37.4 °C)  Heart Rate:  [] 104  Resp:  [20-22] 20  BP: (120-146)/() 142/102        Objective    Physical Exam  Constitutional:       Appearance: She is obese. She is ill-appearing.      Comments: Drowsy, awakens with multiple prompts   HENT:      Head: Normocephalic and atraumatic.   Eyes:      Comments: Does not blink to visual threat in the left peripheral visual fields.  Difficult to fully assess secondary to drowsiness.  Pupils are equal and reactive   Cardiovascular:      Rate and Rhythm: Normal rate and regular rhythm.   Pulmonary:      Effort: Pulmonary effort is normal. No respiratory distress.   Musculoskeletal:         General: Normal range of motion.      Cervical back: Normal range of motion and neck supple.   Skin:     General: Skin is warm and dry.      Capillary Refill: Capillary refill takes less than 2 seconds.   Neurological:      Cranial Nerves: Cranial nerve deficit present.      Sensory: Sensory deficit present.      Motor: Weakness present.      Comments: Oriented to self, knows she is in the hospital.  Knows the year.  LFD, left sensory deficit, dysarthria, left peripheral visual deficit            Results Review:    I reviewed the patient's new clinical results.    WBC   Date Value Ref Range Status   08/26/2024 15.37 (H) 3.40 - 10.80 10*3/mm3 Final     RBC   Date Value Ref Range Status   08/26/2024  4.65 3.77 - 5.28 10*6/mm3 Final     Hemoglobin   Date Value Ref Range Status   08/26/2024 15.6 12.0 - 15.9 g/dL Final     Hematocrit   Date Value Ref Range Status   08/26/2024 47.0 (H) 34.0 - 46.6 % Final     MCV   Date Value Ref Range Status   08/26/2024 101.1 (H) 79.0 - 97.0 fL Final     MCH   Date Value Ref Range Status   08/26/2024 33.5 (H) 26.6 - 33.0 pg Final     MCHC   Date Value Ref Range Status   08/26/2024 33.2 31.5 - 35.7 g/dL Final     RDW   Date Value Ref Range Status   08/26/2024 13.5 12.3 - 15.4 % Final     RDW-SD   Date Value Ref Range Status   08/26/2024 50.1 37.0 - 54.0 fl Final     MPV   Date Value Ref Range Status   08/26/2024 8.2 6.0 - 12.0 fL Final     Platelets   Date Value Ref Range Status   08/26/2024 346 140 - 450 10*3/mm3 Final     Neutrophil %   Date Value Ref Range Status   08/26/2024 70.3 42.7 - 76.0 % Final     Lymphocyte %   Date Value Ref Range Status   08/26/2024 21.5 19.6 - 45.3 % Final     Monocyte %   Date Value Ref Range Status   08/26/2024 6.9 5.0 - 12.0 % Final     Eosinophil %   Date Value Ref Range Status   08/26/2024 0.3 0.3 - 6.2 % Final     Basophil %   Date Value Ref Range Status   08/26/2024 0.5 0.0 - 1.5 % Final     Immature Grans %   Date Value Ref Range Status   08/26/2024 0.5 0.0 - 0.5 % Final     Neutrophils, Absolute   Date Value Ref Range Status   08/26/2024 10.82 (H) 1.70 - 7.00 10*3/mm3 Final     Lymphocytes, Absolute   Date Value Ref Range Status   08/26/2024 3.30 (H) 0.70 - 3.10 10*3/mm3 Final     Monocytes, Absolute   Date Value Ref Range Status   08/26/2024 1.06 (H) 0.10 - 0.90 10*3/mm3 Final     Eosinophils, Absolute   Date Value Ref Range Status   08/26/2024 0.05 0.00 - 0.40 10*3/mm3 Final     Basophils, Absolute   Date Value Ref Range Status   08/26/2024 0.07 0.00 - 0.20 10*3/mm3 Final     Immature Grans, Absolute   Date Value Ref Range Status   08/26/2024 0.07 (H) 0.00 - 0.05 10*3/mm3 Final     nRBC   Date Value Ref Range Status   08/26/2024 0.0 0.0 -  0.2 /100 WBC Final       Lab Results   Component Value Date    GLUCOSE 144 (H) 08/27/2024    BUN 23 (H) 08/27/2024    CREATININE 0.99 08/27/2024     08/27/2024    K 4.5 08/27/2024     08/27/2024    CALCIUM 9.4 08/27/2024    PROTEINTOT 6.4 08/24/2024    ALBUMIN 3.7 08/24/2024    ALT 28 08/24/2024    AST 53 (H) 08/24/2024    ALKPHOS 60 08/24/2024    BILITOT 0.6 08/24/2024    GLOB 2.7 08/24/2024    AGRATIO 1.4 08/24/2024    BCR 23.2 08/27/2024    ANIONGAP 14.0 08/27/2024    EGFR 77.9 08/27/2024     Lipid Panel          8/24/2024    01:02   Lipid Panel   Total Cholesterol 177    Triglycerides 175    HDL Cholesterol 31    VLDL Cholesterol 31    LDL Cholesterol  115    LDL/HDL Ratio 3.58           Lab 08/24/24  0102   HEMOGLOBIN A1C 6.70*      XR Chest 1 View    Result Date: 8/26/2024  Impression: 1.Interval removal of the endotracheal tube. 2.Evidence for increasing atelectasis versus infiltrates within the mid to lower lungs bilaterally. Electronically Signed: Edouard Krishna MD  8/26/2024 7:21 AM EDT  Workstation ID: [id number]   Results for orders placed during the hospital encounter of 08/23/24    Adult Transthoracic Echo Complete W/ Cont if Necessary Per Protocol (With Agitated Saline)    Interpretation Summary  Images from the original result were not included.      Left ventricular systolic function is moderately decreased. Calculated left ventricular EF = 36.7%  global hypokinesis.  LV cavity mildly dilated.  RV size and function normal    Left ventricular wall thickness is consistent with hypertrophy.    Left ventricular diastolic dysfunction is noted.    There is a thrombus present in the left ventricle in apex. 2.22 cm by 0.96 cm    The left atrial cavity is dilated.    Estimated right ventricular systolic pressure from tricuspid regurgitation is normal (<35 mmHg).    Indeterminate bubble study    -MRI brain from 8-24-24 with an AIS in the R putamen and R head of caudate.   -LDL from 8-24-24 was  115  -A1c from 8-24-24 was 6.7  -Echo on 8-24-24 with concern for IV thrombus  -Repeated CTH on 8-24-24 with and evolving R BG AIS, no hemorrhage noted    Assessment/Plan   34-year-old white female, with multiple vascular risk factors who presented to [facility] ED via EMS on 8/23/2024 with R MCA syndrome.  Initial NIH 11.  CT head was negative for hemorrhage.  /106.  She was deemed a candidate for TNK which was given after BP was lowered to 167/105.  CTP with a large area of hypoperfusion in the R MCA territory.  CTA H/N with R M1 occlusion.  She was intubated in the ED secondary due to inability to protect her airway.  She was taken to the Cath Lab for mechanical thrombectomy and admitted to the ICU postprocedure.    Antiplatelet PTA: None  Anticoagulant PTA: None        # R MCA stroke, R M1 occlusion s/p TNK and MT, TICI 3   # Cardiac intraventricular thrombus (cardiology following)  -Suspected cardioembolic in etiology in the setting of left ventricular thrombus  -Risk factors: Hypertension, hypertension lipidemia, obesity, diabetes, marijuana use, on progesterone(Mirena IUD)  -MRI with an AIS in the R putamen and R head of caudate.   -Continue Eliquis 5 mg twice daily.  Heparin drip discontinued yesterday.  -TTE with an LV thrombus, left atrial cavity dilation, EF 36.7 with global hypokinesis.  Bubble study was indeterminate.  -CTh 8/24-with evolving infarcts and no evidence of overt hemorrhage  -Hypercoag panel, factor V Leiden heterozygous on 8/24.  Additional labs pending.  -Continue to target sodium level 145-150.  Sodium 139 this a.m.  -HTN; SBP less than 140.  (Okay 130-150).  Management per primary team  -T2DM with hyperglycemia; A1c 6.7.  Glucose elevated on admission.  Diabetes education as appropriate.  Management per primary team.  -PT/OT/SLP    #Drowsiness  - Start amantadine 100 mg twice daily  - Repeat CT head today    #Marijuana use  #Substance abuse  - UDS positive for methamphetamines, THC.   Patient's  reports that he is unsure where she came in contact with methamphetamines.  He reports that he knows that she smokes THC but was unaware of methamphetamine use.  Was concerned that marijuana was possibly laced with meth.  - Cessation counseling when appropriate     #Nicotine product use via vaping  -Daily vaping  -Cessation counseling when appropriate     #Morbid obesity  -BMI 49.44  -Complicates all aspects of care  -Counseling on healthy diet exercise and weight loss when appropriate.     #Medication noncompliance  -Discussed with patient and family importance of compliance with medication, verbalized understanding.    Plan discussed with Dr. Godoman, the patient, her , and nursing staff.  Stroke neurology will continue to follow.  Please call with any questions or concerns.    MICHELLE Box, [facility]-BC

## 2024-08-28 LAB
ANA SER QL: NEGATIVE
ANION GAP SERPL CALCULATED.3IONS-SCNC: 15 MMOL/L (ref 5–15)
B2 GLYCOPROT1 IGA SER-ACNC: <9 GPI IGA UNITS (ref 0–25)
B2 GLYCOPROT1 IGG SER-ACNC: <9 GPI IGG UNITS (ref 0–20)
B2 GLYCOPROT1 IGM SER-ACNC: <9 GPI IGM UNITS (ref 0–32)
BUN SERPL-MCNC: 23 MG/DL (ref 6–20)
BUN/CREAT SERPL: 22.3 (ref 7–25)
CALCIUM SPEC-SCNC: 10 MG/DL (ref 8.6–10.5)
CARDIOLIPIN IGG SER IA-ACNC: <9 GPL U/ML (ref 0–14)
CARDIOLIPIN IGM SER IA-ACNC: 10 MPL U/ML (ref 0–12)
CHLORIDE SERPL-SCNC: 105 MMOL/L (ref 98–107)
CO2 SERPL-SCNC: 20 MMOL/L (ref 22–29)
CREAT SERPL-MCNC: 1.03 MG/DL (ref 0.57–1)
EGFRCR SERPLBLD CKD-EPI 2021: 74.2 ML/MIN/1.73
F5 GENE MUT ANL BLD/T: ABNORMAL
FACTOR II, DNA ANALYSIS: NORMAL
GLUCOSE BLDC GLUCOMTR-MCNC: 137 MG/DL (ref 70–130)
GLUCOSE BLDC GLUCOMTR-MCNC: 139 MG/DL (ref 70–130)
GLUCOSE BLDC GLUCOMTR-MCNC: 161 MG/DL (ref 70–130)
GLUCOSE BLDC GLUCOMTR-MCNC: 164 MG/DL (ref 70–130)
GLUCOSE SERPL-MCNC: 168 MG/DL (ref 65–99)
POTASSIUM SERPL-SCNC: 4.7 MMOL/L (ref 3.5–5.2)
PROT C AG ACT/NOR PPP IA: 98 % (ref 60–150)
SODIUM SERPL-SCNC: 140 MMOL/L (ref 136–145)

## 2024-08-28 PROCEDURE — 63710000001 INSULIN GLARGINE PER 5 UNITS: Performed by: INTERNAL MEDICINE

## 2024-08-28 PROCEDURE — 97110 THERAPEUTIC EXERCISES: CPT

## 2024-08-28 PROCEDURE — 99232 SBSQ HOSP IP/OBS MODERATE 35: CPT | Performed by: HOSPITALIST

## 2024-08-28 PROCEDURE — 80048 BASIC METABOLIC PNL TOTAL CA: CPT | Performed by: INTERNAL MEDICINE

## 2024-08-28 PROCEDURE — 99232 SBSQ HOSP IP/OBS MODERATE 35: CPT

## 2024-08-28 PROCEDURE — 99232 SBSQ HOSP IP/OBS MODERATE 35: CPT | Performed by: INTERNAL MEDICINE

## 2024-08-28 PROCEDURE — 63710000001 INSULIN LISPRO (HUMAN) PER 5 UNITS: Performed by: INTERNAL MEDICINE

## 2024-08-28 PROCEDURE — 92507 TX SP LANG VOICE COMM INDIV: CPT

## 2024-08-28 PROCEDURE — 97116 GAIT TRAINING THERAPY: CPT

## 2024-08-28 PROCEDURE — 92526 ORAL FUNCTION THERAPY: CPT

## 2024-08-28 PROCEDURE — 25010000002 CEFTRIAXONE PER 250 MG: Performed by: INTERNAL MEDICINE

## 2024-08-28 PROCEDURE — 82948 REAGENT STRIP/BLOOD GLUCOSE: CPT

## 2024-08-28 RX ORDER — NEBIVOLOL 5 MG/1
10 TABLET ORAL
Status: DISCONTINUED | OUTPATIENT
Start: 2024-08-28 | End: 2024-08-30 | Stop reason: HOSPADM

## 2024-08-28 RX ADMIN — APIXABAN 5 MG: 5 TABLET, FILM COATED ORAL at 09:43

## 2024-08-28 RX ADMIN — EMPAGLIFLOZIN 10 MG: 10 TABLET, FILM COATED ORAL at 09:44

## 2024-08-28 RX ADMIN — INSULIN GLARGINE 10 UNITS: 100 INJECTION, SOLUTION SUBCUTANEOUS at 09:44

## 2024-08-28 RX ADMIN — SODIUM CHLORIDE 2000 MG: 900 INJECTION INTRAVENOUS at 09:44

## 2024-08-28 RX ADMIN — AMANTADINE HYDROCHLORIDE 100 MG: 100 CAPSULE ORAL at 20:34

## 2024-08-28 RX ADMIN — APIXABAN 5 MG: 5 TABLET, FILM COATED ORAL at 20:35

## 2024-08-28 RX ADMIN — SACUBITRIL AND VALSARTAN 1 TABLET: 49; 51 TABLET, FILM COATED ORAL at 09:44

## 2024-08-28 RX ADMIN — ATORVASTATIN CALCIUM 80 MG: 40 TABLET, FILM COATED ORAL at 20:34

## 2024-08-28 RX ADMIN — ACETAMINOPHEN 650 MG: 325 TABLET ORAL at 01:36

## 2024-08-28 RX ADMIN — NEBIVOLOL 10 MG: 5 TABLET ORAL at 09:43

## 2024-08-28 RX ADMIN — INSULIN LISPRO 2 UNITS: 100 INJECTION, SOLUTION INTRAVENOUS; SUBCUTANEOUS at 22:23

## 2024-08-28 RX ADMIN — SACUBITRIL AND VALSARTAN 1 TABLET: 49; 51 TABLET, FILM COATED ORAL at 20:35

## 2024-08-28 RX ADMIN — AMANTADINE HYDROCHLORIDE 100 MG: 100 CAPSULE ORAL at 09:43

## 2024-08-28 RX ADMIN — PANTOPRAZOLE SODIUM 40 MG: 40 TABLET, DELAYED RELEASE ORAL at 09:43

## 2024-08-28 NOTE — THERAPY TREATMENT NOTE
Patient Name: Keya Hamilton  : 1990    MRN: [id number]                              Today's Date: 2024       Admit Date: 2024    Visit Dx:     ICD-10-CM ICD-9-CM   1. Acute stroke due to ischemia  I63.9 434.91   2. Acute left-sided weakness  R53.1 728.87   3. Dysarthria  R47.1 784.51   4. Somnolence  R40.0 780.09   5. Compromised respiratory status  R06.89 786.09   6. Oral phase dysphagia  R13.11 787.21   7. Cognitive communication deficit  R41.841 799.52     Patient Active Problem List   Diagnosis    R M1 stenosis    T2DM    HTN    Morbid obesity with BMI of 45.0-49.9, adult    SCOTT    Hypertensive urgency    R/O Seizure    Acute respiratory failure    Acute stroke due to ischemia    Acute systolic heart failure    Left ventricular thrombus     Past Medical History:   Diagnosis Date    Diabetes mellitus     Hypertension     Sleep apnea      Past Surgical History:   Procedure Laterality Date    INTERVENTIONAL RADIOLOGY PROCEDURE N/A 2024    Procedure: IR mechanical thrombectomy;  Surgeon: Lemuel Medina MD;  Location: [facility] INVASIVE LOCATION;  Service: Interventional Radiology;  Laterality: N/A;      General Information       Row Name 24 1605          Physical Therapy Time and Intention    Document Type therapy note (daily note)  -CD     Mode of Treatment physical therapy  -CD       Row Name 24 1605          General Information    Patient Profile Reviewed yes  -CD     Existing Precautions/Restrictions fall  L SIDED WEAKNESS, INATTENTION,  -CD     Barriers to Rehab medically complex;visual deficit  -CD       Row Name 24 1605          Cognition    Orientation Status (Cognition) oriented x 4  PT LETHARGIC AND SPEAKING WITH EYES CLOSED BUT WILL OPEN ON COMMAND.  -CD       Row Name 24 1605          Safety Issues, Functional Mobility    Safety Issues Affecting Function (Mobility) awareness of need for assistance;safety precaution awareness;safety precautions  follow-through/compliance;sequencing abilities  -CD     Impairments Affecting Function (Mobility) balance;coordination;endurance/activity tolerance;motor control;motor planning;visual/perceptual;sensation/sensory awareness;postural/trunk control  -CD               User Key  (r) = Recorded By, (t) = Taken By, (c) = Cosigned By      Initials Name Provider Type    CD Tara Duff, PT Physical Therapist                   Mobility       Row Name 08/28/24 1610          Bed Mobility    Supine-Sit Williams (Bed Mobility) contact guard;verbal cues  -CD     Assistive Device (Bed Mobility) bed rails;draw sheet;head of bed elevated  -CD     Comment, (Bed Mobility) CUES TO LOG ROLL TO THE LEFT AND REACH FOR BED RAIL TO ASSIST.  -CD       Row Name 08/28/24 1610          Transfers    Comment, (Transfers) CUES FOR HAND PLACEMENT. STS FROM EOB AND RECLINER.  -CD       Row Name 08/28/24 1610          Bed-Chair Transfer    Bed-Chair Williams (Transfers) minimum assist (75% patient effort);2 person assist  -CD     Assistive Device (Bed-Chair Transfers) --  B UE SUPPORT.  -CD       Row Name 08/28/24 1610          Sit-Stand Transfer    Sit-Stand Williams (Transfers) minimum assist (75% patient effort);2 person assist  -CD     Assistive Device (Sit-Stand Transfers) --  B UE SUPPORT.  -CD       Row Name 08/28/24 1610          Gait/Stairs (Locomotion)    Williams Level (Gait) minimum assist (75% patient effort);2 person assist  -CD     Assistive Device (Gait) --  B UE SUPPORT.  -CD     Distance in Feet (Gait) 54  54+54  -CD     Deviations/Abnormal Patterns (Gait) gait speed decreased;weight shifting decreased;stride length decreased  -CD     Comment, (Gait/Stairs) PT CUED TO ATTEND TO L DURING GAIT. FOLLOWED CLOSELY WITH RECLINER. GAIT DISTANCE LIMITED BY FATIGUE.  -CD               User Key  (r) = Recorded By, (t) = Taken By, (c) = Cosigned By      Initials Name Provider Type    CD Tara Duff PT Physical Therapist                    Obj/Interventions       Row Name 08/28/24 1614          Motor Skills    Motor Skills coordination  -CD     Coordination gross motor deficit;left;lower extremity  WITH THER EX AND GAIT.  -CD     Therapeutic Exercise --  COMPLETED SEATED B LE RECIPROCAL THER EX: LAQ, HIP FLEX, AP'S X 10 REPS EACH. L LE WITH DECREASED COORDINATION - PT CUED TO ATTEND TO L SIDE WITH IMPROVEMENT NOTED.  -CD       Row Name 08/28/24 1614          Balance    Balance Assessment sitting static balance;sitting dynamic balance;sit to stand dynamic balance;standing static balance;standing dynamic balance  -CD     Static Sitting Balance contact guard  -CD     Dynamic Sitting Balance contact guard  PT HAS DIFFICULTY RECIPROCAL SCOOTING ON L DUE TO WEAKNESS.  -CD     Position, Sitting Balance unsupported;sitting edge of bed  -CD     Sit to Stand Dynamic Balance minimal assist;2-person assist  -CD     Static Standing Balance minimal assist;2-person assist  -CD     Dynamic Standing Balance minimal assist;2-person assist  -CD     Position/Device Used, Standing Balance supported  BUE SUPPORT.  -CD     Balance Interventions sitting;standing;sit to stand;supported;static;dynamic  -CD     Comment, Balance WORKED ON STANDING LATERAL WT SHIFTING WITH B UE SUPPORT. NO OVERT LOB WITH GAIT IN SELLERS BUT RELIES ON B UE SUPPORT AND NEEDS CUES TO ATTEND TO THE L.  -CD               User Key  (r) = Recorded By, (t) = Taken By, (c) = Cosigned By      Initials Name Provider Type    CD Tara Duff, PT Physical Therapist                   Goals/Plan    No documentation.                  Clinical Impression       Row Name 08/28/24 1618          Pain    Pretreatment Pain Rating 0/10 - no pain  -CD     Posttreatment Pain Rating 0/10 - no pain  -CD       Row Name 08/28/24 1618          Plan of Care Review    Plan of Care Reviewed With patient  -CD     Outcome Evaluation PT LETHARGIC BUT ABLE TO FOLLOW COMMANDS FOR MOBILITY/THER EX. PT NEEDS CUES FOR  SAFETY AND SEQUENCING MOBILITY AND TO ATTEND TO THE L SIDE. REQUIRES MIN ASSIST OF 2 FOR TRANFERS AND GAIT VIA B UE SUPPORT. CONTINUES WITH L SIDED WEAKNESS AND IMPAIRED COORDINATION. RECOMMEND IRF AT D/C FOR BEST OUTCOME.  -CD       Row Name 08/28/24 1618          Therapy Assessment/Plan (PT)    Patient/Family Therapy Goals Statement (PT) TO GO TO REHAB.  -CD     Rehab Potential (PT) good, to achieve stated therapy goals  -CD     Criteria for Skilled Interventions Met (PT) yes;skilled treatment is necessary  -CD     Therapy Frequency (PT) daily  -CD       Row Name 08/28/24 1618          Vital Signs    Pre Systolic BP Rehab 144  -CD     Pre Treatment Diastolic   -CD     Intra Systolic BP Rehab 139  -CD     Intra Treatment Diastolic   -CD     Post Systolic BP Rehab 133  -CD     Post Treatment Diastolic BP 95  -CD     Posttreatment Heart Rate (beats/min) 90  -CD     Pre SpO2 (%) 97  -CD     O2 Delivery Pre Treatment nasal cannula  -CD     Intra SpO2 (%) 97  -CD     O2 Delivery Intra Treatment room air  -CD     Post SpO2 (%) 98  -CD     O2 Delivery Post Treatment nasal cannula  -CD     Pre Patient Position Supine  -CD     Intra Patient Position Standing  -CD     Post Patient Position Sitting  -CD       Row Name 08/28/24 1618          Positioning and Restraints    Pre-Treatment Position in bed  -CD     Post Treatment Position chair  -CD     In Chair reclined;call light within reach;encouraged to call for assist;exit alarm on;with family/caregiver;RUE elevated;LUE elevated;notified nsg;legs elevated;heels elevated  -CD               User Key  (r) = Recorded By, (t) = Taken By, (c) = Cosigned By      Initials Name Provider Type    CD Tara Duff, PT Physical Therapist                   Outcome Measures       Row Name 08/28/24 1622          How much help from another person do you currently need...    Turning from your back to your side while in flat bed without using bedrails? 3  -CD     Moving from lying  on back to sitting on the side of a flat bed without bedrails? 3  -CD     Moving to and from a bed to a chair (including a wheelchair)? 3  -CD     Standing up from a chair using your arms (e.g., wheelchair, bedside chair)? 3  -CD     Climbing 3-5 steps with a railing? 2  -CD     To walk in hospital room? 3  -CD     AM-PAC 6 Clicks Score (PT) 17  -CD     Highest Level of Mobility Goal 5 --> Static standing  -CD       Row Name 08/28/24 1622          Modified [place] Scale    Modified [place] Scale 4 - Moderately severe disability.  Unable to walk without assistance, and unable to attend to own bodily needs without assistance.  -CD       Row Name 08/28/24 1622          Functional Assessment    Outcome Measure Options AM-PAC 6 Clicks Basic Mobility (PT)  -CD               User Key  (r) = Recorded By, (t) = Taken By, (c) = Cosigned By      Initials Name Provider Type    CD Tara Duff PT Physical Therapist                                 Physical Therapy Education       Title: PT OT SLP Therapies (In Progress)       Topic: Physical Therapy (Done)       Point: Mobility training (Done)       Learning Progress Summary             Patient Acceptance, E, VU by CD at 8/28/2024 1623    Comment: SEE FLOWSHEET    Acceptance, E,D, VU,NR by LS at 8/25/2024 1004   Family Acceptance, E,D, VU,NR by LS at 8/25/2024 1004                         Point: Home exercise program (Done)       Learning Progress Summary             Patient Acceptance, E, VU by CD at 8/28/2024 1623    Comment: SEE FLOWSHEET    Acceptance, E,D, VU,NR by LS at 8/25/2024 1004   Family Acceptance, E,D, VU,NR by LS at 8/25/2024 1004                         Point: Body mechanics (Done)       Learning Progress Summary             Patient Acceptance, E, VU by CD at 8/28/2024 1623    Comment: SEE FLOWSHEET    Acceptance, E,D, VU,NR by LS at 8/25/2024 1004   Family Acceptance, E,D, VU,NR by LS at 8/25/2024 1004                         Point: Precautions (Done)        Learning Progress Summary             Patient Acceptance, E, VU by CD at 8/28/2024 1623    Comment: SEE FLOWSHEET    Acceptance, E,D, VU,NR by LS at 8/25/2024 1004   Family Acceptance, E,D, VU,NR by LS at 8/25/2024 1004                                         User Key       Initials Effective Dates Name Provider Type Discipline    CD 02/03/23 -  Tara Duff, PT Physical Therapist PT    LS 02/03/23 -  Latanya Shelley PT Physical Therapist PT                  PT Recommendation and Plan     Plan of Care Reviewed With: patient  Outcome Evaluation: PT LETHARGIC BUT ABLE TO FOLLOW COMMANDS FOR MOBILITY/THER EX. PT NEEDS CUES FOR SAFETY AND SEQUENCING MOBILITY AND TO ATTEND TO THE L SIDE. REQUIRES MIN ASSIST OF 2 FOR TRANFERS AND GAIT VIA B UE SUPPORT. CONTINUES WITH L SIDED WEAKNESS AND IMPAIRED COORDINATION. RECOMMEND IRF AT D/C FOR BEST OUTCOME.     Time Calculation:         PT Charges       Row Name 08/28/24 1623             Time Calculation    Start Time 1527  -CD      PT Received On 08/28/24  -CD         Time Calculation- PT    Total Timed Code Minutes- PT 36 minute(s)  -CD         Timed Charges    93493 - PT Therapeutic Exercise Minutes 15  -CD      93104 - Gait Training Minutes  18  -CD         Total Minutes    Timed Charges Total Minutes 33  -CD       Total Minutes 33  -CD                User Key  (r) = Recorded By, (t) = Taken By, (c) = Cosigned By      Initials Name Provider Type    CD Tara Duff, PT Physical Therapist                  Therapy Charges for Today       Code Description Service Date Service Provider Modifiers Qty    21046659134 HC PT THER PROC EA 15 MIN 8/28/2024 Tara Duff, PT GP 1    47341860792 HC GAIT TRAINING EA 15 MIN 8/28/2024 Tara Duff, PT GP 1    87878398687 HC PT THER SUPP EA 15 MIN 8/28/2024 Tara Duff, PT GP 2            PT G-Codes  Outcome Measure Options: AM-PAC 6 Clicks Basic Mobility (PT)  AM-PAC 6 Clicks Score (PT): 17  AM-PAC 6 Clicks Score (OT): 13  Modified  [place] Scale: 4 - Moderately severe disability.  Unable to walk without assistance, and unable to attend to own bodily needs without assistance.       Tara Duff, PT  8/28/2024

## 2024-08-28 NOTE — CASE MANAGEMENT/SOCIAL WORK
Continued Stay Note  [facility]     Patient Name: Keya Hamilton  MRN: [id number]  Today's Date: 8/28/2024    Admit Date: 8/23/2024    Plan: [facility]   Discharge Plan       Row Name 08/28/24 1315       Plan    Plan [facility]    Plan Comments I updated patient's spouse about [facility], insurance is being precerted.   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    Final Discharge Disposition Code 62 - inpatient rehab facility                   Discharge Codes    No documentation.                 Expected Discharge Date and Time       Expected Discharge Date Expected Discharge Time    Aug 29, 2024               Tianna Brady RN

## 2024-08-28 NOTE — PLAN OF CARE
Goal Outcome Evaluation:  Plan of Care Reviewed With: patient           Outcome Evaluation: PT LETHARGIC BUT ABLE TO FOLLOW COMMANDS FOR MOBILITY/THER EX. PT NEEDS CUES FOR SAFETY AND SEQUENCING MOBILITY AND TO ATTEND TO THE L SIDE. REQUIRES MIN ASSIST OF 2 FOR TRANFERS AND GAIT VIA B UE SUPPORT. CONTINUES WITH L SIDED WEAKNESS AND IMPAIRED COORDINATION. RECOMMEND IRF AT D/C FOR BEST OUTCOME.

## 2024-08-28 NOTE — THERAPY TREATMENT NOTE
Acute Care - Speech Language Pathology   Swallow Treatment Note [facility]     Patient Name: Keya Hamilton  : 1990  MRN: [id number]  Today's Date: 2024               Admit Date: 2024    Visit Dx:     ICD-10-CM ICD-9-CM   1. Acute stroke due to ischemia  I63.9 434.91   2. Acute left-sided weakness  R53.1 728.87   3. Dysarthria  R47.1 784.51   4. Somnolence  R40.0 780.09   5. Compromised respiratory status  R06.89 786.09   6. Oral phase dysphagia  R13.11 787.21   7. Cognitive communication deficit  R41.841 799.52     Patient Active Problem List   Diagnosis    R M1 stenosis    T2DM    HTN    Morbid obesity with BMI of 45.0-49.9, adult    SCOTT    Hypertensive urgency    R/O Seizure    Acute respiratory failure    Acute stroke due to ischemia    Acute systolic heart failure    Left ventricular thrombus     Past Medical History:   Diagnosis Date    Diabetes mellitus     Hypertension     Sleep apnea      Past Surgical History:   Procedure Laterality Date    INTERVENTIONAL RADIOLOGY PROCEDURE N/A 2024    Procedure: IR mechanical thrombectomy;  Surgeon: Lemuel Medina MD;  Location: [facility] INVASIVE LOCATION;  Service: Interventional Radiology;  Laterality: N/A;       SLP Recommendation and Plan     SLP Diet Recommendation: soft to chew textures, chopped, thin liquids, other (see comments) (24 143)  Recommended Precautions and Strategies: upright posture during/after eating, general aspiration precautions, assist with feeding (24 143)  SLP Rec. for Method of Medication Administration: meds whole, meds crushed, as tolerated (24 143)     Monitor for Signs of Aspiration: notify SLP if any concerns (24 143)  Recommended Diagnostics: reassess via clinical swallow evaluation (24 143)     Anticipated Discharge Disposition (SLP): inpatient rehabilitation facility, anticipate therapy at next level of care (24)     Therapy Frequency (Swallow): 3 days per  week (08/28/24 1430)  Predicted Duration Therapy Intervention (Days): 1 week (08/28/24 1430)  Oral Care Recommendations: Oral Care BID/PRN, Toothbrush (08/28/24 1430)        Daily Summary of Progress (SLP): progress toward functional goals is good (08/28/24 1430)               Treatment Assessment (SLP): improved, dysarthria, cognitive-linguistic disorder (08/28/24 1430)  Treatment Assessment Comments (SLP): Pt slow to wake. Initially required repeat cues to maintain alertness, however as the session continued, pt was able to keep eyes open. Poor eye contact with speaker.  Beginning to look to the left- past midline. Able to implement compensatory strategies for improved speech production/clarity but cues required to maintain. Impaired cognitive linguistic function- attention, pragmatics, mental flexibility, and divergent thinking.  Lethargy persists and is impacting diet advancement. Pt endorses eating is tiring and she is in agreement that she is not ready for diet increase. (08/28/24 1430)  Plan for Continued Treatment (SLP): continue treatment per plan of care (08/28/24 1430)         Outcome Evaluation: SLP continues to follow for speech/cognitive communication/and swallowing.  Spouse is supportive and very helpful.  Ongoing ST services indicated. Pt is not appropriate for diet increase at this time. Plan of care revised based on today's treatment.      SWALLOW EVALUATION (Last 72 Hours)       SLP Adult Swallow Evaluation       Row Name 08/28/24 1430 08/26/24 5331                Rehab Evaluation    Document Type -- therapy note (daily note)  -AV       Subjective Information -- no complaints  -AV       Patient Observations -- alert;cooperative  -AV       Patient Effort -- good  -AV          General Information    Prior Level of Function-Swallowing no diet consistency restrictions  -ML --       Patient's Goals for Discharge return home;return to all previous roles/activities  [facility] and then home  -ML --        Family Goals for Discharge family did not state  -ML --          Pain    Additional Documentation -- Pain Scale: FACES Pre/Post-Treatment (Group)  -AV          Pain Scale: FACES Pre/Post-Treatment    Pain: FACES Scale, Pretreatment -- 0-->no hurt  -AV       Posttreatment Pain Rating -- 0-->no hurt  -AV          SLP Evaluation Clinical Impression    SLP Swallowing Diagnosis -- mild;oral dysphagia  -AV       Functional Impact -- risk of aspiration/pneumonia  -AV       Rehab Potential/Prognosis, Swallowing -- good, to achieve stated therapy goals  -AV       Swallow Criteria for Skilled Therapeutic Interventions Met -- demonstrates skilled criteria  -AV          SLP Treatment Clinical Impressions    Treatment Assessment (SLP) -- continued;improved  -AV       Treatment Assessment Comments (SLP) Pt slow to wake. Initially required repeat cues to maintain alertness, however as the session continued, pt was able to keep eyes open. Poor eye contact with speaker.  Beginning to look to the left- past midline. Able to implement compensatory strategies for improved speech production/clarity but cues required to maintain. Impaired cognitive linguistic function- attention, pragmatics, mental flexibility, and divergent thinking.  Lethargy persists and is impacting diet advancement. Pt endorses eating is tiring and she is in agreement that she is not ready for diet increase.  -ML --       Daily Summary of Progress (SLP) -- progress toward functional goals is good  -AV       Plan for Continued Treatment (SLP) -- continue treatment per plan of care  -AV       Care Plan Review -- care plan/treatment goals reviewed  -AV       Care Plan Review, Other Participant(s) -- spouse  -AV          Recommendations    Therapy Frequency (Swallow) 3 days per week  -ML 5 days per week  -AV       Predicted Duration Therapy Intervention (Days) -- 1 week  -AV       SLP Diet Recommendation soft to chew textures;chopped;thin liquids;other (see comments)   -ML soft to chew textures;chopped;thin liquids;other (see comments)  -AV       Recommended Diagnostics reassess via clinical swallow evaluation  -ML --       Recommended Precautions and Strategies upright posture during/after eating;general aspiration precautions;assist with feeding  -ML general aspiration precautions  -AV       Oral Care Recommendations Oral Care BID/PRN;Toothbrush  -ML Oral Care BID/PRN;Toothbrush  -AV       SLP Rec. for Method of Medication Administration meds whole;meds crushed;as tolerated  -ML meds whole;with puree  -AV       Monitor for Signs of Aspiration notify SLP if any concerns  -ML yes;notify SLP if any concerns  -AV       Anticipated Discharge Disposition (SLP) -- inpatient rehabilitation facility  -AV                 User Key  (r) = Recorded By, (t) = Taken By, (c) = Cosigned By      Initials Name Effective Dates    AV Silvia Peralta, MS CCC-SLP 06/16/21 -     ML Ela Mccall CCC-SLP 06/16/21 -                     EDUCATION  The patient has been educated in the following areas:   Dysphagia (Swallowing Impairment) Modified Diet Instruction.        SLP GOALS       Row Name 08/28/24 1430 08/26/24 1500          (LTG) Patient will demonstrate functional swallow for    Diet Texture (Demonstrate functional swallow) regular textures  -ML regular textures  -AV     Liquid viscosity (Demonstrate functional swallow) thin liquids  -ML thin liquids  -AV     [place] (Demonstrate functional swallow) independently (over 90% accuracy)  -ML independently (over 90% accuracy)  -AV     Time Frame (Demonstrate functional swallow) 1 week  -ML 1 week  -AV     Progress/Outcomes (Demonstrate functional swallow) progress slower than expected  -ML continuing progress toward goal  -AV     Comment (Demonstrate functional swallow) Tolerating current diet but endorses eating is exhausting. Pt is not ready for diet increase consideration at this time.  -ML --        (STG) Patient will tolerate  trials of    Consistencies Trialed (Tolerate trials) soft to chew (chopped) textures;thin liquids  -ML soft to chew (chopped) textures;thin liquids  -AV     Desired Outcome (Tolerate trials) with adequate oral prep/transit/clearance  -ML with adequate oral prep/transit/clearance  -AV     [place] (Tolerate trials) independently (over 90% accuracy)  -ML independently (over 90% accuracy)  -AV     Time Frame (Tolerate trials) 1 week  -ML 1 week  -AV     Progress/Outcomes (Tolerate trials) progress slower than expected  -ML continuing progress toward goal  -AV     Comment (Tolerate trials) Tolerates current diet. Slow intake. Limited intake.  Lethargy impacting oral phase and diet advancement.  -ML --        (STG) Patient will tolerate therapeutic trials of    Consistencies Trialed (Tolerate therapeutic trials) regular textures  -ML regular textures  -AV     Desired Outcome (Tolerate therapeutic trials) with adequate oral prep/transit/clearance;without signs/symptoms of aspiration  -ML with adequate oral prep/transit/clearance;without signs/symptoms of aspiration  -AV     [place] (Tolerate therapeutic trials) independently (over 90% accuracy)  -ML independently (over 90% accuracy)  -AV     Time Frame (Tolerate therapeutic trials) 1 week  -ML 1 week  -AV     Progress/Outcomes (Tolerate therapeutic trials) progress slower than expected  -ML continuing progress toward goal  -AV     Comment (Tolerate therapeutic trials) Not appropriate to try regular consistencies due to lethargy and reported fatigue during meals.  -ML --        (STG) Labial Strengthening Goal 1 (SLP)    Activity (Labial Strengthening Goal 1, SLP) increase labial tone  -ML increase labial tone  -AV     Increase Labial Tone swallow trials;labial resistance exercises  -ML swallow trials;labial resistance exercises  -AV     [place]/Accuracy (Labial Strengthening Goal 1, SLP) independently (over 90% accuracy)  -ML independently (over 90% accuracy)   -AV     Time Frame (Labial Strengthening Goal 1, SLP) 1 week;short term goal (STG)  -ML 1 week  -AV     Progress/Outcomes (Labial Strengthening Goal 1, SLP) progress slower than expected  -ML continuing progress toward goal  -AV     Comment (Labial Strengthening Goal 1, SLP) Able to place straw on the right/stronger side to prevent anterior loss.  Limited movement on left with/without resistance.  -ML --        (STG) Lingual Strengthening Goal 1 (SLP)    Activity (Lingual Strengthening Goal 1, SLP) increase lingual tone/sensation/control/coordination/movement  -ML increase lingual tone/sensation/control/coordination/movement  -AV     Increase Lingual Tone/Sensation/Control/Coordination/Movement swallow trials;lingual resistance exercises  -ML swallow trials;lingual resistance exercises  -AV     [place]/Accuracy (Lingual Strengthening Goal 1, SLP) independently (over 90% accuracy)  -ML independently (over 90% accuracy)  -AV     Time Frame (Lingual Strengthening Goal 1, SLP) 1 week;short term goal (STG)  -ML 1 week  -AV     Progress/Outcomes (Lingual Strengthening Goal 1, SLP) progress slower than expected  -ML continuing progress toward goal  -AV     Comment (Lingual Strengthening Goal 1, SLP) Completes exercises without resistance-limited trials.  -ML --        (STG) Swallow Compensatory Strategies Goal 1 (SLP)    Activity (Swallow Compensatory Strategies/Techniques Goal 1, SLP) aspiration precautions;food/liquid placed on stronger right side  -ML aspiration precautions;food/liquid placed on stronger right side  -AV     [place]/Accuracy (Swallow Compensatory Strategies/Techniques Goal 1, SLP) independently (over 90% accuracy)  -ML independently (over 90% accuracy)  -AV     Time Frame (Swallow Compensatory Strategies/Techniques Goal 1, SLP) short term goal (STG);1 week  -ML 1 week  -AV     Progress/Outcomes (Swallow Compensatory Strategies/Techniques Goal 1, SLP) continuing progress toward goal  -ML  continuing progress toward goal  -AV     Comment (Swallow Compensatory Strategies/Techniques Goal 1, SLP) Aware of need for placement of straw on the right.  Inconsistent placement of food on right.  -ML --        Patient will demonstrate functional cognitive-linguistic skills for return to discharge environment    [place] with minimal cues  -ML with minimal cues  -AV     Time frame 1 week  -ML 1 week  -AV     Progress/Outcomes progress slower than expected  -ML continuing progress toward goal  -AV     Comments Max cues for  pt to stay awake to participate. Decreased cues needed as session continued.  -ML --        SLP Diagnostic Treatment     Patient will participate in further assessment in the following areas -- reading comprehension;graphic expression;higher-level cognitive-linguistic  -AV     Time Frame (Diagnostic) -- 1 week  -AV     Progress/Outcomes (Additional Goal 1, SLP) -- goal met  -AV        Comprehend Questions Goal 1 (SLP)    Improve Ability to Comprehend Questions Goal 1 (SLP) complex yes/no questions;complex wh questions;100%;independently (over 90% accuracy)  -ML complex yes/no questions;complex wh questions;100%;independently (over 90% accuracy)  -AV     Time Frame (Comprehend Questions Goal 1, SLP) short term goal (STG);1 week  -ML 1 week  -AV     Progress (Ability to Comprehend Questions Goal 1, SLP) 90%;with minimal cues (75-90%)  -ML 70%;with minimal cues (75-90%)  -AV     Progress/Outcomes (Comprehend Questions Goal 1, SLP) good progress toward goal  -ML continuing progress toward goal  -AV        Phonation Goal 1 (SLP)    Improve Phonation By Goal 1 (SLP) using loud speech;80%;with minimal cues (75-90%)  -ML --     Time Frame (Phonation Goal 1, SLP) short term goal (STG);1 week  -ML --     Progress (Phonation Goal 1, SLP) 70%;with moderate cues (50-74%)  -ML --     Progress/Outcomes (Phonation Goal 1, SLP) new goal  -ML --     Comment (Phonation Goal 1, SLP) Able to demonstrate loud  speech with rote/automatic task with cues.  -ML --        Articulation Goal 1 (SLP)    Improve Articulation Goal 1 (SLP) by over-articulating at word level;90%;with minimal cues (75-90%);by over-articulating at phrase level  -ML by over-articulating at word level;90%;with minimal cues (75-90%)  -AV     Time Frame (Articulation Goal 1, SLP) short term goal (STG);1 week  -ML 1 week  -AV     Progress (Articulation Goal 1, SLP) with maximum cues (25-49%);40%  -ML 50%;with minimal cues (75-90%)  -AV     Progress/Outcomes (Articulation Goal 1, SLP) good progress toward goal  -ML continuing progress toward goal  -AV     Comment (Articulation Goal 1, SLP) Aware of need for overarticulation but could not or did not comply at the word level with a model.  -ML --        Attention Goal 1 (SLP)    Improve Attention by Goal 1 (SLP) looking at speaker;attending to task;90%;with minimal cues (75-90%)  -ML --     Time Frame (Attention Goal 1, SLP) short term goal (STG);1 week  -ML --     Progress/Outcomes (Attention Goal 1, SLP) new goal  -ML --        Organizational Skills Goal 1 (SLP)    Improve Thought Organization Through Goal 1 (SLP) completing a divergent naming task;completing mental manipulation task;90%;with minimal cues (75-90%)  -ML --     Time Frame (Thought Organization Skills Goal 1, SLP) short term goal (STG);1 week  -ML --     Progress/Outcomes (Thought Organization Skills Goal 1, SLP) new goal  -ML --        Pragmatic Skills Goal 1 (SLP)    Improve Pragmatic Skills Goal 1 (SLP) maintain eye contact;demonstrate topic initiation;maintain topic;80%;with minimal cues (75-90%)  -ML --     Time Frame (Pragmatic Skills Goal 1, SLP) short term goal (STG);1 week  -ML --     Progress/Outcomes (Pragmatic Skills Goal 1, SLP) new goal  -ML --               User Key  (r) = Recorded By, (t) = Taken By, (c) = Cosigned By      Initials Name Provider Type    AV Silvia Peralta MS CCC-SLP Speech and Language Pathologist     Ela Nicolas CCC-SLP Speech and Language Pathologist                         Time Calculation:    Time Calculation- SLP       Row Name 24 1602             Time Calculation- SLP    SLP Start Time 1430  -ML      SLP Received On 24  -ML                User Key  (r) = Recorded By, (t) = Taken By, (c) = Cosigned By      Initials Name Provider Type    Ela Nicolas CCC-SLP Speech and Language Pathologist                    Therapy Charges for Today       Code Description Service Date Service Provider Modifiers Qty    04154217157 HC ST TREATMENT SWALLOW 1 2024 Ela Mccall CCC-SLP GN 1    [id number] HC ST TREATMENT SPEECH 2 2024 Ela Mccall CCC-SLP GN 1                 ARIADNE Rollins  2024   and Acute Care - Speech Language Pathology Treatment Note  [facility]     Patient Name: Keya Hamilton  : 1990  MRN: [id number]  Today's Date: 2024               Admit Date: 2024     Visit Dx:    ICD-10-CM ICD-9-CM   1. Acute stroke due to ischemia  I63.9 434.91   2. Acute left-sided weakness  R53.1 728.87   3. Dysarthria  R47.1 784.51   4. Somnolence  R40.0 780.09   5. Compromised respiratory status  R06.89 786.09   6. Oral phase dysphagia  R13.11 787.21   7. Cognitive communication deficit  R41.841 799.52     Patient Active Problem List   Diagnosis    R M1 stenosis    T2DM    HTN    Morbid obesity with BMI of 45.0-49.9, adult    SCOTT    Hypertensive urgency    R/O Seizure    Acute respiratory failure    Acute stroke due to ischemia    Acute systolic heart failure    Left ventricular thrombus     Past Medical History:   Diagnosis Date    Diabetes mellitus     Hypertension     Sleep apnea      Past Surgical History:   Procedure Laterality Date    INTERVENTIONAL RADIOLOGY PROCEDURE N/A 2024    Procedure: IR mechanical thrombectomy;  Surgeon: Lemuel Medina MD;  Location: [facility] INVASIVE LOCATION;  Service:  Interventional Radiology;  Laterality: N/A;       SLP Recommendation and Plan           Monitor for Signs of Aspiration: notify SLP if any concerns (08/28/24 1430)        Anticipated Discharge Disposition (SLP): inpatient rehabilitation facility, anticipate therapy at next level of care (08/28/24 1430)     Therapy Frequency (Swallow): 3 days per week (08/28/24 1430)  Therapy Frequency (SLP SLC): 5 days per week (08/28/24 1430)  Predicted Duration Therapy Intervention (Days): 1 week (08/28/24 1430)  Oral Care Recommendations: Oral Care BID/PRN, Toothbrush (08/28/24 1430)     Daily Summary of Progress (SLP): progress toward functional goals is good (08/28/24 1430)           Treatment Assessment (SLP): improved, dysarthria, cognitive-linguistic disorder (08/28/24 1430)  Treatment Assessment Comments (SLP): Pt slow to wake. Initially required repeat cues to maintain alertness, however as the session continued, pt was able to keep eyes open. Poor eye contact with speaker.  Beginning to look to the left- past midline. Able to implement compensatory strategies for improved speech production/clarity but cues required to maintain. Impaired cognitive linguistic function- attention, pragmatics, mental flexibility, and divergent thinking.  Lethargy persists and is impacting diet advancement. Pt endorses eating is tiring and she is in agreement that she is not ready for diet increase. (08/28/24 1430)  Plan for Continued Treatment (SLP): continue treatment per plan of care (08/28/24 1430)  Outcome Evaluation: SLP continues to follow for speech/cognitive communication/and swallowing.  Spouse is supportive and very helpful.  Ongoing ST services indicated. Pt is not appropriate for diet increase at this time. Plan of care revised based on today's treatment. (08/28/24 1601)      SLP EVALUATION (Last 72 Hours)       SLP SLC Evaluation       Row Name 08/28/24 1430                   Communication Assessment/Intervention    Document Type  therapy note (daily note)  -ML        Subjective Information no complaints  -ML        Patient Observations alert;cooperative  -ML        Patient Effort adequate  -ML        Comment Pt lethargic- spouse reports that she is improving and he feels lethargy is due to no sleep/rest while in ICU.  -ML           General Information    Patient Profile Reviewed yes  -ML        Pertinent History Of Current Problem See ST hx  -ML        Precautions/Limitations, Vision neglect, left  -ML        Precautions/Limitations, Hearing WFL  -ML        Prior Level of Function-Communication WFL  -ML        Plans/Goals Discussed with patient;spouse/S.O.;agreed upon  -ML        Barriers to Rehab none identified  -ML        Patient's Goals for Discharge other (see comments)  [facility] and then home  -ML        Family Goals for Discharge family did not state  -ML           Pain    Additional Documentation Pain Scale: Numbers Pre/Post-Treatment (Group)  -ML           Pain Scale: Numbers Pre/Post-Treatment    Pretreatment Pain Rating 0/10 - no pain  -ML        Posttreatment Pain Rating 0/10 - no pain  -ML           SLP Treatment Clinical Impressions    Treatment Assessment (SLP) improved;dysarthria;cognitive-linguistic disorder  -ML        Daily Summary of Progress (SLP) progress toward functional goals is good  -ML        Barriers to Overall Progress (SLP) Lethargy  -ML        Plan for Continued Treatment (SLP) continue treatment per plan of care  -ML        Care Plan Review care plan/treatment goals reviewed;risks/benefits reviewed;patient/other agree to care plan  -ML        Care Plan Review, Other Participant(s) spouse  -ML           Recommendations    Therapy Frequency (SLP SLC) 5 days per week  -ML        Predicted Duration Therapy Intervention (Days) 1 week  -ML        Anticipated Discharge Disposition (SLP) inpatient rehabilitation facility;anticipate therapy at next level of care  -ML                  User Key  (r) = Recorded By, (t) =  Taken By, (c) = Cosigned By      Initials Name Effective Dates    ML Ela Mccall, CCC-SLP 06/16/21 -                        EDUCATION  The patient has been educated in the following areas:     Cognitive Impairment Communication Impairment.           SLP GOALS       Row Name 08/28/24 1430 08/26/24 1500          (LTG) Patient will demonstrate functional swallow for    Diet Texture (Demonstrate functional swallow) regular textures  -ML regular textures  -AV     Liquid viscosity (Demonstrate functional swallow) thin liquids  -ML thin liquids  -AV     [place] (Demonstrate functional swallow) independently (over 90% accuracy)  -ML independently (over 90% accuracy)  -AV     Time Frame (Demonstrate functional swallow) 1 week  -ML 1 week  -AV     Progress/Outcomes (Demonstrate functional swallow) progress slower than expected  -ML continuing progress toward goal  -AV     Comment (Demonstrate functional swallow) Tolerating current diet but endorses eating is exhausting. Pt is not ready for diet increase consideration at this time.  -ML --        (STG) Patient will tolerate trials of    Consistencies Trialed (Tolerate trials) soft to chew (chopped) textures;thin liquids  -ML soft to chew (chopped) textures;thin liquids  -AV     Desired Outcome (Tolerate trials) with adequate oral prep/transit/clearance  -ML with adequate oral prep/transit/clearance  -AV     [place] (Tolerate trials) independently (over 90% accuracy)  -ML independently (over 90% accuracy)  -AV     Time Frame (Tolerate trials) 1 week  -ML 1 week  -AV     Progress/Outcomes (Tolerate trials) progress slower than expected  -ML continuing progress toward goal  -AV     Comment (Tolerate trials) Tolerates current diet. Slow intake. Limited intake.  Lethargy impacting oral phase and diet advancement.  -ML --        (STG) Patient will tolerate therapeutic trials of    Consistencies Trialed (Tolerate therapeutic trials) regular textures  -ML regular  textures  -AV     Desired Outcome (Tolerate therapeutic trials) with adequate oral prep/transit/clearance;without signs/symptoms of aspiration  -ML with adequate oral prep/transit/clearance;without signs/symptoms of aspiration  -AV     [place] (Tolerate therapeutic trials) independently (over 90% accuracy)  -ML independently (over 90% accuracy)  -AV     Time Frame (Tolerate therapeutic trials) 1 week  -ML 1 week  -AV     Progress/Outcomes (Tolerate therapeutic trials) progress slower than expected  -ML continuing progress toward goal  -AV     Comment (Tolerate therapeutic trials) Not appropriate to try regular consistencies due to lethargy and reported fatigue during meals.  -ML --        (STG) Labial Strengthening Goal 1 (SLP)    Activity (Labial Strengthening Goal 1, SLP) increase labial tone  -ML increase labial tone  -AV     Increase Labial Tone swallow trials;labial resistance exercises  -ML swallow trials;labial resistance exercises  -AV     [place]/Accuracy (Labial Strengthening Goal 1, SLP) independently (over 90% accuracy)  -ML independently (over 90% accuracy)  -AV     Time Frame (Labial Strengthening Goal 1, SLP) 1 week;short term goal (STG)  -ML 1 week  -AV     Progress/Outcomes (Labial Strengthening Goal 1, SLP) progress slower than expected  -ML continuing progress toward goal  -AV     Comment (Labial Strengthening Goal 1, SLP) Able to place straw on the right/stronger side to prevent anterior loss.  Limited movement on left with/without resistance.  -ML --        (STG) Lingual Strengthening Goal 1 (SLP)    Activity (Lingual Strengthening Goal 1, SLP) increase lingual tone/sensation/control/coordination/movement  -ML increase lingual tone/sensation/control/coordination/movement  -AV     Increase Lingual Tone/Sensation/Control/Coordination/Movement swallow trials;lingual resistance exercises  -ML swallow trials;lingual resistance exercises  -AV     [place]/Accuracy (Lingual Strengthening  Goal 1, SLP) independently (over 90% accuracy)  -ML independently (over 90% accuracy)  -AV     Time Frame (Lingual Strengthening Goal 1, SLP) 1 week;short term goal (STG)  -ML 1 week  -AV     Progress/Outcomes (Lingual Strengthening Goal 1, SLP) progress slower than expected  -ML continuing progress toward goal  -AV     Comment (Lingual Strengthening Goal 1, SLP) Completes exercises without resistance-limited trials.  -ML --        (STG) Swallow Compensatory Strategies Goal 1 (SLP)    Activity (Swallow Compensatory Strategies/Techniques Goal 1, SLP) aspiration precautions;food/liquid placed on stronger right side  -ML aspiration precautions;food/liquid placed on stronger right side  -AV     [place]/Accuracy (Swallow Compensatory Strategies/Techniques Goal 1, SLP) independently (over 90% accuracy)  -ML independently (over 90% accuracy)  -AV     Time Frame (Swallow Compensatory Strategies/Techniques Goal 1, SLP) short term goal (STG);1 week  -ML 1 week  -AV     Progress/Outcomes (Swallow Compensatory Strategies/Techniques Goal 1, SLP) continuing progress toward goal  -ML continuing progress toward goal  -AV     Comment (Swallow Compensatory Strategies/Techniques Goal 1, SLP) Aware of need for placement of straw on the right.  Inconsistent placement of food on right.  -ML --        Patient will demonstrate functional cognitive-linguistic skills for return to discharge environment    [place] with minimal cues  -ML with minimal cues  -AV     Time frame 1 week  -ML 1 week  -AV     Progress/Outcomes progress slower than expected  -ML continuing progress toward goal  -AV     Comments Max cues for  pt to stay awake to participate. Decreased cues needed as session continued.  -ML --        SLP Diagnostic Treatment     Patient will participate in further assessment in the following areas -- reading comprehension;graphic expression;higher-level cognitive-linguistic  -AV     Time Frame (Diagnostic) -- 1 week  -AV      Progress/Outcomes (Additional Goal 1, SLP) -- goal met  -AV        Comprehend Questions Goal 1 (SLP)    Improve Ability to Comprehend Questions Goal 1 (SLP) complex yes/no questions;complex wh questions;100%;independently (over 90% accuracy)  -ML complex yes/no questions;complex wh questions;100%;independently (over 90% accuracy)  -AV     Time Frame (Comprehend Questions Goal 1, SLP) short term goal (STG);1 week  -ML 1 week  -AV     Progress (Ability to Comprehend Questions Goal 1, SLP) 90%;with minimal cues (75-90%)  -ML 70%;with minimal cues (75-90%)  -AV     Progress/Outcomes (Comprehend Questions Goal 1, SLP) good progress toward goal  -ML continuing progress toward goal  -AV        Phonation Goal 1 (SLP)    Improve Phonation By Goal 1 (SLP) using loud speech;80%;with minimal cues (75-90%)  -ML --     Time Frame (Phonation Goal 1, SLP) short term goal (STG);1 week  -ML --     Progress (Phonation Goal 1, SLP) 70%;with moderate cues (50-74%)  -ML --     Progress/Outcomes (Phonation Goal 1, SLP) new goal  -ML --     Comment (Phonation Goal 1, SLP) Able to demonstrate loud speech with rote/automatic task with cues.  -ML --        Articulation Goal 1 (SLP)    Improve Articulation Goal 1 (SLP) by over-articulating at word level;90%;with minimal cues (75-90%);by over-articulating at phrase level  -ML by over-articulating at word level;90%;with minimal cues (75-90%)  -AV     Time Frame (Articulation Goal 1, SLP) short term goal (STG);1 week  -ML 1 week  -AV     Progress (Articulation Goal 1, SLP) with maximum cues (25-49%);40%  -ML 50%;with minimal cues (75-90%)  -AV     Progress/Outcomes (Articulation Goal 1, SLP) good progress toward goal  -ML continuing progress toward goal  -AV     Comment (Articulation Goal 1, SLP) Aware of need for overarticulation but could not or did not comply at the word level with a model.  -ML --        Attention Goal 1 (SLP)    Improve Attention by Goal 1 (SLP) looking at speaker;attending  to task;90%;with minimal cues (75-90%)  -ML --     Time Frame (Attention Goal 1, SLP) short term goal (STG);1 week  -ML --     Progress/Outcomes (Attention Goal 1, SLP) new goal  -ML --        Organizational Skills Goal 1 (SLP)    Improve Thought Organization Through Goal 1 (SLP) completing a divergent naming task;completing mental manipulation task;90%;with minimal cues (75-90%)  -ML --     Time Frame (Thought Organization Skills Goal 1, SLP) short term goal (STG);1 week  -ML --     Progress/Outcomes (Thought Organization Skills Goal 1, SLP) new goal  -ML --        Pragmatic Skills Goal 1 (SLP)    Improve Pragmatic Skills Goal 1 (SLP) maintain eye contact;demonstrate topic initiation;maintain topic;80%;with minimal cues (75-90%)  -ML --     Time Frame (Pragmatic Skills Goal 1, SLP) short term goal (STG);1 week  -ML --     Progress/Outcomes (Pragmatic Skills Goal 1, SLP) new goal  -ML --               User Key  (r) = Recorded By, (t) = Taken By, (c) = Cosigned By      Initials Name Provider Type    Silvia Trinidad, MS CCC-SLP Speech and Language Pathologist    Ela Nicolas CCC-SLP Speech and Language Pathologist                              Time Calculation:      Time Calculation- SLP       Row Name 08/28/24 1602             Time Calculation- SLP    SLP Start Time 1430  -ML      SLP Received On 08/28/24  -ML                User Key  (r) = Recorded By, (t) = Taken By, (c) = Cosigned By      Initials Name Provider Type    Ela Nicolas CCC-SLP Speech and Language Pathologist                    Therapy Charges for Today       Code Description Service Date Service Provider Modifiers Qty    55620886277 HC ST TREATMENT SWALLOW 1 8/28/2024 Ela Mccall CCC-SLP GN 1    [id number] HC ST TREATMENT SPEECH 2 8/28/2024 Ela Mccall CCC-KAL GN 1                       ARIADNE Rollins  8/28/2024

## 2024-08-28 NOTE — PROGRESS NOTES
Stroke Progress Note       Chief Complaint: Left-sided weakness    Subjective    Subjective     Subjective:    Sitting up in bed.  Patient is a attempting to eat breakfast.   is at bedside.  Exam is stable from previous.    Review of Systems   Constitutional:  Negative for fever.   Eyes:  Positive for visual disturbance.   Gastrointestinal:  Negative for abdominal pain and nausea.   Neurological:  Positive for facial asymmetry, speech difficulty, weakness and numbness. Negative for dizziness and headaches.   Psychiatric/Behavioral:  Negative for confusion.       Constitutional: No fatigue  Eyes: Negative for redness.   Respiratory: Negative for cough.    Musculoskeletal: Negative for joint swelling.   Neurological: Negative for tremors.     Objective      Temp:  [97.5 °F (36.4 °C)-98.4 °F (36.9 °C)] 98.4 °F (36.9 °C)  Heart Rate:  [] 95  Resp:  [18-20] 19  BP: (128-163)/() 143/98        Objective    Physical Exam  Constitutional:       General: She is not in acute distress.     Appearance: She is obese.   HENT:      Head: Normocephalic and atraumatic.      Nose: Nose normal.      Mouth/Throat:      Mouth: Mucous membranes are moist.   Eyes:      Extraocular Movements: Extraocular movements intact.      Pupils: Pupils are equal, round, and reactive to light.   Cardiovascular:      Pulses: Normal pulses.   Pulmonary:      Effort: Pulmonary effort is normal.   Abdominal:      Comments: Round, obese   Musculoskeletal:         General: Normal range of motion.      Cervical back: Normal range of motion.   Skin:     General: Skin is warm and dry.      Capillary Refill: Capillary refill takes less than 2 seconds.   Neurological:      Mental Status: She is alert and oriented to person, place, and time.      Cranial Nerves: Cranial nerve deficit and dysarthria present.      Sensory: Sensory deficit present.      Motor: Weakness present.   Psychiatric:         Mood and Affect: Mood normal.         Behavior:  Behavior normal.        Neurological Exam  Mental Status  Alert. Oriented to person, place and time. Oriented to person, place, and time. Mild dysarthria present. Expressive aphasia and receptive aphasia present.    Cranial Nerves  CN II: Right visual acuity: Counts fingers. Left visual acuity: Counts fingers.  CN III, IV, VI: Extraocular movements intact bilaterally. Pupils equal round and reactive to light bilaterally.  CN V:  Right: Facial sensation is normal.  Left: Diminished sensation of the entire left side of the face.  CN VII:  Left: There is central facial weakness.  CN VIII: Hearing intact.  CN XI:  Right: Sternocleidomastoid strength is normal. Trapezius strength is normal.  Left: Sternocleidomastoid strength is weak. Trapezius strength is weak.  CN XII: Tongue midline without atrophy or fasciculations.    Motor  Normal muscle bulk throughout. Normal muscle tone. Strength is 5/5 in all four extremities except as noted.  Strength left upper extremity 2/5..    Sensory  Light touch abnormality: Sensation: Decrease sensitivity to left-sided body.     Coordination  Right: Finger-to-nose normal. Heel-to-shin normal.Left: Finger-to-nose abnormality: Heel-to-shin normal.  Left upper extremity weakness.    Gait    Unable to assess.       Results Review:    I reviewed the patient's new clinical results.    WBC   Date Value Ref Range Status   08/26/2024 15.37 (H) 3.40 - 10.80 10*3/mm3 Final     RBC   Date Value Ref Range Status   08/26/2024 4.65 3.77 - 5.28 10*6/mm3 Final     Hemoglobin   Date Value Ref Range Status   08/26/2024 15.6 12.0 - 15.9 g/dL Final     Hematocrit   Date Value Ref Range Status   08/26/2024 47.0 (H) 34.0 - 46.6 % Final     MCV   Date Value Ref Range Status   08/26/2024 101.1 (H) 79.0 - 97.0 fL Final     MCH   Date Value Ref Range Status   08/26/2024 33.5 (H) 26.6 - 33.0 pg Final     MCHC   Date Value Ref Range Status   08/26/2024 33.2 31.5 - 35.7 g/dL Final     RDW   Date Value Ref Range  Status   08/26/2024 13.5 12.3 - 15.4 % Final     RDW-SD   Date Value Ref Range Status   08/26/2024 50.1 37.0 - 54.0 fl Final     MPV   Date Value Ref Range Status   08/26/2024 8.2 6.0 - 12.0 fL Final     Platelets   Date Value Ref Range Status   08/26/2024 346 140 - 450 10*3/mm3 Final     Neutrophil %   Date Value Ref Range Status   08/26/2024 70.3 42.7 - 76.0 % Final     Lymphocyte %   Date Value Ref Range Status   08/26/2024 21.5 19.6 - 45.3 % Final     Monocyte %   Date Value Ref Range Status   08/26/2024 6.9 5.0 - 12.0 % Final     Eosinophil %   Date Value Ref Range Status   08/26/2024 0.3 0.3 - 6.2 % Final     Basophil %   Date Value Ref Range Status   08/26/2024 0.5 0.0 - 1.5 % Final     Immature Grans %   Date Value Ref Range Status   08/26/2024 0.5 0.0 - 0.5 % Final     Neutrophils, Absolute   Date Value Ref Range Status   08/26/2024 10.82 (H) 1.70 - 7.00 10*3/mm3 Final     Lymphocytes, Absolute   Date Value Ref Range Status   08/26/2024 3.30 (H) 0.70 - 3.10 10*3/mm3 Final     Monocytes, Absolute   Date Value Ref Range Status   08/26/2024 1.06 (H) 0.10 - 0.90 10*3/mm3 Final     Eosinophils, Absolute   Date Value Ref Range Status   08/26/2024 0.05 0.00 - 0.40 10*3/mm3 Final     Basophils, Absolute   Date Value Ref Range Status   08/26/2024 0.07 0.00 - 0.20 10*3/mm3 Final     Immature Grans, Absolute   Date Value Ref Range Status   08/26/2024 0.07 (H) 0.00 - 0.05 10*3/mm3 Final     nRBC   Date Value Ref Range Status   08/26/2024 0.0 0.0 - 0.2 /100 WBC Final       Lab Results   Component Value Date    GLUCOSE 144 (H) 08/27/2024    BUN 23 (H) 08/27/2024    CREATININE 0.99 08/27/2024     08/27/2024    K 4.5 08/27/2024     08/27/2024    CALCIUM 9.4 08/27/2024    PROTEINTOT 6.4 08/24/2024    ALBUMIN 3.7 08/24/2024    ALT 28 08/24/2024    AST 53 (H) 08/24/2024    ALKPHOS 60 08/24/2024    BILITOT 0.6 08/24/2024    GLOB 2.7 08/24/2024    AGRATIO 1.4 08/24/2024    BCR 23.2 08/27/2024    ANIONGAP 14.0  08/27/2024    EGFR 77.9 08/27/2024     Lipid Panel          8/24/2024    01:02   Lipid Panel   Total Cholesterol 177    Triglycerides 175    HDL Cholesterol 31    VLDL Cholesterol 31    LDL Cholesterol  115    LDL/HDL Ratio 3.58           Lab 08/24/24  0102   HEMOGLOBIN A1C 6.70*      CT Head Without Contrast    Result Date: 8/27/2024  Impression: Evolving right-sided subacute infarcts most prominent within right basal ganglia. No hemorrhagic transformation or acute herniation. Electronically Signed: Kali Palmer MD  8/27/2024 4:39 PM EDT  Workstation ID: [id number]   Results for orders placed during the hospital encounter of 08/23/24    Adult Transthoracic Echo Complete W/ Cont if Necessary Per Protocol (With Agitated Saline)    Interpretation Summary  Images from the original result were not included.      Left ventricular systolic function is moderately decreased. Calculated left ventricular EF = 36.7%  global hypokinesis.  LV cavity mildly dilated.  RV size and function normal    Left ventricular wall thickness is consistent with hypertrophy.    Left ventricular diastolic dysfunction is noted.    There is a thrombus present in the left ventricle in apex. 2.22 cm by 0.96 cm    The left atrial cavity is dilated.    Estimated right ventricular systolic pressure from tricuspid regurgitation is normal (<35 mmHg).    Indeterminate bubble study    -MRI brain from 8-24-24 with an AIS in the R putamen and R head of caudate.   -LDL from 8-24-24 was 115  -A1c from 8-24-24 was 6.7  -Echo on 8-24-24 with concern for IV thrombus  -Repeated CTH on 8-24-24 with and evolving R BG AIS, no hemorrhage noted    Assessment/Plan   34-year-old white female, with multiple vascular risk factors who presented to [facility] ED via EMS on 8/23/2024 with R MCA syndrome.  Initial NIH 11.  CT head was negative for hemorrhage.  /106.  She was deemed a candidate for TNK which was given after BP was lowered to 167/105.  CTP with a large area of  hypoperfusion in the R MCA territory.  CTA H/N with R M1 occlusion.  She was intubated in the ED secondary due to inability to protect her airway.  She was taken to the Cath Lab for mechanical thrombectomy and admitted to the ICU postprocedure.    Antiplatelet PTA: None  Anticoagulant PTA: None        # R MCA stroke, R M1 occlusion s/p TNK and MT, TICI 3   # Cardiac intraventricular thrombus (cardiology following)  -Suspected cardioembolic in etiology in the setting of left ventricular thrombus  -Risk factors: Hypertension, hypertension lipidemia, obesity, diabetes, marijuana use, on progesterone(Mirena IUD)  -MRI with an AIS in the R putamen and R head of caudate.   -Continue Eliquis 5 mg twice daily.  Heparin drip discontinued yesterday.  -TTE with an LV thrombus, left atrial cavity dilation, EF 36.7 with global hypokinesis.  Bubble study was indeterminate.  -CTh 8/24-with evolving infarcts and no evidence of overt hemorrhage  -Hypercoag panel, factor V Leiden heterozygous on 8/24.  Additional labs pending.  -Continue to target sodium level 145-150.  Sodium 139 this a.m.  -HTN; SBP less than 140.  (Okay 130-150).  Management per primary team  -T2DM with hyperglycemia; A1c 6.7.  Glucose elevated on admission.  Diabetes education as appropriate.  Management per primary team.  -PT/OT/SLP    #Drowsiness  - Start amantadine 100 mg twice daily  - Repeat CT head today    #Marijuana use  #Substance abuse  - UDS positive for methamphetamines, THC.  Patient's  reports that he is unsure where she came in contact with methamphetamines.  He reports that he knows that she smokes THC but was unaware of methamphetamine use.  Was concerned that marijuana was possibly laced with meth.  - Cessation counseling when appropriate     #Nicotine product use via vaping  -Daily vaping  -Cessation counseling when appropriate     #Morbid obesity  -BMI 49.44  -Complicates all aspects of care  -Counseling on healthy diet exercise and  weight loss when appropriate.     #Medication noncompliance  -Discussed with patient and family importance of compliance with medication, verbalized understanding.    Plan discussed with Dr. Goodman, the patient, her , and nursing staff.  Stroke neurology will continue to follow.  Please call with any questions or concerns.    MICHELLE Lind

## 2024-08-28 NOTE — PROGRESS NOTES
[facility]  PROGRESS NOTE    Patient Name: Keya Hamilton  : 1990  MRN: [id number]    Date of Admission: 2024  Primary Care Physician: Cuong Parks MD    Subjective   Subjective     CC:  CVA     HPI:  Patient doing ok but is drowsy.  is at bedside. VSS. Reports no changes or new complaints today. Hopeful to go to rehab soon    ROS  As above      Objective   Objective     Vital Signs:   Temp:  [96.2 °F (35.7 °C)-98.4 °F (36.9 °C)] 96.2 °F (35.7 °C)  Heart Rate:  [] 95  Resp:  [16-20] 16  BP: (130-163)/() 133/92  Flow (L/min):  [2-4] 2     Physical Exam:  GEN: NAD, resting in bed, awake but drowsy   HEENT: , atraumatic, normocephalic  NECK: supple, no masses  RESP: on nc, normal effort  CV: on tele, sinus rhythm  PSYCH: normal affect, appropriate  NEURO: awake, alert, no focal deficits noted  MSK: no edema noted  SKIN: no rashes noted       Results Reviewed:  LAB RESULTS:      Lab 24  0459 24  2112 24  1325 24  0542 24  2244 24  1929 24  1548 24  1143 24  0833 24  0611 24  0102 245 24   WBC 15.37*  --   --  20.36*  --   --   --   --  16.61*  --   --  11.16*  --   --    HEMOGLOBIN 15.6  --   --  14.7  --   --   --   --  14.9  --   --  16.1*  --   --    HEMOGLOBIN, POC  --   --   --   --   --   --   --   --   --   --   --   --   --  12.2   HEMATOCRIT 47.0*  --   --  46.1  --   --   --   --  44.2  --   --  47.2*  --   --    HEMATOCRIT POC  --   --   --   --   --   --   --   --   --   --   --   --   --  36*   PLATELETS 346  --   --  374  --   --   --   --  340  --   --  417  --   --    NEUTROS ABS 10.82*  --   --  16.06*  --   --   --   --  15.03*  --   --  7.05*  --   --    IMMATURE GRANS (ABS) 0.07*  --   --  0.18*  --   --   --   --  0.12*  --   --  0.07*  --   --    LYMPHS ABS 3.30*  --   --  2.82  --   --   --   --  0.91  --   --   2.77  --   --    MONOS ABS 1.06*  --   --  1.24*  --   --   --   --  0.51  --   --  0.85  --   --    EOS ABS 0.05  --   --  0.01  --   --   --   --  0.00  --   --  0.33  --   --    .1*  --   --  103.4*  --   --   --   --  97.8*  --   --  97.7*  --   --    PROCALCITONIN 0.21  --   --  0.15  --   --   --   --   --   --   --   --   --   --    LACTATE  --   --   --   --   --  1.9  --  4.2* 3.9* 3.4* 3.5*  --   --   --    PROTIME  --   --   --   --   --   --   --   --  14.9*  --   --   --  12.6*  --    APTT  --   --   --   --   --   --   --   --  23.2*  --   --  25.3  --   --    HEPARIN ANTI-XA 0.58 0.33 0.24* 0.10* 0.10*  --    < >  --  0.10*  --   --   --   --   --     < > = values in this interval not displayed.         Lab 08/27/24  0358 08/26/24  0459 08/25/24  0542 08/24/24 0102 08/23/24 2032   SODIUM 139 138 144 141  --    POTASSIUM 4.5 4.3 4.3 4.4  --    CHLORIDE 105 105 108* 105  --    CO2 20.0* 20.0* 22.0 20.0*  --    ANION GAP 14.0 13.0 14.0 16.0*  --    BUN 23* 17 17 16  --    CREATININE 0.99 0.92 1.15* 1.39* 0.80   EGFR 77.9 85.0 65.0 51.8* 100.5   GLUCOSE 144* 106* 143* 206*  --    CALCIUM 9.4 9.3 9.0 9.1  --    MAGNESIUM  --  2.3 2.0  --   --    HEMOGLOBIN A1C  --   --   --  6.70*  --          Lab 08/24/24 0102 08/23/24 2055   TOTAL PROTEIN 6.4  --    ALBUMIN 3.7  --    GLOBULIN 2.7  --    ALT (SGPT) 28 15   AST (SGOT) 53* 18   BILIRUBIN 0.6  --    BETA 2 GLYCO 1 IGG <9  --    BETA 2 GLYCO 1 IGA <9  --    BETA 2 GLYCO 1 IGM <9  --    ALK PHOS 60  --          Lab 08/25/24  0542 08/24/24  0833 08/24/24 0102 08/23/24 2055 08/23/24 2036   PROBNP 1,011.0*  --   --   --   --    HSTROP T  --   --  73* 64*  --    PROTIME  --  14.9*  --   --  12.6*   INR  --  1.16*  --   --  1.1         Lab 08/24/24 0102   CHOLESTEROL 177   LDL CHOL 115*   HDL CHOL 31*   TRIGLYCERIDES 175*             Lab 08/25/24  0538 08/24/24  1751   PH, ARTERIAL 7.450 7.396   PCO2, ARTERIAL 36.2 39.0   PO2 ART 62.9* 83.2   FIO2  28 30   HCO3 ART 25.1 23.9   BASE EXCESS ART 1.4 -0.8*   CARBOXYHEMOGLOBIN 1.7 1.4     Brief Urine Lab Results  (Last result in the past 365 days)        Color   Clarity   Blood   Leuk Est   Nitrite   Protein   CREAT   Urine HCG        08/24/24 0124 Yellow   Clear   Negative   Negative   Negative   100 mg/dL (2+)           08/24/24 0124               Negative               Microbiology Results Abnormal       Procedure Component Value - Date/Time    MRSA Screen, PCR (Inpatient) - Swab, Nares [[id number]]  (Normal) Collected: 08/24/24 0109    Lab Status: Final result Specimen: Swab from Nares Updated: 08/24/24 0728     MRSA PCR Negative    Narrative:      The negative predictive value of this diagnostic test is high and should only be used to consider de-escalating anti-MRSA therapy. A positive result may indicate colonization with MRSA and must be correlated clinically.  MRSA Negative    Respiratory Panel PCR w/COVID-19(SARS-CoV-2) KAVEH/BRIAN/DOROTHEA/PAD/COR/VASYL In-House, NP Swab in UTM/VTM, 2 HR TAT - Swab, Nasopharynx [[id number]]  (Normal) Collected: 08/24/24 0109    Lab Status: Final result Specimen: Swab from Nasopharynx Updated: 08/24/24 0242     ADENOVIRUS, PCR Not Detected     Coronavirus 229E Not Detected     Coronavirus HKU1 Not Detected     Coronavirus NL63 Not Detected     Coronavirus OC43 Not Detected     COVID19 Not Detected     Human Metapneumovirus Not Detected     Human Rhinovirus/Enterovirus Not Detected     Influenza A PCR Not Detected     Influenza B PCR Not Detected     Parainfluenza Virus 1 Not Detected     Parainfluenza Virus 2 Not Detected     Parainfluenza Virus 3 Not Detected     Parainfluenza Virus 4 Not Detected     RSV, PCR Not Detected     Bordetella pertussis pcr Not Detected     Bordetella parapertussis PCR Not Detected     Chlamydophila pneumoniae PCR Not Detected     Mycoplasma pneumo by PCR Not Detected    Narrative:      In the setting of a positive respiratory panel with a viral infection  PLUS a negative procalcitonin without other underlying concern for bacterial infection, consider observing off antibiotics or discontinuation of antibiotics and continue supportive care. If the respiratory panel is positive for atypical bacterial infection (Bordetella pertussis, Chlamydophila pneumoniae, or Mycoplasma pneumoniae), consider antibiotic de-escalation to target atypical bacterial infection.            CT Head Without Contrast    Result Date: 8/27/2024  CT HEAD WO CONTRAST Date of Exam: 8/27/2024 4:24 PM EDT Indication: drowsiness, stroke follow up. Comparison: August 24, 2024 Technique: Axial CT images were obtained of the head without contrast administration.  Automated exposure control and iterative construction methods were used. Findings: Evolving subacute infarcts within the right basal ganglia, right temporal, and occipital lobes are redemonstrated. There is no hemorrhagic transformation. There is minimal mass effect on the right lateral ventricle, but no midline shift. The basal cisterns appear patent. The calvarium appears intact. The paranasal sinuses and mastoid air cells are well-aerated.     Impression: Impression: Evolving right-sided subacute infarcts most prominent within right basal ganglia. No hemorrhagic transformation or acute herniation. Electronically Signed: Kali Palmer MD  8/27/2024 4:39 PM EDT  Workstation ID: [id number]     Results for orders placed during the hospital encounter of 08/23/24    Adult Transthoracic Echo Complete W/ Cont if Necessary Per Protocol (With Agitated Saline)    Interpretation Summary  Images from the original result were not included.      Left ventricular systolic function is moderately decreased. Calculated left ventricular EF = 36.7%  global hypokinesis.  LV cavity mildly dilated.  RV size and function normal    Left ventricular wall thickness is consistent with hypertrophy.    Left ventricular diastolic dysfunction is noted.    There is a thrombus  "present in the left ventricle in apex. 2.22 cm by 0.96 cm    The left atrial cavity is dilated.    Estimated right ventricular systolic pressure from tricuspid regurgitation is normal (<35 mmHg).    Indeterminate bubble study      Current medications:  Scheduled Meds:amantadine, 100 mg, Oral, Q12H  apixaban, 5 mg, Oral, Q12H  atorvastatin, 80 mg, Oral, Nightly  empagliflozin, 10 mg, Oral, Daily  insulin glargine, 10 Units, Subcutaneous, Daily  insulin lispro, 2-7 Units, Subcutaneous, 4x Daily AC & at Bedtime  nebivolol, 10 mg, Oral, Q24H  pantoprazole, 40 mg, Oral, Q AM  sacubitril-valsartan, 1 tablet, Oral, Q12H      Continuous Infusions:dexmedetomidine, 0.2-1.5 mcg/kg/hr  niCARdipine, 5-15 mg/hr, Last Rate: 5 mg/hr (08/25/24 0812)      PRN Meds:.  acetaminophen    acetaminophen    dextrose    dextrose    glucagon (human recombinant)    hydrALAZINE    nitroglycerin    Assessment & Plan   Assessment & Plan     Active Hospital Problems    Diagnosis  POA    **R M1 stenosis [I63.511]  Yes    Acute systolic heart failure [I50.21]  Unknown    Left ventricular thrombus [I51.3]  Unknown    Acute stroke due to ischemia [I63.9]  Yes    T2DM [E11.9]  Yes    HTN [I10]  Yes    Morbid obesity with BMI of 45.0-49.9, adult [E66.01, Z68.42]  Not Applicable    SCOTT [G47.33]  Yes    Hypertensive urgency [I16.0]  Yes    R/O Seizure [R56.9]  Unknown    Acute respiratory failure [J96.00]  Unknown      Resolved Hospital Problems   No resolved problems to display.        Brief Hospital Course to date:  Keya Hamilton is a \"34-year-old woman with diabetes, hypertension, right renal atrophy who presented with somnolence, dysarthria, left hemiparesis.  Imaging revealed a right middle cerebral artery perfusion deficit and severe stenosis of the right M1 segment.  Blood pressure was elevated and treated with labetalol and hydralazine.  She was then given TNKase.  She required intubation in the emergency room.  She was extubated on August 25.  " "Echo revealed an EF of 36.7% with clot in the left ventricle apex.  She was placed on a heparin drip which was transitioned to Eliquis on 8/26/24. She is heterozygous for factor V Leiden. Her hemoglobin A1c is 6.7.  She passed her speech assessment and diet has been ordered. Urine drug screen was positive for methamphetamine and marijuana.  In addition she does vape.\"    R MCA stroke, RM1 occlusion, s/p TNK and mechanical thrombectomy  --stroke following, suspected cardioembolic in nature (LV thrombus found on ECHO)  --MRI with acute ischemic stroke in R putamen and head of caudate  --continue eliquis, was previously transitioned from heparin gtt   --RAMU noted with LV thrombus, left atrial cavity dilation, bubble study indeterminant EF 36%- cardiology is following  --continue statin, normalized BP goals  --PT/OT/SLP- recommending rehab, patient agreeable    LV thrombus   Factor V leiden  --eliquis  --cardiology on board  --patient noted heterozygous for factor V leiden, additional anticoag labs pending    Substance use- UDS + meth/THC  --cessation counseling, monitor    Previous h/o noncompliance       Somnolence   --amantadine started   --CT h done 8/27 unchanged         Expected Discharge Location and Transportation: rehab  Expected Discharge   Expected Discharge Date: 8/29/2024; Expected Discharge Time:      VTE Prophylaxis:  Pharmacologic & mechanical VTE prophylaxis orders are present.         AM-PAC 6 Clicks Score (PT): 15 (08/27/24 2006)    CODE STATUS:   Code Status and Medical Interventions: CPR (Attempt to Resuscitate); Full Support   Ordered at: 08/24/24 0808     Code Status (Patient has no pulse and is not breathing):    CPR (Attempt to Resuscitate)     Medical Interventions (Patient has pulse or is breathing):    Full Support       Tianna Coronel MD  08/28/24      "

## 2024-08-28 NOTE — PROGRESS NOTES
[facility]    Inpatient Progress Note      Chief Complaint/Reason for service:    HF         Subjective:       Patient resting in bed.  No complaints.  Denies shortness of breath, palpitations or chest pain.    Past medical, surgical, social and family history reviewed in the patient's electronic medical record.         Objective:      Infusions:  dexmedetomidine, 0.2-1.5 mcg/kg/hr  niCARdipine, 5-15 mg/hr, Last Rate: 5 mg/hr (08/25/24 0812)         Medications:    Current Facility-Administered Medications:     acetaminophen (TYLENOL) suppository 650 mg, 650 mg, Rectal, Q6H PRN, Russ Gorman PA-C, 650 mg at 08/25/24 0509    acetaminophen (TYLENOL) tablet 650 mg, 650 mg, Oral, Q6H PRN, Quynh Jovel APRNEAL, 650 mg at 08/28/24 0136    amantadine (SYMMETREL) capsule 100 mg, 100 mg, Oral, Q12H, Catrina Mars APRN, 100 mg at 08/27/24 2251    apixaban (ELIQUIS) tablet 5 mg, 5 mg, Oral, Q12H, Katerin Barney MD, 5 mg at 08/27/24 2251    atorvastatin (LIPITOR) tablet 80 mg, 80 mg, Oral, Nightly, Katerin Barney MD, 80 mg at 08/27/24 2251    cefTRIAXone (ROCEPHIN) 2,000 mg in sodium chloride 0.9 % 100 mL MBP, 2,000 mg, Intravenous, Q24H, Cinthia Banegas MD, Last Rate: 200 mL/hr at 08/27/24 0808, 2,000 mg at 08/27/24 0808    dexmedetomidine (PRECEDEX) 400 mcg in 100 mL NS infusion, 0.2-1.5 mcg/kg/hr, Intravenous, Titrated, Cinthia Banegas MD    dextrose (D50W) (25 g/50 mL) IV injection 25 g, 25 g, Intravenous, Q15 Min PRN, Blayne Haddad MD    dextrose (GLUTOSE) oral gel 15 g, 15 g, Oral, Q15 Min PRN, Blayne Haddad MD    empagliflozin (JARDIANCE) tablet 10 mg, 10 mg, Oral, Daily, Dion Hooker MD, 10 mg at 08/27/24 0943    glucagon (GLUCAGEN) injection 1 mg, 1 mg, Intramuscular, Q15 Min PRN, Blayne Haddad MD    hydrALAZINE (APRESOLINE) injection 10 mg, 10 mg, Intravenous, Q6H PRN, Quynh Jovel APRN, 10 mg at 08/26/24  0218    insulin glargine (LANTUS, SEMGLEE) injection 10 Units, 10 Units, Subcutaneous, Daily, Blayne Haddad MD, 10 Units at 08/27/24 0807    Insulin Lispro (humaLOG) injection 2-7 Units, 2-7 Units, Subcutaneous, 4x Daily AC & at Bedtime, Blayne Haddad MD, 2 Units at 08/27/24 1204    nebivolol (BYSTOLIC) tablet 10 mg, 10 mg, Oral, Q24H, Dion Hooker MD    niCARdipine (CARDENE) 25mg in 250mL NS infusion, 5-15 mg/hr, Intravenous, Titrated, Stanton Alonso APRN, Last Rate: 50 mL/hr at 08/25/24 0812, 5 mg/hr at 08/25/24 0812    nitroglycerin (NITROSTAT) SL tablet 0.4 mg, 0.4 mg, Sublingual, Q5 Min PRN, Olayinka, May, APRN    pantoprazole (PROTONIX) EC tablet 40 mg, 40 mg, Oral, Q AM, Katerin Barney MD    sacubitril-valsartan (ENTRESTO) 49-51 MG tablet 1 tablet, 1 tablet, Oral, Q12H, Dion Hooker MD, 1 tablet at 08/27/24 3341    Vital Sign Min/Max for last 24 hours  Temp  Min: 97.5 °F (36.4 °C)  Max: 98.4 °F (36.9 °C)   BP  Min: 133/100  Max: 163/106   Pulse  Min: 86  Max: 101   Resp  Min: 18  Max: 20   SpO2  Min: 91 %  Max: 98 %   No data recorded      Intake/Output Summary (Last 24 hours) at 8/28/2024 0804  Last data filed at 8/27/2024 1000  Gross per 24 hour   Intake 200 ml   Output --   Net 200 ml           CONSTITUTIONAL: No acute distress    Labs/studies:  Available lab and imaging results were reviewed by myself today    Results for orders placed during the hospital encounter of 08/23/24    Adult Transthoracic Echo Complete W/ Cont if Necessary Per Protocol (With Agitated Saline)    Interpretation Summary  Images from the original result were not included.      Left ventricular systolic function is moderately decreased. Calculated left ventricular EF = 36.7%  global hypokinesis.  LV cavity mildly dilated.  RV size and function normal    Left ventricular wall thickness is consistent with hypertrophy.    Left ventricular diastolic dysfunction is noted.    There is a thrombus present  in the left ventricle in apex. 2.22 cm by 0.96 cm    The left atrial cavity is dilated.    Estimated right ventricular systolic pressure from tricuspid regurgitation is normal (<35 mmHg).    Indeterminate bubble study      Tele: Sinus rhythm         Assessment/Plan:         R M1 stenosis    T2DM    HTN    Morbid obesity with BMI of 45.0-49.9, adult    SCOTT    Hypertensive urgency    R/O Seizure    Acute respiratory failure    Acute stroke due to ischemia    Acute systolic heart failure    Left ventricular thrombus       ASSESSMENT:  LV thrombus  Echocardiogram shows decreased LVEF 36%, LV thrombus, indeterminate bubble study.   Acute HFrEF, severe exacerbation  New diagnosis.  Unclear etiology  Possible contributing causes include recent pneumonia (returned from cruise from [place], given antibiotics), positive meth and marijuana and UDS  LVEF 36% with global hypokinesis on echo.   CVA secondary to right MCA occlusion   Status post thrombolytic therapy, TNK  Status post urgent mechanical thrombectomy, right MCA  Hypertension   Type 2 diabetes mellitus   ANA  Acute respiratory failure  Suspected sleep apnea   Substance abuse   UDS positive for THC, methamphetamines (possibly laced marijuana?, no known history of meth use per patient's )    PLAN:  Apixaban for LV thrombus  Deferring Lasix today  Switched Toprol to Bystolic for additional anti-HTN effect  Continue Jardiance, Entresto   Will consider adding spironolactone, but K+ may be too high with increased Entresto  Daily BMP while admitted; monitoring Cr and K+ with new meds  Continue statin   Repeat limited echo tomorrow for EF.  Possibly okay for discharge home tomorrow from cardiology standpoint if labs stable.    Sleep med referral/sleep study/outpatient SCOTT management  Future ischemic evaluation (cardiac CTA) and possible cardiac MRI    Electronically signed by MICHELLE Do, 08/28/24, 2:14 PM EDT.

## 2024-08-28 NOTE — PLAN OF CARE
Goal Outcome Evaluation:              Outcome Evaluation: SLP continues to follow for speech/cognitive communication/and swallowing.  Spouse is supportive and very helpful.  Ongoing ST services indicated. Pt is not appropriate for diet increase at this time. Plan of care revised based on today's treatment.      Anticipated Discharge Disposition (SLP): inpatient rehabilitation facility, anticipate therapy at next level of care             Treatment Assessment (SLP): improved, dysarthria, cognitive-linguistic disorder (08/28/24 1430)  Treatment Assessment Comments (SLP): Pt slow to wake. Initially required repeat cues to maintain alertness, however as the session continued, pt was able to keep eyes open. Poor eye contact with speaker.  Beginning to look to the left- past midline. Able to implement compensatory strategies for improved speech production/clarity but cues required to maintain. Impaired cognitive linguistic function- attention, pragmatics, mental flexibility, and divergent thinking.  Lethargy persists and is impacting diet advancement. Pt endorses eating is tiring and she is in agreement that she is not ready for diet increase. (08/28/24 1430)  Plan for Continued Treatment (SLP): continue treatment per plan of care (08/28/24 1430)

## 2024-08-29 ENCOUNTER — APPOINTMENT (OUTPATIENT)
Dept: CARDIOLOGY | Facility: HOSPITAL | Age: 34
DRG: 023 | End: 2024-08-29
Payer: COMMERCIAL

## 2024-08-29 LAB
ANION GAP SERPL CALCULATED.3IONS-SCNC: 13 MMOL/L (ref 5–15)
APTT SCREEN TO CONFIRM RATIO: 1.09 RATIO (ref 0–1.34)
BH CV ECHO MEAS - AO ROOT DIAM: 4 CM
BH CV ECHO MEAS - EF(MOD-BP): 26.9 %
BH CV ECHO MEAS - EF(MOD-SP2): 31.6 %
BH CV ECHO MEAS - EF(MOD-SP4): 31.3 %
BH CV ECHO MEAS - IVS/LVPW: 1 CM
BH CV ECHO MEAS - IVSD: 1.3 CM
BH CV ECHO MEAS - LA DIMENSION: 4.1 CM
BH CV ECHO MEAS - LVIDD: 6.2 CM
BH CV ECHO MEAS - LVIDS: 5.4 CM
BH CV ECHO MEAS - LVOT AREA: 3.1 CM2
BH CV ECHO MEAS - LVOT DIAM: 2.1 CM
BH CV ECHO MEAS - LVPWD: 1.3 CM
BH CV VAS BP RIGHT ARM: NORMAL MMHG
BUN SERPL-MCNC: 22 MG/DL (ref 6–20)
BUN/CREAT SERPL: 21.4 (ref 7–25)
CALCIUM SPEC-SCNC: 9.7 MG/DL (ref 8.6–10.5)
CHLORIDE SERPL-SCNC: 105 MMOL/L (ref 98–107)
CO2 SERPL-SCNC: 22 MMOL/L (ref 22–29)
CONFIRM APTT/NORMAL: 40.1 SEC (ref 0–47.6)
CREAT SERPL-MCNC: 1.03 MG/DL (ref 0.57–1)
DRVVT SCREEN TO CONFIRM RATIO: 1 RATIO (ref 0.8–1.2)
EGFRCR SERPLBLD CKD-EPI 2021: 74.2 ML/MIN/1.73
FACT V ACT/NOR PPP: 105 % (ref 70–150)
GLUCOSE BLDC GLUCOMTR-MCNC: 128 MG/DL (ref 70–130)
GLUCOSE BLDC GLUCOMTR-MCNC: 131 MG/DL (ref 70–130)
GLUCOSE BLDC GLUCOMTR-MCNC: 138 MG/DL (ref 70–130)
GLUCOSE BLDC GLUCOMTR-MCNC: 160 MG/DL (ref 70–130)
GLUCOSE SERPL-MCNC: 130 MG/DL (ref 65–99)
LA 2 SCREEN W REFLEX-IMP: ABNORMAL
MIXING DRVVT: 44.8 SEC (ref 0–40.4)
POTASSIUM SERPL-SCNC: 4.6 MMOL/L (ref 3.5–5.2)
PROT C ACT/NOR PPP: 156 % (ref 73–180)
PROT C AG ACT/NOR PPP IA: 117 % (ref 60–150)
PROT S FREE AG ACT/NOR PPP IA: 138 % (ref 61–136)
PS IGA SER-ACNC: 1 APS UNITS (ref 0–19)
PS IGA SER-ACNC: <1 APS UNITS (ref 0–19)
PS IGG SER-ACNC: 9 UNITS (ref 0–30)
PS IGG SER-ACNC: <9 UNITS (ref 0–30)
PS IGM SER-ACNC: 13 UNITS (ref 0–30)
PS IGM SER-ACNC: <10 UNITS (ref 0–30)
QT INTERVAL: 376 MS
QTC INTERVAL: 503 MS
SCREEN APTT: 32.6 SEC (ref 0–43.5)
SCREEN DRVVT: 58.8 SEC (ref 0–47)
SODIUM SERPL-SCNC: 140 MMOL/L (ref 136–145)
THROMBIN TIME: 16.4 SEC (ref 0–23)

## 2024-08-29 PROCEDURE — 82948 REAGENT STRIP/BLOOD GLUCOSE: CPT

## 2024-08-29 PROCEDURE — 93325 DOPPLER ECHO COLOR FLOW MAPG: CPT | Performed by: INTERNAL MEDICINE

## 2024-08-29 PROCEDURE — 93308 TTE F-UP OR LMTD: CPT

## 2024-08-29 PROCEDURE — 99232 SBSQ HOSP IP/OBS MODERATE 35: CPT | Performed by: INTERNAL MEDICINE

## 2024-08-29 PROCEDURE — 99232 SBSQ HOSP IP/OBS MODERATE 35: CPT | Performed by: NURSE PRACTITIONER

## 2024-08-29 PROCEDURE — 63710000001 INSULIN GLARGINE PER 5 UNITS: Performed by: INTERNAL MEDICINE

## 2024-08-29 PROCEDURE — 63710000001 INSULIN LISPRO (HUMAN) PER 5 UNITS: Performed by: INTERNAL MEDICINE

## 2024-08-29 PROCEDURE — 93325 DOPPLER ECHO COLOR FLOW MAPG: CPT

## 2024-08-29 PROCEDURE — 80048 BASIC METABOLIC PNL TOTAL CA: CPT | Performed by: INTERNAL MEDICINE

## 2024-08-29 PROCEDURE — 25010000002 SULFUR HEXAFLUORIDE MICROSPH 60.7-25 MG RECONSTITUTED SUSPENSION: Performed by: HOSPITALIST

## 2024-08-29 PROCEDURE — 99232 SBSQ HOSP IP/OBS MODERATE 35: CPT | Performed by: HOSPITALIST

## 2024-08-29 PROCEDURE — 93308 TTE F-UP OR LMTD: CPT | Performed by: INTERNAL MEDICINE

## 2024-08-29 RX ADMIN — APIXABAN 5 MG: 5 TABLET, FILM COATED ORAL at 20:37

## 2024-08-29 RX ADMIN — NEBIVOLOL 10 MG: 5 TABLET ORAL at 08:34

## 2024-08-29 RX ADMIN — INSULIN LISPRO 2 UNITS: 100 INJECTION, SOLUTION INTRAVENOUS; SUBCUTANEOUS at 17:18

## 2024-08-29 RX ADMIN — AMANTADINE HYDROCHLORIDE 100 MG: 100 CAPSULE ORAL at 20:37

## 2024-08-29 RX ADMIN — ATORVASTATIN CALCIUM 80 MG: 40 TABLET, FILM COATED ORAL at 20:37

## 2024-08-29 RX ADMIN — SACUBITRIL AND VALSARTAN 1 TABLET: 49; 51 TABLET, FILM COATED ORAL at 20:37

## 2024-08-29 RX ADMIN — SULFUR HEXAFLUORIDE 5 ML: KIT at 09:06

## 2024-08-29 RX ADMIN — INSULIN GLARGINE 10 UNITS: 100 INJECTION, SOLUTION SUBCUTANEOUS at 08:34

## 2024-08-29 RX ADMIN — APIXABAN 5 MG: 5 TABLET, FILM COATED ORAL at 08:34

## 2024-08-29 RX ADMIN — SACUBITRIL AND VALSARTAN 1 TABLET: 49; 51 TABLET, FILM COATED ORAL at 08:34

## 2024-08-29 RX ADMIN — EMPAGLIFLOZIN 10 MG: 10 TABLET, FILM COATED ORAL at 08:34

## 2024-08-29 RX ADMIN — AMANTADINE HYDROCHLORIDE 100 MG: 100 CAPSULE ORAL at 08:34

## 2024-08-29 NOTE — CONSULTS
Diabetes Education    Patient Name:  Keya Hamilton  YOB: 1990  MRN: [id number]  Admit Date:  8/23/2024        Consult received for diabetes education. Chart reviewed. A1c is 6.7%. No medications for diabetes at this time. Met with patient and  at bedside. Patient knew she had prediabetes and was told to make lifestyle changes. She does not check her blood sugar.   Patient was seen for new diagnosis of type 2 diabetes. Discussed and taught patient about type 2 diabetes self-management, risk factors, and importance of blood glucose control to reduce complications. Target blood glucose readings and A1c goals per ADA were reviewed. Reviewed with patient current A1c and discussed its significance. Signs, symptoms and treatment of hyperglycemia and hypoglycemia were discussed. Lifestyle changes such as physical activity with MD approval and healthy eating were encouraged.  OP education was also encouraged for additional education once discharged. Patient to follow up with PCP.  Patient does not qualify for the follow up stroke class based on the exclusion criteria of discharge to an acute care facility.     Electronically signed by:  Krista Alberto RN  08/29/24 13:00 EDT

## 2024-08-29 NOTE — PROGRESS NOTES
[facility] Medicine Services  PROGRESS NOTE    Patient Name: Keya Hamilton  : 1990  MRN: [id number]    Date of Admission: 2024  Primary Care Physician: Cuong Parks MD    Subjective   Subjective     CC:  CVA     HPI:  Drowsy, sleeping through my visit.  at bedside reports patient has been sleeping essentially most of the time- was awake for a couple hours last night but other than that sleeping. Nursing reports no new issues. Anitcipating going to [facility] in the coming days for rehab     ROS  As above      Objective   Objective     Vital Signs:   Temp:  [97.4 °F (36.3 °C)-98.9 °F (37.2 °C)] 97.6 °F (36.4 °C)  Heart Rate:  [89-96] 89  Resp:  [17-18] 18  BP: (127-136)/(90-97) 136/96  Flow (L/min):  [2] 2     Physical Exam:  GEN: sleeping in bed throughout visit   HEENT: , atraumatic, normocephalic  NECK: supple, no masses  RESP: on nc, normal effort  CV: on tele, sinus rhythm  NEURO: no focal def appreciated, sleeping throughout visit   MSK: no edema noted  SKIN: no rashes noted       Results Reviewed:  LAB RESULTS:      Lab 24  0459 24  2112 24  1325 24  0542 24  2244 24  1929 24  1548 24  1143 24  0833 24  0611 24  0102 245 24   WBC 15.37*  --   --  20.36*  --   --   --   --  16.61*  --   --  11.16*  --   --    HEMOGLOBIN 15.6  --   --  14.7  --   --   --   --  14.9  --   --  16.1*  --   --    HEMOGLOBIN, POC  --   --   --   --   --   --   --   --   --   --   --   --   --  12.2   HEMATOCRIT 47.0*  --   --  46.1  --   --   --   --  44.2  --   --  47.2*  --   --    HEMATOCRIT POC  --   --   --   --   --   --   --   --   --   --   --   --   --  36*   PLATELETS 346  --   --  374  --   --   --   --  340  --   --  417  --   --    NEUTROS ABS 10.82*  --   --  16.06*  --   --   --   --  15.03*  --   --  7.05*  --   --    IMMATURE GRANS (ABS) 0.07*  --   --  0.18*  --    --   --   --  0.12*  --   --  0.07*  --   --    LYMPHS ABS 3.30*  --   --  2.82  --   --   --   --  0.91  --   --  2.77  --   --    MONOS ABS 1.06*  --   --  1.24*  --   --   --   --  0.51  --   --  0.85  --   --    EOS ABS 0.05  --   --  0.01  --   --   --   --  0.00  --   --  0.33  --   --    .1*  --   --  103.4*  --   --   --   --  97.8*  --   --  97.7*  --   --    PROCALCITONIN 0.21  --   --  0.15  --   --   --   --   --   --   --   --   --   --    LACTATE  --   --   --   --   --  1.9  --  4.2* 3.9* 3.4* 3.5*  --   --   --    PROTIME  --   --   --   --   --   --   --   --  14.9*  --   --   --  12.6*  --    APTT  --   --   --   --   --   --   --   --  23.2*  --   --  25.3  --   --    HEPARIN ANTI-XA 0.58 0.33 0.24* 0.10* 0.10*  --    < >  --  0.10*  --   --   --   --   --     < > = values in this interval not displayed.         Lab 08/28/24  1537 08/27/24  0358 08/26/24  0459 08/25/24  0542 08/24/24  0102   SODIUM 140 139 138 144 141   POTASSIUM 4.7 4.5 4.3 4.3 4.4   CHLORIDE 105 105 105 108* 105   CO2 20.0* 20.0* 20.0* 22.0 20.0*   ANION GAP 15.0 14.0 13.0 14.0 16.0*   BUN 23* 23* 17 17 16   CREATININE 1.03* 0.99 0.92 1.15* 1.39*   EGFR 74.2 77.9 85.0 65.0 51.8*   GLUCOSE 168* 144* 106* 143* 206*   CALCIUM 10.0 9.4 9.3 9.0 9.1   MAGNESIUM  --   --  2.3 2.0  --    HEMOGLOBIN A1C  --   --   --   --  6.70*         Lab 08/26/24  1800 08/24/24  0102 08/23/24 2055   TOTAL PROTEIN  --  6.4  --    ALBUMIN  --  3.7  --    GLOBULIN  --  2.7  --    ALT (SGPT)  --  28 15   AST (SGOT)  --  53* 18   BILIRUBIN  --  0.6  --    BETA 2 GLYCO 1 IGG <9 <9  --    BETA 2 GLYCO 1 IGA <9 <9  --    BETA 2 GLYCO 1 IGM <9 <9  --    ALK PHOS  --  60  --          Lab 08/25/24  0542 08/24/24  0833 08/24/24  0102 08/23/24 2055 08/23/24 2036   PROBNP 1,011.0*  --   --   --   --    HSTROP T  --   --  73* 64*  --    PROTIME  --  14.9*  --   --  12.6*   INR  --  1.16*  --   --  1.1         Lab 08/24/24 0102   CHOLESTEROL 177   LDL CHOL  115*   HDL CHOL 31*   TRIGLYCERIDES 175*             Lab 08/25/24  0538 08/24/24  1751   PH, ARTERIAL 7.450 7.396   PCO2, ARTERIAL 36.2 39.0   PO2 ART 62.9* 83.2   FIO2 28 30   HCO3 ART 25.1 23.9   BASE EXCESS ART 1.4 -0.8*   CARBOXYHEMOGLOBIN 1.7 1.4     Brief Urine Lab Results  (Last result in the past 365 days)        Color   Clarity   Blood   Leuk Est   Nitrite   Protein   CREAT   Urine HCG        08/24/24 0124 Yellow   Clear   Negative   Negative   Negative   100 mg/dL (2+)           08/24/24 0124               Negative               Microbiology Results Abnormal       Procedure Component Value - Date/Time    MRSA Screen, PCR (Inpatient) - Swab, Nares [[id number]]  (Normal) Collected: 08/24/24 0109    Lab Status: Final result Specimen: Swab from Nares Updated: 08/24/24 0728     MRSA PCR Negative    Narrative:      The negative predictive value of this diagnostic test is high and should only be used to consider de-escalating anti-MRSA therapy. A positive result may indicate colonization with MRSA and must be correlated clinically.  MRSA Negative    Respiratory Panel PCR w/COVID-19(SARS-CoV-2) KAVEH/BRIAN/DOROTHEA/PAD/COR/VASYL In-House, NP Swab in UTM/VTM, 2 HR TAT - Swab, Nasopharynx [[id number]]  (Normal) Collected: 08/24/24 0109    Lab Status: Final result Specimen: Swab from Nasopharynx Updated: 08/24/24 0242     ADENOVIRUS, PCR Not Detected     Coronavirus 229E Not Detected     Coronavirus HKU1 Not Detected     Coronavirus NL63 Not Detected     Coronavirus OC43 Not Detected     COVID19 Not Detected     Human Metapneumovirus Not Detected     Human Rhinovirus/Enterovirus Not Detected     Influenza A PCR Not Detected     Influenza B PCR Not Detected     Parainfluenza Virus 1 Not Detected     Parainfluenza Virus 2 Not Detected     Parainfluenza Virus 3 Not Detected     Parainfluenza Virus 4 Not Detected     RSV, PCR Not Detected     Bordetella pertussis pcr Not Detected     Bordetella parapertussis PCR Not Detected      Chlamydophila pneumoniae PCR Not Detected     Mycoplasma pneumo by PCR Not Detected    Narrative:      In the setting of a positive respiratory panel with a viral infection PLUS a negative procalcitonin without other underlying concern for bacterial infection, consider observing off antibiotics or discontinuation of antibiotics and continue supportive care. If the respiratory panel is positive for atypical bacterial infection (Bordetella pertussis, Chlamydophila pneumoniae, or Mycoplasma pneumoniae), consider antibiotic de-escalation to target atypical bacterial infection.            CT Head Without Contrast    Result Date: 8/27/2024  CT HEAD WO CONTRAST Date of Exam: 8/27/2024 4:24 PM EDT Indication: drowsiness, stroke follow up. Comparison: August 24, 2024 Technique: Axial CT images were obtained of the head without contrast administration.  Automated exposure control and iterative construction methods were used. Findings: Evolving subacute infarcts within the right basal ganglia, right temporal, and occipital lobes are redemonstrated. There is no hemorrhagic transformation. There is minimal mass effect on the right lateral ventricle, but no midline shift. The basal cisterns appear patent. The calvarium appears intact. The paranasal sinuses and mastoid air cells are well-aerated.     Impression: Impression: Evolving right-sided subacute infarcts most prominent within right basal ganglia. No hemorrhagic transformation or acute herniation. Electronically Signed: Kali Palmer MD  8/27/2024 4:39 PM EDT  Workstation ID: [id number]     Results for orders placed during the hospital encounter of 08/23/24    Adult Transthoracic Echo Complete W/ Cont if Necessary Per Protocol (With Agitated Saline)    Interpretation Summary  Images from the original result were not included.      Left ventricular systolic function is moderately decreased. Calculated left ventricular EF = 36.7%  global hypokinesis.  LV cavity mildly  "dilated.  RV size and function normal    Left ventricular wall thickness is consistent with hypertrophy.    Left ventricular diastolic dysfunction is noted.    There is a thrombus present in the left ventricle in apex. 2.22 cm by 0.96 cm    The left atrial cavity is dilated.    Estimated right ventricular systolic pressure from tricuspid regurgitation is normal (<35 mmHg).    Indeterminate bubble study      Current medications:  Scheduled Meds:amantadine, 100 mg, Oral, Q12H  apixaban, 5 mg, Oral, Q12H  atorvastatin, 80 mg, Oral, Nightly  empagliflozin, 10 mg, Oral, Daily  insulin glargine, 10 Units, Subcutaneous, Daily  insulin lispro, 2-7 Units, Subcutaneous, 4x Daily AC & at Bedtime  nebivolol, 10 mg, Oral, Q24H  pantoprazole, 40 mg, Oral, Q AM  sacubitril-valsartan, 1 tablet, Oral, Q12H      Continuous Infusions:     PRN Meds:.  acetaminophen    acetaminophen    dextrose    dextrose    glucagon (human recombinant)    hydrALAZINE    nitroglycerin    Assessment & Plan   Assessment & Plan     Active Hospital Problems    Diagnosis  POA    **R M1 stenosis [I63.511]  Yes    Acute systolic heart failure [I50.21]  Unknown    Left ventricular thrombus [I51.3]  Unknown    Acute stroke due to ischemia [I63.9]  Yes    T2DM [E11.9]  Yes    HTN [I10]  Yes    Morbid obesity with BMI of 45.0-49.9, adult [E66.01, Z68.42]  Not Applicable    SCOTT [G47.33]  Yes    Hypertensive urgency [I16.0]  Yes    R/O Seizure [R56.9]  Unknown    Acute respiratory failure [J96.00]  Unknown      Resolved Hospital Problems   No resolved problems to display.        Brief Hospital Course to date:  Keya Hamilton is a \"34-year-old woman with diabetes, hypertension, right renal atrophy who presented with somnolence, dysarthria, left hemiparesis.  Imaging revealed a right middle cerebral artery perfusion deficit and severe stenosis of the right M1 segment.  Blood pressure was elevated and treated with labetalol and hydralazine.  She was then given " "TNKase.  She required intubation in the emergency room.  She was extubated on August 25.  Echo revealed an EF of 36.7% with clot in the left ventricle apex.  She was placed on a heparin drip which was transitioned to Eliquis on 8/26/24. She is heterozygous for factor V Leiden. Her hemoglobin A1c is 6.7.  She passed her speech assessment and diet has been ordered. Urine drug screen was positive for methamphetamine and marijuana.  In addition she does vape.\"    R MCA stroke, RM1 occlusion, s/p TNK and mechanical thrombectomy  --stroke following, suspected cardioembolic in nature (LV thrombus found on ECHO)  --MRI with acute ischemic stroke in R putamen and head of caudate  --continue eliquis, was previously transitioned from heparin gtt   --RAMU noted with LV thrombus, left atrial cavity dilation, bubble study indeterminant EF 36%- cardiology is following, limited ECHO repeated for EF done today- read pending   --continue statin, normalized BP goals  --PT/OT/SLP- recommending rehab, patient agreeable    LV thrombus   Factor V leiden  --eliquis  --cardiology on board  --patient noted heterozygous for factor V leiden, additional anticoag labs pending    Substance use- UDS + meth/THC  --cessation counseling, monitor    Previous h/o noncompliance       Somnolence   --amantadine started   --CT h done 8/27 unchanged         Expected Discharge Location and Transportation: rehab- has a bed at [facility] for tomorrow   Expected Discharge   Expected Discharge Date: 8/30/2024; Expected Discharge Time:      VTE Prophylaxis:  Pharmacologic & mechanical VTE prophylaxis orders are present.         AM-PAC 6 Clicks Score (PT): 15 (08/28/24 2003)    CODE STATUS:   Code Status and Medical Interventions: CPR (Attempt to Resuscitate); Full Support   Ordered at: 08/24/24 0808     Code Status (Patient has no pulse and is not breathing):    CPR (Attempt to Resuscitate)     Medical Interventions (Patient has pulse or is breathing):    Full Support "       Tianna Coronel MD  08/29/24

## 2024-08-29 NOTE — PROGRESS NOTES
Stroke Progress Note       Chief Complaint: Left-sided weakness    Subjective    Subjective     Subjective:    Sitting up on the side of the bed.  Much more awake today.   at bedside.  Hopes to go to rehab soon.    Review of Systems   Constitutional:  Negative for fever.   Eyes:  Positive for visual disturbance.   Gastrointestinal:  Negative for abdominal pain and nausea.   Neurological:  Positive for facial asymmetry, speech difficulty, weakness and numbness. Negative for dizziness and headaches.   Psychiatric/Behavioral:  Negative for confusion.           Objective      Temp:  [97.4 °F (36.3 °C)-98.9 °F (37.2 °C)] 97.6 °F (36.4 °C)  Heart Rate:  [89-96] 89  Resp:  [17-18] 18  BP: (127-136)/(90-97) 136/96        Objective    Physical Exam  Constitutional:       General: She is not in acute distress.     Appearance: She is obese.   HENT:      Head: Normocephalic and atraumatic.      Nose: Nose normal.      Mouth/Throat:      Mouth: Mucous membranes are moist.   Eyes:      Extraocular Movements: Extraocular movements intact.      Pupils: Pupils are equal, round, and reactive to light.   Cardiovascular:      Pulses: Normal pulses.   Pulmonary:      Effort: Pulmonary effort is normal.   Abdominal:      Comments: Round, obese   Musculoskeletal:         General: Normal range of motion.      Cervical back: Normal range of motion.   Skin:     General: Skin is warm and dry.      Capillary Refill: Capillary refill takes less than 2 seconds.   Neurological:      Mental Status: She is alert and oriented to person, place, and time.      Cranial Nerves: Cranial nerve deficit and dysarthria present.      Sensory: Sensory deficit present.      Motor: Weakness present.   Psychiatric:         Mood and Affect: Mood normal.         Behavior: Behavior normal.        Neurological Exam  Mental Status  Alert. Oriented to person, place and time. Oriented to person, place, and time. Mild dysarthria present. Expressive aphasia and  receptive aphasia present.    Cranial Nerves  CN II: Right visual acuity: Counts fingers. Left visual acuity: Counts fingers.  CN III, IV, VI: Extraocular movements intact bilaterally. Pupils equal round and reactive to light bilaterally.  CN V:  Right: Facial sensation is normal.  Left: Diminished sensation of the entire left side of the face.  CN VII:  Left: There is central facial weakness.  CN VIII: Hearing intact.  CN XI:  Right: Sternocleidomastoid strength is normal. Trapezius strength is normal.  Left: Sternocleidomastoid strength is weak. Trapezius strength is weak.  CN XII: Tongue midline without atrophy or fasciculations.    Motor  Normal muscle bulk throughout. Normal muscle tone. Strength is 5/5 in all four extremities except as noted.  Strength left upper extremity 2/5..    Sensory  Light touch abnormality: Sensation: Decrease sensitivity to left-sided body.     Coordination  Right: Finger-to-nose normal. Heel-to-shin normal.Left: Finger-to-nose abnormality: Heel-to-shin normal.  Left upper extremity weakness.    Gait    Unable to assess.       Results Review:    I reviewed the patient's new clinical results.      Lab Results   Component Value Date    GLUCOSE 168 (H) 08/28/2024    BUN 23 (H) 08/28/2024    CREATININE 1.03 (H) 08/28/2024     08/28/2024    K 4.7 08/28/2024     08/28/2024    CALCIUM 10.0 08/28/2024    PROTEINTOT 6.4 08/24/2024    ALBUMIN 3.7 08/24/2024    ALT 28 08/24/2024    AST 53 (H) 08/24/2024    ALKPHOS 60 08/24/2024    BILITOT 0.6 08/24/2024    GLOB 2.7 08/24/2024    AGRATIO 1.4 08/24/2024    BCR 22.3 08/28/2024    ANIONGAP 15.0 08/28/2024    EGFR 74.2 08/28/2024     Lipid Panel          8/24/2024    01:02   Lipid Panel   Total Cholesterol 177    Triglycerides 175    HDL Cholesterol 31    VLDL Cholesterol 31    LDL Cholesterol  115    LDL/HDL Ratio 3.58           Lab 08/24/24  0102   HEMOGLOBIN A1C 6.70*      CT Head Without Contrast    Result Date: 8/27/2024  Impression:  Evolving right-sided subacute infarcts most prominent within right basal ganglia. No hemorrhagic transformation or acute herniation. Electronically Signed: Kali Palmer MD  8/27/2024 4:39 PM EDT  Workstation ID: [id number]   Results for orders placed during the hospital encounter of 08/23/24    Adult Transthoracic Echo Complete W/ Cont if Necessary Per Protocol (With Agitated Saline)    Interpretation Summary  Images from the original result were not included.      Left ventricular systolic function is moderately decreased. Calculated left ventricular EF = 36.7%  global hypokinesis.  LV cavity mildly dilated.  RV size and function normal    Left ventricular wall thickness is consistent with hypertrophy.    Left ventricular diastolic dysfunction is noted.    There is a thrombus present in the left ventricle in apex. 2.22 cm by 0.96 cm    The left atrial cavity is dilated.    Estimated right ventricular systolic pressure from tricuspid regurgitation is normal (<35 mmHg).    Indeterminate bubble study    -MRI brain from 8-24-24 with an AIS in the R putamen and R head of caudate.   -LDL from 8-24-24 was 115  -A1c from 8-24-24 was 6.7  -Echo on 8-24-24 with concern for IV thrombus  -Repeated CTH on 8-24-24 with and evolving R BG AIS, no hemorrhage noted    Assessment/Plan   34-year-old white female, with multiple vascular risk factors who presented to [facility] ED via EMS on 8/23/2024 with R MCA syndrome.  Initial NIH 11.  CT head was negative for hemorrhage.  /106.  She was deemed a candidate for TNK which was given after BP was lowered to 167/105.  CTP with a large area of hypoperfusion in the R MCA territory.  CTA H/N with R M1 occlusion.  She was intubated in the ED secondary due to inability to protect her airway.  She was taken to the Cath Lab for mechanical thrombectomy and admitted to the ICU postprocedure.    Antiplatelet PTA: None  Anticoagulant PTA: None        # R MCA stroke, R M1 occlusion s/p TNK and MT,  TICI 3   # Cardiac intraventricular thrombus (cardiology following)  -Suspected cardioembolic in etiology in the setting of left ventricular thrombus  -Risk factors: Hypertension, hypertension lipidemia, obesity, diabetes, marijuana use, on progesterone(Mirena IUD)  -MRI with an AIS in the R putamen and R head of caudate.   -Continue Eliquis 5 mg twice daily.    -TTE with an LV thrombus, left atrial cavity dilation, EF 36.7 with global hypokinesis.  Bubble study was indeterminate.  -CTh 8/24-with evolving infarcts and no evidence of overt hemorrhage  -Hypercoag panel, factor V Leiden heterozygous on 8/24.  Elevated protein S Ag.   Recommend repeating hypercoagulable labs in 3 months.  -Continue to target sodium level 145-150.  Sodium 139 this a.m.  -HTN; SBP less than 140.  (Okay 130-150).  Management per primary team  -T2DM with hyperglycemia; A1c 6.7.  Glucose elevated on admission.  Diabetes education as appropriate.  Management per primary team.  -PT/OT/SLP; IPR recommended at DC  -Stroke clinic follow-up in 4 to 6 weeks after discharge  -Discussed the importance of medication compliance Atorvastatin 80mg nightly and Eliquis 5mg twice daily and lifestyle modifications adequate control of blood pressure, adequate control of cholesterol (goal LDL <70), adequate control of glucose (<140, A1c goal <7), smoking cessation, increased physical activity, and implementation of healthy diet to help reduce the risk of future cerebrovascular events.  Also discussed the signs symptoms that would warrant the patient return back to the emergency department including unilateral weakness, unilateral numbness, visual disturbances, loss of balance, speech difficulties, and/or a sudden severe headache.  Patient and  verbalized understanding.    #Drowsiness  -Much more alert today.  - Continue amantadine 100 mg twice daily  - CT head 8/27 stable with evolving infarcts.    #Marijuana use  #Substance abuse  - UDS positive for  methamphetamines, THC.  Patient's  reports that he is unsure where she came in contact with methamphetamines.  He reports that he knows that she smokes THC but was unaware of methamphetamine use.  Was concerned that marijuana was possibly laced with meth.  - Cessation counseling when appropriate     #Nicotine product use via vaping  -Daily vaping  -Cessation counseling when appropriate     #Morbid obesity  -BMI 49.44  -Complicates all aspects of care  -Counseling on healthy diet exercise and weight loss when appropriate.     #Medication noncompliance  -Discussed with patient and family importance of compliance with medication, verbalized understanding.    Plan discussed with Dr. Goodman, the patient, her , and nursing staff.  Stroke neurology will follow peripherally and see as needed.  Please call with any questions or concerns.    MICHELLE Ray, LIANAP-BC  Stroke Neurology

## 2024-08-29 NOTE — PAYOR COMM NOTE
"  [facility]# [id number]   Clinical Update    ALLI Polanco, RN  Utilization Review  Phone [phone]  Fax [phone]    [place] [address]         Keya Hamilton (34 y.o. Female)       Date of Birth   1990    Social Security Number       Address   [address] [place] [address]    Home Phone   [phone]    MRN   [id number]       Muslim   Non-Latter day    Marital Status                               Admission Date   8/23/24    Admission Type   Emergency    Admitting Provider   Tianna Coronel MD    Attending Provider   Tianna Coronel MD    Department, Room/Bed   [facility] 3F, S316/1       Discharge Date       Discharge Disposition       Discharge Destination                                 Attending Provider: Tianna Coronel MD    Allergies: Codeine    Isolation: None   Infection: None   Code Status: CPR    Ht: 160 cm (62.99\")   Wt: 123 kg (271 lb 2.7 oz)    Admission Cmt: None   Principal Problem: R M1 stenosis [I63.511]                   Active Insurance as of 8/23/2024       Primary Coverage       Payor Plan Insurance Group Employer/Plan Group    [employer]HE[employer] BLUE CROSS ANTHEM BLUE CROSS BLUE SHIELD PPO [id number]       Payor Plan Address Payor Plan Phone Number Payor Plan Fax Number Effective Dates    [address] [phone]  1/1/2021 - None Entered    [facility] [id number]         Subscriber Name Subscriber Birth Date Member ID       BRANDO HAMILTON [birth year]26/1991 [id number]                     Emergency Contacts        (Rel.) Home Phone Work Phone Mobile Phone    brando hamilton (Spouse) [phone]              Oxygen Therapy (last 2 days)       Date/Time SpO2 Device (Oxygen Therapy) Flow (L/min) Oxygen Concentration (%) ETCO2 (mmHg)    08/29/24 1513 91 -- -- -- --    08/29/24 1214 97 nasal cannula 2 -- --    08/29/24 1055 97 room air -- -- --    08/29/24 0825 92 " nasal cannula 2 -- --    08/29/24 0758 93 -- -- -- --    08/29/24 0600 -- nasal cannula 2 -- --    08/29/24 0450 93 nasal cannula 2 -- --    08/29/24 0400 -- nasal cannula 2 -- --    08/29/24 0200 -- nasal cannula 2 -- --    08/29/24 0015 95 nasal cannula 2 -- --    08/29/24 0000 -- nasal cannula 2 -- --    08/28/24 2200 -- nasal cannula 2 -- --    08/28/24 2003 -- nasal cannula 2 -- --    08/28/24 1935 94 nasal cannula 2 -- --    08/28/24 1800 -- room air -- -- --    08/28/24 1600 -- room air -- -- --    08/28/24 1400 -- nasal cannula 2 -- --    08/28/24 1115 97 nasal cannula -- -- --    08/28/24 0954 -- nasal cannula 2 -- --    08/28/24 0730 -- nasal cannula -- -- --    08/28/24 0710 92 nasal cannula -- -- --    08/28/24 0600 -- nasal cannula 2 -- --    08/28/24 0515 94 nasal cannula 2 -- --    08/28/24 0400 -- nasal cannula 2 -- --    08/28/24 0200 -- nasal cannula 2 -- --    08/28/24 0000 -- nasal cannula 2 -- --    08/27/24 2310 93 nasal cannula 2 -- --    08/27/24 2200 -- nasal cannula 2 -- --    08/27/24 2006 -- nasal cannula 2 -- --    08/27/24 1905 -- -- -- 30 --    08/27/24 1853 95 -- -- -- --    08/27/24 1755 -- nasal cannula 2 -- --    08/27/24 1709 96 nasal cannula 2 -- --    08/27/24 1706 92 nasal cannula 2 -- --    08/27/24 1600 93 nasal cannula 2 -- --    08/27/24 1500 92 -- -- -- --    08/27/24 1400 97 nasal cannula 2 -- --    08/27/24 1300 91 -- -- -- --    08/27/24 1200 98 nasal cannula 4 -- --    08/27/24 1100 96 -- -- -- --    08/27/24 1000 96 room air -- -- --    08/27/24 0900 98 -- -- -- --    08/27/24 0800 99 NPPV/NIV 4 -- --    08/27/24 0700 99 NPPV/NIV;other (see comments) -- -- --    08/27/24 0500 93 -- -- -- --    08/27/24 0300 97 -- -- -- --    08/27/24 0200 93 -- -- -- --    08/27/24 0100 98 -- -- -- --          Current Facility-Administered Medications   Medication Dose Route Frequency Provider Last Rate Last Admin    acetaminophen (TYLENOL) suppository 650 mg  650 mg Rectal Q6H PRN  Russ Gorman PA-C   650 mg at 08/25/24 0509    acetaminophen (TYLENOL) tablet 650 mg  650 mg Oral Q6H PRN Quynh Jovel, APRN   650 mg at 08/28/24 0136    amantadine (SYMMETREL) capsule 100 mg  100 mg Oral Q12H Catrina Mars APRN   100 mg at 08/29/24 0834    apixaban (ELIQUIS) tablet 5 mg  5 mg Oral Q12H Katerin Barney MD   5 mg at 08/29/24 0834    atorvastatin (LIPITOR) tablet 80 mg  80 mg Oral Nightly Katerin Barney MD   80 mg at 08/28/24 2034    dextrose (D50W) (25 g/50 mL) IV injection 25 g  25 g Intravenous Q15 Min PRN Blayne Haddad MD        dextrose (GLUTOSE) oral gel 15 g  15 g Oral Q15 Min PRN Blayne Haddad MD        empagliflozin (JARDIANCE) tablet 10 mg  10 mg Oral Daily Dion Hooker MD   10 mg at 08/29/24 0834    glucagon (GLUCAGEN) injection 1 mg  1 mg Intramuscular Q15 Min PRN Blayne Haddad MD        hydrALAZINE (APRESOLINE) injection 10 mg  10 mg Intravenous Q6H PRN Quynh Jovel APRN   10 mg at 08/26/24 0218    insulin glargine (LANTUS, SEMGLEE) injection 10 Units  10 Units Subcutaneous Daily Blayne Haddad MD   10 Units at 08/29/24 0834    Insulin Lispro (humaLOG) injection 2-7 Units  2-7 Units Subcutaneous 4x Daily AC & at Bedtime Blayne Haddad MD   2 Units at 08/28/24 2223    nebivolol (BYSTOLIC) tablet 10 mg  10 mg Oral Q24H Dion Hooker MD   10 mg at 08/29/24 0834    nitroglycerin (NITROSTAT) SL tablet 0.4 mg  0.4 mg Sublingual Q5 Min PRN Olayinka, May, APRN        pantoprazole (PROTONIX) EC tablet 40 mg  40 mg Oral Q AM Katerin Barney MD   40 mg at 08/28/24 0943    sacubitril-valsartan (ENTRESTO) 49-51 MG tablet 1 tablet  1 tablet Oral Q12H Dion Hooker MD   1 tablet at 08/29/24 0834     Lab Results (last 72 hours)       Procedure Component Value Units Date/Time    Basic Metabolic Panel [[id number]]  (Abnormal) Collected: 08/29/24 1331    Specimen: Blood from Arm, Right Updated: 08/29/24  1401     Glucose 130 mg/dL      BUN 22 mg/dL      Creatinine 1.03 mg/dL      Sodium 140 mmol/L      Potassium 4.6 mmol/L      Chloride 105 mmol/L      CO2 22.0 mmol/L      Calcium 9.7 mg/dL      BUN/Creatinine Ratio 21.4     Anion Gap 13.0 mmol/L      eGFR 74.2 mL/min/1.73     Narrative:      GFR Normal >60  Chronic Kidney Disease <60  Kidney Failure <15      POC Glucose Once [[id number]]  (Normal) Collected: 08/29/24 1148    Specimen: Blood Updated: 08/29/24 1150     Glucose 128 mg/dL     Factor 5 Activity [[id number]] Collected: 08/26/24 1800    Specimen: Blood Updated: 08/29/24 1114     Factor V Activity 105 %      Comment: Molecular testing for the presence of factor V Leiden is recommended  when evaluating the patient for thrombotic risk.       Narrative:      Performed at:  [address] [place] [address]  [id number]  : Romel Calloway MD, Phone:  [phone]    POC Glucose Once [[id number]]  (Abnormal) Collected: 08/29/24 0728    Specimen: Blood Updated: 08/29/24 0729     Glucose 131 mg/dL     Reflexed DRVVT Mix [[id number]]  (Abnormal) Collected: 08/26/24 1800    Specimen: Blood Updated: 08/29/24 0614     Dilute Viper Venom 1:1 Mix 44.8 sec     Narrative:      Performed at:  [address] [place] [address]  [id number]  : Romel Calloway MD, Phone:  [phone]    Reflexed DRVVT Confirm [[id number]] Collected: 08/26/24 1800    Specimen: Blood Updated: 08/29/24 0614     Dilute Viper Venom Time 1.0 ratio     Narrative:      Performed at:  [address] [place] [address]  [id number]  : Romel Calloway MD, Phone:  [phone]    Lupus Anticoagulant [[id number]]  (Abnormal) Collected: 08/26/24 1800    Specimen: Blood Updated: 08/29/24 0614     Dilute Prothrombin Time(dPT) 40.1 sec      dPT Confirm Ratio 1.09 Ratio      Thrombin Time 16.4 sec      PTT Lupus Anticoagulant 32.6 sec      Dilute Viper Venom Time 58.8 sec       Lupus Anticoagulant Reflex Comment:     Comment: No lupus anticoagulant was detected. These results are consistent with  specific inhibitors to one or more common pathway factors (X, V, II or  fibrinogen).       Narrative:      Performed at:  [address] [place] [address]  [id number]  : Romel Calloway MD, Phone:  [phone]    Protein S Antigen, Free [[id number]]  (Abnormal) Collected: 08/26/24 1800    Specimen: Blood Updated: 08/29/24 0336     Protein S Ag, Free 138 %     Narrative:      Performed at:  [address] [place] [address]  [id number]  : Romel Calloway MD, Phone:  [phone]    Protein C Activity [[id number]] Collected: 08/26/24 1800    Specimen: Blood Updated: 08/29/24 0209     Protein C-Functional 156 %     Narrative:      Performed at:  [address] [place] [address]  [id number]  : Romel Calloway MD, Phone:  [phone]    POC Glucose Once [[id number]]  (Abnormal) Collected: 08/28/24 2122    Specimen: Blood Updated: 08/28/24 2123     Glucose 164 mg/dL     DAYRON [[id number]] Collected: 08/26/24 1759    Specimen: Blood Updated: 08/28/24 1809     DAYRON Direct Negative    Narrative:      Performed at:  [address] [place] [address]  [id number]  : Rod Cuevas PhD, Phone:  [phone]    Protein C Antigen, Total [[id number]] Collected: 08/24/24 0102    Specimen: Blood Updated: 08/28/24 1710     Protein C Antigen 98 %     Narrative:      Performed at:  [address] [place] [address]  [id number]  : Romel Calloway MD, Phone:  [phone]    Anticardiolipin Antibody, IgG / M, Qn [[id number]] Collected: 08/26/24 1759    Specimen: Blood Updated: 08/28/24 1710     Anticardiolipin IgG <9 GPL U/mL      Comment:                           Negative:              <15                            Indeterminate:     15 - 20                             Low-Med Positive: >20 - 80                            High Positive:         >80        Anticardiolipin IgM 10 MPL U/mL      Comment:                           Negative:              <13                            Indeterminate:     13 - 20                            Low-Med Positive: >20 - 80                            High Positive:         >80       Narrative:      Performed at:  [address] [place] [address]  [id number]  : Rod Cuevas PhD, Phone:  [phone]    POC Glucose Once [[id number]]  (Abnormal) Collected: 08/28/24 1655    Specimen: Blood Updated: 08/28/24 1657     Glucose 139 mg/dL     Basic Metabolic Panel [[id number]]  (Abnormal) Collected: 08/28/24 1537    Specimen: Blood Updated: 08/28/24 1620     Glucose 168 mg/dL      BUN 23 mg/dL      Creatinine 1.03 mg/dL      Sodium 140 mmol/L      Potassium 4.7 mmol/L      Comment: Slight hemolysis detected by analyzer. Result may be falsely elevated.        Chloride 105 mmol/L      CO2 20.0 mmol/L      Calcium 10.0 mg/dL      BUN/Creatinine Ratio 22.3     Anion Gap 15.0 mmol/L      eGFR 74.2 mL/min/1.73     Narrative:      GFR Normal >60  Chronic Kidney Disease <60  Kidney Failure <15      Beta-2 Glycoprotein Antibodies [[id number]] Collected: 08/26/24 1800    Specimen: Blood Updated: 08/28/24 1612     Beta-2 Glyco 1 IgG <9 GPI IgG units      Comment: The reference interval reflects a 3SD or 99th percentile interval,  which is thought to represent a potentially clinically significant  result in accordance with the International Consensus Statement on  the classification criteria for definitive antiphospholipid syndrome  (APS). J Thromb Haem 2006;4:295-306.        Beta-2 Glyco 1 IgA <9 GPI IgA units      Comment: The reference interval reflects a 3SD or 99th percentile interval,  which is thought to represent a potentially clinically significant  result in accordance with the International Consensus Statement on  the  classification criteria for definitive antiphospholipid syndrome  (APS). J Thromb Haem 2006;4:295-306.        Beta-2 Glyco 1 IgM <9 GPI IgM units      Comment: The reference interval reflects a 3SD or 99th percentile interval,  which is thought to represent a potentially clinically significant  result in accordance with the International Consensus Statement on  the classification criteria for definitive antiphospholipid syndrome  (APS). J Thromb Haem 2006;4:295-306.       Narrative:      Performed at:  [address] [place] [address]  [id number]  : Rod Cuevas PhD, Phone:  [phone]    POC Glucose Once [[id number]]  (Abnormal) Collected: 08/28/24 1152    Specimen: Blood Updated: 08/28/24 1153     Glucose 161 mg/dL     Factor 5 Leiden [[id number]]  (Abnormal) Collected: 08/26/24 1759    Specimen: Blood Updated: 08/28/24 0733     Factor V Leiden Heterozygous    Narrative:      Factor V Leiden is a specific mutation (R506Q) in the factor V gene that is associated with an increased risk of venous thrombosis.  Factor V Leiden is more resistant to inactivation by activated protein C.  Factor V Leiden refers to the G to A transition at nucleotide position 1691 of the Factor V gene, resulting in the substitution of the amino acid arginine by glutamine in the Factor V protein, causing resistance to cleavage by Activated Protein C (APC).    As a result, factor V persists in the circulation leading to a mild hyper-coagulable state.  The Leiden mutation accounts for 90% - 95% of APC resistance.  Factor V Leiden has been reported in patients with deep vein thrombosis, pulmonary embolus, central retinal vein occlusion, cerebral sinus thrombosis and hepatic vein thrombosis.  Other risk factors to be considered in the workup for venous thrombosis include the A66610B mutation in the factor II (prothrombin) gene, protein S and C deficiency, and antithrombin deficiencies. Anticardiolipin antibody and  lupus anticoagulant analysis may be appropriate for certain patients, as well as homocysteine levels.    The expression of Factor V Leiden thrombophilia is impacted by coexisting genetic thrombophilic disorders, acquired thrombophilic disorders (malignancy, hyperhomocysteinemia, high factor VIII levels), and circumstances including: pregnancy, oral contraceptive use, hormone replacement therapy, selective estrogen receptor modulators, travel, central venous catheters, surgery, transplantation and advanced age.    In order to derive the most meaningful benefit from this testing, it may be necessary that the results and subsequent options from these complex genetic tests be discussed with patients by a trained genetic professional.    Factor II, DNA Analysis [[id number]]  (Normal) Collected: 08/26/24 1759    Specimen: Blood Updated: 08/28/24 0733     Factor II, DNA Analysis Normal    Narrative:      A point mutation (J30343S) in the factor II (prothrombin) gene is the second most common cause of inherited thrombophilia.  The Factor II or Prothrombin mutation refers to the G to A transition at nucleotide in the untranslated region of the gene and is associated with increased plasma levels of prothrombin.    The incidence of this mutation in the [place]  population is about 2% and in the  population it is approximately 0.5%. This mutation is rare in the  and  population. Being heterozygous for a prothrombin mutation increases the risk for developing venous thrombosis about 2 to 3 times above the general population risk. Being homozygous for the prothrombin gene mutation increases the relative risk for venous thrombosis further, although it is not yet known how much further the risk is increased. In women heterozygous for the prothrombin gene mutation, the use of estrogen containing oral contraceptives increases the relative risk of venous thrombosis about 16 times and the risk of  developing cerebral thrombosis is also significantly increased. In pregnancy, the prothrombin gene mutation increases risk for venous thrombosis and may increase risk for stillbirth, placental abruption, pre-eclampsia and fetal growth restriction.     The expression of Factor II thrombophilia is impacted by coexisting genetic thrombophilic disorders, acquired thrombophilic disorders (eg, malignancy, hyperhomocysteinemia, high factor VIII levels), and circumstances including: pregnancy, oral contraceptive use, hormone replacement therapy, selective estrogen receptor modulators, travel, central venous catheters, surgery, and organ transplantation.    In order to derive the most meaningful benefit from this testing, it may be necessary that the results and subsequent options from these complex genetic tests be discussed with patients by a trained genetic professional.    POC Glucose Once [[id number]]  (Abnormal) Collected: 08/28/24 0713    Specimen: Blood Updated: 08/28/24 0715     Glucose 137 mg/dL     POC Glucose Once [[id number]]  (Normal) Collected: 08/27/24 1853    Specimen: Blood Updated: 08/27/24 1854     Glucose 122 mg/dL     POC Glucose Once [[id number]]  (Normal) Collected: 08/27/24 1710    Specimen: Blood Updated: 08/27/24 1711     Glucose 124 mg/dL     Protein C Activity [[id number]] Collected: 08/24/24 0102    Specimen: Blood Updated: 08/27/24 1212     Protein C-Functional 137 %     Narrative:      Performed at:  [address] [place] [address]  [id number]  : Romel Calloway MD, Phone:  [phone]    POC Glucose Once [[id number]]  (Abnormal) Collected: 08/27/24 1117    Specimen: Blood Updated: 08/27/24 1119     Glucose 157 mg/dL     Factor 5 Activity [[id number]] Collected: 08/24/24 0102    Specimen: Blood Updated: 08/27/24 1113     Factor V Activity 85 %      Comment: Molecular testing for the presence of factor V Leiden is recommended  when evaluating the patient for  thrombotic risk.       Narrative:      Performed at:  [address] [place] [address]  [id number]  : Romel Calloway MD, Phone:  [phone]    Lupus Anticoagulant [[id number]] Collected: 08/24/24 0102    Specimen: Blood Updated: 08/27/24 1113     Dilute Prothrombin Time(dPT) 40.8 sec      dPT Confirm Ratio 1.09 Ratio      Thrombin Time 19.0 sec      PTT Lupus Anticoagulant 26.4 sec      Dilute Viper Venom Time 39.4 sec      Lupus Anticoagulant Reflex Comment:     Comment: No lupus anticoagulant was detected.       Narrative:      Performed at:  [address] [place] [address]  [id number]  : Romel Calloway MD, Phone:  [phone]    Protein S Functional [[id number]] Collected: 08/24/24 0102    Specimen: Blood Updated: 08/27/24 1113     Protein S-Functional 80 %      Comment: Protein S activity may be falsely increased (masking an abnormal, low  result) in patients receiving direct Xa inhibitor (e.g., rivaroxaban,  apixaban, edoxaban) or a direct thrombin inhibitor (e.g., dabigatran)  anticoagulant treatment due to assay interference by these drugs.       Narrative:      Performed at:  [address] [place] [address]  [id number]  : Romel Calloway MD, Phone:  [phone]    POC Glucose Once [[id number]]  (Abnormal) Collected: 08/27/24 0718    Specimen: Blood Updated: 08/27/24 0719     Glucose 201 mg/dL     POC Protime / INR [[id number]]  (Abnormal) Collected: 08/23/24 2036    Specimen: Blood Updated: 08/27/24 0646     Protime 12.6 seconds      INR 1.1     Comment: Serial Number: [id number]:  [id number]       POC CHEM 8 [[id number]]  (Abnormal) Collected: 08/23/24 2032    Specimen: Blood Updated: 08/27/24 0645     Glucose 110 mg/dL      BUN 13 mg/dL      Creatinine 0.80 mg/dL      Sodium 145 mmol/L      POC Potassium 4.9 mmol/L      Chloride 112 mmol/L      Total CO2 19 mmol/L      Hemoglobin 12.2 g/dL      Comment:  Serial Number: [id number]:  [id number]        Hematocrit 36 %      Ionized Calcium 0.98 mmol/L      eGFR 100.5 mL/min/1.73     Basic Metabolic Panel [[id number]]  (Abnormal) Collected: 08/27/24 0358    Specimen: Blood Updated: 08/27/24 0459     Glucose 144 mg/dL      BUN 23 mg/dL      Creatinine 0.99 mg/dL      Sodium 139 mmol/L      Potassium 4.5 mmol/L      Comment: Slight hemolysis detected by analyzer. Result may be falsely elevated.        Chloride 105 mmol/L      CO2 20.0 mmol/L      Calcium 9.4 mg/dL      BUN/Creatinine Ratio 23.2     Anion Gap 14.0 mmol/L      eGFR 77.9 mL/min/1.73     Narrative:      GFR Normal >60  Chronic Kidney Disease <60  Kidney Failure <15      POC Glucose Once [[id number]]  (Abnormal) Collected: 08/26/24 2018    Specimen: Blood Updated: 08/26/24 2021     Glucose 166 mg/dL     Phosphatidylserine Antibodies [[id number]] Collected: 08/26/24 1800    Specimen: Blood Updated: 08/26/24 1815    Protein C Antigen, Total [[id number]] Collected: 08/26/24 1800    Specimen: Blood Updated: 08/26/24 1813    POC Glucose Once [[id number]]  (Abnormal) Collected: 08/26/24 1643    Specimen: Blood Updated: 08/26/24 1645     Glucose 135 mg/dL           Imaging Results (Last 72 Hours)       Procedure Component Value Units Date/Time    CT Head Without Contrast [[id number]] Collected: 08/27/24 1628     Updated: 08/27/24 1642    Narrative:      CT HEAD WO CONTRAST    Date of Exam: 8/27/2024 4:24 PM EDT    Indication: drowsiness, stroke follow up.    Comparison: August 24, 2024    Technique: Axial CT images were obtained of the head without contrast administration.  Automated exposure control and iterative construction methods were used.      Findings:  Evolving subacute infarcts within the right basal ganglia, right temporal, and occipital lobes are redemonstrated. There is no hemorrhagic transformation. There is minimal mass effect on the right lateral ventricle, but no midline shift. The basal   cisterns appear  patent.    The calvarium appears intact. The paranasal sinuses and mastoid air cells are well-aerated.      Impression:      Impression:  Evolving right-sided subacute infarcts most prominent within right basal ganglia. No hemorrhagic transformation or acute herniation.        Electronically Signed: Kali Palmer MD    2024 4:39 PM EDT    Workstation ID: [id number]          ECG/EMG Results (last 72 hours)       Procedure Component Value Units Date/Time    Adult Transthoracic Echo Limited W/ Cont if Necessary Per Protocol [[id number]] Resulted: 24 1007     Updated: 24 1007     LVIDd 6.2 cm      LVIDs 5.4 cm      IVSd 1.30 cm      LVPWd 1.30 cm      IVS/LVPW 1.00 cm      LVOT diam 2.10 cm      LA dimension (2D)  4.1 cm      Ao root diam 4.0 cm      EF(MOD-bp) 26.9 %      LVOT area 3.1 cm2      EF(MOD-sp2) 31.6 %      EF(MOD-sp4) 31.3 %      BH CV VAS BP RIGHT /96 mmHg              Physician Progress Notes (last 72 hours)        Tianna Coronel MD at 24 1104              [facility]  PROGRESS NOTE    Patient Name: Keya Hamilton  : 1990  MRN: [id number]    Date of Admission: 2024  Primary Care Physician: Cuong Parks MD    Subjective   Subjective     CC:  CVA     HPI:  Drowsy, sleeping through my visit.  at bedside reports patient has been sleeping essentially most of the time- was awake for a couple hours last night but other than that sleeping. Nursing reports no new issues. Anitcipating going to [facility] in the coming days for rehab     ROS  As above      Objective   Objective     Vital Signs:   Temp:  [97.4 °F (36.3 °C)-98.9 °F (37.2 °C)] 97.6 °F (36.4 °C)  Heart Rate:  [89-96] 89  Resp:  [17-18] 18  BP: (127-136)/(90-97) 136/96  Flow (L/min):  [2] 2     Physical Exam:  GEN: sleeping in bed throughout visit   HEENT: , atraumatic, normocephalic  NECK: supple, no masses  RESP: on nc, normal effort  CV: on tele, sinus  rhythm  NEURO: no focal def appreciated, sleeping throughout visit   MSK: no edema noted  SKIN: no rashes noted       Results Reviewed:  LAB RESULTS:      Lab 08/26/24  0459 08/25/24  2112 08/25/24  1325 08/25/24  0542 08/24/24  2244 08/24/24  1929 08/24/24  1548 08/24/24  1143 08/24/24  0833 08/24/24  0611 08/24/24  0102 08/23/24 2055 08/23/24 2036 08/23/24 2032   WBC 15.37*  --   --  20.36*  --   --   --   --  16.61*  --   --  11.16*  --   --    HEMOGLOBIN 15.6  --   --  14.7  --   --   --   --  14.9  --   --  16.1*  --   --    HEMOGLOBIN, POC  --   --   --   --   --   --   --   --   --   --   --   --   --  12.2   HEMATOCRIT 47.0*  --   --  46.1  --   --   --   --  44.2  --   --  47.2*  --   --    HEMATOCRIT POC  --   --   --   --   --   --   --   --   --   --   --   --   --  36*   PLATELETS 346  --   --  374  --   --   --   --  340  --   --  417  --   --    NEUTROS ABS 10.82*  --   --  16.06*  --   --   --   --  15.03*  --   --  7.05*  --   --    IMMATURE GRANS (ABS) 0.07*  --   --  0.18*  --   --   --   --  0.12*  --   --  0.07*  --   --    LYMPHS ABS 3.30*  --   --  2.82  --   --   --   --  0.91  --   --  2.77  --   --    MONOS ABS 1.06*  --   --  1.24*  --   --   --   --  0.51  --   --  0.85  --   --    EOS ABS 0.05  --   --  0.01  --   --   --   --  0.00  --   --  0.33  --   --    .1*  --   --  103.4*  --   --   --   --  97.8*  --   --  97.7*  --   --    PROCALCITONIN 0.21  --   --  0.15  --   --   --   --   --   --   --   --   --   --    LACTATE  --   --   --   --   --  1.9  --  4.2* 3.9* 3.4* 3.5*  --   --   --    PROTIME  --   --   --   --   --   --   --   --  14.9*  --   --   --  12.6*  --    APTT  --   --   --   --   --   --   --   --  23.2*  --   --  25.3  --   --    HEPARIN ANTI-XA 0.58 0.33 0.24* 0.10* 0.10*  --    < >  --  0.10*  --   --   --   --   --     < > = values in this interval not displayed.         Lab 08/28/24  1537 08/27/24  0358 08/26/24  0459 08/25/24  0542 08/24/24  0102    SODIUM 140 139 138 144 141   POTASSIUM 4.7 4.5 4.3 4.3 4.4   CHLORIDE 105 105 105 108* 105   CO2 20.0* 20.0* 20.0* 22.0 20.0*   ANION GAP 15.0 14.0 13.0 14.0 16.0*   BUN 23* 23* 17 17 16   CREATININE 1.03* 0.99 0.92 1.15* 1.39*   EGFR 74.2 77.9 85.0 65.0 51.8*   GLUCOSE 168* 144* 106* 143* 206*   CALCIUM 10.0 9.4 9.3 9.0 9.1   MAGNESIUM  --   --  2.3 2.0  --    HEMOGLOBIN A1C  --   --   --   --  6.70*         Lab 08/26/24  1800 08/24/24  0102 08/23/24 2055   TOTAL PROTEIN  --  6.4  --    ALBUMIN  --  3.7  --    GLOBULIN  --  2.7  --    ALT (SGPT)  --  28 15   AST (SGOT)  --  53* 18   BILIRUBIN  --  0.6  --    BETA 2 GLYCO 1 IGG <9 <9  --    BETA 2 GLYCO 1 IGA <9 <9  --    BETA 2 GLYCO 1 IGM <9 <9  --    ALK PHOS  --  60  --          Lab 08/25/24  0542 08/24/24  0833 08/24/24  0102 08/23/24 2055 08/23/24 2036   PROBNP 1,011.0*  --   --   --   --    HSTROP T  --   --  73* 64*  --    PROTIME  --  14.9*  --   --  12.6*   INR  --  1.16*  --   --  1.1         Lab 08/24/24  0102   CHOLESTEROL 177   LDL CHOL 115*   HDL CHOL 31*   TRIGLYCERIDES 175*             Lab 08/25/24  0538 08/24/24  1751   PH, ARTERIAL 7.450 7.396   PCO2, ARTERIAL 36.2 39.0   PO2 ART 62.9* 83.2   FIO2 28 30   HCO3 ART 25.1 23.9   BASE EXCESS ART 1.4 -0.8*   CARBOXYHEMOGLOBIN 1.7 1.4     Brief Urine Lab Results  (Last result in the past 365 days)        Color   Clarity   Blood   Leuk Est   Nitrite   Protein   CREAT   Urine HCG        08/24/24 0124 Yellow   Clear   Negative   Negative   Negative   100 mg/dL (2+)           08/24/24 0124               Negative               Microbiology Results Abnormal       Procedure Component Value - Date/Time    MRSA Screen, PCR (Inpatient) - Swab, Nares [[id number]]  (Normal) Collected: 08/24/24 0109    Lab Status: Final result Specimen: Swab from Nares Updated: 08/24/24 0728     MRSA PCR Negative    Narrative:      The negative predictive value of this diagnostic test is high and should only be used to consider  de-escalating anti-MRSA therapy. A positive result may indicate colonization with MRSA and must be correlated clinically.  MRSA Negative    Respiratory Panel PCR w/COVID-19(SARS-CoV-2) KAVEH/BRIAN/DOROTHEA/PAD/COR/VASYL In-House, NP Swab in UTM/VTM, 2 HR TAT - Swab, Nasopharynx [[id number]]  (Normal) Collected: 08/24/24 0109    Lab Status: Final result Specimen: Swab from Nasopharynx Updated: 08/24/24 0242     ADENOVIRUS, PCR Not Detected     Coronavirus 229E Not Detected     Coronavirus HKU1 Not Detected     Coronavirus NL63 Not Detected     Coronavirus OC43 Not Detected     COVID19 Not Detected     Human Metapneumovirus Not Detected     Human Rhinovirus/Enterovirus Not Detected     Influenza A PCR Not Detected     Influenza B PCR Not Detected     Parainfluenza Virus 1 Not Detected     Parainfluenza Virus 2 Not Detected     Parainfluenza Virus 3 Not Detected     Parainfluenza Virus 4 Not Detected     RSV, PCR Not Detected     Bordetella pertussis pcr Not Detected     Bordetella parapertussis PCR Not Detected     Chlamydophila pneumoniae PCR Not Detected     Mycoplasma pneumo by PCR Not Detected    Narrative:      In the setting of a positive respiratory panel with a viral infection PLUS a negative procalcitonin without other underlying concern for bacterial infection, consider observing off antibiotics or discontinuation of antibiotics and continue supportive care. If the respiratory panel is positive for atypical bacterial infection (Bordetella pertussis, Chlamydophila pneumoniae, or Mycoplasma pneumoniae), consider antibiotic de-escalation to target atypical bacterial infection.            CT Head Without Contrast    Result Date: 8/27/2024  CT HEAD WO CONTRAST Date of Exam: 8/27/2024 4:24 PM EDT Indication: drowsiness, stroke follow up. Comparison: August 24, 2024 Technique: Axial CT images were obtained of the head without contrast administration.  Automated exposure control and iterative construction methods were used.  Findings: Evolving subacute infarcts within the right basal ganglia, right temporal, and occipital lobes are redemonstrated. There is no hemorrhagic transformation. There is minimal mass effect on the right lateral ventricle, but no midline shift. The basal cisterns appear patent. The calvarium appears intact. The paranasal sinuses and mastoid air cells are well-aerated.     Impression: Impression: Evolving right-sided subacute infarcts most prominent within right basal ganglia. No hemorrhagic transformation or acute herniation. Electronically Signed: Kali Palmer MD  8/27/2024 4:39 PM EDT  Workstation ID: [id number]     Results for orders placed during the hospital encounter of 08/23/24    Adult Transthoracic Echo Complete W/ Cont if Necessary Per Protocol (With Agitated Saline)    Interpretation Summary  Images from the original result were not included.      Left ventricular systolic function is moderately decreased. Calculated left ventricular EF = 36.7%  global hypokinesis.  LV cavity mildly dilated.  RV size and function normal    Left ventricular wall thickness is consistent with hypertrophy.    Left ventricular diastolic dysfunction is noted.    There is a thrombus present in the left ventricle in apex. 2.22 cm by 0.96 cm    The left atrial cavity is dilated.    Estimated right ventricular systolic pressure from tricuspid regurgitation is normal (<35 mmHg).    Indeterminate bubble study      Current medications:  Scheduled Meds:amantadine, 100 mg, Oral, Q12H  apixaban, 5 mg, Oral, Q12H  atorvastatin, 80 mg, Oral, Nightly  empagliflozin, 10 mg, Oral, Daily  insulin glargine, 10 Units, Subcutaneous, Daily  insulin lispro, 2-7 Units, Subcutaneous, 4x Daily AC & at Bedtime  nebivolol, 10 mg, Oral, Q24H  pantoprazole, 40 mg, Oral, Q AM  sacubitril-valsartan, 1 tablet, Oral, Q12H      Continuous Infusions:     PRN Meds:.  acetaminophen    acetaminophen    dextrose    dextrose    glucagon (human recombinant)     "hydrALAZINE    nitroglycerin    Assessment & Plan   Assessment & Plan     Active Hospital Problems    Diagnosis  POA    **R M1 stenosis [I63.511]  Yes    Acute systolic heart failure [I50.21]  Unknown    Left ventricular thrombus [I51.3]  Unknown    Acute stroke due to ischemia [I63.9]  Yes    T2DM [E11.9]  Yes    HTN [I10]  Yes    Morbid obesity with BMI of 45.0-49.9, adult [E66.01, Z68.42]  Not Applicable    SCOTT [G47.33]  Yes    Hypertensive urgency [I16.0]  Yes    R/O Seizure [R56.9]  Unknown    Acute respiratory failure [J96.00]  Unknown      Resolved Hospital Problems   No resolved problems to display.        Brief Hospital Course to date:  Keya Hamilton is a \"34-year-old woman with diabetes, hypertension, right renal atrophy who presented with somnolence, dysarthria, left hemiparesis.  Imaging revealed a right middle cerebral artery perfusion deficit and severe stenosis of the right M1 segment.  Blood pressure was elevated and treated with labetalol and hydralazine.  She was then given TNKase.  She required intubation in the emergency room.  She was extubated on August 25.  Echo revealed an EF of 36.7% with clot in the left ventricle apex.  She was placed on a heparin drip which was transitioned to Eliquis on 8/26/24. She is heterozygous for factor V Leiden. Her hemoglobin A1c is 6.7.  She passed her speech assessment and diet has been ordered. Urine drug screen was positive for methamphetamine and marijuana.  In addition she does vape.\"    R MCA stroke, RM1 occlusion, s/p TNK and mechanical thrombectomy  --stroke following, suspected cardioembolic in nature (LV thrombus found on ECHO)  --MRI with acute ischemic stroke in R putamen and head of caudate  --continue eliquis, was previously transitioned from heparin gtt   --RAMU noted with LV thrombus, left atrial cavity dilation, bubble study indeterminant EF 36%- cardiology is following, limited ECHO repeated for EF done today- read pending   --continue statin, " normalized BP goals  --PT/OT/SLP- recommending rehab, patient agreeable    LV thrombus   Factor V leiden  --eliquis  --cardiology on board  --patient noted heterozygous for factor V leiden, additional anticoag labs pending    Substance use- UDS + meth/THC  --cessation counseling, monitor    Previous h/o noncompliance       Somnolence   --amantadine started   --CT h done 8/27 unchanged         Expected Discharge Location and Transportation: rehab- has a bed at [facility] for tomorrow   Expected Discharge   Expected Discharge Date: 8/30/2024; Expected Discharge Time:      VTE Prophylaxis:  Pharmacologic & mechanical VTE prophylaxis orders are present.         AM-PAC 6 Clicks Score (PT): 15 (08/28/24 2003)    CODE STATUS:   Code Status and Medical Interventions: CPR (Attempt to Resuscitate); Full Support   Ordered at: 08/24/24 0808     Code Status (Patient has no pulse and is not breathing):    CPR (Attempt to Resuscitate)     Medical Interventions (Patient has pulse or is breathing):    Full Support       Tianna Coronel MD  08/29/24        Electronically signed by Tianna Coronel MD at 08/29/24 1106       Catrina Mars APRN at 08/29/24 0953          Stroke Progress Note       Chief Complaint: Left-sided weakness    Subjective    Subjective     Subjective:    Sitting up on the side of the bed.  Much more awake today.   at bedside.  Hopes to go to rehab soon.    Review of Systems   Constitutional:  Negative for fever.   Eyes:  Positive for visual disturbance.   Gastrointestinal:  Negative for abdominal pain and nausea.   Neurological:  Positive for facial asymmetry, speech difficulty, weakness and numbness. Negative for dizziness and headaches.   Psychiatric/Behavioral:  Negative for confusion.           Objective      Temp:  [97.4 °F (36.3 °C)-98.9 °F (37.2 °C)] 97.6 °F (36.4 °C)  Heart Rate:  [89-96] 89  Resp:  [17-18] 18  BP: (127-136)/(90-97) 136/96       Objective    Physical Exam  Constitutional:        General: She is not in acute distress.     Appearance: She is obese.   HENT:      Head: Normocephalic and atraumatic.      Nose: Nose normal.      Mouth/Throat:      Mouth: Mucous membranes are moist.   Eyes:      Extraocular Movements: Extraocular movements intact.      Pupils: Pupils are equal, round, and reactive to light.   Cardiovascular:      Pulses: Normal pulses.   Pulmonary:      Effort: Pulmonary effort is normal.   Abdominal:      Comments: Round, obese   Musculoskeletal:         General: Normal range of motion.      Cervical back: Normal range of motion.   Skin:     General: Skin is warm and dry.      Capillary Refill: Capillary refill takes less than 2 seconds.   Neurological:      Mental Status: She is alert and oriented to person, place, and time.      Cranial Nerves: Cranial nerve deficit and dysarthria present.      Sensory: Sensory deficit present.      Motor: Weakness present.   Psychiatric:         Mood and Affect: Mood normal.         Behavior: Behavior normal.        Neurological Exam  Mental Status  Alert. Oriented to person, place and time. Oriented to person, place, and time. Mild dysarthria present. Expressive aphasia and receptive aphasia present.    Cranial Nerves  CN II: Right visual acuity: Counts fingers. Left visual acuity: Counts fingers.  CN III, IV, VI: Extraocular movements intact bilaterally. Pupils equal round and reactive to light bilaterally.  CN V:  Right: Facial sensation is normal.  Left: Diminished sensation of the entire left side of the face.  CN VII:  Left: There is central facial weakness.  CN VIII: Hearing intact.  CN XI:  Right: Sternocleidomastoid strength is normal. Trapezius strength is normal.  Left: Sternocleidomastoid strength is weak. Trapezius strength is weak.  CN XII: Tongue midline without atrophy or fasciculations.    Motor  Normal muscle bulk throughout. Normal muscle tone. Strength is 5/5 in all four extremities except as noted.  Strength left upper  extremity 2/5..    Sensory  Light touch abnormality: Sensation: Decrease sensitivity to left-sided body.     Coordination  Right: Finger-to-nose normal. Heel-to-shin normal.Left: Finger-to-nose abnormality: Heel-to-shin normal.  Left upper extremity weakness.    Gait    Unable to assess.       Results Review:    I reviewed the patient's new clinical results.      Lab Results   Component Value Date    GLUCOSE 168 (H) 08/28/2024    BUN 23 (H) 08/28/2024    CREATININE 1.03 (H) 08/28/2024     08/28/2024    K 4.7 08/28/2024     08/28/2024    CALCIUM 10.0 08/28/2024    PROTEINTOT 6.4 08/24/2024    ALBUMIN 3.7 08/24/2024    ALT 28 08/24/2024    AST 53 (H) 08/24/2024    ALKPHOS 60 08/24/2024    BILITOT 0.6 08/24/2024    GLOB 2.7 08/24/2024    AGRATIO 1.4 08/24/2024    BCR 22.3 08/28/2024    ANIONGAP 15.0 08/28/2024    EGFR 74.2 08/28/2024     Lipid Panel          8/24/2024    01:02   Lipid Panel   Total Cholesterol 177    Triglycerides 175    HDL Cholesterol 31    VLDL Cholesterol 31    LDL Cholesterol  115    LDL/HDL Ratio 3.58           Lab 08/24/24  0102   HEMOGLOBIN A1C 6.70*      CT Head Without Contrast    Result Date: 8/27/2024  Impression: Evolving right-sided subacute infarcts most prominent within right basal ganglia. No hemorrhagic transformation or acute herniation. Electronically Signed: Kali Palmer MD  8/27/2024 4:39 PM EDT  Workstation ID: [id number]   Results for orders placed during the hospital encounter of 08/23/24    Adult Transthoracic Echo Complete W/ Cont if Necessary Per Protocol (With Agitated Saline)    Interpretation Summary  Images from the original result were not included.      Left ventricular systolic function is moderately decreased. Calculated left ventricular EF = 36.7%  global hypokinesis.  LV cavity mildly dilated.  RV size and function normal    Left ventricular wall thickness is consistent with hypertrophy.    Left ventricular diastolic dysfunction is noted.    There is  a thrombus present in the left ventricle in apex. 2.22 cm by 0.96 cm    The left atrial cavity is dilated.    Estimated right ventricular systolic pressure from tricuspid regurgitation is normal (<35 mmHg).    Indeterminate bubble study    -MRI brain from 8-24-24 with an AIS in the R putamen and R head of caudate.   -LDL from 8-24-24 was 115  -A1c from 8-24-24 was 6.7  -Echo on 8-24-24 with concern for IV thrombus  -Repeated CTH on 8-24-24 with and evolving R BG AIS, no hemorrhage noted    Assessment/Plan   34-year-old white female, with multiple vascular risk factors who presented to [facility] ED via EMS on 8/23/2024 with R MCA syndrome.  Initial NIH 11.  CT head was negative for hemorrhage.  /106.  She was deemed a candidate for TNK which was given after BP was lowered to 167/105.  CTP with a large area of hypoperfusion in the R MCA territory.  CTA H/N with R M1 occlusion.  She was intubated in the ED secondary due to inability to protect her airway.  She was taken to the Cath Lab for mechanical thrombectomy and admitted to the ICU postprocedure.    Antiplatelet PTA: None  Anticoagulant PTA: None        # R MCA stroke, R M1 occlusion s/p TNK and MT, TICI 3   # Cardiac intraventricular thrombus (cardiology following)  -Suspected cardioembolic in etiology in the setting of left ventricular thrombus  -Risk factors: Hypertension, hypertension lipidemia, obesity, diabetes, marijuana use, on progesterone(Mirena IUD)  -MRI with an AIS in the R putamen and R head of caudate.   -Continue Eliquis 5 mg twice daily.    -TTE with an LV thrombus, left atrial cavity dilation, EF 36.7 with global hypokinesis.  Bubble study was indeterminate.  -CTh 8/24-with evolving infarcts and no evidence of overt hemorrhage  -Hypercoag panel, factor V Leiden heterozygous on 8/24.  Elevated protein S Ag.   Recommend repeating hypercoagulable labs in 3 months.  -Continue to target sodium level 145-150.  Sodium 139 this a.m.  -HTN; SBP less than  140.  (Okay 130-150).  Management per primary team  -T2DM with hyperglycemia; A1c 6.7.  Glucose elevated on admission.  Diabetes education as appropriate.  Management per primary team.  -PT/OT/SLP; IPR recommended at [place]  -Stroke clinic follow-up in 4 to 6 weeks after discharge  -Discussed the importance of medication compliance Atorvastatin 80mg nightly and Eliquis 5mg twice daily and lifestyle modifications adequate control of blood pressure, adequate control of cholesterol (goal LDL <70), adequate control of glucose (<140, A1c goal <7), smoking cessation, increased physical activity, and implementation of healthy diet to help reduce the risk of future cerebrovascular events.  Also discussed the signs symptoms that would warrant the patient return back to the emergency department including unilateral weakness, unilateral numbness, visual disturbances, loss of balance, speech difficulties, and/or a sudden severe headache.  Patient and  verbalized understanding.    #Drowsiness  -Much more alert today.  - Continue amantadine 100 mg twice daily  - CT head 8/27 stable with evolving infarcts.    #Marijuana use  #Substance abuse  - UDS positive for methamphetamines, THC.  Patient's  reports that he is unsure where she came in contact with methamphetamines.  He reports that he knows that she smokes THC but was unaware of methamphetamine use.  Was concerned that marijuana was possibly laced with meth.  - Cessation counseling when appropriate     #Nicotine product use via vaping  -Daily vaping  -Cessation counseling when appropriate     #Morbid obesity  -BMI 49.44  -Complicates all aspects of care  -Counseling on healthy diet exercise and weight loss when appropriate.     #Medication noncompliance  -Discussed with patient and family importance of compliance with medication, verbalized understanding.    Plan discussed with Dr. Goodman, the patient, her , and nursing staff.  Stroke neurology will follow  peripherally and see as needed.  Please call with any questions or concerns.    MICHELLE Ray, [facility]  Stroke Neurology               Electronically signed by Catrina Mars APRN at 24 1141       Tianna Coronel MD at 24 1109              [facility] Medicine Services  PROGRESS NOTE    Patient Name: Keya Hamilton  : 1990  MRN: [id number]    Date of Admission: 2024  Primary Care Physician: Cuong Parks MD    Subjective   Subjective     CC:  CVA     HPI:  Patient doing ok but is drowsy.  is at bedside. VSS. Reports no changes or new complaints today. Hopeful to go to rehab soon    ROS  As above      Objective   Objective     Vital Signs:   Temp:  [96.2 °F (35.7 °C)-98.4 °F (36.9 °C)] 96.2 °F (35.7 °C)  Heart Rate:  [] 95  Resp:  [16-20] 16  BP: (130-163)/() 133/92  Flow (L/min):  [2-4] 2     Physical Exam:  GEN: NAD, resting in bed, awake but drowsy   HEENT: , atraumatic, normocephalic  NECK: supple, no masses  RESP: on nc, normal effort  CV: on tele, sinus rhythm  PSYCH: normal affect, appropriate  NEURO: awake, alert, no focal deficits noted  MSK: no edema noted  SKIN: no rashes noted       Results Reviewed:  LAB RESULTS:      Lab 24  0459 24  2112 24  1325 24  0542 24  2244 24  1929 24  1548 24  1143 24  0833 24  0611 24  0102 24   WBC 15.37*  --   --  20.36*  --   --   --   --  16.61*  --   --  11.16*  --   --    HEMOGLOBIN 15.6  --   --  14.7  --   --   --   --  14.9  --   --  16.1*  --   --    HEMOGLOBIN, POC  --   --   --   --   --   --   --   --   --   --   --   --   --  12.2   HEMATOCRIT 47.0*  --   --  46.1  --   --   --   --  44.2  --   --  47.2*  --   --    HEMATOCRIT POC  --   --   --   --   --   --   --   --   --   --   --   --   --  36*   PLATELETS 346  --   --  374  --   --   --   --  340  --   --  417   --   --    NEUTROS ABS 10.82*  --   --  16.06*  --   --   --   --  15.03*  --   --  7.05*  --   --    IMMATURE GRANS (ABS) 0.07*  --   --  0.18*  --   --   --   --  0.12*  --   --  0.07*  --   --    LYMPHS ABS 3.30*  --   --  2.82  --   --   --   --  0.91  --   --  2.77  --   --    MONOS ABS 1.06*  --   --  1.24*  --   --   --   --  0.51  --   --  0.85  --   --    EOS ABS 0.05  --   --  0.01  --   --   --   --  0.00  --   --  0.33  --   --    .1*  --   --  103.4*  --   --   --   --  97.8*  --   --  97.7*  --   --    PROCALCITONIN 0.21  --   --  0.15  --   --   --   --   --   --   --   --   --   --    LACTATE  --   --   --   --   --  1.9  --  4.2* 3.9* 3.4* 3.5*  --   --   --    PROTIME  --   --   --   --   --   --   --   --  14.9*  --   --   --  12.6*  --    APTT  --   --   --   --   --   --   --   --  23.2*  --   --  25.3  --   --    HEPARIN ANTI-XA 0.58 0.33 0.24* 0.10* 0.10*  --    < >  --  0.10*  --   --   --   --   --     < > = values in this interval not displayed.         Lab 08/27/24  0358 08/26/24  0459 08/25/24  0542 08/24/24  0102 08/23/24 2032   SODIUM 139 138 144 141  --    POTASSIUM 4.5 4.3 4.3 4.4  --    CHLORIDE 105 105 108* 105  --    CO2 20.0* 20.0* 22.0 20.0*  --    ANION GAP 14.0 13.0 14.0 16.0*  --    BUN 23* 17 17 16  --    CREATININE 0.99 0.92 1.15* 1.39* 0.80   EGFR 77.9 85.0 65.0 51.8* 100.5   GLUCOSE 144* 106* 143* 206*  --    CALCIUM 9.4 9.3 9.0 9.1  --    MAGNESIUM  --  2.3 2.0  --   --    HEMOGLOBIN A1C  --   --   --  6.70*  --          Lab 08/24/24 0102 08/23/24 2055   TOTAL PROTEIN 6.4  --    ALBUMIN 3.7  --    GLOBULIN 2.7  --    ALT (SGPT) 28 15   AST (SGOT) 53* 18   BILIRUBIN 0.6  --    BETA 2 GLYCO 1 IGG <9  --    BETA 2 GLYCO 1 IGA <9  --    BETA 2 GLYCO 1 IGM <9  --    ALK PHOS 60  --          Lab 08/25/24  0542 08/24/24  0833 08/24/24 0102 08/23/24 2055 08/23/24 2036   PROBNP 1,011.0*  --   --   --   --    HSTROP T  --   --  73* 64*  --    PROTIME  --  14.9*  --    --  12.6*   INR  --  1.16*  --   --  1.1         Lab 08/24/24  0102   CHOLESTEROL 177   LDL CHOL 115*   HDL CHOL 31*   TRIGLYCERIDES 175*             Lab 08/25/24  0538 08/24/24  1751   PH, ARTERIAL 7.450 7.396   PCO2, ARTERIAL 36.2 39.0   PO2 ART 62.9* 83.2   FIO2 28 30   HCO3 ART 25.1 23.9   BASE EXCESS ART 1.4 -0.8*   CARBOXYHEMOGLOBIN 1.7 1.4     Brief Urine Lab Results  (Last result in the past 365 days)        Color   Clarity   Blood   Leuk Est   Nitrite   Protein   CREAT   Urine HCG        08/24/24 0124 Yellow   Clear   Negative   Negative   Negative   100 mg/dL (2+)           08/24/24 0124               Negative               Microbiology Results Abnormal       Procedure Component Value - Date/Time    MRSA Screen, PCR (Inpatient) - Swab, Nares [[id number]]  (Normal) Collected: 08/24/24 0109    Lab Status: Final result Specimen: Swab from Nares Updated: 08/24/24 0728     MRSA PCR Negative    Narrative:      The negative predictive value of this diagnostic test is high and should only be used to consider de-escalating anti-MRSA therapy. A positive result may indicate colonization with MRSA and must be correlated clinically.  MRSA Negative    Respiratory Panel PCR w/COVID-19(SARS-CoV-2) KAVEH/BRIAN/DOROTHEA/PAD/COR/VASYL In-House, NP Swab in UTM/VTM, 2 HR TAT - Swab, Nasopharynx [[id number]]  (Normal) Collected: 08/24/24 0109    Lab Status: Final result Specimen: Swab from Nasopharynx Updated: 08/24/24 0242     ADENOVIRUS, PCR Not Detected     Coronavirus 229E Not Detected     Coronavirus HKU1 Not Detected     Coronavirus NL63 Not Detected     Coronavirus OC43 Not Detected     COVID19 Not Detected     Human Metapneumovirus Not Detected     Human Rhinovirus/Enterovirus Not Detected     Influenza A PCR Not Detected     Influenza B PCR Not Detected     Parainfluenza Virus 1 Not Detected     Parainfluenza Virus 2 Not Detected     Parainfluenza Virus 3 Not Detected     Parainfluenza Virus 4 Not Detected     RSV, PCR Not Detected      Bordetella pertussis pcr Not Detected     Bordetella parapertussis PCR Not Detected     Chlamydophila pneumoniae PCR Not Detected     Mycoplasma pneumo by PCR Not Detected    Narrative:      In the setting of a positive respiratory panel with a viral infection PLUS a negative procalcitonin without other underlying concern for bacterial infection, consider observing off antibiotics or discontinuation of antibiotics and continue supportive care. If the respiratory panel is positive for atypical bacterial infection (Bordetella pertussis, Chlamydophila pneumoniae, or Mycoplasma pneumoniae), consider antibiotic de-escalation to target atypical bacterial infection.            CT Head Without Contrast    Result Date: 8/27/2024  CT HEAD WO CONTRAST Date of Exam: 8/27/2024 4:24 PM EDT Indication: drowsiness, stroke follow up. Comparison: August 24, 2024 Technique: Axial CT images were obtained of the head without contrast administration.  Automated exposure control and iterative construction methods were used. Findings: Evolving subacute infarcts within the right basal ganglia, right temporal, and occipital lobes are redemonstrated. There is no hemorrhagic transformation. There is minimal mass effect on the right lateral ventricle, but no midline shift. The basal cisterns appear patent. The calvarium appears intact. The paranasal sinuses and mastoid air cells are well-aerated.     Impression: Impression: Evolving right-sided subacute infarcts most prominent within right basal ganglia. No hemorrhagic transformation or acute herniation. Electronically Signed: Kali Palmer MD  8/27/2024 4:39 PM EDT  Workstation ID: [id number]     Results for orders placed during the hospital encounter of 08/23/24    Adult Transthoracic Echo Complete W/ Cont if Necessary Per Protocol (With Agitated Saline)    Interpretation Summary  Images from the original result were not included.      Left ventricular systolic function is moderately  "decreased. Calculated left ventricular EF = 36.7%  global hypokinesis.  LV cavity mildly dilated.  RV size and function normal    Left ventricular wall thickness is consistent with hypertrophy.    Left ventricular diastolic dysfunction is noted.    There is a thrombus present in the left ventricle in apex. 2.22 cm by 0.96 cm    The left atrial cavity is dilated.    Estimated right ventricular systolic pressure from tricuspid regurgitation is normal (<35 mmHg).    Indeterminate bubble study      Current medications:  Scheduled Meds:amantadine, 100 mg, Oral, Q12H  apixaban, 5 mg, Oral, Q12H  atorvastatin, 80 mg, Oral, Nightly  empagliflozin, 10 mg, Oral, Daily  insulin glargine, 10 Units, Subcutaneous, Daily  insulin lispro, 2-7 Units, Subcutaneous, 4x Daily AC & at Bedtime  nebivolol, 10 mg, Oral, Q24H  pantoprazole, 40 mg, Oral, Q AM  sacubitril-valsartan, 1 tablet, Oral, Q12H      Continuous Infusions:dexmedetomidine, 0.2-1.5 mcg/kg/hr  niCARdipine, 5-15 mg/hr, Last Rate: 5 mg/hr (08/25/24 0812)      PRN Meds:.  acetaminophen    acetaminophen    dextrose    dextrose    glucagon (human recombinant)    hydrALAZINE    nitroglycerin    Assessment & Plan   Assessment & Plan     Active Hospital Problems    Diagnosis  POA    **R M1 stenosis [I63.511]  Yes    Acute systolic heart failure [I50.21]  Unknown    Left ventricular thrombus [I51.3]  Unknown    Acute stroke due to ischemia [I63.9]  Yes    T2DM [E11.9]  Yes    HTN [I10]  Yes    Morbid obesity with BMI of 45.0-49.9, adult [E66.01, Z68.42]  Not Applicable    SCOTT [G47.33]  Yes    Hypertensive urgency [I16.0]  Yes    R/O Seizure [R56.9]  Unknown    Acute respiratory failure [J96.00]  Unknown      Resolved Hospital Problems   No resolved problems to display.        Brief Hospital Course to date:  Keya Hamilton is a \"34-year-old woman with diabetes, hypertension, right renal atrophy who presented with somnolence, dysarthria, left hemiparesis.  Imaging revealed a right " "middle cerebral artery perfusion deficit and severe stenosis of the right M1 segment.  Blood pressure was elevated and treated with labetalol and hydralazine.  She was then given TNKase.  She required intubation in the emergency room.  She was extubated on August 25.  Echo revealed an EF of 36.7% with clot in the left ventricle apex.  She was placed on a heparin drip which was transitioned to Eliquis on 8/26/24. She is heterozygous for factor V Leiden. Her hemoglobin A1c is 6.7.  She passed her speech assessment and diet has been ordered. Urine drug screen was positive for methamphetamine and marijuana.  In addition she does vape.\"    R MCA stroke, RM1 occlusion, s/p TNK and mechanical thrombectomy  --stroke following, suspected cardioembolic in nature (LV thrombus found on ECHO)  --MRI with acute ischemic stroke in R putamen and head of caudate  --continue eliquis, was previously transitioned from heparin gtt   --RAMU noted with LV thrombus, left atrial cavity dilation, bubble study indeterminant EF 36%- cardiology is following  --continue statin, normalized BP goals  --PT/OT/SLP- recommending rehab, patient agreeable    LV thrombus   Factor V leiden  --eliquis  --cardiology on board  --patient noted heterozygous for factor V leiden, additional anticoag labs pending    Substance use- UDS + meth/THC  --cessation counseling, monitor    Previous h/o noncompliance       Somnolence   --amantadine started   --CT h done 8/27 unchanged         Expected Discharge Location and Transportation: rehab  Expected Discharge   Expected Discharge Date: 8/29/2024; Expected Discharge Time:      VTE Prophylaxis:  Pharmacologic & mechanical VTE prophylaxis orders are present.         AM-PAC 6 Clicks Score (PT): 15 (08/27/24 2006)    CODE STATUS:   Code Status and Medical Interventions: CPR (Attempt to Resuscitate); Full Support   Ordered at: 08/24/24 0808     Code Status (Patient has no pulse and is not breathing):    CPR (Attempt to " Resuscitate)     Medical Interventions (Patient has pulse or is breathing):    Full Support       Tianna Coronel MD  08/28/24        Electronically signed by Tianna Coronel MD at 08/28/24 1122       Faustina Reyes APRN at 08/28/24 0804          [facility] Cardiology    Inpatient Progress Note      Chief Complaint/Reason for service:    HF    Subjective     Subjective:       Patient resting in bed.  No complaints.  Denies shortness of breath, palpitations or chest pain.    Past medical, surgical, social and family history reviewed in the patient's electronic medical record.    Objective     Objective:      Infusions:  dexmedetomidine, 0.2-1.5 mcg/kg/hr  niCARdipine, 5-15 mg/hr, Last Rate: 5 mg/hr (08/25/24 0812)         Medications:    Current Facility-Administered Medications:     acetaminophen (TYLENOL) suppository 650 mg, 650 mg, Rectal, Q6H PRN, Russ Gorman PA-C, 650 mg at 08/25/24 0509    acetaminophen (TYLENOL) tablet 650 mg, 650 mg, Oral, Q6H PRN, Quynh Jovel APRN, 650 mg at 08/28/24 0136    amantadine (SYMMETREL) capsule 100 mg, 100 mg, Oral, Q12H, Catrina Mars APRN, 100 mg at 08/27/24 2251    apixaban (ELIQUIS) tablet 5 mg, 5 mg, Oral, Q12H, Katerin Barney MD, 5 mg at 08/27/24 2251    atorvastatin (LIPITOR) tablet 80 mg, 80 mg, Oral, Nightly, Katerin Barney MD, 80 mg at 08/27/24 2251    cefTRIAXone (ROCEPHIN) 2,000 mg in sodium chloride 0.9 % 100 mL MBP, 2,000 mg, Intravenous, Q24H, Cinthia Banegas MD, Last Rate: 200 mL/hr at 08/27/24 0808, 2,000 mg at 08/27/24 0808    dexmedetomidine (PRECEDEX) 400 mcg in 100 mL NS infusion, 0.2-1.5 mcg/kg/hr, Intravenous, Titrated, Cinthia Banegas MD    dextrose (D50W) (25 g/50 mL) IV injection 25 g, 25 g, Intravenous, Q15 Min PRN, Blayne Haddad MD    dextrose (GLUTOSE) oral gel 15 g, 15 g, Oral, Q15 Min PRN, Blayne Haddad MD    empagliflozin (JARDIANCE) tablet 10 mg, 10 mg, Oral,  Daily, Dion Hooker MD, 10 mg at 08/27/24 0943    glucagon (GLUCAGEN) injection 1 mg, 1 mg, Intramuscular, Q15 Min PRN, Blayne Haddad MD    hydrALAZINE (APRESOLINE) injection 10 mg, 10 mg, Intravenous, Q6H PRN, Quynh Jovel, APRN, 10 mg at 08/26/24 0218    insulin glargine (LANTUS, SEMGLEE) injection 10 Units, 10 Units, Subcutaneous, Daily, Blayne Haddad MD, 10 Units at 08/27/24 0807    Insulin Lispro (humaLOG) injection 2-7 Units, 2-7 Units, Subcutaneous, 4x Daily AC & at Bedtime, Blayne Haddad MD, 2 Units at 08/27/24 1204    nebivolol (BYSTOLIC) tablet 10 mg, 10 mg, Oral, Q24H, Dion Hooker MD    niCARdipine (CARDENE) 25mg in 250mL NS infusion, 5-15 mg/hr, Intravenous, Titrated, Stanton Alonso APRN, Last Rate: 50 mL/hr at 08/25/24 0812, 5 mg/hr at 08/25/24 0812    nitroglycerin (NITROSTAT) SL tablet 0.4 mg, 0.4 mg, Sublingual, Q5 Min PRN, Olayinka, May, APRN    pantoprazole (PROTONIX) EC tablet 40 mg, 40 mg, Oral, Q AM, Katerin Barney MD    sacubitril-valsartan (ENTRESTO) 49-51 MG tablet 1 tablet, 1 tablet, Oral, Q12H, Dion Hooker MD, 1 tablet at 08/27/24 2251    Vital Sign Min/Max for last 24 hours  Temp  Min: 97.5 °F (36.4 °C)  Max: 98.4 °F (36.9 °C)   BP  Min: 133/100  Max: 163/106   Pulse  Min: 86  Max: 101   Resp  Min: 18  Max: 20   SpO2  Min: 91 %  Max: 98 %   No data recorded      Intake/Output Summary (Last 24 hours) at 8/28/2024 0804  Last data filed at 8/27/2024 1000  Gross per 24 hour   Intake 200 ml   Output --   Net 200 ml           CONSTITUTIONAL: No acute distress    Labs/studies:  Available lab and imaging results were reviewed by myself today    Results for orders placed during the hospital encounter of 08/23/24    Adult Transthoracic Echo Complete W/ Cont if Necessary Per Protocol (With Agitated Saline)    Interpretation Summary  Images from the original result were not included.      Left ventricular systolic function is moderately decreased.  Calculated left ventricular EF = 36.7%  global hypokinesis.  LV cavity mildly dilated.  RV size and function normal    Left ventricular wall thickness is consistent with hypertrophy.    Left ventricular diastolic dysfunction is noted.    There is a thrombus present in the left ventricle in apex. 2.22 cm by 0.96 cm    The left atrial cavity is dilated.    Estimated right ventricular systolic pressure from tricuspid regurgitation is normal (<35 mmHg).    Indeterminate bubble study      Tele: Sinus rhythm    Assessment     Assessment/Plan:         R M1 stenosis    T2DM    HTN    Morbid obesity with BMI of 45.0-49.9, adult    SCOTT    Hypertensive urgency    R/O Seizure    Acute respiratory failure    Acute stroke due to ischemia    Acute systolic heart failure    Left ventricular thrombus       ASSESSMENT:  LV thrombus  Echocardiogram shows decreased LVEF 36%, LV thrombus, indeterminate bubble study.   Acute HFrEF, severe exacerbation  New diagnosis.  Unclear etiology  Possible contributing causes include recent pneumonia (returned from cruise from [place], given antibiotics), positive meth and marijuana and UDS  LVEF 36% with global hypokinesis on echo.   CVA secondary to right MCA occlusion   Status post thrombolytic therapy, TNK  Status post urgent mechanical thrombectomy, right MCA  Hypertension   Type 2 diabetes mellitus   ANA  Acute respiratory failure  Suspected sleep apnea   Substance abuse   UDS positive for THC, methamphetamines (possibly laced marijuana?, no known history of meth use per patient's )    PLAN:  Apixaban for LV thrombus  Deferring Lasix today  Switched Toprol to Bystolic for additional anti-HTN effect  Continue Jardiance, Entresto   Will consider adding spironolactone, but K+ may be too high with increased Entresto  Daily BMP while admitted; monitoring Cr and K+ with new meds  Continue statin   Repeat limited echo tomorrow for EF.  Possibly okay for discharge home tomorrow from  cardiology standpoint if labs stable.    Sleep med referral/sleep study/outpatient SCOTT management  Future ischemic evaluation (cardiac CTA) and possible cardiac MRI    Electronically signed by MIHCELLE Do, 08/28/24, 2:14 PM EDT.          Electronically signed by Faustina Reyes APRN at 08/28/24 1416       Stanton Alonso APRN at 08/28/24 0739          Stroke Progress Note       Chief Complaint: Left-sided weakness    Subjective    Subjective     Subjective:    Sitting up in bed.  Patient is a attempting to eat breakfast.   is at bedside.  Exam is stable from previous.    Review of Systems   Constitutional:  Negative for fever.   Eyes:  Positive for visual disturbance.   Gastrointestinal:  Negative for abdominal pain and nausea.   Neurological:  Positive for facial asymmetry, speech difficulty, weakness and numbness. Negative for dizziness and headaches.   Psychiatric/Behavioral:  Negative for confusion.       Constitutional: No fatigue  Eyes: Negative for redness.   Respiratory: Negative for cough.    Musculoskeletal: Negative for joint swelling.   Neurological: Negative for tremors.     Objective      Temp:  [97.5 °F (36.4 °C)-98.4 °F (36.9 °C)] 98.4 °F (36.9 °C)  Heart Rate:  [] 95  Resp:  [18-20] 19  BP: (128-163)/() 143/98       Objective    Physical Exam  Constitutional:       General: She is not in acute distress.     Appearance: She is obese.   HENT:      Head: Normocephalic and atraumatic.      Nose: Nose normal.      Mouth/Throat:      Mouth: Mucous membranes are moist.   Eyes:      Extraocular Movements: Extraocular movements intact.      Pupils: Pupils are equal, round, and reactive to light.   Cardiovascular:      Pulses: Normal pulses.   Pulmonary:      Effort: Pulmonary effort is normal.   Abdominal:      Comments: Round, obese   Musculoskeletal:         General: Normal range of motion.      Cervical back: Normal range of motion.   Skin:     General: Skin is warm and dry.       Capillary Refill: Capillary refill takes less than 2 seconds.   Neurological:      Mental Status: She is alert and oriented to person, place, and time.      Cranial Nerves: Cranial nerve deficit and dysarthria present.      Sensory: Sensory deficit present.      Motor: Weakness present.   Psychiatric:         Mood and Affect: Mood normal.         Behavior: Behavior normal.        Neurological Exam  Mental Status  Alert. Oriented to person, place and time. Oriented to person, place, and time. Mild dysarthria present. Expressive aphasia and receptive aphasia present.    Cranial Nerves  CN II: Right visual acuity: Counts fingers. Left visual acuity: Counts fingers.  CN III, IV, VI: Extraocular movements intact bilaterally. Pupils equal round and reactive to light bilaterally.  CN V:  Right: Facial sensation is normal.  Left: Diminished sensation of the entire left side of the face.  CN VII:  Left: There is central facial weakness.  CN VIII: Hearing intact.  CN XI:  Right: Sternocleidomastoid strength is normal. Trapezius strength is normal.  Left: Sternocleidomastoid strength is weak. Trapezius strength is weak.  CN XII: Tongue midline without atrophy or fasciculations.    Motor  Normal muscle bulk throughout. Normal muscle tone. Strength is 5/5 in all four extremities except as noted.  Strength left upper extremity 2/5..    Sensory  Light touch abnormality: Sensation: Decrease sensitivity to left-sided body.     Coordination  Right: Finger-to-nose normal. Heel-to-shin normal.Left: Finger-to-nose abnormality: Heel-to-shin normal.  Left upper extremity weakness.    Gait    Unable to assess.       Results Review:    I reviewed the patient's new clinical results.    WBC   Date Value Ref Range Status   08/26/2024 15.37 (H) 3.40 - 10.80 10*3/mm3 Final     RBC   Date Value Ref Range Status   08/26/2024 4.65 3.77 - 5.28 10*6/mm3 Final     Hemoglobin   Date Value Ref Range Status   08/26/2024 15.6 12.0 - 15.9 g/dL Final      Hematocrit   Date Value Ref Range Status   08/26/2024 47.0 (H) 34.0 - 46.6 % Final     MCV   Date Value Ref Range Status   08/26/2024 101.1 (H) 79.0 - 97.0 fL Final     MCH   Date Value Ref Range Status   08/26/2024 33.5 (H) 26.6 - 33.0 pg Final     MCHC   Date Value Ref Range Status   08/26/2024 33.2 31.5 - 35.7 g/dL Final     RDW   Date Value Ref Range Status   08/26/2024 13.5 12.3 - 15.4 % Final     RDW-SD   Date Value Ref Range Status   08/26/2024 50.1 37.0 - 54.0 fl Final     MPV   Date Value Ref Range Status   08/26/2024 8.2 6.0 - 12.0 fL Final     Platelets   Date Value Ref Range Status   08/26/2024 346 140 - 450 10*3/mm3 Final     Neutrophil %   Date Value Ref Range Status   08/26/2024 70.3 42.7 - 76.0 % Final     Lymphocyte %   Date Value Ref Range Status   08/26/2024 21.5 19.6 - 45.3 % Final     Monocyte %   Date Value Ref Range Status   08/26/2024 6.9 5.0 - 12.0 % Final     Eosinophil %   Date Value Ref Range Status   08/26/2024 0.3 0.3 - 6.2 % Final     Basophil %   Date Value Ref Range Status   08/26/2024 0.5 0.0 - 1.5 % Final     Immature Grans %   Date Value Ref Range Status   08/26/2024 0.5 0.0 - 0.5 % Final     Neutrophils, Absolute   Date Value Ref Range Status   08/26/2024 10.82 (H) 1.70 - 7.00 10*3/mm3 Final     Lymphocytes, Absolute   Date Value Ref Range Status   08/26/2024 3.30 (H) 0.70 - 3.10 10*3/mm3 Final     Monocytes, Absolute   Date Value Ref Range Status   08/26/2024 1.06 (H) 0.10 - 0.90 10*3/mm3 Final     Eosinophils, Absolute   Date Value Ref Range Status   08/26/2024 0.05 0.00 - 0.40 10*3/mm3 Final     Basophils, Absolute   Date Value Ref Range Status   08/26/2024 0.07 0.00 - 0.20 10*3/mm3 Final     Immature Grans, Absolute   Date Value Ref Range Status   08/26/2024 0.07 (H) 0.00 - 0.05 10*3/mm3 Final     nRBC   Date Value Ref Range Status   08/26/2024 0.0 0.0 - 0.2 /100 WBC Final       Lab Results   Component Value Date    GLUCOSE 144 (H) 08/27/2024    BUN 23 (H) 08/27/2024     CREATININE 0.99 08/27/2024     08/27/2024    K 4.5 08/27/2024     08/27/2024    CALCIUM 9.4 08/27/2024    PROTEINTOT 6.4 08/24/2024    ALBUMIN 3.7 08/24/2024    ALT 28 08/24/2024    AST 53 (H) 08/24/2024    ALKPHOS 60 08/24/2024    BILITOT 0.6 08/24/2024    GLOB 2.7 08/24/2024    AGRATIO 1.4 08/24/2024    BCR 23.2 08/27/2024    ANIONGAP 14.0 08/27/2024    EGFR 77.9 08/27/2024     Lipid Panel          8/24/2024    01:02   Lipid Panel   Total Cholesterol 177    Triglycerides 175    HDL Cholesterol 31    VLDL Cholesterol 31    LDL Cholesterol  115    LDL/HDL Ratio 3.58           Lab 08/24/24  0102   HEMOGLOBIN A1C 6.70*      CT Head Without Contrast    Result Date: 8/27/2024  Impression: Evolving right-sided subacute infarcts most prominent within right basal ganglia. No hemorrhagic transformation or acute herniation. Electronically Signed: Kali Palmer MD  8/27/2024 4:39 PM EDT  Workstation ID: [id number]   Results for orders placed during the hospital encounter of 08/23/24    Adult Transthoracic Echo Complete W/ Cont if Necessary Per Protocol (With Agitated Saline)    Interpretation Summary  Images from the original result were not included.      Left ventricular systolic function is moderately decreased. Calculated left ventricular EF = 36.7%  global hypokinesis.  LV cavity mildly dilated.  RV size and function normal    Left ventricular wall thickness is consistent with hypertrophy.    Left ventricular diastolic dysfunction is noted.    There is a thrombus present in the left ventricle in apex. 2.22 cm by 0.96 cm    The left atrial cavity is dilated.    Estimated right ventricular systolic pressure from tricuspid regurgitation is normal (<35 mmHg).    Indeterminate bubble study    -MRI brain from 8-24-24 with an AIS in the R putamen and R head of caudate.   -LDL from 8-24-24 was 115  -A1c from 8-24-24 was 6.7  -Echo on 8-24-24 with concern for IV thrombus  -Repeated CTH on 8-24-24 with and evolving R  BG AIS, no hemorrhage noted    Assessment/Plan   34-year-old white female, with multiple vascular risk factors who presented to [facility] ED via EMS on 8/23/2024 with R MCA syndrome.  Initial NIH 11.  CT head was negative for hemorrhage.  /106.  She was deemed a candidate for TNK which was given after BP was lowered to 167/105.  CTP with a large area of hypoperfusion in the R MCA territory.  CTA H/N with R M1 occlusion.  She was intubated in the ED secondary due to inability to protect her airway.  She was taken to the Cath Lab for mechanical thrombectomy and admitted to the ICU postprocedure.    Antiplatelet PTA: None  Anticoagulant PTA: None        # R MCA stroke, R M1 occlusion s/p TNK and MT, TICI 3   # Cardiac intraventricular thrombus (cardiology following)  -Suspected cardioembolic in etiology in the setting of left ventricular thrombus  -Risk factors: Hypertension, hypertension lipidemia, obesity, diabetes, marijuana use, on progesterone(Mirena IUD)  -MRI with an AIS in the R putamen and R head of caudate.   -Continue Eliquis 5 mg twice daily.  Heparin drip discontinued yesterday.  -TTE with an LV thrombus, left atrial cavity dilation, EF 36.7 with global hypokinesis.  Bubble study was indeterminate.  -CTh 8/24-with evolving infarcts and no evidence of overt hemorrhage  -Hypercoag panel, factor V Leiden heterozygous on 8/24.  Additional labs pending.  -Continue to target sodium level 145-150.  Sodium 139 this a.m.  -HTN; SBP less than 140.  (Okay 130-150).  Management per primary team  -T2DM with hyperglycemia; A1c 6.7.  Glucose elevated on admission.  Diabetes education as appropriate.  Management per primary team.  -PT/OT/SLP    #Drowsiness  - Start amantadine 100 mg twice daily  - Repeat CT head today    #Marijuana use  #Substance abuse  - UDS positive for methamphetamines, THC.  Patient's  reports that he is unsure where she came in contact with methamphetamines.  He reports that he knows that she  smokes THC but was unaware of methamphetamine use.  Was concerned that marijuana was possibly laced with meth.  - Cessation counseling when appropriate     #Nicotine product use via vaping  -Daily vaping  -Cessation counseling when appropriate     #Morbid obesity  -BMI 49.44  -Complicates all aspects of care  -Counseling on healthy diet exercise and weight loss when appropriate.     #Medication noncompliance  -Discussed with patient and family importance of compliance with medication, verbalized understanding.    Plan discussed with Dr. Goodman, the patient, her , and nursing staff.  Stroke neurology will continue to follow.  Please call with any questions or concerns.    MICHELLE Lind               Electronically signed by Stanton Alonso APRN at 08/28/24 1329       Dion Hooker MD at 08/27/24 0952          [facility] Cardiology    Inpatient Progress Note      Chief Complaint/Reason for service:    HF    Subjective     Subjective:       No acute events overnight.  Ambulated today.  Tired    Past medical, surgical, social and family history reviewed in the patient's electronic medical record.    Objective     Objective:      Infusions:  dexmedetomidine, 0.2-1.5 mcg/kg/hr  niCARdipine, 5-15 mg/hr, Last Rate: 5 mg/hr (08/25/24 0812)         Medications:    Current Facility-Administered Medications:     acetaminophen (TYLENOL) suppository 650 mg, 650 mg, Rectal, Q6H PRN, Russ Gorman PA-C, 650 mg at 08/25/24 0509    acetaminophen (TYLENOL) tablet 650 mg, 650 mg, Oral, Q6H PRN, Quynh Jovel APRN, 650 mg at 08/27/24 0049    apixaban (ELIQUIS) tablet 5 mg, 5 mg, Oral, Q12H, Katerin Barney MD, 5 mg at 08/27/24 0943    atorvastatin (LIPITOR) tablet 80 mg, 80 mg, Nasogastric, Nightly, Cinthia Banegas MD, 80 mg at 08/26/24 2025    cefTRIAXone (ROCEPHIN) 2,000 mg in sodium chloride 0.9 % 100 mL MBP, 2,000 mg, Intravenous, Q24H, Cinthia Banegas MD, Last  Rate: 200 mL/hr at 08/27/24 0808, 2,000 mg at 08/27/24 0808    dexmedetomidine (PRECEDEX) 400 mcg in 100 mL NS infusion, 0.2-1.5 mcg/kg/hr, Intravenous, Titrated, Cinthia Banegas MD    dextrose (D50W) (25 g/50 mL) IV injection 25 g, 25 g, Intravenous, Q15 Min PRN, Blayne Haddad MD    dextrose (GLUTOSE) oral gel 15 g, 15 g, Oral, Q15 Min PRN, Blayne Haddad MD    empagliflozin (JARDIANCE) tablet 10 mg, 10 mg, Oral, Daily, Dion Hooker MD, 10 mg at 08/27/24 0943    glucagon (GLUCAGEN) injection 1 mg, 1 mg, Intramuscular, Q15 Min PRN, Blayne Haddad MD    hydrALAZINE (APRESOLINE) injection 10 mg, 10 mg, Intravenous, Q6H PRN, Quynh Jovel APRN, 10 mg at 08/26/24 0218    insulin glargine (LANTUS, SEMGLEE) injection 10 Units, 10 Units, Subcutaneous, Daily, Blayne Haddad MD, 10 Units at 08/27/24 0807    Insulin Lispro (humaLOG) injection 2-7 Units, 2-7 Units, Subcutaneous, 4x Daily AC & at Bedtime, Blayne Haddad MD, 3 Units at 08/27/24 0806    metoprolol succinate XL (TOPROL-XL) 24 hr tablet 50 mg, 50 mg, Oral, Q24H, Dion Hooker MD, 50 mg at 08/27/24 0943    niCARdipine (CARDENE) 25mg in 250mL NS infusion, 5-15 mg/hr, Intravenous, Titrated, Stanton Alonso APRN, Last Rate: 50 mL/hr at 08/25/24 0812, 5 mg/hr at 08/25/24 0812    nitroglycerin (NITROSTAT) SL tablet 0.4 mg, 0.4 mg, Sublingual, Q5 Min PRN, Alma Bhagat, APRN    pantoprazole (PROTONIX) injection 40 mg, 40 mg, Intravenous, QAM AC, Cinthia Banegas MD, 40 mg at 08/27/24 0808    sacubitril-valsartan (ENTRESTO) 24-26 MG tablet 1 tablet, 1 tablet, Oral, Q12H, Dion Hooker MD, 1 tablet at 08/27/24 0943    Vital Sign Min/Max for last 24 hours  Temp  Min: 98 °F (36.7 °C)  Max: 99.3 °F (37.4 °C)   BP  Min: 120/91  Max: 146/115   Pulse  Min: 96  Max: 105   Resp  Min: 20  Max: 22   SpO2  Min: 93 %  Max: 99 %   No data recorded      Intake/Output Summary (Last 24 hours) at 8/27/2024 0947  Last data filed at  8/27/2024 0200  Gross per 24 hour   Intake 400 ml   Output 3775 ml   Net -3375 ml           CONSTITUTIONAL: No acute distress    Labs/studies:  Available lab and imaging results were reviewed by myself today    Results for orders placed during the hospital encounter of 08/23/24    Adult Transthoracic Echo Complete W/ Cont if Necessary Per Protocol (With Agitated Saline)    Interpretation Summary  Images from the original result were not included.      Left ventricular systolic function is moderately decreased. Calculated left ventricular EF = 36.7%  global hypokinesis.  LV cavity mildly dilated.  RV size and function normal    Left ventricular wall thickness is consistent with hypertrophy.    Left ventricular diastolic dysfunction is noted.    There is a thrombus present in the left ventricle in apex. 2.22 cm by 0.96 cm    The left atrial cavity is dilated.    Estimated right ventricular systolic pressure from tricuspid regurgitation is normal (<35 mmHg).    Indeterminate bubble study      Tele: Sinus rhythm    Assessment     Assessment/Plan:         R M1 stenosis    T2DM    HTN    Morbid obesity with BMI of 45.0-49.9, adult    SCOTT    Hypertensive urgency    R/O Seizure    Acute respiratory failure    Acute stroke due to ischemia    Acute systolic heart failure    Left ventricular thrombus       ASSESSMENT:  LV thrombus  Echocardiogram shows decreased LVEF 36%, LV thrombus, indeterminate bubble study.   Acute HFrEF, severe exacerbation  New diagnosis.  Unclear etiology  Possible contributing causes include recent pneumonia (returned from cruise from [place], given antibiotics), positive meth and marijuana and UDS  LVEF 36% with global hypokinesis on echo.   CVA secondary to right MCA occlusion   Status post thrombolytic therapy, TNK  Status post urgent mechanical thrombectomy, right MCA  Hypertension   Type 2 diabetes mellitus   ANA  Acute respiratory failure  Suspected sleep apnea   Substance abuse   UDS  positive for THC, methamphetamines (possibly laced marijuana?, no known history of meth use per patient's )    PLAN:  Apixaban for LV thrombus  Deferring Lasix today  Continue Toprol, Jardiance   Increasing Entresto  Will consider adding spironolactone, but K+ may be too high with increased Entresto  Daily BMP while admitted; monitoring Cr and K+ with new meds  Continue statin     Awaiting transfer to telemetry  Sleep med referral/sleep study/outpatient SCOTT management  Future ischemic evaluation (cardiac CTA) and possible cardiac MRI      Dion Hooker MD, MSc, FACC, [facility]  Interventional Cardiology  [facility]        Electronically signed by Dion Hooker MD at 08/27/24 1410       Catrina Mars APRN at 08/27/24 0945          Stroke Progress Note       Chief Complaint: Left-sided weakness    Subjective    Subjective     Subjective:    Sitting up in the chair.  Continues to be drowsy.  Wakes up briefly with continuous stimulation and follows commands.  Per the patient's , she walked in the perales yesterday.  Exam is stable from previous.    Review of Systems   Constitutional: No fatigue  Eyes: Negative for redness.   Respiratory: Negative for cough.    Musculoskeletal: Negative for joint swelling.   Neurological: Negative for tremors.     Objective      Temp:  [98 °F (36.7 °C)-99.3 °F (37.4 °C)] 99.3 °F (37.4 °C)  Heart Rate:  [] 104  Resp:  [20-22] 20  BP: (120-146)/() 142/102       Objective    Physical Exam  Constitutional:       Appearance: She is obese. She is ill-appearing.      Comments: Drowsy, awakens with multiple prompts   HENT:      Head: Normocephalic and atraumatic.   Eyes:      Comments: Does not blink to visual threat in the left peripheral visual fields.  Difficult to fully assess secondary to drowsiness.  Pupils are equal and reactive   Cardiovascular:      Rate and Rhythm: Normal rate and regular rhythm.   Pulmonary:      Effort: Pulmonary effort is  normal. No respiratory distress.   Musculoskeletal:         General: Normal range of motion.      Cervical back: Normal range of motion and neck supple.   Skin:     General: Skin is warm and dry.      Capillary Refill: Capillary refill takes less than 2 seconds.   Neurological:      Cranial Nerves: Cranial nerve deficit present.      Sensory: Sensory deficit present.      Motor: Weakness present.      Comments: Oriented to self, knows she is in the hospital.  Knows the year.  LFD, left sensory deficit, dysarthria, left peripheral visual deficit            Results Review:    I reviewed the patient's new clinical results.    WBC   Date Value Ref Range Status   08/26/2024 15.37 (H) 3.40 - 10.80 10*3/mm3 Final     RBC   Date Value Ref Range Status   08/26/2024 4.65 3.77 - 5.28 10*6/mm3 Final     Hemoglobin   Date Value Ref Range Status   08/26/2024 15.6 12.0 - 15.9 g/dL Final     Hematocrit   Date Value Ref Range Status   08/26/2024 47.0 (H) 34.0 - 46.6 % Final     MCV   Date Value Ref Range Status   08/26/2024 101.1 (H) 79.0 - 97.0 fL Final     MCH   Date Value Ref Range Status   08/26/2024 33.5 (H) 26.6 - 33.0 pg Final     MCHC   Date Value Ref Range Status   08/26/2024 33.2 31.5 - 35.7 g/dL Final     RDW   Date Value Ref Range Status   08/26/2024 13.5 12.3 - 15.4 % Final     RDW-SD   Date Value Ref Range Status   08/26/2024 50.1 37.0 - 54.0 fl Final     MPV   Date Value Ref Range Status   08/26/2024 8.2 6.0 - 12.0 fL Final     Platelets   Date Value Ref Range Status   08/26/2024 346 140 - 450 10*3/mm3 Final     Neutrophil %   Date Value Ref Range Status   08/26/2024 70.3 42.7 - 76.0 % Final     Lymphocyte %   Date Value Ref Range Status   08/26/2024 21.5 19.6 - 45.3 % Final     Monocyte %   Date Value Ref Range Status   08/26/2024 6.9 5.0 - 12.0 % Final     Eosinophil %   Date Value Ref Range Status   08/26/2024 0.3 0.3 - 6.2 % Final     Basophil %   Date Value Ref Range Status   08/26/2024 0.5 0.0 - 1.5 % Final      Immature Grans %   Date Value Ref Range Status   08/26/2024 0.5 0.0 - 0.5 % Final     Neutrophils, Absolute   Date Value Ref Range Status   08/26/2024 10.82 (H) 1.70 - 7.00 10*3/mm3 Final     Lymphocytes, Absolute   Date Value Ref Range Status   08/26/2024 3.30 (H) 0.70 - 3.10 10*3/mm3 Final     Monocytes, Absolute   Date Value Ref Range Status   08/26/2024 1.06 (H) 0.10 - 0.90 10*3/mm3 Final     Eosinophils, Absolute   Date Value Ref Range Status   08/26/2024 0.05 0.00 - 0.40 10*3/mm3 Final     Basophils, Absolute   Date Value Ref Range Status   08/26/2024 0.07 0.00 - 0.20 10*3/mm3 Final     Immature Grans, Absolute   Date Value Ref Range Status   08/26/2024 0.07 (H) 0.00 - 0.05 10*3/mm3 Final     nRBC   Date Value Ref Range Status   08/26/2024 0.0 0.0 - 0.2 /100 WBC Final       Lab Results   Component Value Date    GLUCOSE 144 (H) 08/27/2024    BUN 23 (H) 08/27/2024    CREATININE 0.99 08/27/2024     08/27/2024    K 4.5 08/27/2024     08/27/2024    CALCIUM 9.4 08/27/2024    PROTEINTOT 6.4 08/24/2024    ALBUMIN 3.7 08/24/2024    ALT 28 08/24/2024    AST 53 (H) 08/24/2024    ALKPHOS 60 08/24/2024    BILITOT 0.6 08/24/2024    GLOB 2.7 08/24/2024    AGRATIO 1.4 08/24/2024    BCR 23.2 08/27/2024    ANIONGAP 14.0 08/27/2024    EGFR 77.9 08/27/2024     Lipid Panel          8/24/2024    01:02   Lipid Panel   Total Cholesterol 177    Triglycerides 175    HDL Cholesterol 31    VLDL Cholesterol 31    LDL Cholesterol  115    LDL/HDL Ratio 3.58           Lab 08/24/24  0102   HEMOGLOBIN A1C 6.70*      XR Chest 1 View    Result Date: 8/26/2024  Impression: 1.Interval removal of the endotracheal tube. 2.Evidence for increasing atelectasis versus infiltrates within the mid to lower lungs bilaterally. Electronically Signed: Edouard Krishna MD  8/26/2024 7:21 AM EDT  Workstation ID: [id number]   Results for orders placed during the hospital encounter of 08/23/24    Adult Transthoracic Echo Complete W/ Cont if Necessary  Per Protocol (With Agitated Saline)    Interpretation Summary  Images from the original result were not included.      Left ventricular systolic function is moderately decreased. Calculated left ventricular EF = 36.7%  global hypokinesis.  LV cavity mildly dilated.  RV size and function normal    Left ventricular wall thickness is consistent with hypertrophy.    Left ventricular diastolic dysfunction is noted.    There is a thrombus present in the left ventricle in apex. 2.22 cm by 0.96 cm    The left atrial cavity is dilated.    Estimated right ventricular systolic pressure from tricuspid regurgitation is normal (<35 mmHg).    Indeterminate bubble study    -MRI brain from 8-24-24 with an AIS in the R putamen and R head of caudate.   -LDL from 8-24-24 was 115  -A1c from 8-24-24 was 6.7  -Echo on 8-24-24 with concern for IV thrombus  -Repeated CTH on 8-24-24 with and evolving R BG AIS, no hemorrhage noted    Assessment/Plan   34-year-old white female, with multiple vascular risk factors who presented to [facility] ED via EMS on 8/23/2024 with R MCA syndrome.  Initial NIH 11.  CT head was negative for hemorrhage.  /106.  She was deemed a candidate for TNK which was given after BP was lowered to 167/105.  CTP with a large area of hypoperfusion in the R MCA territory.  CTA H/N with R M1 occlusion.  She was intubated in the ED secondary due to inability to protect her airway.  She was taken to the Cath Lab for mechanical thrombectomy and admitted to the ICU postprocedure.    Antiplatelet PTA: None  Anticoagulant PTA: None        # R MCA stroke, R M1 occlusion s/p TNK and MT, TICI 3   # Cardiac intraventricular thrombus (cardiology following)  -Suspected cardioembolic in etiology in the setting of left ventricular thrombus  -Risk factors: Hypertension, hypertension lipidemia, obesity, diabetes, marijuana use, on progesterone(Mirena IUD)  -MRI with an AIS in the R putamen and R head of caudate.   -Continue Eliquis 5 mg  twice daily.  Heparin drip discontinued yesterday.  -TTE with an LV thrombus, left atrial cavity dilation, EF 36.7 with global hypokinesis.  Bubble study was indeterminate.  -CTh 8/24-with evolving infarcts and no evidence of overt hemorrhage  -Hypercoag panel, factor V Leiden heterozygous on 8/24.  Additional labs pending.  -Continue to target sodium level 145-150.  Sodium 139 this a.m.  -HTN; SBP less than 140.  (Okay 130-150).  Management per primary team  -T2DM with hyperglycemia; A1c 6.7.  Glucose elevated on admission.  Diabetes education as appropriate.  Management per primary team.  -PT/OT/SLP    #Drowsiness  - Start amantadine 100 mg twice daily  - Repeat CT head today    #Marijuana use  #Substance abuse  - UDS positive for methamphetamines, THC.  Patient's  reports that he is unsure where she came in contact with methamphetamines.  He reports that he knows that she smokes THC but was unaware of methamphetamine use.  Was concerned that marijuana was possibly laced with meth.  - Cessation counseling when appropriate     #Nicotine product use via vaping  -Daily vaping  -Cessation counseling when appropriate     #Morbid obesity  -BMI 49.44  -Complicates all aspects of care  -Counseling on healthy diet exercise and weight loss when appropriate.     #Medication noncompliance  -Discussed with patient and family importance of compliance with medication, verbalized understanding.    Plan discussed with Dr. Goodman, the patient, her , and nursing staff.  Stroke neurology will continue to follow.  Please call with any questions or concerns.    MICHELLE Box, AGACNP-BC              Electronically signed by Catrina Mars APRN at 08/27/24 1148       Quynh Jovel APRN at 08/27/24 0754          Critical Care Note     LOS: 4 days   Patient Care Team:  Cuong Parks MD as PCP - General (Family Medicine)    Chief Complaint/Reason for visit:    Chief Complaint   Patient presents  "with    Stroke       Subjective     Interval History: No acute issues overnight. Sleepy this morning. Nursing staff states that  is requesting a medication for depression. She is not on any home meds for depression.     History taken from: patient    Review of Systems:    All systems were reviewed and negative except as noted in subjective.    Medical history, surgical history, social history, family history reviewed    Objective     Intake/Output:    Intake/Output Summary (Last 24 hours) at 8/27/2024 0755  Last data filed at 8/27/2024 0200  Gross per 24 hour   Intake 456 ml   Output 4275 ml   Net -3819 ml       Nutrition:  Diet: Cardiac, Diabetic; Healthy Heart (2-3 Na+); Consistent Carbohydrate; Texture: Soft to Chew (NDD 3); Soft to Chew: Chopped Meat; Fluid Consistency: Thin (IDDSI 0)    Infusions:  dexmedetomidine, 0.2-1.5 mcg/kg/hr  niCARdipine, 5-15 mg/hr, Last Rate: 5 mg/hr (08/25/24 0812)        Mechanical Ventilator Settings:            Resp Rate (Set): 20  Pressure Support (cm H2O): 8 cm H20  FiO2 (%): 40 %  PEEP/CPAP (cm H2O): 5 cm H20    Minute Ventilation (L/min) (Obs): 2.24 L/min  Resp Rate (Observed) Vent: 29  I:E Ratio (Set): 1:2.33  I:E Ratio (Obs): 14.29:1    PIP Observed (cm H2O): 15 cm H2O  Plateau Pressure (cm H2O): 25 cm H2O    Telemetry: NSR             Vital Signs  Blood pressure (!) 142/102, pulse 104, temperature 99.3 °F (37.4 °C), temperature source Axillary, resp. rate 20, height 160 cm (62.99\"), weight 117 kg (258 lb 6.1 oz), SpO2 93%.    Physical Exam:   General Appearance:  WD/WN/NAD   Head:  Atraumatic.    Lungs:   CTA. No wheezes, rhonchi or rales.     Heart:  RRR. S1, S2 normal. No r/m/g.    Abdomen:   S/NT/ND. + BS.    Rectal:   Deferred   Extremities: L extremities weaker than right.    Pulses: DP 2+ bilaterally.    Skin: Warm, dry.    Neurologic: Sleepy. Arouses easily and follows commands.       Results Review:     I reviewed the patient's new clinical results.   Results " "from last 7 days   Lab Units 08/27/24  0358 08/26/24  0459 08/25/24  0542 08/24/24  0102 08/24/24  0102 08/23/24  2055   SODIUM mmol/L 139 138 144   < > 141  --    POTASSIUM mmol/L 4.5 4.3 4.3   < > 4.4  --    CHLORIDE mmol/L 105 105 108*   < > 105  --    CO2 mmol/L 20.0* 20.0* 22.0   < > 20.0*  --    BUN mg/dL 23* 17 17   < > 16  --    CREATININE mg/dL 0.99 0.92 1.15*   < > 1.39*  --    CALCIUM mg/dL 9.4 9.3 9.0   < > 9.1  --    BILIRUBIN mg/dL  --   --   --   --  0.6  --    ALK PHOS U/L  --   --   --   --  60  --    ALT (SGPT) U/L  --   --   --   --  28 15   AST (SGOT) U/L  --   --   --   --  53* 18   GLUCOSE mg/dL 144* 106* 143*   < > 206*  --     < > = values in this interval not displayed.     Results from last 7 days   Lab Units 08/26/24  0459 08/25/24  0542 08/24/24  0833   WBC 10*3/mm3 15.37* 20.36* 16.61*   HEMOGLOBIN g/dL 15.6 14.7 14.9   HEMATOCRIT % 47.0* 46.1 44.2   PLATELETS 10*3/mm3 346 374 340     Results from last 7 days   Lab Units 08/25/24  0538   PH, ARTERIAL pH units 7.450   PO2 ART mm Hg 62.9*   PCO2, ARTERIAL mm Hg 36.2   HCO3 ART mmol/L 25.1     No results found for: \"BLOODCX\"  No results found for: \"URINECX\"    I reviewed the patient's new imaging including images and reports.      All medications reviewed.   apixaban, 5 mg, Oral, Q12H  atorvastatin, 80 mg, Nasogastric, Nightly  cefTRIAXone, 2,000 mg, Intravenous, Q24H  empagliflozin, 10 mg, Oral, Daily  insulin glargine, 10 Units, Subcutaneous, Daily  insulin lispro, 2-7 Units, Subcutaneous, 4x Daily AC & at Bedtime  metoprolol succinate XL, 50 mg, Oral, Q24H  pantoprazole, 40 mg, Intravenous, QAM AC  sacubitril-valsartan, 1 tablet, Oral, Q12H          Assessment & Plan       R M1 stenosis    T2DM    HTN    Morbid obesity with BMI of 45.0-49.9, adult    SCOTT    Hypertensive urgency    R/O Seizure    Acute respiratory failure    Acute stroke due to ischemia    Acute systolic heart failure    Left ventricular thrombus    34-year-old woman with " diabetes, hypertension, right renal atrophy who presented with somnolence, dysarthria, left hemiparesis.  Imaging revealed a right middle cerebral artery perfusion deficit and severe stenosis of the right M1 segment.  Blood pressure was elevated and treated with labetalol and hydralazine.  She was then given TNKase.  She required intubation in the emergency room.  She was extubated on August 25.  Echo revealed an EF of 36.7% with clot in the left ventricle apex.  She was placed on a heparin drip which was transitioned to Eliquis on 8/26/24. She is heterozygous for factor V Leiden. Her hemoglobin A1c is 6.7.  She passed her speech assessment and diet has been ordered.  Urine drug screen was positive for methamphetamine and marijuana.  In addition she does vape.    PLAN:    Continue Eliquis and high dose statin for CVA  Continue Jardiance for DM  Sliding scale insulin for DM  PT, OT, speech therapy  CPAP at night  Encourage vaping cessation  Continue Entresto and Toprolol for HFrEF and HTN per cardiology. Might benefit from using Coreg instead of Toprolol for better BP control but will defer to cards.   Continue hydralazine PRN for HTN.  Transfer to floor  Consider addition of SSRI at outpatient follow up with neurology given its impact on sodium, will defer for now.      VTE Prophylaxis: anticoagulated     Stress Ulcer Prophylaxis: Protonix    MICHELLE Moya  08/27/24  07:55 EDT      Time:  10 minutes  I personally provided care to this critically ill patient as documented above.  Critical care time does not include time spent on separately billed procedures.  None of my critical care time was concurrent with other critical care providers.     Electronically signed by Quynh Jovel APRN at 08/27/24 1453       Consult Notes (last 72 hours)  Notes from 08/26/24 1611 through 08/29/24 1611   No notes of this type exist for this encounter.

## 2024-08-29 NOTE — CASE MANAGEMENT/SOCIAL WORK
Case Management Discharge Note      Final Note: For Friday, if medically ready. [facility] has available bed with insurance approval.   Nursing to call report to [phone], her spouse will stay with her at [facility].    I will arrange ambulance transport, or possible wheelchair van.     I requested [facility] for tomorrow afdter speaking with patient's spouse    Patient will need to be in the 1700/maternity entrance on or before 2:30 for the [facility] Wheelchair van transport.         Selected Continued Care - Admitted Since 8/23/2024       Destination Coordination complete.      Service Provider Selected Services Address Phone Fax Patient Preferred    [facility] [address] [address] [phone] --              Durable Medical Equipment    No services have been selected for the patient.                Dialysis/Infusion    No services have been selected for the patient.                Home Medical Care    No services have been selected for the patient.                Therapy    No services have been selected for the patient.                Community Resources    No services have been selected for the patient.                Community & DME    No services have been selected for the patient.                         Final Discharge Disposition Code: 62 - inpatient rehab facility

## 2024-08-29 NOTE — PROGRESS NOTES
[facility]    Inpatient Progress Note      Chief Complaint/Reason for service:    HF         Subjective:       Patient resting in bed, drowsy today.  No complaints.  Currently getting repeat echo.   at bedside.      Past medical, surgical, social and family history reviewed in the patient's electronic medical record.         Objective:      Infusions:          Medications:    Current Facility-Administered Medications:     acetaminophen (TYLENOL) suppository 650 mg, 650 mg, Rectal, Q6H PRN, Russ Gorman PA-C, 650 mg at 08/25/24 0509    acetaminophen (TYLENOL) tablet 650 mg, 650 mg, Oral, Q6H PRN, Quynh Jovel B, APRN, 650 mg at 08/28/24 0136    amantadine (SYMMETREL) capsule 100 mg, 100 mg, Oral, Q12H, Catrina Mars APRN, 100 mg at 08/29/24 0834    apixaban (ELIQUIS) tablet 5 mg, 5 mg, Oral, Q12H, Katerin Barney MD, 5 mg at 08/29/24 0834    atorvastatin (LIPITOR) tablet 80 mg, 80 mg, Oral, Nightly, Katerin Barney MD, 80 mg at 08/28/24 2034    dextrose (D50W) (25 g/50 mL) IV injection 25 g, 25 g, Intravenous, Q15 Min PRN, Blayne Haddad MD    dextrose (GLUTOSE) oral gel 15 g, 15 g, Oral, Q15 Min PRN, Blayne Haddad MD    empagliflozin (JARDIANCE) tablet 10 mg, 10 mg, Oral, Daily, Dion Hooker MD, 10 mg at 08/29/24 0834    glucagon (GLUCAGEN) injection 1 mg, 1 mg, Intramuscular, Q15 Min PRN, Blayne Haddad MD    hydrALAZINE (APRESOLINE) injection 10 mg, 10 mg, Intravenous, Q6H PRN, Quynh Jovel B, APRN, 10 mg at 08/26/24 0218    insulin glargine (LANTUS, SEMGLEE) injection 10 Units, 10 Units, Subcutaneous, Daily, Blayne Haddad MD, 10 Units at 08/29/24 0834    Insulin Lispro (humaLOG) injection 2-7 Units, 2-7 Units, Subcutaneous, 4x Daily AC & at Bedtime, Blayne Haddad MD, 2 Units at 08/28/24 2223    nebivolol (BYSTOLIC) tablet 10 mg, 10 mg, Oral, Q24H, Dion Hooker MD, 10 mg at 08/29/24 0834     nitroglycerin (NITROSTAT) SL tablet 0.4 mg, 0.4 mg, Sublingual, Q5 Min PRN, Olayinka, May, APRN    pantoprazole (PROTONIX) EC tablet 40 mg, 40 mg, Oral, Q AM, Katerin Barney MD, 40 mg at 08/28/24 0943    sacubitril-valsartan (ENTRESTO) 49-51 MG tablet 1 tablet, 1 tablet, Oral, Q12H, Dion Hooker MD, 1 tablet at 08/29/24 0834    Vital Sign Min/Max for last 24 hours  Temp  Min: 97.4 °F (36.3 °C)  Max: 98.9 °F (37.2 °C)   BP  Min: 127/95  Max: 136/96   Pulse  Min: 89  Max: 96   Resp  Min: 17  Max: 18   SpO2  Min: 93 %  Max: 97 %   No data recorded    No intake or output data in the 24 hours ending 08/29/24 0839          CONSTITUTIONAL: No acute distress    Labs/studies:  Available lab and imaging results were reviewed by myself today    Results for orders placed during the hospital encounter of 08/23/24    Adult Transthoracic Echo Complete W/ Cont if Necessary Per Protocol (With Agitated Saline)    Interpretation Summary  Images from the original result were not included.      Left ventricular systolic function is moderately decreased. Calculated left ventricular EF = 36.7%  global hypokinesis.  LV cavity mildly dilated.  RV size and function normal    Left ventricular wall thickness is consistent with hypertrophy.    Left ventricular diastolic dysfunction is noted.    There is a thrombus present in the left ventricle in apex. 2.22 cm by 0.96 cm    The left atrial cavity is dilated.    Estimated right ventricular systolic pressure from tricuspid regurgitation is normal (<35 mmHg).    Indeterminate bubble study      Tele: Sinus rhythm         Assessment/Plan:         R M1 stenosis    T2DM    HTN    Morbid obesity with BMI of 45.0-49.9, adult    SCOTT    Hypertensive urgency    R/O Seizure    Acute respiratory failure    Acute stroke due to ischemia    Acute systolic heart failure    Left ventricular thrombus       ASSESSMENT:  LV thrombus  Echocardiogram shows decreased LVEF 36%, LV thrombus,  indeterminate bubble study.   Acute HFrEF, severe exacerbation  New diagnosis.  Unclear etiology  Possible contributing causes include recent pneumonia (returned from cruise from [place], given antibiotics), positive meth and marijuana and UDS  LVEF 36% with global hypokinesis on echo.   CVA secondary to right MCA occlusion   Status post thrombolytic therapy, TNK  Status post urgent mechanical thrombectomy, right MCA  Hypertension   Type 2 diabetes mellitus   ANA  Acute respiratory failure  Suspected sleep apnea   Substance abuse   UDS positive for THC, methamphetamines (possibly laced marijuana?, no known history of meth use per patient's )    PLAN:  Apixaban for LV thrombus.  Continue Bystolic, Jardiance, Entresto.   Holding off addition of spironolactone.   Daily BMP while admitted; monitoring Cr and K+ with new meds.  Continue statin   Repeat limited echo today for EF.  Possibly okay for discharge home tomorrow from cardiology standpoint if labs stable.    Sleep med referral/sleep study/outpatient SCOTT management  Future ischemic evaluation (cardiac CTA) and possible cardiac MRI  Patient will need follow up in one week with heart and valve clinic.       Electronically signed by MICHELLE Do, 08/29/24, 12:36 PM EDT.

## 2024-08-30 VITALS
HEART RATE: 90 BPM | TEMPERATURE: 98.4 F | RESPIRATION RATE: 20 BRPM | OXYGEN SATURATION: 97 % | HEIGHT: 63 IN | DIASTOLIC BLOOD PRESSURE: 76 MMHG | SYSTOLIC BLOOD PRESSURE: 120 MMHG | BODY MASS INDEX: 48.05 KG/M2 | WEIGHT: 271.17 LBS

## 2024-08-30 DIAGNOSIS — I50.21 ACUTE SYSTOLIC CHF (CONGESTIVE HEART FAILURE): ICD-10-CM

## 2024-08-30 DIAGNOSIS — I48.91 ATRIAL FIBRILLATION, UNSPECIFIED TYPE: ICD-10-CM

## 2024-08-30 DIAGNOSIS — I63.9 ACUTE STROKE DUE TO ISCHEMIA: Primary | ICD-10-CM

## 2024-08-30 PROBLEM — I50.22 CHRONIC HFREF (HEART FAILURE WITH REDUCED EJECTION FRACTION): Status: ACTIVE | Noted: 2024-08-30

## 2024-08-30 LAB
ALBUMIN SERPL-MCNC: 3.9 G/DL (ref 3.5–5.2)
ALBUMIN/GLOB SERPL: 1.3 G/DL
ALP SERPL-CCNC: 72 U/L (ref 39–117)
ALT SERPL W P-5'-P-CCNC: 36 U/L (ref 1–33)
ANION GAP SERPL CALCULATED.3IONS-SCNC: 13 MMOL/L (ref 5–15)
AST SERPL-CCNC: 43 U/L (ref 1–32)
BASOPHILS # BLD AUTO: 0.11 10*3/MM3 (ref 0–0.2)
BASOPHILS NFR BLD AUTO: 0.9 % (ref 0–1.5)
BILIRUB SERPL-MCNC: 0.5 MG/DL (ref 0–1.2)
BUN SERPL-MCNC: 22 MG/DL (ref 6–20)
BUN/CREAT SERPL: 20.4 (ref 7–25)
CALCIUM SPEC-SCNC: 9.2 MG/DL (ref 8.6–10.5)
CHLORIDE SERPL-SCNC: 105 MMOL/L (ref 98–107)
CO2 SERPL-SCNC: 19 MMOL/L (ref 22–29)
CREAT SERPL-MCNC: 1.08 MG/DL (ref 0.57–1)
DEPRECATED RDW RBC AUTO: 47 FL (ref 37–54)
EGFRCR SERPLBLD CKD-EPI 2021: 70.1 ML/MIN/1.73
EOSINOPHIL # BLD AUTO: 0.2 10*3/MM3 (ref 0–0.4)
EOSINOPHIL NFR BLD AUTO: 1.7 % (ref 0.3–6.2)
ERYTHROCYTE [DISTWIDTH] IN BLOOD BY AUTOMATED COUNT: 13 % (ref 12.3–15.4)
GLOBULIN UR ELPH-MCNC: 2.9 GM/DL
GLUCOSE BLDC GLUCOMTR-MCNC: 128 MG/DL (ref 70–130)
GLUCOSE BLDC GLUCOMTR-MCNC: 131 MG/DL (ref 70–130)
GLUCOSE SERPL-MCNC: 140 MG/DL (ref 65–99)
HCT VFR BLD AUTO: 55.7 % (ref 34–46.6)
HGB BLD-MCNC: 18.9 G/DL (ref 12–15.9)
IMM GRANULOCYTES # BLD AUTO: 0.09 10*3/MM3 (ref 0–0.05)
IMM GRANULOCYTES NFR BLD AUTO: 0.8 % (ref 0–0.5)
LYMPHOCYTES # BLD AUTO: 2.73 10*3/MM3 (ref 0.7–3.1)
LYMPHOCYTES NFR BLD AUTO: 23.3 % (ref 19.6–45.3)
MCH RBC QN AUTO: 33.2 PG (ref 26.6–33)
MCHC RBC AUTO-ENTMCNC: 33.9 G/DL (ref 31.5–35.7)
MCV RBC AUTO: 97.9 FL (ref 79–97)
MONOCYTES # BLD AUTO: 0.93 10*3/MM3 (ref 0.1–0.9)
MONOCYTES NFR BLD AUTO: 7.9 % (ref 5–12)
NEUTROPHILS NFR BLD AUTO: 65.4 % (ref 42.7–76)
NEUTROPHILS NFR BLD AUTO: 7.68 10*3/MM3 (ref 1.7–7)
NRBC BLD AUTO-RTO: 0 /100 WBC (ref 0–0.2)
PLATELET # BLD AUTO: 469 10*3/MM3 (ref 140–450)
PMV BLD AUTO: 8.8 FL (ref 6–12)
POTASSIUM SERPL-SCNC: 3.9 MMOL/L (ref 3.5–5.2)
PROT SERPL-MCNC: 6.8 G/DL (ref 6–8.5)
RBC # BLD AUTO: 5.69 10*6/MM3 (ref 3.77–5.28)
SODIUM SERPL-SCNC: 137 MMOL/L (ref 136–145)
WBC NRBC COR # BLD AUTO: 11.74 10*3/MM3 (ref 3.4–10.8)

## 2024-08-30 PROCEDURE — 82948 REAGENT STRIP/BLOOD GLUCOSE: CPT

## 2024-08-30 PROCEDURE — 99232 SBSQ HOSP IP/OBS MODERATE 35: CPT | Performed by: INTERNAL MEDICINE

## 2024-08-30 PROCEDURE — 97110 THERAPEUTIC EXERCISES: CPT

## 2024-08-30 PROCEDURE — 92526 ORAL FUNCTION THERAPY: CPT

## 2024-08-30 PROCEDURE — 92507 TX SP LANG VOICE COMM INDIV: CPT

## 2024-08-30 PROCEDURE — 63710000001 INSULIN GLARGINE PER 5 UNITS: Performed by: INTERNAL MEDICINE

## 2024-08-30 PROCEDURE — 99239 HOSP IP/OBS DSCHRG MGMT >30: CPT | Performed by: INTERNAL MEDICINE

## 2024-08-30 PROCEDURE — 80053 COMPREHEN METABOLIC PANEL: CPT | Performed by: INTERNAL MEDICINE

## 2024-08-30 PROCEDURE — 85025 COMPLETE CBC W/AUTO DIFF WBC: CPT | Performed by: INTERNAL MEDICINE

## 2024-08-30 RX ORDER — ACETAMINOPHEN 650 MG/1
650 SUPPOSITORY RECTAL EVERY 4 HOURS PRN
Start: 2024-08-30

## 2024-08-30 RX ORDER — NEBIVOLOL 10 MG/1
10 TABLET ORAL
Qty: 30 TABLET | Refills: 11 | Status: SHIPPED | OUTPATIENT
Start: 2024-08-30 | End: 2024-08-30

## 2024-08-30 RX ORDER — INSULIN LISPRO 100 [IU]/ML
2-7 INJECTION, SOLUTION INTRAVENOUS; SUBCUTANEOUS
Start: 2024-08-30

## 2024-08-30 RX ORDER — AMANTADINE HYDROCHLORIDE 100 MG/1
100 CAPSULE, GELATIN COATED ORAL EVERY 12 HOURS SCHEDULED
Start: 2024-08-30

## 2024-08-30 RX ORDER — ATORVASTATIN CALCIUM 80 MG/1
80 TABLET, FILM COATED ORAL NIGHTLY
Start: 2024-08-30

## 2024-08-30 RX ORDER — NEBIVOLOL 5 MG/1
5 TABLET ORAL
Qty: 90 TABLET | Refills: 3 | Status: SHIPPED | OUTPATIENT
Start: 2024-08-30

## 2024-08-30 RX ADMIN — APIXABAN 5 MG: 5 TABLET, FILM COATED ORAL at 09:37

## 2024-08-30 RX ADMIN — EMPAGLIFLOZIN 10 MG: 10 TABLET, FILM COATED ORAL at 09:37

## 2024-08-30 RX ADMIN — PANTOPRAZOLE SODIUM 40 MG: 40 TABLET, DELAYED RELEASE ORAL at 06:31

## 2024-08-30 RX ADMIN — AMANTADINE HYDROCHLORIDE 100 MG: 100 CAPSULE ORAL at 12:18

## 2024-08-30 RX ADMIN — SACUBITRIL AND VALSARTAN 1 TABLET: 49; 51 TABLET, FILM COATED ORAL at 12:19

## 2024-08-30 RX ADMIN — INSULIN GLARGINE 10 UNITS: 100 INJECTION, SOLUTION SUBCUTANEOUS at 09:24

## 2024-08-30 NOTE — PROGRESS NOTES
Nutrition Services    Patient Name:  Keya Hamilton  YOB: 1990  MRN: [id number]  Admit Date:  8/23/2024    Patient screened for LOS. Chart reviewed. No significant weight changes documented/reported and PO intake has been adequate. No nutrition risks identified at this time. RDN following peripherally. Available via consult.     Electronically signed by:  Genesis Dominguez MS,NOEL,NICHOLAS  08/30/24 11:20 EDT

## 2024-08-30 NOTE — THERAPY TREATMENT NOTE
Acute Care - Speech Language Pathology   Swallow Treatment Note [facility]     Patient Name: Keya Hamilton  : 1990  MRN: [id number]  Today's Date: 2024               Admit Date: 2024    Visit Dx:     ICD-10-CM ICD-9-CM   1. Acute stroke due to ischemia  I63.9 434.91   2. Acute left-sided weakness  R53.1 728.87   3. Dysarthria  R47.1 784.51   4. Somnolence  R40.0 780.09   5. Compromised respiratory status  R06.89 786.09   6. Oral phase dysphagia  R13.11 787.21   7. Cognitive communication deficit  R41.841 799.52   8. Left ventricular thrombus  I51.3 410.90   9. Acute systolic heart failure  I50.21 428.21   10. Sleep disturbances  G47.9 780.50     Patient Active Problem List   Diagnosis    R M1 stenosis    T2DM    HTN    Morbid obesity with BMI of 45.0-49.9, adult    SCOTT    Hypertensive urgency    R/O Seizure    Acute respiratory failure    Acute stroke due to ischemia    Acute systolic heart failure    Left ventricular thrombus     Past Medical History:   Diagnosis Date    Diabetes mellitus     Hypertension     Sleep apnea      Past Surgical History:   Procedure Laterality Date    INTERVENTIONAL RADIOLOGY PROCEDURE N/A 2024    Procedure: IR mechanical thrombectomy;  Surgeon: Lemuel Medina MD;  Location: [facility] INVASIVE LOCATION;  Service: Interventional Radiology;  Laterality: N/A;       SLP Recommendation and Plan  SLP Swallowing Diagnosis: mild, oral dysphagia (24 113)  SLP Diet Recommendation: soft to chew textures, whole, thin liquids (24 1135)  Recommended Precautions and Strategies: upright posture during/after eating, general aspiration precautions, assist with feeding (24 113)  SLP Rec. for Method of Medication Administration: meds whole, meds crushed, as tolerated (24 113)     Monitor for Signs of Aspiration: notify SLP if any concerns (24 113)     Swallow Criteria for Skilled Therapeutic Interventions Met: demonstrates skilled criteria  (08/30/24 1135)  Anticipated Discharge Disposition (SLP): inpatient rehabilitation facility, anticipate therapy at next level of care (08/30/24 1135)  Rehab Potential/Prognosis, Swallowing: good, to achieve stated therapy goals (08/30/24 1135)  Therapy Frequency (Swallow): 3 days per week (08/30/24 1135)  Predicted Duration Therapy Intervention (Days): 1 week (08/30/24 1135)  Oral Care Recommendations: Oral Care BID/PRN, Toothbrush (08/30/24 1135)        Daily Summary of Progress (SLP): progress toward functional goals is good (08/30/24 1135)               Treatment Assessment (SLP): continued, improved, oral dysphagia, dysarthria, cognitive-linguistic disorder (08/30/24 1135)  Treatment Assessment Comments (SLP): Patient tolerated trials of soft, whole solids without difficulty. No s/s of aspiration with thin liquids or soft solids. Dysarthria and cognitive communication  improving. Will continue to address deficits in tx. (08/30/24 1135)  Plan for Continued Treatment (SLP): continue treatment per plan of care (08/30/24 1135)                SWALLOW EVALUATION (Last 72 Hours)       SLP Adult Swallow Evaluation       Row Name 08/30/24 1135 08/28/24 1430                Rehab Evaluation    Document Type therapy note (daily note)  -CH --       Subjective Information no complaints  -CH --       Patient Observations alert;cooperative;agree to therapy  -CH --       Patient Effort excellent  -CH --          General Information    Patient Profile Reviewed yes  -CH --       Prior Level of Function-Swallowing -- no diet consistency restrictions  -       Patient's Goals for Discharge -- return home;return to all previous roles/activities  [facility] and then home  -       Family Goals for Discharge -- family did not state  -          Pain    Additional Documentation Pain Scale: FACES Pre/Post-Treatment (Group)  - --          Pain Scale: FACES Pre/Post-Treatment    Pain: FACES Scale, Pretreatment 0-->no hurt  - --        Posttreatment Pain Rating 0-->no hurt  -CH --          SLP Evaluation Clinical Impression    SLP Swallowing Diagnosis mild;oral dysphagia  -CH --       Functional Impact risk of aspiration/pneumonia  -CH --       Rehab Potential/Prognosis, Swallowing good, to achieve stated therapy goals  -CH --       Swallow Criteria for Skilled Therapeutic Interventions Met demonstrates skilled criteria  -CH --          SLP Treatment Clinical Impressions    Treatment Assessment (SLP) continued;improved;oral dysphagia;dysarthria;cognitive-linguistic disorder  -CH --       Treatment Assessment Comments (SLP) Patient tolerated trials of soft, whole solids without difficulty. No s/s of aspiration with thin liquids or soft solids. Dysarthria and cognitive communication  improving. Will continue to address deficits in tx.  -CH Pt slow to wake. Initially required repeat cues to maintain alertness, however as the session continued, pt was able to keep eyes open. Poor eye contact with speaker.  Beginning to look to the left- past midline. Able to implement compensatory strategies for improved speech production/clarity but cues required to maintain. Impaired cognitive linguistic function- attention, pragmatics, mental flexibility, and divergent thinking.  Lethargy persists and is impacting diet advancement. Pt endorses eating is tiring and she is in agreement that she is not ready for diet increase.  -ML       Daily Summary of Progress (SLP) progress toward functional goals is good  -CH --       Plan for Continued Treatment (SLP) continue treatment per plan of care  -CH --       Care Plan Review care plan/treatment goals reviewed  -CH --       Care Plan Review, Other Participant(s) spouse  -CH --          Recommendations    Therapy Frequency (Swallow) 3 days per week  - 3 days per week  -ML       Predicted Duration Therapy Intervention (Days) 1 week  -CH --       SLP Diet Recommendation soft to chew textures;whole;thin liquids  - soft  to chew textures;chopped;thin liquids;other (see comments)  -       Recommended Diagnostics -- reassess via clinical swallow evaluation  -       Recommended Precautions and Strategies upright posture during/after eating;general aspiration precautions;assist with feeding  - upright posture during/after eating;general aspiration precautions;assist with feeding  -ML       Oral Care Recommendations Oral Care BID/PRN;Toothbrush  -CH Oral Care BID/PRN;Toothbrush  -ML       SLP Rec. for Method of Medication Administration meds whole;meds crushed;as tolerated  - meds whole;meds crushed;as tolerated  -ML       Monitor for Signs of Aspiration notify SLP if any concerns  - notify SLP if any concerns  -       Anticipated Discharge Disposition (SLP) inpatient rehabilitation facility;anticipate therapy at next level of care  - --                 User Key  (r) = Recorded By, (t) = Taken By, (c) = Cosigned By      Initials Name Effective Dates    ML Eal Mccall, CCC-[facility] 06/16/21 -      Cate Friend MS CCC-SLP 06/16/21 -                     EDUCATION  The patient has been educated in the following areas:   Dysphagia (Swallowing Impairment) Oral Care/Hydration Modified Diet Instruction.        SLP GOALS       Row Name 08/30/24 1135 08/28/24 1430          (LTG) Patient will demonstrate functional swallow for    Diet Texture (Demonstrate functional swallow) regular textures  - regular textures  -ML     Liquid viscosity (Demonstrate functional swallow) thin liquids  - thin liquids  -     [place] (Demonstrate functional swallow) independently (over 90% accuracy)  - independently (over 90% accuracy)  -ML     Time Frame (Demonstrate functional swallow) 1 week  - 1 week  -     Progress/Outcomes (Demonstrate functional swallow) good progress toward goal  - progress slower than expected  -ML     Comment (Demonstrate functional swallow) diet upgraded to soft, whole and thin liquids  -  Tolerating current diet but endorses eating is exhausting. Pt is not ready for diet increase consideration at this time.  -ML        (STG) Patient will tolerate trials of    Consistencies Trialed (Tolerate trials) thin liquids;soft to chew (whole) textures  -CH soft to chew (chopped) textures;thin liquids  -ML     Desired Outcome (Tolerate trials) with adequate oral prep/transit/clearance  -CH with adequate oral prep/transit/clearance  -ML     [place] (Tolerate trials) independently (over 90% accuracy)  -CH independently (over 90% accuracy)  -ML     Time Frame (Tolerate trials) 1 week  -CH 1 week  -ML     Progress/Outcomes (Tolerate trials) goal met;new goal  -CH progress slower than expected  -ML     Comment (Tolerate trials) diet upgraded to soft, whole and thin liquids. Adequate mastication and oral prep with soft solid trials.  -CH Tolerates current diet. Slow intake. Limited intake.  Lethargy impacting oral phase and diet advancement.  -ML        (STG) Patient will tolerate therapeutic trials of    Consistencies Trialed (Tolerate therapeutic trials) regular textures  -CH regular textures  -ML     Desired Outcome (Tolerate therapeutic trials) with adequate oral prep/transit/clearance;without signs/symptoms of aspiration  -CH with adequate oral prep/transit/clearance;without signs/symptoms of aspiration  -ML     [place] (Tolerate therapeutic trials) independently (over 90% accuracy)  -CH independently (over 90% accuracy)  -ML     Time Frame (Tolerate therapeutic trials) 1 week  -CH 1 week  -ML     Progress/Outcomes (Tolerate therapeutic trials) continuing progress toward goal  -CH progress slower than expected  -ML     Comment (Tolerate therapeutic trials) -- Not appropriate to try regular consistencies due to lethargy and reported fatigue during meals.  -ML        (STG) Labial Strengthening Goal 1 (SLP)    Activity (Labial Strengthening Goal 1, SLP) increase labial tone  -CH increase labial tone  -ML      Increase Labial Tone swallow trials;labial resistance exercises  -CH swallow trials;labial resistance exercises  -ML     [place]/Accuracy (Labial Strengthening Goal 1, SLP) independently (over 90% accuracy)  -CH independently (over 90% accuracy)  -ML     Time Frame (Labial Strengthening Goal 1, SLP) 1 week;short term goal (STG)  -CH 1 week;short term goal (STG)  -ML     Progress/Outcomes (Labial Strengthening Goal 1, SLP) continuing progress toward goal  -CH progress slower than expected  -ML     Comment (Labial Strengthening Goal 1, SLP) Able to place straw on the right/stronger side to prevent anterior loss.  Limited movement on left with/without resistance.  -CH Able to place straw on the right/stronger side to prevent anterior loss.  Limited movement on left with/without resistance.  -ML        (STG) Lingual Strengthening Goal 1 (SLP)    Activity (Lingual Strengthening Goal 1, SLP) increase lingual tone/sensation/control/coordination/movement  -CH increase lingual tone/sensation/control/coordination/movement  -ML     Increase Lingual Tone/Sensation/Control/Coordination/Movement swallow trials;lingual resistance exercises  -CH swallow trials;lingual resistance exercises  -ML     [place]/Accuracy (Lingual Strengthening Goal 1, SLP) independently (over 90% accuracy)  -CH independently (over 90% accuracy)  -ML     Time Frame (Lingual Strengthening Goal 1, SLP) 1 week;short term goal (STG)  -CH 1 week;short term goal (STG)  -ML     Progress/Outcomes (Lingual Strengthening Goal 1, SLP) continuing progress toward goal  -CH progress slower than expected  -ML     Comment (Lingual Strengthening Goal 1, SLP) -- Completes exercises without resistance-limited trials.  -ML        (STG) Swallow Compensatory Strategies Goal 1 (SLP)    Activity (Swallow Compensatory Strategies/Techniques Goal 1, SLP) aspiration precautions;food/liquid placed on stronger right side  -CH aspiration precautions;food/liquid placed on  stronger right side  -ML     [place]/Accuracy (Swallow Compensatory Strategies/Techniques Goal 1, SLP) independently (over 90% accuracy)  -CH independently (over 90% accuracy)  -ML     Time Frame (Swallow Compensatory Strategies/Techniques Goal 1, SLP) short term goal (STG);1 week  -CH short term goal (STG);1 week  -ML     Progress/Outcomes (Swallow Compensatory Strategies/Techniques Goal 1, SLP) continuing progress toward goal  -CH continuing progress toward goal  -ML     Comment (Swallow Compensatory Strategies/Techniques Goal 1, SLP) independent placement of straw in on the right  -CH Aware of need for placement of straw on the right.  Inconsistent placement of food on right.  -ML        Patient will demonstrate functional cognitive-linguistic skills for return to discharge environment    [place] with minimal cues  -CH with minimal cues  -ML     Time frame 1 week  -CH 1 week  -ML     Progress/Outcomes good progress toward goal  -CH progress slower than expected  -ML     Comments -- Max cues for  pt to stay awake to participate. Decreased cues needed as session continued.  -ML        SLP Diagnostic Treatment     Patient will participate in further assessment in the following areas reading comprehension;graphic expression;higher-level cognitive-linguistic  -CH --     Time Frame (Diagnostic) 1 week  -CH --     Progress/Outcomes (Additional Goal 1, SLP) goal met  -CH --        Comprehend Questions Goal 1 (SLP)    Improve Ability to Comprehend Questions Goal 1 (SLP) complex yes/no questions;complex wh questions;100%;independently (over 90% accuracy)  -CH complex yes/no questions;complex wh questions;100%;independently (over 90% accuracy)  -ML     Time Frame (Comprehend Questions Goal 1, SLP) short term goal (STG);1 week  -CH short term goal (STG);1 week  -ML     Progress (Ability to Comprehend Questions Goal 1, SLP) 90%;with minimal cues (75-90%)  -CH 90%;with minimal cues (75-90%)  -ML     Progress/Outcomes  (Comprehend Questions Goal 1, SLP) good progress toward goal  -CH good progress toward goal  -ML        Phonation Goal 1 (SLP)    Improve Phonation By Goal 1 (SLP) using loud speech;80%;with minimal cues (75-90%)  -CH using loud speech;80%;with minimal cues (75-90%)  -ML     Time Frame (Phonation Goal 1, SLP) short term goal (STG);1 week  -CH short term goal (STG);1 week  -ML     Progress (Phonation Goal 1, SLP) 80%;with minimal cues (75-90%)  -CH 70%;with moderate cues (50-74%)  -ML     Progress/Outcomes (Phonation Goal 1, SLP) continuing progress toward goal  -CH new goal  -ML     Comment (Phonation Goal 1, SLP) -- Able to demonstrate loud speech with rote/automatic task with cues.  -ML        Articulation Goal 1 (SLP)    Improve Articulation Goal 1 (SLP) by over-articulating at word level;90%;with minimal cues (75-90%);by over-articulating at phrase level  -CH by over-articulating at word level;90%;with minimal cues (75-90%);by over-articulating at phrase level  -ML     Time Frame (Articulation Goal 1, SLP) short term goal (STG);1 week  -CH short term goal (STG);1 week  -ML     Progress (Articulation Goal 1, SLP) 50%;with moderate cues (50-74%)  -CH with maximum cues (25-49%);40%  -ML     Progress/Outcomes (Articulation Goal 1, SLP) continuing progress toward goal  -CH good progress toward goal  -ML     Comment (Articulation Goal 1, SLP) -- Aware of need for overarticulation but could not or did not comply at the word level with a model.  -ML        Attention Goal 1 (SLP)    Improve Attention by Goal 1 (SLP) looking at speaker;attending to task;90%;with minimal cues (75-90%)  -CH looking at speaker;attending to task;90%;with minimal cues (75-90%)  -ML     Time Frame (Attention Goal 1, SLP) short term goal (STG);1 week  -CH short term goal (STG);1 week  -ML     Progress (Attention Goal 1, SLP) 50%;with minimal cues (75-90%)  -CH --     Progress/Outcomes (Attention Goal 1, SLP) continuing progress toward goal  -CH  new goal  -ML        Organizational Skills Goal 1 (SLP)    Improve Thought Organization Through Goal 1 (SLP) completing a divergent naming task;completing mental manipulation task;90%;with minimal cues (75-90%)  -CH completing a divergent naming task;completing mental manipulation task;90%;with minimal cues (75-90%)  -ML     Time Frame (Thought Organization Skills Goal 1, SLP) short term goal (STG);1 week  -CH short term goal (STG);1 week  -ML     Progress (Thought Organization Skills Goal 1, SLP) 60%;with minimal cues (75-90%)  - --     Progress/Outcomes (Thought Organization Skills Goal 1, SLP) continuing progress toward goal  -CH new goal  -ML     Comment (Thought Organization Skills Goal 1, SLP) divergent and mental manipulation  - --        Pragmatic Skills Goal 1 (SLP)    Improve Pragmatic Skills Goal 1 (SLP) maintain eye contact;demonstrate topic initiation;maintain topic;80%;with minimal cues (75-90%)  -CH maintain eye contact;demonstrate topic initiation;maintain topic;80%;with minimal cues (75-90%)  -ML     Time Frame (Pragmatic Skills Goal 1, SLP) short term goal (STG);1 week  -CH short term goal (STG);1 week  -ML     Progress (Pragmatic Skills Goal 1, SLP) 60%;with minimal cues (75-90%)  -CH --     Progress/Outcomes (Pragmatic Skills Goal 1, SLP) continuing progress toward goal  -CH new goal  -ML        Right Hemisphere Function Goal 1 (SLP)    Improve Right Hemisphere Function Through Goal 1 (SLP) demonstrate awareness of communication partner in left visual field;100%;independently (over 90% accuracy)  -CH --     Time Frame (Right Hemisphere Function Goal 1, SLP) 1 week  -CH --     Progress (Right Hemisphere Function Goal 1, SLP) 60%;with minimal cues (75-90%)  - --     Progress/Outcomes (Right Hemisphere Function Goal 1, SLP) continuing progress toward goal  -CH --               User Key  (r) = Recorded By, (t) = Taken By, (c) = Cosigned By      Initials Name Provider Type    ANGELA Mccall  Ela JAVIER, CCC-SLP Speech and Language Pathologist    Cate Davenport MS CCC-SLP Speech and Language Pathologist                         Time Calculation:    Time Calculation- SLP       Row Name 24 1359             Time Calculation- SLP    SLP Start Time 1135  -CH      SLP Received On 24  -CH         Untimed Charges    87668-PB Treatment/ST Modification Prosth Aug Alter  38  -CH      92361-OK Treatment Swallow Minutes 38  -CH         Total Minutes    Untimed Charges Total Minutes 76  -CH       Total Minutes 76  -CH                User Key  (r) = Recorded By, (t) = Taken By, (c) = Cosigned By      Initials Name Provider Type    Cate Davenport MS CCC-SLP Speech and Language Pathologist                    Therapy Charges for Today       Code Description Service Date Service Provider Modifiers Qty    39530773312 HC ST TREATMENT SWALLOW 3 2024 Cate Friend MS CCC-SLP GN 1    [id number] HC ST TREATMENT SPEECH 3 2024 Cate Friend MS CCC-SLP GN 1                 Cate Friend MS CCC-SLP  2024   and Acute [facility] - Speech Language Pathology Treatment Note   [facility]     Patient Name: Keya Hamilton  : 1990  MRN: [id number]  Today's Date: 2024               Admit Date: 2024     Visit Dx:    ICD-10-CM ICD-9-CM   1. Acute stroke due to ischemia  I63.9 434.91   2. Acute left-sided weakness  R53.1 728.87   3. Dysarthria  R47.1 784.51   4. Somnolence  R40.0 780.09   5. Compromised respiratory status  R06.89 786.09   6. Oral phase dysphagia  R13.11 787.21   7. Cognitive communication deficit  R41.841 799.52   8. Left ventricular thrombus  I51.3 410.90   9. Acute systolic heart failure  I50.21 428.21   10. Sleep disturbances  G47.9 780.50     Patient Active Problem List   Diagnosis    R M1 stenosis    T2DM    HTN    Morbid obesity with BMI of 45.0-49.9, adult    SCOTT    Hypertensive urgency    R/O Seizure    Acute respiratory failure    Acute stroke due to  ischemia    Acute systolic heart failure    Left ventricular thrombus     Past Medical History:   Diagnosis Date    Diabetes mellitus     Hypertension     Sleep apnea      Past Surgical History:   Procedure Laterality Date    INTERVENTIONAL RADIOLOGY PROCEDURE N/A 8/23/2024    Procedure: IR mechanical thrombectomy;  Surgeon: Lemuel Medina MD;  Location: [facility] INVASIVE LOCATION;  Service: Interventional Radiology;  Laterality: N/A;       SLP Recommendation and Plan  SLP Diagnosis: cognitive-linguistic disorder, mild, moderate, dysarthria (08/30/24 1135)        Monitor for Signs of Aspiration: notify SLP if any concerns (08/30/24 1135)  Swallow Criteria for Skilled Therapeutic Interventions Met: demonstrates skilled criteria (08/30/24 1135)  SLC Criteria for Skilled Therapy Interventions Met: yes (08/30/24 1135)  Anticipated Discharge Disposition (SLP): inpatient rehabilitation facility, anticipate therapy at next level of care (08/30/24 1135)     Therapy Frequency (Swallow): 3 days per week (08/30/24 1135)  Therapy Frequency (SLP SLC): 5 days per week (08/30/24 1135)  Predicted Duration Therapy Intervention (Days): 1 week (08/30/24 1135)  Oral Care Recommendations: Oral Care BID/PRN, Toothbrush (08/30/24 1135)     Daily Summary of Progress (SLP): progress toward functional goals is good (08/30/24 1135)           Treatment Assessment (SLP): continued, improved, oral dysphagia, dysarthria, cognitive-linguistic disorder (08/30/24 1135)  Treatment Assessment Comments (SLP): Patient tolerated trials of soft, whole solids without difficulty. No s/s of aspiration with thin liquids or soft solids. Dysarthria and cognitive communication  improving. Will continue to address deficits in tx. (08/30/24 1135)  Plan for Continued Treatment (SLP): continue treatment per plan of care (08/30/24 1135)         SLP EVALUATION (Last 72 Hours)       SLP SLC Evaluation       Row Name 08/30/24 1135 08/28/24 7566                 Communication Assessment/Intervention    Document Type -- therapy note (daily note)  -ML       Subjective Information -- no complaints  -ML       Patient Observations -- alert;cooperative  -       Patient Effort -- adequate  -ML       Comment -- Pt lethargic- spouse reports that she is improving and he feels lethargy is due to no sleep/rest while in ICU.  -ML          General Information    Patient Profile Reviewed -- yes  -ML       Pertinent History Of Current Problem -- See ST hx  -ML       Precautions/Limitations, Vision -- neglect, left  -ML       Precautions/Limitations, Hearing -- WFL  -ML       Prior Level of Function-Communication -- WFL  -       Plans/Goals Discussed with -- patient;spouse/S.O.;agreed upon  -       Barriers to Rehab -- none identified  -       Patient's Goals for Discharge -- other (see comments)  [facility]  -       Family Goals for Discharge -- family did not state  -          Pain    Additional Documentation -- Pain Scale: Numbers Pre/Post-Treatment (Group)  -          Pain Scale: Numbers Pre/Post-Treatment    Pretreatment Pain Rating -- 0/10 - no pain  -ML       Posttreatment Pain Rating -- 0/10 - no pain  -ML          SLP Evaluation Clinical Impressions    SLP Diagnosis cognitive-linguistic disorder;mild;moderate;dysarthria  -CH --       Rehab Potential/Prognosis good  -CH --       [facility] Criteria for Skilled Therapy Interventions Met yes  -CH --       Functional Impact difficulty completing home management task  -CH --          SLP Treatment Clinical Impressions    Treatment Assessment (SLP) -- improved;dysarthria;cognitive-linguistic disorder  -       Daily Summary of Progress (SLP) -- progress toward functional goals is good  -       Barriers to Overall Progress (SLP) -- Lethargy  -       Plan for Continued Treatment (SLP) -- continue treatment per plan of care  -       Care Plan Review -- care plan/treatment goals reviewed;risks/benefits  reviewed;patient/other agree to care plan  -ML       Care Plan Review, Other Participant(s) -- spouse  -ML          Recommendations    Therapy Frequency (SLP SLC) 5 days per week  -CH 5 days per week  -ML       Predicted Duration Therapy Intervention (Days) -- 1 week  -ML       Anticipated Discharge Disposition (SLP) -- inpatient rehabilitation facility;anticipate therapy at next level of care  -ML                 User Key  (r) = Recorded By, (t) = Taken By, (c) = Cosigned By      Initials Name Effective Dates    ML Ela Mccall CCC-SLP 06/16/21 -     CH Cate Friend MS CCC-SLP 06/16/21 -                        EDUCATION  The patient has been educated in the following areas:     Cognitive Impairment Communication Impairment.           SLP GOALS       Row Name 08/30/24 1135 08/28/24 1430          (LTG) Patient will demonstrate functional swallow for    Diet Texture (Demonstrate functional swallow) regular textures  -CH regular textures  -ML     Liquid viscosity (Demonstrate functional swallow) thin liquids  -CH thin liquids  -ML     [place] (Demonstrate functional swallow) independently (over 90% accuracy)  -CH independently (over 90% accuracy)  -ML     Time Frame (Demonstrate functional swallow) 1 week  -CH 1 week  -ML     Progress/Outcomes (Demonstrate functional swallow) good progress toward goal  -CH progress slower than expected  -ML     Comment (Demonstrate functional swallow) diet upgraded to soft, whole and thin liquids  -CH Tolerating current diet but endorses eating is exhausting. Pt is not ready for diet increase consideration at this time.  -ML        (STG) Patient will tolerate trials of    Consistencies Trialed (Tolerate trials) thin liquids;soft to chew (whole) textures  -CH soft to chew (chopped) textures;thin liquids  -ML     Desired Outcome (Tolerate trials) with adequate oral prep/transit/clearance  -CH with adequate oral prep/transit/clearance  -ML     [place] (Tolerate  trials) independently (over 90% accuracy)  -CH independently (over 90% accuracy)  -ML     Time Frame (Tolerate trials) 1 week  -CH 1 week  -ML     Progress/Outcomes (Tolerate trials) goal met;new goal  -CH progress slower than expected  -ML     Comment (Tolerate trials) diet upgraded to soft, whole and thin liquids. Adequate mastication and oral prep with soft solid trials.  -CH Tolerates current diet. Slow intake. Limited intake.  Lethargy impacting oral phase and diet advancement.  -ML        (STG) Patient will tolerate therapeutic trials of    Consistencies Trialed (Tolerate therapeutic trials) regular textures  -CH regular textures  -ML     Desired Outcome (Tolerate therapeutic trials) with adequate oral prep/transit/clearance;without signs/symptoms of aspiration  -CH with adequate oral prep/transit/clearance;without signs/symptoms of aspiration  -ML     [place] (Tolerate therapeutic trials) independently (over 90% accuracy)  -CH independently (over 90% accuracy)  -ML     Time Frame (Tolerate therapeutic trials) 1 week  -CH 1 week  -ML     Progress/Outcomes (Tolerate therapeutic trials) continuing progress toward goal  -CH progress slower than expected  -ML     Comment (Tolerate therapeutic trials) -- Not appropriate to try regular consistencies due to lethargy and reported fatigue during meals.  -ML        (STG) Labial Strengthening Goal 1 (SLP)    Activity (Labial Strengthening Goal 1, SLP) increase labial tone  -CH increase labial tone  -ML     Increase Labial Tone swallow trials;labial resistance exercises  -CH swallow trials;labial resistance exercises  -ML     [place]/Accuracy (Labial Strengthening Goal 1, SLP) independently (over 90% accuracy)  -CH independently (over 90% accuracy)  -ML     Time Frame (Labial Strengthening Goal 1, SLP) 1 week;short term goal (STG)  -CH 1 week;short term goal (STG)  -ML     Progress/Outcomes (Labial Strengthening Goal 1, SLP) continuing progress toward goal  -CH  progress slower than expected  -ML     Comment (Labial Strengthening Goal 1, SLP) Able to place straw on the right/stronger side to prevent anterior loss.  Limited movement on left with/without resistance.  -CH Able to place straw on the right/stronger side to prevent anterior loss.  Limited movement on left with/without resistance.  -ML        (STG) Lingual Strengthening Goal 1 (SLP)    Activity (Lingual Strengthening Goal 1, SLP) increase lingual tone/sensation/control/coordination/movement  -CH increase lingual tone/sensation/control/coordination/movement  -ML     Increase Lingual Tone/Sensation/Control/Coordination/Movement swallow trials;lingual resistance exercises  -CH swallow trials;lingual resistance exercises  -ML     [place]/Accuracy (Lingual Strengthening Goal 1, SLP) independently (over 90% accuracy)  -CH independently (over 90% accuracy)  -ML     Time Frame (Lingual Strengthening Goal 1, SLP) 1 week;short term goal (STG)  -CH 1 week;short term goal (STG)  -ML     Progress/Outcomes (Lingual Strengthening Goal 1, SLP) continuing progress toward goal  -CH progress slower than expected  -ML     Comment (Lingual Strengthening Goal 1, SLP) -- Completes exercises without resistance-limited trials.  -ML        (STG) Swallow Compensatory Strategies Goal 1 (SLP)    Activity (Swallow Compensatory Strategies/Techniques Goal 1, SLP) aspiration precautions;food/liquid placed on stronger right side  -CH aspiration precautions;food/liquid placed on stronger right side  -ML     [place]/Accuracy (Swallow Compensatory Strategies/Techniques Goal 1, SLP) independently (over 90% accuracy)  -CH independently (over 90% accuracy)  -ML     Time Frame (Swallow Compensatory Strategies/Techniques Goal 1, SLP) short term goal (STG);1 week  -CH short term goal (STG);1 week  -ML     Progress/Outcomes (Swallow Compensatory Strategies/Techniques Goal 1, SLP) continuing progress toward goal  -CH continuing progress toward goal   -ML     Comment (Swallow Compensatory Strategies/Techniques Goal 1, SLP) independent placement of straw in on the right  -CH Aware of need for placement of straw on the right.  Inconsistent placement of food on right.  -ML        Patient will demonstrate functional cognitive-linguistic skills for return to discharge environment    [place] with minimal cues  -CH with minimal cues  -ML     Time frame 1 week  -CH 1 week  -ML     Progress/Outcomes good progress toward goal  -CH progress slower than expected  -ML     Comments -- Max cues for  pt to stay awake to participate. Decreased cues needed as session continued.  -ML        SLP Diagnostic Treatment     Patient will participate in further assessment in the following areas reading comprehension;graphic expression;higher-level cognitive-linguistic  -CH --     Time Frame (Diagnostic) 1 week  -CH --     Progress/Outcomes (Additional Goal 1, SLP) goal met  -CH --        Comprehend Questions Goal 1 (SLP)    Improve Ability to Comprehend Questions Goal 1 (SLP) complex yes/no questions;complex wh questions;100%;independently (over 90% accuracy)  -CH complex yes/no questions;complex wh questions;100%;independently (over 90% accuracy)  -ML     Time Frame (Comprehend Questions Goal 1, SLP) short term goal (STG);1 week  -CH short term goal (STG);1 week  -ML     Progress (Ability to Comprehend Questions Goal 1, SLP) 90%;with minimal cues (75-90%)  -CH 90%;with minimal cues (75-90%)  -ML     Progress/Outcomes (Comprehend Questions Goal 1, SLP) good progress toward goal  -CH good progress toward goal  -ML        Phonation Goal 1 (SLP)    Improve Phonation By Goal 1 (SLP) using loud speech;80%;with minimal cues (75-90%)  -CH using loud speech;80%;with minimal cues (75-90%)  -ML     Time Frame (Phonation Goal 1, SLP) short term goal (STG);1 week  -CH short term goal (STG);1 week  -ML     Progress (Phonation Goal 1, SLP) 80%;with minimal cues (75-90%)  -CH 70%;with moderate  cues (50-74%)  -ML     Progress/Outcomes (Phonation Goal 1, SLP) continuing progress toward goal  -CH new goal  -ML     Comment (Phonation Goal 1, SLP) -- Able to demonstrate loud speech with rote/automatic task with cues.  -ML        Articulation Goal 1 (SLP)    Improve Articulation Goal 1 (SLP) by over-articulating at word level;90%;with minimal cues (75-90%);by over-articulating at phrase level  -CH by over-articulating at word level;90%;with minimal cues (75-90%);by over-articulating at phrase level  -ML     Time Frame (Articulation Goal 1, SLP) short term goal (STG);1 week  -CH short term goal (STG);1 week  -ML     Progress (Articulation Goal 1, SLP) 50%;with moderate cues (50-74%)  -CH with maximum cues (25-49%);40%  -ML     Progress/Outcomes (Articulation Goal 1, SLP) continuing progress toward goal  -CH good progress toward goal  -ML     Comment (Articulation Goal 1, SLP) -- Aware of need for overarticulation but could not or did not comply at the word level with a model.  -ML        Attention Goal 1 (SLP)    Improve Attention by Goal 1 (SLP) looking at speaker;attending to task;90%;with minimal cues (75-90%)  - looking at speaker;attending to task;90%;with minimal cues (75-90%)  -ML     Time Frame (Attention Goal 1, SLP) short term goal (STG);1 week  -CH short term goal (STG);1 week  -ML     Progress (Attention Goal 1, SLP) 50%;with minimal cues (75-90%)  - --     Progress/Outcomes (Attention Goal 1, SLP) continuing progress toward goal  -CH new goal  -ML        Organizational Skills Goal 1 (SLP)    Improve Thought Organization Through Goal 1 (SLP) completing a divergent naming task;completing mental manipulation task;90%;with minimal cues (75-90%)  - completing a divergent naming task;completing mental manipulation task;90%;with minimal cues (75-90%)  -ML     Time Frame (Thought Organization Skills Goal 1, SLP) short term goal (STG);1 week  -CH short term goal (STG);1 week  -ML     Progress (Thought  Organization Skills Goal 1, SLP) 60%;with minimal cues (75-90%)  -CH --     Progress/Outcomes (Thought Organization Skills Goal 1, SLP) continuing progress toward goal  -CH new goal  -ML     Comment (Thought Organization Skills Goal 1, SLP) divergent and mental manipulation  - --        Pragmatic Skills Goal 1 (SLP)    Improve Pragmatic Skills Goal 1 (SLP) maintain eye contact;demonstrate topic initiation;maintain topic;80%;with minimal cues (75-90%)  -CH maintain eye contact;demonstrate topic initiation;maintain topic;80%;with minimal cues (75-90%)  -ML     Time Frame (Pragmatic Skills Goal 1, SLP) short term goal (STG);1 week  -CH short term goal (STG);1 week  -ML     Progress (Pragmatic Skills Goal 1, SLP) 60%;with minimal cues (75-90%)  -CH --     Progress/Outcomes (Pragmatic Skills Goal 1, SLP) continuing progress toward goal  -CH new goal  -ML        Right Hemisphere Function Goal 1 (SLP)    Improve Right Hemisphere Function Through Goal 1 (SLP) demonstrate awareness of communication partner in left visual field;100%;independently (over 90% accuracy)  -CH --     Time Frame (Right Hemisphere Function Goal 1, SLP) 1 week  -CH --     Progress (Right Hemisphere Function Goal 1, SLP) 60%;with minimal cues (75-90%)  -CH --     Progress/Outcomes (Right Hemisphere Function Goal 1, SLP) continuing progress toward goal  -CH --               User Key  (r) = Recorded By, (t) = Taken By, (c) = Cosigned By      Initials Name Provider Type     Ela Mccall, CCC-SLP Speech and Language Pathologist     Cate Friend MS CCC-SLP Speech and Language Pathologist                              Time Calculation:      Time Calculation- SLP       Row Name 08/30/24 1359             Time Calculation- SLP    SLP Start Time 1135  -CH      SLP Received On 08/30/24  -         Untimed Charges    26612-[id number] Treatment/ST Modification Prosth Aug Alter  38  -      [id number] Treatment Swallow Minutes 38  -CH         Total  Minutes    Untimed Charges Total Minutes 76  -CH       Total Minutes 76  -CH                User Key  (r) = Recorded By, (t) = Taken By, (c) = Cosigned By      Initials Name Provider Type    Cate Davenport MS CCC-SLP Speech and Language Pathologist                    Therapy Charges for Today       Code Description Service Date Service Provider Modifiers Qty    50389651887 HC ST TREATMENT SWALLOW 3 8/30/2024 Cate Friend MS CCC-SLP GN 1    [id number] HC ST TREATMENT SPEECH 3 8/30/2024 Cate Friend MS CCC-SLP GN 1                       MS ARIADNE [place]  8/30/2024

## 2024-08-30 NOTE — DISCHARGE SUMMARY
2024    [facility] Medicine Services  DISCHARGE SUMMARY    Patient Name: Keya Hamilton  : 1990  MRN: [id number]    Date of Admission: 2024  8:21 PM  Date of Discharge:  2024  Primary Care Physician: Cuong Parks MD    Consults       Date and Time Order Name Status Description    2024  8:09 AM Inpatient Cardiology Consult Completed     2024  9:09 PM Inpatient Neurosurgery Consult Completed     2024  8:23 PM Inpatient Neurology Consult Stroke Completed             Hospital Course     Presenting Problem: acute L weakness     Active Hospital Problems    Diagnosis  POA    **R M1 stenosis [I63.511]  Yes    Acute systolic heart failure [I50.21]  Unknown    Left ventricular thrombus [I51.3]  Unknown    Acute stroke due to ischemia [I63.9]  Yes    T2DM [E11.9]  Yes    HTN [I10]  Yes    Morbid obesity with BMI of 45.0-49.9, adult [E66.01, Z68.42]  Not Applicable    SCOTT [G47.33]  Yes    Hypertensive urgency [I16.0]  Yes    R/O Seizure [R56.9]  Unknown    Acute respiratory failure [J96.00]  Unknown      Resolved Hospital Problems   No resolved problems to display.          Hospital Course:  Keya Hamilton is a 34 y.o. female with PMH of T2DM, right renal atrophy, HTN, morbid obesity, and SCOTT on PAP.  She presented to [facility] ED on 24 for evaluation of somnolence, dysarthria, left-hemiparesis, and rightward gaze deviation concerning for stroke.   NIH was 11 Imaging showed CT perfusion deficit in R MCA territory.      R MCA CVA  - believed cardioembolic from LV thrombus  -  Seen emergently by stroke team and got TNKa and was admitted to ICU  - MRI showed acute ischemic stroke in R putamen and head of caudate  --RAMU noted LV thrombus, left atrial cavity dilation, bubble study indeterminant EF 36%; Cardiology service followed throughout her stay  -  Intubated in ED , extubated 24  -  placed on heparin gtt which has been transitioned to Eliquis  - worked  with PT OT and ST.  She is getting stronger, can now raise her LUE.    - She is tolerating food by mouth  - She is going to [facility]       LV thrombus   Cardiomyopathy, EF 26-36 (2 echos)  --eliquis,   - meds started for cardiomyopathy  - Lifevest considered but not ordered; pt borderline EF   - outpt cardiac MRI ordered   --patient noted heterozygous for factor V leiden, additional anticoag labs pending     Substance use- UDS + meth/THC  --cessation counseling      Somnolence   --amantadine started for this.  Family notes she is sleeping a lot. However, they report daily improvement.  She also tends to stay up late.     R renal atrophy - Cr is 1.1      Vaginal spotting, mild  - Has IUD.    - I explained this may happen in setting of Eliquis.   If it worsens, she may need to see GYN after DC        Discharge Follow Up Recommendations for outpatient labs/diagnostics:   - heart valve clinic in one week     - Neuro clinic in one month     - outpt sleep med referral and SCOTT management     Day of Discharge     HPI:   sleepy, explains she was up until 0300.  Partner says she is getting more alert daily and that her LUE movement is also improving.      Review of Systems  No pain   Did have some vaginal spotting today    Vital Signs:   Temp:  [97.5 °F (36.4 °C)-98.5 °F (36.9 °C)] 98.4 °F (36.9 °C)  Heart Rate:  [81-94] 89  Resp:  [16-20] 20  BP: (102-132)/(69-97) 132/95  Flow (L/min):  [3-4] 4      Physical Exam:  Gen:   up in chair w eyes closed but answrs questions and follows commands.  Visitor present.    Neuro: somnolent, oriented, clear speech, LUE lifts against gravity.    HEENT:  NC/AT   Neck:  Supple, no LAD  Heart RRR no murmur, rub, or gallop  Abd:  Soft, nontender, no rebound or guarding, pos BS  Extrem:  No c/c/e      Pertinent  and/or Most Recent Results     LAB RESULTS:      Lab 08/30/24  0934 08/26/24  0459 08/25/24  2112 08/25/24  1325 08/25/24  0542 08/24/24  2244 08/24/24  1929 08/24/24  1548 08/24/24  1144  08/24/24  0833 08/24/24  0611 08/24/24  0102 08/23/24 2055 08/23/24 2036   WBC 11.74* 15.37*  --   --  20.36*  --   --   --   --  16.61*  --   --  11.16*  --    HEMOGLOBIN 18.9* 15.6  --   --  14.7  --   --   --   --  14.9  --   --  16.1*  --    HEMATOCRIT 55.7* 47.0*  --   --  46.1  --   --   --   --  44.2  --   --  47.2*  --    PLATELETS 469* 346  --   --  374  --   --   --   --  340  --   --  417  --    NEUTROS ABS 7.68* 10.82*  --   --  16.06*  --   --   --   --  15.03*  --   --  7.05*  --    IMMATURE GRANS (ABS) 0.09* 0.07*  --   --  0.18*  --   --   --   --  0.12*  --   --  0.07*  --    LYMPHS ABS 2.73 3.30*  --   --  2.82  --   --   --   --  0.91  --   --  2.77  --    MONOS ABS 0.93* 1.06*  --   --  1.24*  --   --   --   --  0.51  --   --  0.85  --    EOS ABS 0.20 0.05  --   --  0.01  --   --   --   --  0.00  --   --  0.33  --    MCV 97.9* 101.1*  --   --  103.4*  --   --   --   --  97.8*  --   --  97.7*  --    PROCALCITONIN  --  0.21  --   --  0.15  --   --   --   --   --   --   --   --   --    LACTATE  --   --   --   --   --   --  1.9  --  4.2* 3.9* 3.4* 3.5*  --   --    PROTIME  --   --   --   --   --   --   --   --   --  14.9*  --   --   --  12.6*   APTT  --   --   --   --   --   --   --   --   --  23.2*  --   --  25.3  --    HEPARIN ANTI-XA  --  0.58 0.33 0.24* 0.10* 0.10*  --    < >  --  0.10*  --   --   --   --     < > = values in this interval not displayed.         Lab 08/30/24  1229 08/29/24  1331 08/28/24  1537 08/27/24  0358 08/26/24  0459 08/25/24  0542 08/24/24  0102   SODIUM 137 140 140 139 138 144 141   POTASSIUM 3.9 4.6 4.7 4.5 4.3 4.3 4.4   CHLORIDE 105 105 105 105 105 108* 105   CO2 19.0* 22.0 20.0* 20.0* 20.0* 22.0 20.0*   ANION GAP 13.0 13.0 15.0 14.0 13.0 14.0 16.0*   BUN 22* 22* 23* 23* 17 17 16   CREATININE 1.08* 1.03* 1.03* 0.99 0.92 1.15* 1.39*   EGFR 70.1 74.2 74.2 77.9 85.0 65.0 51.8*   GLUCOSE 140* 130* 168* 144* 106* 143* 206*   CALCIUM 9.2 9.7 10.0 9.4 9.3 9.0 9.1   MAGNESIUM   --   --   --   --  2.3 2.0  --    HEMOGLOBIN A1C  --   --   --   --   --   --  6.70*         Lab 08/30/24  1229 08/26/24  1800 08/24/24  0102 08/23/24 2055   TOTAL PROTEIN 6.8  --  6.4  --    ALBUMIN 3.9  --  3.7  --    GLOBULIN 2.9  --  2.7  --    ALT (SGPT) 36*  --  28 15   AST (SGOT) 43*  --  53* 18   BILIRUBIN 0.5  --  0.6  --    BETA 2 GLYCO 1 IGG  --  <9 <9  --    BETA 2 GLYCO 1 IGA  --  <9 <9  --    BETA 2 GLYCO 1 IGM  --  <9 <9  --    ALK PHOS 72  --  60  --          Lab 08/25/24  0542 08/24/24  0833 08/24/24  0102 08/23/24 2055 08/23/24 2036   PROBNP 1,011.0*  --   --   --   --    HSTROP T  --   --  73* 64*  --    PROTIME  --  14.9*  --   --  12.6*   INR  --  1.16*  --   --  1.1         Lab 08/24/24  0102   CHOLESTEROL 177   LDL CHOL 115*   HDL CHOL 31*   TRIGLYCERIDES 175*             Lab 08/25/24  0538 08/24/24  1751   PH, ARTERIAL 7.450 7.396   PCO2, ARTERIAL 36.2 39.0   PO2 ART 62.9* 83.2   FIO2 28 30   HCO3 ART 25.1 23.9   BASE EXCESS ART 1.4 -0.8*   CARBOXYHEMOGLOBIN 1.7 1.4     Brief Urine Lab Results  (Last result in the past 365 days)        Color   Clarity   Blood   Leuk Est   Nitrite   Protein   CREAT   Urine HCG        08/24/24 0124 Yellow   Clear   Negative   Negative   Negative   100 mg/dL (2+)           08/24/24 0124               Negative             Microbiology Results (last 10 days)       Procedure Component Value - Date/Time    Respiratory Panel PCR w/COVID-19(SARS-CoV-2) KAVEH/BRIAN/DOROTHEA/PAD/COR/VASYL In-House, NP Swab in UTM/VTM, 2 HR TAT - Swab, Nasopharynx [[id number]]  (Normal) Collected: 08/24/24 0109    Lab Status: Final result Specimen: Swab from Nasopharynx Updated: 08/24/24 0242     ADENOVIRUS, PCR Not Detected     Coronavirus 229E Not Detected     Coronavirus HKU1 Not Detected     Coronavirus NL63 Not Detected     Coronavirus OC43 Not Detected     COVID19 Not Detected     Human Metapneumovirus Not Detected     Human Rhinovirus/Enterovirus Not Detected     Influenza A PCR Not  Detected     Influenza B PCR Not Detected     Parainfluenza Virus 1 Not Detected     Parainfluenza Virus 2 Not Detected     Parainfluenza Virus 3 Not Detected     Parainfluenza Virus 4 Not Detected     RSV, PCR Not Detected     Bordetella pertussis pcr Not Detected     Bordetella parapertussis PCR Not Detected     Chlamydophila pneumoniae PCR Not Detected     Mycoplasma pneumo by PCR Not Detected    Narrative:      In the setting of a positive respiratory panel with a viral infection PLUS a negative procalcitonin without other underlying concern for bacterial infection, consider observing off antibiotics or discontinuation of antibiotics and continue supportive care. If the respiratory panel is positive for atypical bacterial infection (Bordetella pertussis, Chlamydophila pneumoniae, or Mycoplasma pneumoniae), consider antibiotic de-escalation to target atypical bacterial infection.    MRSA Screen, PCR (Inpatient) - Swab, Nares [[id number]]  (Normal) Collected: 08/24/24 0109    Lab Status: Final result Specimen: Swab from Nares Updated: 08/24/24 0728     MRSA PCR Negative    Narrative:      The negative predictive value of this diagnostic test is high and should only be used to consider de-escalating anti-MRSA therapy. A positive result may indicate colonization with MRSA and must be correlated clinically.  MRSA Negative            Adult Transthoracic Echo Limited W/ Cont if Necessary Per Protocol    Result Date: 8/29/2024    This was a limited echocardiogram to reassess left ventricular function   Left ventricular systolic function is severely decreased. Left ventricular ejection fraction appears to be 26 - 30%.   Left ventricular wall thickness is consistent with hypertrophy.   The previously noted LV thrombus appears much improved, there appears to be a smaller residual mural thrombus at the apex     Cardiac Catheterization/Vascular Study    Addendum Date: 8/29/2024    Procedure-cerebral angiography note  Preprocedural diagnosis Patient presents with respiratory failure Symptoms of cardiac failure Right-sided MCA syndrome NIH stroke scale 11-14 CT perfusion showing holohemispheric deficit with marked hypertension Postprocedural diagnosis-same Procedures performed 1.  Right-sided common carotid artery angiography 2.  Right sided internal carotid cerebral angiogram 3.  Mechanical embolectomy right -sided distal M1 using 4 mm Trevo 1 pass with tiki 3 flow 4.  Follow-up angiogram right internal carotid 5.  Angio-Seal 8 [place] right groin Angio-Seal to right groin-8 [place] via Access site is right common femoral artery Surgeon: Lemuel Medina MD FAANS Assistants-none Complications none apparent Contrast Visipaque 320 Procedure in detail This patient was taken emergently after emergency verbal consent was obtained by her nurse practitioner staff, he was taken to the angiography suite and prepped and draped in the usual sterile manner.  Risk and benefits were explicitly explained.  After this point in time the patient was anesthetized at the right groin using local anesthetic agent lidocaine without epinephrine, micropuncture technique was used to assess and obtain right-sided common femoral access  and a 8 [place] sheath was placed.  The patient had to be placed under general anesthesia secondary to respiratory failure At this point in time navigation of a 5 [place] [employer] catheter into the above selected vessels was performed with little difficult after selective catheterization, diagnostic angiogram was performed using 8-10 cc of dilute Visipaque 320 contrast and approximately 5050 ratio.  The right common carotid followed by the right internal was selected with DSA angiogram demonstrating a large occlusion M1 dominant consistent with his perfusion deficit at this point time  with a flow gait 8 [place] catheter microcatheter access to the distal M2 was performed followed by mechanical embolectomy with 1 passes of a 4  x 41 mm TR E VO and subsequent TICI 3 reperfusion No immediate complications noted mild to moderate basal spasm was noted post procedure After diagnostic imaging was obtained independent review of these was performed at a separate workstation.  The patient tolerated the procedure well and given the access site being of adequate diameter a 8 [place] Angio-Seal was placed in the right groin. The patient improved on the table Findings Right-sided common carotid artery angiography demonstrates a normal age-related appearance of the carotid artery there is no evidence of significant flow-limiting stenosis by NASCET criteria.  There is some atherosclerotic change of the carotid bulb.  Antegrade flow appears within normal limits of the internal.  There does not appear to be plaque at the carotid that would explain the patient's distal emboli Right-sided internal carotid cerebral angiogram demonstrates a normal appearance of the right MCA trunk up until M1 and there is abrupt occlusion with retrograde filling of the entire right hemisphere with reasonable collateralization Right internal carotid artery injection status post mechanical embolectomy demonstrates reperfusion with TICI 3 flow, mild to moderate expected vasospasm and in the MCA and clinoid carotid is seen Final impression Successful catheter-based angiogram with successful right mechanical embolectomy of M1 branch with resultant TICI 3 flow as documented above No medic complications noted Patient will be in ICU for further workup of cardiac and respiratory failure.    Result Date: 8/29/2024  Procedure-cerebral angiography note Preprocedural diagnosis Patient presents with respiratory failure Symptoms of cardiac failure Right-sided MCA syndrome NIH stroke scale 11-14 CT perfusion showing holohemispheric deficit with marked hypertension Postprocedural diagnosis-same Procedures performed 1.  Right-sided common carotid artery angiography 2.  Right sided internal  carotid cerebral angiogram 3.  Mechanical embolectomy left-sided distal M1 using 4 mm Trevo 1 pass with tiki 3 flow 4.  Follow-up angiogram right internal carotid 5.  Angio-Seal 8 [place] right groin Angio-Seal to right groin-8 [place] via Access site is right common femoral artery Surgeon: Lemuel Medina MD FAANS Assistants-none Complications none apparent Contrast Visipaque 320 Procedure in detail This patient was taken emergently after emergency verbal consent was obtained by her nurse practitioner staff, he was taken to the angiography suite and prepped and draped in the usual sterile manner.  Risk and benefits were explicitly explained.  After this point in time the patient was anesthetized at the right groin using local anesthetic agent lidocaine without epinephrine, micropuncture technique was used to assess and obtain right-sided common femoral access  and a 8 [place] sheath was placed.  The patient had to be placed under general anesthesia secondary to respiratory failure At this point in time navigation of a 5 [place] [employer] catheter into the above selected vessels was performed with little difficult after selective catheterization, diagnostic angiogram was performed using 8-10 cc of dilute Visipaque 320 contrast and approximately 5050 ratio.  The right common carotid followed by the right internal was selected with DSA angiogram demonstrating a large occlusion M1 dominant consistent with his perfusion deficit at this point time  with a flow gait 8 [place] catheter microcatheter access to the distal M2 was performed followed by mechanical embolectomy with 1 passes of a 4 x 41 mm TR E VO and subsequent TICI 3 reperfusion No immediate complications noted mild to moderate basal spasm was noted post procedure After diagnostic imaging was obtained independent review of these was performed at a separate workstation.  The patient tolerated the procedure well and given the access site being of adequate diameter a  8 [place] Angio-Seal was placed in the right groin. The patient improved on the table Findings Right-sided common carotid artery angiography demonstrates a normal age-related appearance of the carotid artery there is no evidence of significant flow-limiting stenosis by NASCET criteria.  There is some atherosclerotic change of the carotid bulb.  Antegrade flow appears within normal limits of the internal.  There does not appear to be plaque at the carotid that would explain the patient's distal emboli Right-sided internal carotid cerebral angiogram demonstrates a normal appearance of the left MCA trunk up until M1 and there is abrupt occlusion with retrograde filling of the entire right hemisphere with reasonable collateralization Right internal carotid artery injection status post mechanical embolectomy demonstrates reperfusion with TICI 3 flow, mild to moderate expected vasospasm and in the MCA and clinoid carotid is seen Final impression Successful catheter-based angiogram with successful right mechanical embolectomy of M1 branch with resultant TICI 3 flow as documented above No medic complications noted Patient will be in ICU for further workup of cardiac and respiratory failure.     CT Head Without Contrast    Result Date: 8/27/2024  CT HEAD WO CONTRAST Date of Exam: 8/27/2024 4:24 PM EDT Indication: drowsiness, stroke follow up. Comparison: August 24, 2024 Technique: Axial CT images were obtained of the head without contrast administration.  Automated exposure control and iterative construction methods were used. Findings: Evolving subacute infarcts within the right basal ganglia, right temporal, and occipital lobes are redemonstrated. There is no hemorrhagic transformation. There is minimal mass effect on the right lateral ventricle, but no midline shift. The basal cisterns appear patent. The calvarium appears intact. The paranasal sinuses and mastoid air cells are well-aerated.     Impression: Evolving  right-sided subacute infarcts most prominent within right basal ganglia. No hemorrhagic transformation or acute herniation. Electronically Signed: Kali Palmer MD  8/27/2024 4:39 PM EDT  Workstation ID: [id number]    XR Chest 1 View    Result Date: 8/26/2024  XR CHEST 1 VW Date of Exam: 8/26/2024 3:36 AM EDT Indication: resp insuff Comparison: 8/24/2024 at 0233 hours Findings: The endotracheal tube has been removed. There is interval increase in atelectasis versus infiltrates within the mid to lower lungs bilaterally. No pleural effusions are seen. The cardiac silhouette and mediastinum are stable. Stable cardiomegaly is noted.     Impression: 1.Interval removal of the endotracheal tube. 2.Evidence for increasing atelectasis versus infiltrates within the mid to lower lungs bilaterally. Electronically Signed: Edouard Krishna MD  8/26/2024 7:21 AM EDT  Workstation ID: [id number]    CT Head Without Contrast    Result Date: 8/25/2024  CT HEAD WO CONTRAST Date of Exam: 8/24/2024 8:37 PM EDT Indication: Stroke, follow up stroke 24 Hours Post Thrombolytic Administration. Comparison: 8/24/2024 Technique: Axial CT images were obtained of the head without contrast administration.  Automated exposure control and iterative construction methods were used. Findings: Parenchyma:No acute intraparenchymal hemorrhage. Evolving right sided infarcts predominantly in the basal ganglia. There is some internal mild hyperdensity likely reflecting petechial hemorrhage. No overt hemorrhagic transformation. Normal parenchymal volume. No substantial white matter disease. No midline shift or herniation. Ventricles and extra axial spaces:Normal caliber of ventricles and sulci. No extra axial fluid collection seen. Other:Orbits are grossly intact. Scattered paranasal sinus mucosal thickening. Mastoid air cells are clear. Calvarium is intact. No substantial intracranial atherosclerotic calcification.     Impression: Evolving right-sided infarcts  predominate of the basal ganglia. There is some minimal internal hyperdensity likely reflecting spared tissue or petechial hemorrhage. No overt hemorrhagic transformation. Electronically Signed: Denis Gaines MD  8/25/2024 8:38 AM EDT  Workstation ID: [id number]    CT Head Without Contrast    Result Date: 8/24/2024  CT HEAD WO CONTRAST Date of Exam: 8/24/2024 4:29 AM EDT Indication: stroke  s/p MT. Comparison: 8/24/2024 MR brain Technique: Axial CT images were obtained of the head without contrast administration.  Automated exposure control and iterative construction methods were used. Findings: Parenchyma:No acute intraparenchymal hemorrhage. Known right-sided infarcts are better seen on prior MRI. Normal parenchymal volume. No substantial white matter disease. No midline shift or herniation. Ventricles and extra axial spaces:Normal caliber of ventricles and sulci. No extra axial fluid collection seen. Other:Orbits are grossly intact. Scattered paranasal sinus mucosal thickening. Mastoid air cells are clear. Calvarium is intact. No substantial intracranial atherosclerotic calcification.     Impression: Known right-sided infarcts are better seen on prior MRI. No evidence of hemorrhage. Electronically Signed: Denis Gaines MD  8/24/2024 9:09 AM EDT  Workstation ID: [id number]    Adult Transthoracic Echo Complete W/ Cont if Necessary Per Protocol (With Agitated Saline)    Result Date: 8/24/2024  Images from the original result were not included.   Left ventricular systolic function is moderately decreased. Calculated left ventricular EF = 36.7%  global hypokinesis.  LV cavity mildly dilated.  RV size and function normal   Left ventricular wall thickness is consistent with hypertrophy.   Left ventricular diastolic dysfunction is noted.   There is a thrombus present in the left ventricle in apex. 2.22 cm by 0.96 cm   The left atrial cavity is dilated.   Estimated right ventricular systolic pressure from tricuspid  regurgitation is normal (<35 mmHg).   Indeterminate bubble study    MRI Brain Without Contrast    Result Date: 8/24/2024  MRI BRAIN WO CONTRAST Date of Exam: 8/24/2024 3:36 AM EDT Indication: stroke.  Comparison: 8/23/2024. Technique:  Routine multiplanar/multisequence sequence images of the brain were obtained without contrast administration. Findings: There are patchy areas of diffusion restriction present within the periventricular white matter on the right as well as the right basal ganglia with tiny foci of diffusion restriction seen within the subcortical white matter of the right frontal lobe and  the right temporal lobe as well as the right occipital lobe with corresponding decreased signal on the ADC map consistent with recent or acute ischemia. FLAIR signal changes are seen corresponding to these regions which appears most pronounced within the right putamen. No additional significant FLAIR signal changes. Given the degree of signal intensity, some of the findings may be more subacute. No additional diffusion restriction identified. There is no evidence of acute or chronic intracranial hemorrhage. No mass effect or midline shift. No abnormal extra-axial collections. The major vascular flow voids appear intact. The basal ganglia, brainstem and cerebellum appear within normal limits. Calvarial and superficial soft tissue signal is within  normal limits. Orbits appear unremarkable. The paranasal sinuses and the mastoid air cells appear well aerated. Midline structures are intact.     Impression: Patchy areas of diffusion restriction present within the right periventricular white matter, the right basal ganglia and the right temporal lobe and the right occipital lobe consistent with recent or acute ischemia. Given the degree of signal intensity in presence of FLAIR signal change, findings likely more subacute. No evidence of hemorrhage. No significant mass effect or midline shift. Electronically Signed: Kayla  MD Jamil  8/24/2024 4:29 AM EDT  Workstation ID: [id number]    XR Chest 1 View    Result Date: 8/24/2024  XR CHEST 1 VW Date of Exam: 8/24/2024 2:36 AM EDT Indication: positioning of ETT Comparison: 8/23/2024. Findings: There has been retraction of the endotracheal tube with the tip in the mid trachea. Improved aeration of the lungs bilaterally. Mild interstitial opacities present with indistinctness of the pulmonary vasculature.. The heart appears enlarged. Stable small possible left-sided pleural effusion. No pneumothorax.     Impression: Retraction of the endotracheal tube with the tip in the mid trachea. Improved aeration of the lungs bilaterally with mild pulmonary edema pattern present. Electronically Signed: Kayla Negron MD  8/24/2024 2:50 AM EDT  Workstation ID: [id number]    XR Chest 1 View    Result Date: 8/23/2024  XR CHEST 1 VW Date of Exam: 8/23/2024 9:03 PM EDT Indication: Acute Stroke Protocol (onset < 12 hrs) Comparison: None available. Findings: Although the jose carlos is not well visualized, tip of the endotracheal tube appears to extend into the right mainstem bronchus. Cardiac silhouette appears enlarged. There are diffuse airspace opacities throughout both lungs.     Impression: 1.Tip of the endotracheal tube extends into the right mainstem bronchus. Recommend retraction to the mid thoracic trachea. I called this result to Dr. Lezama at 9:20 p.m. on 8/23/2024, and he was aware of this finding. 2.Diffuse bilateral airspace opacities, which may be due to pulmonary edema, atelectasis, and/or pneumonia. Electronically Signed: Latanya Evans  8/23/2024 9:32 PM EDT  Workstation ID: [id number]    CT Angiogram Head w AI Analysis of LVO    Result Date: 8/23/2024  CT CEREBRAL PERFUSION W WO CONTRAST, CT ANGIOGRAM NECK, CT ANGIOGRAM HEAD W AI ANALYSIS OF LVO Date of Exam: 8/23/2024 8:26 PM EDT Indication: Neuro Deficit, acute, Stroke suspected Neuro deficit, acute stroke suspected.  Comparison: Noncontrast CT of the  head performed on the same date Technique: Axial CT images of the brain were obtained prior to and after the administration of 115 cc Isovue-370 IV contrast . Core blood volume, core blood flow, mean transit time, and Tmax images were obtained utilizing the Rapid software protocol. A limited CT angiogram of the head was also performed to measure the blood vessel density. The radiation dose reduction device was turned on for each scan per the ALARA (As Low as Reasonably Achievable) protocol. Findings: CT perfusion demonstrates approximately 132 cc elevated Tmax greater than 6 seconds, 67 cc greater than 8 seconds and 32 cc greater than 10 seconds also with patchy involvement within the right cerebral hemisphere. These are within the MCA to the distribution. Involving the right cerebral hemisphere. No decrease cerebral blood flow or blood volume is noted. Noncontrast CT demonstrates a normal appearance of the right MCA territory distribution. Technique:  CTA of the head and neck was performed after the uneventful intravenous administration of iodinated contrast.  Reconstructed coronal and sagittal images were also obtained. In addition, a 3-D volume rendered image was created for interpretation. Automated exposure control and iterative reconstruction methods were used.  The arch of the aorta is normal. The common carotid arteries are normal. The extracranial internal carotid arteries are normal. 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 There is some distal reconstitution however there is minimal thready flow in the area of maximal stenosis. Some irregularity of flow within the branches of the left MCA. The anterior cerebral arteries appear normal. The vertebral arteries arise from the subclavian arteries. The right vertebral artery is dominant. No evidence of vertebral artery dissection. The vertebral arteries supply the basilar artery. The basilar artery tip  is normal. The basilar artery terminates in bilateral posterior cerebral arteries which appear normal. The visualized lung apices are clear. The airway is clear. The thyroid gland is normal. No cervical adenopathy. The infratemporal fossa is normal bilaterally. No intracranial mass or mass effect. No midline shift. No extra-axial mass or collection. Orbital and paranasal soft tissues are normal. The paranasal sinuses are clear. The mastoid air cells are aerated.     There is a very severe stenosis of the right M1 segment just proximal to the trifurcation with some distal reconstitution of flow. CT perfusion demonstrates slow flow in the right MCA territory with no decreased vertebral blood flow or blood volume noted. Neurosurgical consult recommended. Findings were discussed with Dr. Garcia at 9:03 p.m. on 8/23/2024 Electronically Signed: David Fernández MD  8/23/2024 9:05 PM EDT  Workstation ID: [id number]    CT Angiogram Neck    Result Date: 8/23/2024  CT CEREBRAL PERFUSION W WO CONTRAST, CT ANGIOGRAM NECK, CT ANGIOGRAM HEAD W AI ANALYSIS OF LVO Date of Exam: 8/23/2024 8:26 PM EDT Indication: Neuro Deficit, acute, Stroke suspected Neuro deficit, acute stroke suspected.  Comparison: Noncontrast CT of the head performed on the same date Technique: Axial CT images of the brain were obtained prior to and after the administration of 115 cc Isovue-370 IV contrast . Core blood volume, core blood flow, mean transit time, and Tmax images were obtained utilizing the Rapid software protocol. A limited CT angiogram of the head was also performed to measure the blood vessel density. The radiation dose reduction device was turned on for each scan per the ALARA (As Low as Reasonably Achievable) protocol. Findings: CT perfusion demonstrates approximately 132 cc elevated Tmax greater than 6 seconds, 67 cc greater than 8 seconds and 32 cc greater than 10 seconds also with patchy involvement within the right cerebral hemisphere. These  are within the MCA to the distribution. Involving the right cerebral hemisphere. No decrease cerebral blood flow or blood volume is noted. Noncontrast CT demonstrates a normal appearance of the right MCA territory distribution. Technique:  CTA of the head and neck was performed after the uneventful intravenous administration of iodinated contrast.  Reconstructed coronal and sagittal images were also obtained. In addition, a 3-D volume rendered image was created for interpretation. Automated exposure control and iterative reconstruction methods were used.  The arch of the aorta is normal. The common carotid arteries are normal. The extracranial internal carotid arteries are normal. 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 There is some distal reconstitution however there is minimal thready flow in the area of maximal stenosis. Some irregularity of flow within the branches of the left MCA. The anterior cerebral arteries appear normal. The vertebral arteries arise from the subclavian arteries. The right vertebral artery is dominant. No evidence of vertebral artery dissection. The vertebral arteries supply the basilar artery. The basilar artery tip is normal. The basilar artery terminates in bilateral posterior cerebral arteries which appear normal. The visualized lung apices are clear. The airway is clear. The thyroid gland is normal. No cervical adenopathy. The infratemporal fossa is normal bilaterally. No intracranial mass or mass effect. No midline shift. No extra-axial mass or collection. Orbital and paranasal soft tissues are normal. The paranasal sinuses are clear. The mastoid air cells are aerated.     There is a very severe stenosis of the right M1 segment just proximal to the trifurcation with some distal reconstitution of flow. CT perfusion demonstrates slow flow in the right MCA territory with no decreased vertebral blood flow or blood volume noted.  Neurosurgical consult recommended. Findings were discussed with Dr. Garcia at 9:03 p.m. on 8/23/2024 Electronically Signed: David Fernández MD  8/23/2024 9:05 PM EDT  Workstation ID: [id number]    CT CEREBRAL PERFUSION WITH & WITHOUT CONTRAST    Result Date: 8/23/2024  CT CEREBRAL PERFUSION W WO CONTRAST, CT ANGIOGRAM NECK, CT ANGIOGRAM HEAD W AI ANALYSIS OF LVO Date of Exam: 8/23/2024 8:26 PM EDT Indication: Neuro Deficit, acute, Stroke suspected Neuro deficit, acute stroke suspected.  Comparison: Noncontrast CT of the head performed on the same date Technique: Axial CT images of the brain were obtained prior to and after the administration of 115 cc Isovue-370 IV contrast . Core blood volume, core blood flow, mean transit time, and Tmax images were obtained utilizing the Rapid software protocol. A limited CT angiogram of the head was also performed to measure the blood vessel density. The radiation dose reduction device was turned on for each scan per the ALARA (As Low as Reasonably Achievable) protocol. Findings: CT perfusion demonstrates approximately 132 cc elevated Tmax greater than 6 seconds, 67 cc greater than 8 seconds and 32 cc greater than 10 seconds also with patchy involvement within the right cerebral hemisphere. These are within the MCA to the distribution. Involving the right cerebral hemisphere. No decrease cerebral blood flow or blood volume is noted. Noncontrast CT demonstrates a normal appearance of the right MCA territory distribution. Technique:  CTA of the head and neck was performed after the uneventful intravenous administration of iodinated contrast.  Reconstructed coronal and sagittal images were also obtained. In addition, a 3-D volume rendered image was created for interpretation. Automated exposure control and iterative reconstruction methods were used.  The arch of the aorta is normal. The common carotid arteries are normal. The extracranial internal carotid arteries are normal. Near  complete vessel occlusion of the right MCA right M1 segment seen on axial image #362 series 10. There is some distal reconstitution however there is minimal thready flow in the area of maximal stenosis. Some irregularity of flow within the branches of the left MCA. The anterior cerebral arteries appear normal. The vertebral arteries arise from the subclavian arteries. The right vertebral artery is dominant. No evidence of vertebral artery dissection. The vertebral arteries supply the basilar artery. The basilar artery tip is normal. The basilar artery terminates in bilateral posterior cerebral arteries which appear normal. The visualized lung apices are clear. The airway is clear. The thyroid gland is normal. No cervical adenopathy. The infratemporal fossa is normal bilaterally. No intracranial mass or mass effect. No midline shift. No extra-axial mass or collection. Orbital and paranasal soft tissues are normal. The paranasal sinuses are clear. The mastoid air cells are aerated.     There is a very severe stenosis of the right M1 segment just proximal to the trifurcation with some distal reconstitution of flow. CT perfusion demonstrates slow flow in the right MCA territory with no decreased vertebral blood flow or blood volume noted. Neurosurgical consult recommended. Findings were discussed with Dr. Garcia at 9:03 p.m. on 8/23/2024 Electronically Signed: David Fernández MD  8/23/2024 9:05 PM EDT  Workstation ID: [id number]    CT Head Without Contrast Stroke Protocol    Result Date: 8/23/2024  CT HEAD WO CONTRAST STROKE PROTOCOL Date of Exam: 8/23/2024 8:25 PM EDT Indication: Neuro deficit, acute, stroke suspected Neuro Deficit, acute, Stroke suspected. Comparison: None available. Technique: Axial CT images were obtained of the head without contrast administration.  Reconstructed coronal images were also obtained. Automated exposure control and iterative construction methods were used. Scan Time: 8:22 p.m. Results  discussed with   Colette stroke Navigator at 8:32 p.m. Findings: No acute intracranial hemorrhage.Intact gray-white differentiation.No extra-axial fluid collection.No significant mass effect. No hydrocephalus. Brain volume appears age-appropriate. Mild scattered mucosal thickening in the paranasal sinuses.Mastoid air cells are essentially clear.Included globes and orbits appear unremarkable by CT. No acute or aggressive appearing bony or extracranial soft tissue process.     Impression: No acute intracranial finding. Electronically Signed: Medardo Wheat  8/23/2024 8:35 PM EDT  Workstation ID: [id number]             Results for orders placed during the hospital encounter of 08/23/24    Adult Transthoracic Echo Limited W/ Cont if Necessary Per Protocol    Interpretation Summary    This was a limited echocardiogram to reassess left ventricular function    Left ventricular systolic function is severely decreased. Left ventricular ejection fraction appears to be 26 - 30%.    Left ventricular wall thickness is consistent with hypertrophy.    The previously noted LV thrombus appears much improved, there appears to be a smaller residual mural thrombus at the apex    Discharge Details        Discharge Medications        New Medications        Instructions Start Date   acetaminophen 650 MG suppository  Commonly known as: TYLENOL   650 mg, Rectal, Every 4 Hours PRN      amantadine 100 MG capsule  Commonly known as: SYMMETREL   100 mg, Oral, Every 12 Hours Scheduled      apixaban 5 MG tablet tablet  Commonly known as: ELIQUIS   Take 1 tablet by mouth Every 12 (Twelve) Hours.      atorvastatin 80 MG tablet  Commonly known as: LIPITOR   80 mg, Oral, Nightly      empagliflozin 10 MG tablet tablet  Commonly known as: JARDIANCE   10 mg, Oral, Daily   Start Date: August 31, 2024     insulin glargine 100 UNIT/ML injection  Commonly known as: LANTUS, SEMGLEE   10 Units, Subcutaneous, Daily   Start Date: August 31, 2024     Insulin Lispro  100 UNIT/ML injection  Commonly known as: humaLOG   2-7 Units, Subcutaneous, 4 Times Daily Before Meals & Nightly      nebivolol 5 MG tablet  Commonly known as: BYSTOLIC   5 mg, Oral, Every 24 Hours Scheduled      sacubitril-valsartan 49-51 MG tablet  Commonly known as: ENTRESTO   1 tablet, Oral, Every 12 Hours Scheduled             Stop These Medications      lisinopril 40 MG tablet  Commonly known as: PRINIVIL,ZESTRIL              Allergies   Allergen Reactions    Codeine Hives         Discharge Disposition:  Rehab Facility or Unit (DC - External)    Diet:  Hospital:  Diet Order   Procedures    Diet: Cardiac, Diabetic; Healthy Heart (2-3 Na+); Consistent Carbohydrate; Texture: Soft to Chew (NDD 3); Soft to Chew: Chopped Meat; Fluid Consistency: Thin (IDDSI 0)            Activity:      Restrictions or Other Recommendations:  Reviewed increase risk of bleeding on DOAC        CODE STATUS:    Code Status and Medical Interventions: CPR (Attempt to Resuscitate); Full Support   Ordered at: 08/24/24 0808     Code Status (Patient has no pulse and is not breathing):    CPR (Attempt to Resuscitate)     Medical Interventions (Patient has pulse or is breathing):    Full Support       Future Appointments   Date Time Provider Department Center   9/4/2024  2:15 PM Neelima Hugo APRN [facility]   10/14/2024  9:30 AM Alissa Beasley APRN [facility]       Additional Instructions for the Follow-ups that You Need to Schedule       Ambulatory Referral to Sleep Medicine   As directed      Follow-up needed: Yes        Discharge Follow-up with PCP   As directed       Currently Documented PCP:    Cuong Parks MD    PCP Phone Number:    [phone]     Follow Up Details: after DC from rehab        Discharge Follow-up with Specialty: Follow up in the heart and valve clinic in 1 week   As directed      Specialty: Follow up in the heart and valve clinic in 1 week        Discharge Follow-up with Specialty: Follow up  with Dr Hooker or MICHELLE Churchill, in the cardiology clinic in 6-8 weeks   As directed      Specialty: Follow up with Dr Hooker or MICHELLE Churchill, in the cardiology clinic in 6-8 weeks        MRI Cardiac Function Complete With & Without Morphology   As directed      Exam reason: Dilated Cardiomyopathy   Pregnant?: Unknown   Release to patient: Routine Release                      Aylin Lea MD  08/30/24      Time Spent on Discharge:  I spent  40  minutes on this discharge activity which included: face-to-face encounter with the patient, reviewing the data in the system, coordination of the care with the nursing staff as well as consultants, documentation, and entering orders.

## 2024-08-30 NOTE — DISCHARGE PLACEMENT REQUEST
"Keya Hamilton (34 y.o. Female)       Date of Birth   1990    Social Security Number       Address   [address] [place] [address]    Home Phone   [phone]    MRN   [id number]       Tenriism   Non-Tenriism    Marital Status                               Admission Date   24    Admission Type   Emergency    Admitting Provider   Aylin Lea MD    Attending Provider   Aylin Lea MD    Department, Room/Bed   [address], S316/1       Discharge Date       Discharge Disposition   Rehab Facility or Unit (DC - External)    Discharge Destination                                 Attending Provider: Aylin eLa MD    Allergies: Codeine    Isolation: None   Infection: None   Code Status: CPR    Ht: 160 cm (62.99\")   Wt: 123 kg (271 lb 2.7 oz)    Admission Cmt: None   Principal Problem: R M1 stenosis [I63.511]                   Active Insurance as of 2024       Primary Coverage       Payor Plan Insurance Group Employer/Plan Group    ANTHEM BLUE CROSS ANTHEM BLUE CROSS BLUE SHIELD PPO [id number]       Payor Plan Address Payor Plan Phone Number Payor Plan Fax Number Effective Dates    [address] [phone]  2021 - None Entered    [place] [address]         Subscriber Name Subscriber Birth Date Member ID       LINDSAYBRANDO 1989 [id number]                     Emergency Contacts        (Rel.) Home Phone Work Phone Mobile Phone    lindsaybrando (Spouse) [phone]              Insurance Information                  ANTHEM BLUE CROSS/ANTHEM BLUE CROSS BLUE SHIELD PPO Phone: [phone]    Subscriber: Brando Hamilton Subscriber#: [id number]    Group#: [id number] Precert#: [id number]               Discharge Summary        Aylin Lea MD at 24 1304          2024    [facility] Medicine Services  DISCHARGE SUMMARY    Patient Name: Keya Hamilton  : " 1990  MRN: [id number]    Date of Admission: 8/23/2024  8:21 PM  Date of Discharge:  8/30/2024  Primary Care Physician: Cuong Parks MD    Consults       Date and Time Order Name Status Description    8/24/2024  8:09 AM Inpatient Cardiology Consult Completed     8/23/2024  9:09 PM Inpatient Neurosurgery Consult Completed     8/23/2024  8:23 PM Inpatient Neurology Consult Stroke Completed             Hospital Course     Presenting Problem: acute L weakness     Active Hospital Problems    Diagnosis  POA    **R M1 stenosis [I63.511]  Yes    Acute systolic heart failure [I50.21]  Unknown    Left ventricular thrombus [I51.3]  Unknown    Acute stroke due to ischemia [I63.9]  Yes    T2DM [E11.9]  Yes    HTN [I10]  Yes    Morbid obesity with BMI of 45.0-49.9, adult [E66.01, Z68.42]  Not Applicable    SCOTT [G47.33]  Yes    Hypertensive urgency [I16.0]  Yes    R/O Seizure [R56.9]  Unknown    Acute respiratory failure [J96.00]  Unknown      Resolved Hospital Problems   No resolved problems to display.          Hospital Course:  Keya Hamilton is a 34 y.o. female with PMH of T2DM, right renal atrophy, HTN, morbid obesity, and SCOTT on PAP.  She presented to [facility] ED on 8/23/24 for evaluation of somnolence, dysarthria, left-hemiparesis, and rightward gaze deviation concerning for stroke.   NIH was 11 Imaging showed CT perfusion deficit in R MCA territory.      R MCA CVA  - believed cardioembolic from LV thrombus  -  Seen emergently by stroke team and [facility] and was admitted to ICU  - MRI showed acute ischemic stroke in R putamen and head of caudate  --RAMU noted LV thrombus, left atrial cavity dilation, bubble study indeterminant EF 36%; Cardiology service followed throughout her stay  -  Intubated in ED , extubated 8/25/24  -  placed on heparin gtt which has been transitioned to Eliquis  - worked with PT OT and ST.  She is getting stronger, can now raise her LUE.    - She is tolerating food by mouth  - She is going to  [facility]       LV thrombus   Cardiomyopathy, EF 26-36 (2 echos)  --eliquis,   - meds started for cardiomyopathy  - Lifevest considered but not ordered; pt borderline EF   - outpt cardiac MRI ordered   --patient noted heterozygous for factor V leiden, additional anticoag labs pending     Substance use- UDS + meth/THC  --cessation counseling      Somnolence   --amantadine started for this.  Family notes she is sleeping a lot. However, they report daily improvement.  She also tends to stay up late.     R renal atrophy - Cr is 1.1      Vaginal spotting, mild  - Has IUD.    - I explained this may happen in setting of Eliquis.   If it worsens, she may need to see GYN after DC        Discharge Follow Up Recommendations for outpatient labs/diagnostics:   - heart valve clinic in one week     - Neuro clinic in one month     - outpt sleep med referral and SCOTT management     Day of Discharge     HPI:   sleepy, explains she was up until 0300.  Partner says she is getting more alert daily and that her LUE movement is also improving.      Review of Systems  No pain   Did have some vaginal spotting today    Vital Signs:   Temp:  [97.5 °F (36.4 °C)-98.5 °F (36.9 °C)] 98.4 °F (36.9 °C)  Heart Rate:  [81-94] 89  Resp:  [16-20] 20  BP: (102-132)/(69-97) 132/95  Flow (L/min):  [3-4] 4      Physical Exam:  Gen:   up in chair w eyes closed but answrs questions and follows commands.  Visitor present.    Neuro: somnolent, oriented, clear speech, LUE lifts against gravity.    HEENT:  NC/AT   Neck:  Supple, no LAD  Heart RRR no murmur, rub, or gallop  Abd:  Soft, nontender, no rebound or guarding, pos BS  Extrem:  No c/c/e      Pertinent  and/or Most Recent Results     LAB RESULTS:      Lab 08/30/24  0934 08/26/24  0459 08/25/24  2112 08/25/24  1325 08/25/24  0542 08/24/24  2244 08/24/24  1929 08/24/24  1548 08/24/24  1143 08/24/24  0833 08/24/24  0611 08/24/24  0102 08/23/24 2055 08/23/24 2036   WBC 11.74* 15.37*  --   --  20.36*  --   --   --    --  16.61*  --   --  11.16*  --    HEMOGLOBIN 18.9* 15.6  --   --  14.7  --   --   --   --  14.9  --   --  16.1*  --    HEMATOCRIT 55.7* 47.0*  --   --  46.1  --   --   --   --  44.2  --   --  47.2*  --    PLATELETS 469* 346  --   --  374  --   --   --   --  340  --   --  417  --    NEUTROS ABS 7.68* 10.82*  --   --  16.06*  --   --   --   --  15.03*  --   --  7.05*  --    IMMATURE GRANS (ABS) 0.09* 0.07*  --   --  0.18*  --   --   --   --  0.12*  --   --  0.07*  --    LYMPHS ABS 2.73 3.30*  --   --  2.82  --   --   --   --  0.91  --   --  2.77  --    MONOS ABS 0.93* 1.06*  --   --  1.24*  --   --   --   --  0.51  --   --  0.85  --    EOS ABS 0.20 0.05  --   --  0.01  --   --   --   --  0.00  --   --  0.33  --    MCV 97.9* 101.1*  --   --  103.4*  --   --   --   --  97.8*  --   --  97.7*  --    PROCALCITONIN  --  0.21  --   --  0.15  --   --   --   --   --   --   --   --   --    LACTATE  --   --   --   --   --   --  1.9  --  4.2* 3.9* 3.4* 3.5*  --   --    PROTIME  --   --   --   --   --   --   --   --   --  14.9*  --   --   --  12.6*   APTT  --   --   --   --   --   --   --   --   --  23.2*  --   --  25.3  --    HEPARIN ANTI-XA  --  0.58 0.33 0.24* 0.10* 0.10*  --    < >  --  0.10*  --   --   --   --     < > = values in this interval not displayed.         Lab 08/30/24  1229 08/29/24  1331 08/28/24  1537 08/27/24  0358 08/26/24  0459 08/25/24  0542 08/24/24  0102   SODIUM 137 140 140 139 138 144 141   POTASSIUM 3.9 4.6 4.7 4.5 4.3 4.3 4.4   CHLORIDE 105 105 105 105 105 108* 105   CO2 19.0* 22.0 20.0* 20.0* 20.0* 22.0 20.0*   ANION GAP 13.0 13.0 15.0 14.0 13.0 14.0 16.0*   BUN 22* 22* 23* 23* 17 17 16   CREATININE 1.08* 1.03* 1.03* 0.99 0.92 1.15* 1.39*   EGFR 70.1 74.2 74.2 77.9 85.0 65.0 51.8*   GLUCOSE 140* 130* 168* 144* 106* 143* 206*   CALCIUM 9.2 9.7 10.0 9.4 9.3 9.0 9.1   MAGNESIUM  --   --   --   --  2.3 2.0  --    HEMOGLOBIN A1C  --   --   --   --   --   --  6.70*         Lab 08/30/24  1229 08/26/24  1800  08/24/24  0102 08/23/24 2055   TOTAL PROTEIN 6.8  --  6.4  --    ALBUMIN 3.9  --  3.7  --    GLOBULIN 2.9  --  2.7  --    ALT (SGPT) 36*  --  28 15   AST (SGOT) 43*  --  53* 18   BILIRUBIN 0.5  --  0.6  --    BETA 2 GLYCO 1 IGG  --  <9 <9  --    BETA 2 GLYCO 1 IGA  --  <9 <9  --    BETA 2 GLYCO 1 IGM  --  <9 <9  --    ALK PHOS 72  --  60  --          Lab 08/25/24  0542 08/24/24  0833 08/24/24  0102 08/23/24 2055 08/23/24 2036   PROBNP 1,011.0*  --   --   --   --    HSTROP T  --   --  73* 64*  --    PROTIME  --  14.9*  --   --  12.6*   INR  --  1.16*  --   --  1.1         Lab 08/24/24  0102   CHOLESTEROL 177   LDL CHOL 115*   HDL CHOL 31*   TRIGLYCERIDES 175*             Lab 08/25/24  0538 08/24/24  1751   PH, ARTERIAL 7.450 7.396   PCO2, ARTERIAL 36.2 39.0   PO2 ART 62.9* 83.2   FIO2 28 30   HCO3 ART 25.1 23.9   BASE EXCESS ART 1.4 -0.8*   CARBOXYHEMOGLOBIN 1.7 1.4     Brief Urine Lab Results  (Last result in the past 365 days)        Color   Clarity   Blood   Leuk Est   Nitrite   Protein   CREAT   Urine HCG        08/24/24 0124 Yellow   Clear   Negative   Negative   Negative   100 mg/dL (2+)           08/24/24 0124               Negative             Microbiology Results (last 10 days)       Procedure Component Value - Date/Time    Respiratory Panel PCR w/COVID-19(SARS-CoV-2) KAVEH/BRIAN/DOROTHEA/PAD/COR/VASYL In-House, NP Swab in [facility]/VTM, 2 HR TAT - Swab, Nasopharynx [[id number]]  (Normal) Collected: 08/24/24 0109    Lab Status: Final result Specimen: Swab from Nasopharynx Updated: 08/24/24 0242     ADENOVIRUS, PCR Not Detected     Coronavirus 229E Not Detected     Coronavirus HKU1 Not Detected     Coronavirus NL63 Not Detected     Coronavirus OC43 Not Detected     COVID19 Not Detected     Human Metapneumovirus Not Detected     Human Rhinovirus/Enterovirus Not Detected     Influenza A PCR Not Detected     Influenza B PCR Not Detected     Parainfluenza Virus 1 Not Detected     Parainfluenza Virus 2 Not Detected      Parainfluenza Virus 3 Not Detected     Parainfluenza Virus 4 Not Detected     RSV, PCR Not Detected     Bordetella pertussis pcr Not Detected     Bordetella parapertussis PCR Not Detected     Chlamydophila pneumoniae PCR Not Detected     Mycoplasma pneumo by PCR Not Detected    Narrative:      In the setting of a positive respiratory panel with a viral infection PLUS a negative procalcitonin without other underlying concern for bacterial infection, consider observing off antibiotics or discontinuation of antibiotics and continue supportive care. If the respiratory panel is positive for atypical bacterial infection (Bordetella pertussis, Chlamydophila pneumoniae, or Mycoplasma pneumoniae), consider antibiotic de-escalation to target atypical bacterial infection.    MRSA Screen, PCR (Inpatient) - Swab, Nares [[id number]]  (Normal) Collected: 08/24/24 0109    Lab Status: Final result Specimen: Swab from Nares Updated: 08/24/24 0728     MRSA PCR Negative    Narrative:      The negative predictive value of this diagnostic test is high and should only be used to consider de-escalating anti-MRSA therapy. A positive result may indicate colonization with MRSA and must be correlated clinically.  MRSA Negative            Adult Transthoracic Echo Limited W/ Cont if Necessary Per Protocol    Result Date: 8/29/2024    This was a limited echocardiogram to reassess left ventricular function   Left ventricular systolic function is severely decreased. Left ventricular ejection fraction appears to be 26 - 30%.   Left ventricular wall thickness is consistent with hypertrophy.   The previously noted LV thrombus appears much improved, there appears to be a smaller residual mural thrombus at the apex     Cardiac Catheterization/Vascular Study    Addendum Date: 8/29/2024    Procedure-cerebral angiography note Preprocedural diagnosis Patient presents with respiratory failure Symptoms of cardiac failure Right-sided MCA syndrome NIH stroke  scale 11-14 CT perfusion showing holohemispheric deficit with marked hypertension Postprocedural diagnosis-same Procedures performed 1.  Right-sided common carotid artery angiography 2.  Right sided internal carotid cerebral angiogram 3.  Mechanical embolectomy right -sided distal M1 using 4 mm Trevo 1 pass with tiki 3 flow 4.  Follow-up angiogram right internal carotid 5.  Angio-Seal 8 Stateless right groin Angio-Seal to right groin-8 Stateless via Access site is right common femoral artery Surgeon: Lemuel Medina MD FAANS Assistants-none Complications none apparent Contrast Visipaque 320 Procedure in detail This patient was taken emergently after emergency verbal consent was obtained by her nurse practitioner staff, he was taken to the angiography suite and prepped and draped in the usual sterile manner.  Risk and benefits were explicitly explained.  After this point in time the patient was anesthetized at the right groin using local anesthetic agent lidocaine without epinephrine, micropuncture technique was used to assess and obtain right-sided common femoral access  and a 8 Stateless sheath was placed.  The patient had to be placed under general anesthesia secondary to respiratory failure At this point in time navigation of a 5 Stateless [employer] catheter into the above selected vessels was performed with little difficult after selective catheterization, diagnostic angiogram was performed using 8-10 cc of dilute Visipaque 320 contrast and approximately 5050 ratio.  The right common carotid followed by the right internal was selected with DSA angiogram demonstrating a large occlusion M1 dominant consistent with his perfusion deficit at this point time  with a flow gait 8 Stateless catheter microcatheter access to the distal M2 was performed followed by mechanical embolectomy with 1 passes of a 4 x 41 mm TR E VO and subsequent TICI 3 reperfusion No immediate complications noted mild to moderate basal spasm was noted post  procedure After diagnostic imaging was obtained independent review of these was performed at a separate workstation.  The patient tolerated the procedure well and given the access site being of adequate diameter a 8 Gu[place] Angio-Seal was placed in the right groin. The patient improved on the table Findings Right-sided common carotid artery angiography demonstrates a normal age-related appearance of the carotid artery there is no evidence of significant flow-limiting stenosis by NASCET criteria.  There is some atherosclerotic change of the carotid bulb.  Antegrade flow appears within normal limits of the internal.  There does not appear to be plaque at the carotid that would explain the patient's distal emboli Right-sided internal carotid cerebral angiogram demonstrates a normal appearance of the right MCA trunk up until M1 and there is abrupt occlusion with retrograde filling of the entire right hemisphere with reasonable collateralization Right internal carotid artery injection status post mechanical embolectomy demonstrates reperfusion with TICI 3 flow, mild to moderate expected vasospasm and in the MCA and clinoid carotid is seen Final impression Successful catheter-based angiogram with successful right mechanical embolectomy of M1 branch with resultant TICI 3 flow as documented above No medic complications noted Patient will be in ICU for further workup of cardiac and respiratory failure.    Result Date: 8/29/2024  Procedure-cerebral angiography note Preprocedural diagnosis Patient presents with respiratory failure Symptoms of cardiac failure Right-sided MCA syndrome NIH stroke scale 11-14 CT perfusion showing holohemispheric deficit with marked hypertension Postprocedural diagnosis-same Procedures performed 1.  Right-sided common carotid artery angiography 2.  Right sided internal carotid cerebral angiogram 3.  Mechanical embolectomy left-sided distal M1 using 4 mm Trevo 1 pass with tiki 3 flow 4.  Follow-up  angiogram right internal carotid 5.  Angio-Seal 8 Canadian right groin Angio-Seal to right groin-8 Canadian via Access site is right common femoral artery Surgeon: Lemuel Medina MD FAANS Assistants-none Complications none apparent Contrast Visipaque 320 Procedure in detail This patient was taken emergently after emergency verbal consent was obtained by her nurse practitioner staff, he was taken to the angiography suite and prepped and draped in the usual sterile manner.  Risk and benefits were explicitly explained.  After this point in time the patient was anesthetized at the right groin using local anesthetic agent lidocaine without epinephrine, micropuncture technique was used to assess and obtain right-sided common femoral access  and a 8 Canadian sheath was placed.  The patient had to be placed under general anesthesia secondary to respiratory failure At this point in time navigation of a 5 Canadian [employer] catheter into the above selected vessels was performed with little difficult after selective catheterization, diagnostic angiogram was performed using 8-10 cc of dilute Visipaque 320 contrast and approximately 5050 ratio.  The right common carotid followed by the right internal was selected with DSA angiogram demonstrating a large occlusion M1 dominant consistent with his perfusion deficit at this point time  with a flow gait 8 Canadian catheter microcatheter access to the distal M2 was performed followed by mechanical embolectomy with 1 passes of a 4 x 41 mm TR E VO and subsequent TICI 3 reperfusion No immediate complications noted mild to moderate basal spasm was noted post procedure After diagnostic imaging was obtained independent review of these was performed at a separate workstation.  The patient tolerated the procedure well and given the access site being of adequate diameter a 8 Canadian Angio-Seal was placed in the right groin. The patient improved on the table Findings Right-sided common carotid artery  angiography demonstrates a normal age-related appearance of the carotid artery there is no evidence of significant flow-limiting stenosis by NASCET criteria.  There is some atherosclerotic change of the carotid bulb.  Antegrade flow appears within normal limits of the internal.  There does not appear to be plaque at the carotid that would explain the patient's distal emboli Right-sided internal carotid cerebral angiogram demonstrates a normal appearance of the left MCA trunk up until M1 and there is abrupt occlusion with retrograde filling of the entire right hemisphere with reasonable collateralization Right internal carotid artery injection status post mechanical embolectomy demonstrates reperfusion with TICI 3 flow, mild to moderate expected vasospasm and in the MCA and clinoid carotid is seen Final impression Successful catheter-based angiogram with successful right mechanical embolectomy of M1 branch with resultant TICI 3 flow as documented above No medic complications noted Patient will be in ICU for further workup of cardiac and respiratory failure.     CT Head Without Contrast    Result Date: 8/27/2024  CT HEAD WO CONTRAST Date of Exam: 8/27/2024 4:24 PM EDT Indication: drowsiness, stroke follow up. Comparison: August 24, 2024 Technique: Axial CT images were obtained of the head without contrast administration.  Automated exposure control and iterative construction methods were used. Findings: Evolving subacute infarcts within the right basal ganglia, right temporal, and occipital lobes are redemonstrated. There is no hemorrhagic transformation. There is minimal mass effect on the right lateral ventricle, but no midline shift. The basal cisterns appear patent. The calvarium appears intact. The paranasal sinuses and mastoid air cells are well-aerated.     Impression: Evolving right-sided subacute infarcts most prominent within right basal ganglia. No hemorrhagic transformation or acute herniation.  Electronically Signed: Kali Palmer MD  8/27/2024 4:39 PM EDT  Workstation ID: [id number]    XR Chest 1 View    Result Date: 8/26/2024  XR CHEST 1 VW Date of Exam: 8/26/2024 3:36 AM EDT Indication: resp insuff Comparison: 8/24/2024 at 0233 hours Findings: The endotracheal tube has been removed. There is interval increase in atelectasis versus infiltrates within the mid to lower lungs bilaterally. No pleural effusions are seen. The cardiac silhouette and mediastinum are stable. Stable cardiomegaly is noted.     Impression: 1.Interval removal of the endotracheal tube. 2.Evidence for increasing atelectasis versus infiltrates within the mid to lower lungs bilaterally. Electronically Signed: Edouard Krishna MD  8/26/2024 7:21 AM EDT  Workstation ID: [id number]    CT Head Without Contrast    Result Date: 8/25/2024  CT HEAD WO CONTRAST Date of Exam: 8/24/2024 8:37 PM EDT Indication: Stroke, follow up stroke 24 Hours Post Thrombolytic Administration. Comparison: 8/24/2024 Technique: Axial CT images were obtained of the head without contrast administration.  Automated exposure control and iterative construction methods were used. Findings: Parenchyma:No acute intraparenchymal hemorrhage. Evolving right sided infarcts predominantly in the basal ganglia. There is some internal mild hyperdensity likely reflecting petechial hemorrhage. No overt hemorrhagic transformation. Normal parenchymal volume. No substantial white matter disease. No midline shift or herniation. Ventricles and extra axial spaces:Normal caliber of ventricles and sulci. No extra axial fluid collection seen. Other:Orbits are grossly intact. Scattered paranasal sinus mucosal thickening. Mastoid air cells are clear. Calvarium is intact. No substantial intracranial atherosclerotic calcification.     Impression: Evolving right-sided infarcts predominate of the basal ganglia. There is some minimal internal hyperdensity likely reflecting spared tissue or petechial  hemorrhage. No overt hemorrhagic transformation. Electronically Signed: Denis Gaines MD  8/25/2024 8:38 AM EDT  Workstation ID: [id number]    CT Head Without Contrast    Result Date: 8/24/2024  CT HEAD WO CONTRAST Date of Exam: 8/24/2024 4:29 AM EDT Indication: stroke  s/p MT. Comparison: 8/24/2024 MR brain Technique: Axial CT images were obtained of the head without contrast administration.  Automated exposure control and iterative construction methods were used. Findings: Parenchyma:No acute intraparenchymal hemorrhage. Known right-sided infarcts are better seen on prior MRI. Normal parenchymal volume. No substantial white matter disease. No midline shift or herniation. Ventricles and extra axial spaces:Normal caliber of ventricles and sulci. No extra axial fluid collection seen. Other:Orbits are grossly intact. Scattered paranasal sinus mucosal thickening. Mastoid air cells are clear. Calvarium is intact. No substantial intracranial atherosclerotic calcification.     Impression: Known right-sided infarcts are better seen on prior MRI. No evidence of hemorrhage. Electronically Signed: Denis Gaines MD  8/24/2024 9:09 AM EDT  Workstation ID: [id number]    Adult Transthoracic Echo Complete W/ Cont if Necessary Per Protocol (With Agitated Saline)    Result Date: 8/24/2024  Images from the original result were not included.   Left ventricular systolic function is moderately decreased. Calculated left ventricular EF = 36.7%  global hypokinesis.  LV cavity mildly dilated.  RV size and function normal   Left ventricular wall thickness is consistent with hypertrophy.   Left ventricular diastolic dysfunction is noted.   There is a thrombus present in the left ventricle in apex. 2.22 cm by 0.96 cm   The left atrial cavity is dilated.   Estimated right ventricular systolic pressure from tricuspid regurgitation is normal (<35 mmHg).   Indeterminate bubble study    MRI Brain Without Contrast    Result Date: 8/24/2024  MRI  BRAIN WO CONTRAST Date of Exam: 8/24/2024 3:36 AM EDT Indication: stroke.  Comparison: 8/23/2024. Technique:  Routine multiplanar/multisequence sequence images of the brain were obtained without contrast administration. Findings: There are patchy areas of diffusion restriction present within the periventricular white matter on the right as well as the right basal ganglia with tiny foci of diffusion restriction seen within the subcortical white matter of the right frontal lobe and  the right temporal lobe as well as the right occipital lobe with corresponding decreased signal on the ADC map consistent with recent or acute ischemia. FLAIR signal changes are seen corresponding to these regions which appears most pronounced within the right putamen. No additional significant FLAIR signal changes. Given the degree of signal intensity, some of the findings may be more subacute. No additional diffusion restriction identified. There is no evidence of acute or chronic intracranial hemorrhage. No mass effect or midline shift. No abnormal extra-axial collections. The major vascular flow voids appear intact. The basal ganglia, brainstem and cerebellum appear within normal limits. Calvarial and superficial soft tissue signal is within  normal limits. Orbits appear unremarkable. The paranasal sinuses and the mastoid air cells appear well aerated. Midline structures are intact.     Impression: Patchy areas of diffusion restriction present within the right periventricular white matter, the right basal ganglia and the right temporal lobe and the right occipital lobe consistent with recent or acute ischemia. Given the degree of signal intensity in presence of FLAIR signal change, findings likely more subacute. No evidence of hemorrhage. No significant mass effect or midline shift. Electronically Signed: Kayla Negron MD  8/24/2024 4:29 AM EDT  Workstation ID: [id number]    XR Chest 1 View    Result Date: 8/24/2024  XR CHEST 1 VW Date of  Exam: 8/24/2024 2:36 AM EDT Indication: positioning of ETT Comparison: 8/23/2024. Findings: There has been retraction of the endotracheal tube with the tip in the mid trachea. Improved aeration of the lungs bilaterally. Mild interstitial opacities present with indistinctness of the pulmonary vasculature.. The heart appears enlarged. Stable small possible left-sided pleural effusion. No pneumothorax.     Impression: Retraction of the endotracheal tube with the tip in the mid trachea. Improved aeration of the lungs bilaterally with mild pulmonary edema pattern present. Electronically Signed: Kayla Negron MD  8/24/2024 2:50 AM EDT  Workstation ID: [id number]    XR Chest 1 View    Result Date: 8/23/2024  XR CHEST 1 VW Date of Exam: 8/23/2024 9:03 PM EDT Indication: Acute Stroke Protocol (onset < 12 hrs) Comparison: None available. Findings: Although the jose carlos is not well visualized, tip of the endotracheal tube appears to extend into the right mainstem bronchus. Cardiac silhouette appears enlarged. There are diffuse airspace opacities throughout both lungs.     Impression: 1.Tip of the endotracheal tube extends into the right mainstem bronchus. Recommend retraction to the mid thoracic trachea. I called this result to Dr. Lezama at 9:20 p.m. on 8/23/2024, and he was aware of this finding. 2.Diffuse bilateral airspace opacities, which may be due to pulmonary edema, atelectasis, and/or pneumonia. Electronically Signed: Latanya Evans  8/23/2024 9:32 PM EDT  Workstation ID: [id number]    CT Angiogram Head w AI Analysis of LVO    Result Date: 8/23/2024  CT CEREBRAL PERFUSION W WO CONTRAST, CT ANGIOGRAM NECK, CT ANGIOGRAM HEAD W AI ANALYSIS OF LVO Date of Exam: 8/23/2024 8:26 PM EDT Indication: Neuro Deficit, acute, Stroke suspected Neuro deficit, acute stroke suspected.  Comparison: Noncontrast CT of the head performed on the same date Technique: Axial CT images of the brain were obtained prior to and after the administration of  115 cc Isovue-370 IV contrast . Core blood volume, core blood flow, mean transit time, and Tmax images were obtained utilizing the Rapid software protocol. A limited CT angiogram of the head was also performed to measure the blood vessel density. The radiation dose reduction device was turned on for each scan per the ALARA (As Low as Reasonably Achievable) protocol. Findings: CT perfusion demonstrates approximately 132 cc elevated Tmax greater than 6 seconds, 67 cc greater than 8 seconds and 32 cc greater than 10 seconds also with patchy involvement within the right cerebral hemisphere. These are within the MCA to the distribution. Involving the right cerebral hemisphere. No decrease cerebral blood flow or blood volume is noted. Noncontrast CT demonstrates a normal appearance of the right MCA territory distribution. Technique:  CTA of the head and neck was performed after the uneventful intravenous administration of iodinated contrast.  Reconstructed coronal and sagittal images were also obtained. In addition, a 3-D volume rendered image was created for interpretation. Automated exposure control and iterative reconstruction methods were used.  The arch of the aorta is normal. The common carotid arteries are normal. The extracranial internal carotid arteries are normal. 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 There is some distal reconstitution however there is minimal thready flow in the area of maximal stenosis. Some irregularity of flow within the branches of the left MCA. The anterior cerebral arteries appear normal. The vertebral arteries arise from the subclavian arteries. The right vertebral artery is dominant. No evidence of vertebral artery dissection. The vertebral arteries supply the basilar artery. The basilar artery tip is normal. The basilar artery terminates in bilateral posterior cerebral arteries which appear normal. The visualized lung  apices are clear. The airway is clear. The thyroid gland is normal. No cervical adenopathy. The infratemporal fossa is normal bilaterally. No intracranial mass or mass effect. No midline shift. No extra-axial mass or collection. Orbital and paranasal soft tissues are normal. The paranasal sinuses are clear. The mastoid air cells are aerated.     There is a very severe stenosis of the right M1 segment just proximal to the trifurcation with some distal reconstitution of flow. CT perfusion demonstrates slow flow in the right MCA territory with no decreased vertebral blood flow or blood volume noted. Neurosurgical consult recommended. Findings were discussed with Dr. Garcia at 9:03 p.m. on 8/23/2024 Electronically Signed: David Fernández MD  8/23/2024 9:05 PM EDT  Workstation ID: [id number]    CT Angiogram Neck    Result Date: 8/23/2024  CT CEREBRAL PERFUSION W WO CONTRAST, CT ANGIOGRAM NECK, CT ANGIOGRAM HEAD W AI ANALYSIS OF LVO Date of Exam: 8/23/2024 8:26 PM EDT Indication: Neuro Deficit, acute, Stroke suspected Neuro deficit, acute stroke suspected.  Comparison: Noncontrast CT of the head performed on the same date Technique: Axial CT images of the brain were obtained prior to and after the administration of 115 cc Isovue-370 IV contrast . Core blood volume, core blood flow, mean transit time, and Tmax images were obtained utilizing the Rapid software protocol. A limited CT angiogram of the head was also performed to measure the blood vessel density. The radiation dose reduction device was turned on for each scan per the ALARA (As Low as Reasonably Achievable) protocol. Findings: CT perfusion demonstrates approximately 132 cc elevated Tmax greater than 6 seconds, 67 cc greater than 8 seconds and 32 cc greater than 10 seconds also with patchy involvement within the right cerebral hemisphere. These are within the MCA to the distribution. Involving the right cerebral hemisphere. No decrease cerebral blood flow or blood  volume is noted. Noncontrast CT demonstrates a normal appearance of the right MCA territory distribution. Technique:  CTA of the head and neck was performed after the uneventful intravenous administration of iodinated contrast.  Reconstructed coronal and sagittal images were also obtained. In addition, a 3-D volume rendered image was created for interpretation. Automated exposure control and iterative reconstruction methods were used.  The arch of the aorta is normal. The common carotid arteries are normal. The extracranial internal carotid arteries are normal. 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 There is some distal reconstitution however there is minimal thready flow in the area of maximal stenosis. Some irregularity of flow within the branches of the left MCA. The anterior cerebral arteries appear normal. The vertebral arteries arise from the subclavian arteries. The right vertebral artery is dominant. No evidence of vertebral artery dissection. The vertebral arteries supply the basilar artery. The basilar artery tip is normal. The basilar artery terminates in bilateral posterior cerebral arteries which appear normal. The visualized lung apices are clear. The airway is clear. The thyroid gland is normal. No cervical adenopathy. The infratemporal fossa is normal bilaterally. No intracranial mass or mass effect. No midline shift. No extra-axial mass or collection. Orbital and paranasal soft tissues are normal. The paranasal sinuses are clear. The mastoid air cells are aerated.     There is a very severe stenosis of the right M1 segment just proximal to the trifurcation with some distal reconstitution of flow. CT perfusion demonstrates slow flow in the right MCA territory with no decreased vertebral blood flow or blood volume noted. Neurosurgical consult recommended. Findings were discussed with Dr. Garcia at 9:03 p.m. on 8/23/2024 Electronically  Signed: David Fernández MD  8/23/2024 9:05 PM EDT  Workstation ID: [id number]    CT CEREBRAL PERFUSION WITH & WITHOUT CONTRAST    Result Date: 8/23/2024  CT CEREBRAL PERFUSION W WO CONTRAST, CT ANGIOGRAM NECK, CT ANGIOGRAM HEAD W AI ANALYSIS OF LVO Date of Exam: 8/23/2024 8:26 PM EDT Indication: Neuro Deficit, acute, Stroke suspected Neuro deficit, acute stroke suspected.  Comparison: Noncontrast CT of the head performed on the same date Technique: Axial CT images of the brain were obtained prior to and after the administration of 115 cc Isovue-370 IV contrast . Core blood volume, core blood flow, mean transit time, and Tmax images were obtained utilizing the Rapid software protocol. A limited CT angiogram of the head was also performed to measure the blood vessel density. The radiation dose reduction device was turned on for each scan per the ALARA (As Low as Reasonably Achievable) protocol. Findings: CT perfusion demonstrates approximately 132 cc elevated Tmax greater than 6 seconds, 67 cc greater than 8 seconds and 32 cc greater than 10 seconds also with patchy involvement within the right cerebral hemisphere. These are within the MCA to the distribution. Involving the right cerebral hemisphere. No decrease cerebral blood flow or blood volume is noted. Noncontrast CT demonstrates a normal appearance of the right MCA territory distribution. Technique:  CTA of the head and neck was performed after the uneventful intravenous administration of iodinated contrast.  Reconstructed coronal and sagittal images were also obtained. In addition, a 3-D volume rendered image was created for interpretation. Automated exposure control and iterative reconstruction methods were used.  The arch of the aorta is normal. The common carotid arteries are normal. The extracranial internal carotid arteries are normal. 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 There is some distal  reconstitution however there is minimal thready flow in the area of maximal stenosis. Some irregularity of flow within the branches of the left MCA. The anterior cerebral arteries appear normal. The vertebral arteries arise from the subclavian arteries. The right vertebral artery is dominant. No evidence of vertebral artery dissection. The vertebral arteries supply the basilar artery. The basilar artery tip is normal. The basilar artery terminates in bilateral posterior cerebral arteries which appear normal. The visualized lung apices are clear. The airway is clear. The thyroid gland is normal. No cervical adenopathy. The infratemporal fossa is normal bilaterally. No intracranial mass or mass effect. No midline shift. No extra-axial mass or collection. Orbital and paranasal soft tissues are normal. The paranasal sinuses are clear. The mastoid air cells are aerated.     There is a very severe stenosis of the right M1 segment just proximal to the trifurcation with some distal reconstitution of flow. CT perfusion demonstrates slow flow in the right MCA territory with no decreased vertebral blood flow or blood volume noted. Neurosurgical consult recommended. Findings were discussed with Dr. Garcia at 9:03 p.m. on 8/23/2024 Electronically Signed: David Fernández MD  8/23/2024 9:05 PM EDT  Workstation ID: [id number]    CT Head Without Contrast Stroke Protocol    Result Date: 8/23/2024  CT HEAD WO CONTRAST STROKE PROTOCOL Date of Exam: 8/23/2024 8:25 PM EDT Indication: Neuro deficit, acute, stroke suspected Neuro Deficit, acute, Stroke suspected. Comparison: None available. Technique: Axial CT images were obtained of the head without contrast administration.  Reconstructed coronal images were also obtained. Automated exposure control and iterative construction methods were used. Scan Time: 8:22 p.m. Results discussed with   Colette stroke Navigator at 8:32 p.m. Findings: No acute intracranial hemorrhage.Intact gray-white  differentiation.No extra-axial fluid collection.No significant mass effect. No hydrocephalus. Brain volume appears age-appropriate. Mild scattered mucosal thickening in the paranasal sinuses.Mastoid air cells are essentially clear.Included globes and orbits appear unremarkable by CT. No acute or aggressive appearing bony or extracranial soft tissue process.     Impression: No acute intracranial finding. Electronically Signed: Medardo Wheat  8/23/2024 8:35 PM EDT  Workstation ID: [id number]             Results for orders placed during the hospital encounter of 08/23/24    Adult Transthoracic Echo Limited W/ Cont if Necessary Per Protocol    Interpretation Summary    This was a limited echocardiogram to reassess left ventricular function    Left ventricular systolic function is severely decreased. Left ventricular ejection fraction appears to be 26 - 30%.    Left ventricular wall thickness is consistent with hypertrophy.    The previously noted LV thrombus appears much improved, there appears to be a smaller residual mural thrombus at the apex    Discharge Details        Discharge Medications        New Medications        Instructions Start Date   acetaminophen 650 MG suppository  Commonly known as: TYLENOL   650 mg, Rectal, Every 4 Hours PRN      amantadine 100 MG capsule  Commonly known as: SYMMETREL   100 mg, Oral, Every 12 Hours Scheduled      apixaban 5 MG tablet tablet  Commonly known as: ELIQUIS   Take 1 tablet by mouth Every 12 (Twelve) Hours.      atorvastatin 80 MG tablet  Commonly known as: LIPITOR   80 mg, Oral, Nightly      empagliflozin 10 MG tablet tablet  Commonly known as: JARDIANCE   10 mg, Oral, Daily   Start Date: August 31, 2024     insulin glargine 100 UNIT/ML injection  Commonly known as: LANTUS, SEMGLEE   10 Units, Subcutaneous, Daily   Start Date: August 31, 2024     Insulin Lispro 100 UNIT/ML injection  Commonly known as: humaLOG   2-7 Units, Subcutaneous, 4 Times Daily Before Meals &  Nightly      nebivolol 5 MG tablet  Commonly known as: BYSTOLIC   5 mg, Oral, Every 24 Hours Scheduled      sacubitril-valsartan 49-51 MG tablet  Commonly known as: ENTRESTO   1 tablet, Oral, Every 12 Hours Scheduled             Stop These Medications      lisinopril 40 MG tablet  Commonly known as: PRINIVIL,ZESTRIL              Allergies   Allergen Reactions    Codeine Hives         Discharge Disposition:  Rehab Facility or Unit (DC - External)    Diet:  Hospital:  Diet Order   Procedures    Diet: Cardiac, Diabetic; Healthy Heart (2-3 Na+); Consistent Carbohydrate; Texture: Soft to Chew (NDD 3); Soft to Chew: Chopped Meat; Fluid Consistency: Thin (IDDSI 0)            Activity:      Restrictions or Other Recommendations:  Reviewed increase risk of bleeding on DOAC        CODE STATUS:    Code Status and Medical Interventions: CPR (Attempt to Resuscitate); Full Support   Ordered at: 08/24/24 0808     Code Status (Patient has no pulse and is not breathing):    CPR (Attempt to Resuscitate)     Medical Interventions (Patient has pulse or is breathing):    Full Support       Future Appointments   Date Time Provider Department Center   9/4/2024  2:15 PM Neelima Hugo APRN [facility]   10/14/2024  9:30 AM Alissa Beasley APRN [facility]       Additional Instructions for the Follow-ups that You Need to Schedule       Ambulatory Referral to Sleep Medicine   As directed      Follow-up needed: Yes        Discharge Follow-up with PCP   As directed       Currently Documented PCP:    Cuong Parks MD    PCP Phone Number:    [phone]     Follow Up Details: after DC from rehab        Discharge Follow-up with Specialty: Follow up in the heart and valve clinic in 1 week   As directed      Specialty: Follow up in the heart and valve clinic in 1 week        Discharge Follow-up with Specialty: Follow up with Dr Hooker or MICHELLE Churchill, in the cardiology clinic in 6-8 weeks   As directed      Specialty:  Follow up with Dr Hooker or MICHELLE Churchill, in the cardiology clinic in 6-8 weeks        MRI Cardiac Function Complete With & Without Morphology   As directed      Exam reason: Dilated Cardiomyopathy   Pregnant?: Unknown   Release to patient: Routine Release                      Aylin Lea MD  08/30/24      Time Spent on Discharge:  I spent  40  minutes on this discharge activity which included: face-to-face encounter with the patient, reviewing the data in the system, coordination of the care with the nursing staff as well as consultants, documentation, and entering orders.            Electronically signed by Aylin Lea MD at 08/30/24 [id number]

## 2024-08-30 NOTE — THERAPY TREATMENT NOTE
Patient Name: Keya Hamilton  : 1990    MRN: [id number]                              Today's Date: 2024       Admit Date: 2024    Visit Dx:     ICD-10-CM ICD-9-CM   1. Acute stroke due to ischemia  I63.9 434.91   2. Acute left-sided weakness  R53.1 728.87   3. Dysarthria  R47.1 784.51   4. Somnolence  R40.0 780.09   5. Compromised respiratory status  R06.89 786.09   6. Oral phase dysphagia  R13.11 787.21   7. Cognitive communication deficit  R41.841 799.52   8. Left ventricular thrombus  I51.3 410.90   9. Acute systolic heart failure  I50.21 428.21   10. Sleep disturbances  G47.9 780.50     Patient Active Problem List   Diagnosis    R M1 stenosis    T2DM    HTN    Morbid obesity with BMI of 45.0-49.9, adult    SCOTT    Hypertensive urgency    R/O Seizure    Acute respiratory failure    Acute stroke due to ischemia    Acute systolic heart failure    Left ventricular thrombus     Past Medical History:   Diagnosis Date    Diabetes mellitus     Hypertension     Sleep apnea      Past Surgical History:   Procedure Laterality Date    INTERVENTIONAL RADIOLOGY PROCEDURE N/A 2024    Procedure: IR mechanical thrombectomy;  Surgeon: Lemuel Medina MD;  Location: [facility] INVASIVE LOCATION;  Service: Interventional Radiology;  Laterality: N/A;      General Information       Row Name 24 1130          OT Time and Intention    Document Type therapy note (daily note)  -     Mode of Treatment occupational therapy  -       Row Name 24 1130          General Information    Patient Profile Reviewed yes  -     Existing Precautions/Restrictions fall;other (see comments);oxygen therapy device and L/min  L weakness, L visual deficits  -       Row Name 24 1130          Cognition    Orientation Status (Cognition) oriented x 4  -       Row Name 24 1130          Safety Issues, Functional Mobility    Impairments Affecting Function (Mobility) balance;coordination;endurance/activity  tolerance;motor control;motor planning;visual/perceptual;strength;shortness of breath;postural/trunk control;sensation/sensory awareness  -               User Key  (r) = Recorded By, (t) = Taken By, (c) = Cosigned By      Initials Name Provider Type    Renetta Dale OT Occupational Therapist                     Mobility/ADL's       Row Name 08/30/24 1130          Bed Mobility    Bed Mobility supine-sit  -     Supine-Sit [place] (Bed Mobility) contact guard;nonverbal cues (demo/gesture);verbal cues  -     Assistive Device (Bed Mobility) bed rails  -       Row Name 08/30/24 1130          Transfers    Transfers bed-chair transfer;sit-stand transfer  -       Row Name 08/30/24 1130          Bed-Chair Transfer    Bed-Chair [place] (Transfers) contact guard;verbal cues  -     Assistive Device (Bed-Chair Transfers) other (see comments)  -     Comment, (Bed-Chair Transfer) UE support  -       Row Name 08/30/24 1130          Sit-Stand Transfer    Sit-Stand [place] (Transfers) contact guard;verbal cues;nonverbal cues (demo/gesture)  -     Assistive Device (Sit-Stand Transfers) other (see comments)  -     Comment, (Sit-Stand Transfer) UE support  -       Row Name 08/30/24 1130          Functional Mobility    Functional Mobility- Ind. Level contact guard assist;verbal cues required;nonverbal cues required (demo/gesture)  -     Functional Mobility-Distance (Feet) 8  -               User Key  (r) = Recorded By, (t) = Taken By, (c) = Cosigned By      Initials Name Provider Type    Renetta Dale OT Occupational Therapist                   Obj/Interventions       Row Name 08/30/24 1130          Shoulder (Therapeutic Exercise)    Shoulder (Therapeutic Exercise) AAROM (active assistive range of motion)  -     Shoulder AAROM (Therapeutic Exercise) left;flexion;extension;aBduction;aDduction;sitting  -       Row Name 08/30/24 1130          Elbow/Forearm (Therapeutic Exercise)     Elbow/Forearm (Therapeutic Exercise) AROM (active range of motion)  -     Elbow/Forearm AROM (Therapeutic Exercise) bilateral;extension;flexion;sitting;10 repetitions  -[facility] Name 08/30/24 1130          Motor Skills    Therapeutic Exercise shoulder;elbow/forearm  -[facility] Name 08/30/24 1130          Balance    Balance Assessment sitting static balance;sitting dynamic balance;sit to stand dynamic balance;standing static balance;standing dynamic balance  -     Static Sitting Balance supervision  -     Dynamic Sitting Balance contact guard  -     Position, Sitting Balance unsupported  -     Sit to Stand Dynamic Balance contact guard;verbal cues  -     Static Standing Balance contact guard;verbal cues  -     Dynamic Standing Balance contact guard;verbal cues  -     Position/Device Used, Standing Balance supported  -     Balance Interventions sitting;standing;sit to stand;supported;static;dynamic;minimal challenge;occupation based/functional task  -               User Key  (r) = Recorded By, (t) = Taken By, (c) = Cosigned By      Initials Name Provider Type     Renetta Kumar OT Occupational Therapist                   Goals/Plan    No documentation.                  Clinical Impression       [facility] Name 08/30/24 1130          Pain Assessment    Pretreatment Pain Rating 0/10 - no pain  -     Posttreatment Pain Rating 0/10 - no pain  -[facility] Name 08/30/24 1130          Plan of Care Review    Plan of Care Reviewed With patient;spouse  -     Progress improving  -     Outcome Evaluation OT promoted oob act with pt difficult to arouse initially, however cga for supine to sit and sts, and cga for taking steps across room to chair. Pt completed static and dyn standing with cga and graded te to improve fx of LUE.  -[facility] Name 08/30/24 1130          Therapy Plan Review/Discharge Plan (OT)    Anticipated Discharge Disposition (OT) inpatient rehabilitation facility  -[facility] Name  08/30/24 1130          Vital Signs    Pre Systolic BP Rehab 113  -SW     Pre Treatment Diastolic BP 90  -SW     Pretreatment Heart Rate (beats/min) 83  -SW     Pre SpO2 (%) 95  -SW     O2 Delivery Pre Treatment room air  -SW     O2 Delivery Intra Treatment room air  -SW     O2 Delivery Post Treatment room air  -SW     Pre Patient Position Supine  -SW     Intra Patient Position Standing  -SW     Post Patient Position Sitting  -SW       Row Name 08/30/24 1130          Positioning and Restraints    Pre-Treatment Position in bed  -SW     Post Treatment Position chair  -SW     In Chair notified nsg;reclined;sitting;call light within reach;encouraged to call for assist;with family/caregiver;waffle cushion;with SLP  -SW               User Key  (r) = Recorded By, (t) = Taken By, (c) = Cosigned By      Initials Name Provider Type    Renetta Dale OT Occupational Therapist                   Outcome Measures       Row Name 08/30/24 1343          How much help from another is currently needed...    Putting on and taking off regular lower body clothing? 1  -SW     Bathing (including washing, rinsing, and drying) 2  -SW     Toileting (which includes using toilet bed pan or urinal) 2  -SW     Putting on and taking off regular upper body clothing 2  -SW     Taking care of personal grooming (such as brushing teeth) 3  -SW     Eating meals 3  -SW     AM-PAC 6 Clicks Score (OT) 13  -SW       Row Name 08/30/24 1343          Functional Assessment    Outcome Measure Options AM-PAC 6 Clicks Daily Activity (OT)  -SW               User Key  (r) = Recorded By, (t) = Taken By, (c) = Cosigned By      Initials Name Provider Type    Renetta Dale OT Occupational Therapist                    Occupational Therapy Education       Title: PT OT SLP Therapies (Resolved)       Topic: Occupational Therapy (Resolved)       Point: ADL training (Resolved)       Description:   Instruct learner(s) on proper safety adaptation and remediation techniques  during self care or transfers.   Instruct in proper use of assistive devices.                  Learning Progress Summary             Patient Acceptance, E, NR by  at 8/26/2024 1547    Acceptance, E, VU by  at 8/25/2024 0825    Comment: role of therapy, ongoing treatment plan, discharge recs   Family Acceptance, E, VU by  at 8/25/2024 0825    Comment: role of therapy, ongoing treatment plan, discharge recs                         Point: Home exercise program (Resolved)       Description:   Instruct learner(s) on appropriate technique for monitoring, assisting and/or progressing therapeutic exercises/activities.                  Learning Progress Summary             Patient Acceptance, E, VU by  at 8/25/2024 0825    Comment: role of therapy, ongoing treatment plan, discharge recs   Family Acceptance, E, VU by  at 8/25/2024 0825    Comment: role of therapy, ongoing treatment plan, discharge recs                         Point: Precautions (Resolved)       Description:   Instruct learner(s) on prescribed precautions during self-care and functional transfers.                  Learning Progress Summary             Patient Acceptance, E, NR by  at 8/26/2024 1547                         Point: Body mechanics (Resolved)       Description:   Instruct learner(s) on proper positioning and spine alignment during self-care, functional mobility activities and/or exercises.                  Learning Progress Summary             Patient Acceptance, E, NR by  at 8/26/2024 1547                                         User Key       Initials Effective Dates Name Provider Type Discipline     02/03/23 -  Mariaa Mas, OT Occupational Therapist OT     02/05/24 -  Loretta Pleitez OT Occupational Therapist OT                  OT Recommendation and Plan     Plan of Care Review  Plan of Care Reviewed With: patient, spouse  Progress: improving  Outcome Evaluation: OT promoted oob act with pt difficult to arouse initially,  however cga for supine to sit and sts, and cga for taking steps across room to chair. Pt completed static and dyn standing with cga and graded te to improve fx of LUE.     Time Calculation:         Time Calculation- OT       Row Name 08/30/24 1130             Time Calculation- OT    OT Start Time 1130  -SW      OT Received On 08/30/24  -SW         Timed Charges    80433 - OT Therapeutic Exercise Minutes 15  -SW         Total Minutes    Timed Charges Total Minutes 15  -SW       Total Minutes 15  -SW                User Key  (r) = Recorded By, (t) = Taken By, (c) = Cosigned By      Initials Name Provider Type    Renetta Dale OT Occupational Therapist                  Therapy Charges for Today       Code Description Service Date Service Provider Modifiers Qty    32785143542 HC OT THER PROC EA 15 MIN 8/30/2024 Renetta Kumar OT GO 1                 Renetta Kumar OT  8/30/2024

## 2024-08-30 NOTE — PROGRESS NOTES
Patient has been initiated on Apixaban (Eliquis) during admission. Spoke to the patient and spouse at bedside. Education provided on 8/30/2024 verbally and in writing. Information provided includes effects of medication, drug-drug and drug-food interactions, and signs/symptoms of bleeding and clotting. Patient/family verbalized understanding through teach back. All pertinent questions were answered by pharmacist.    Yris Phillips, Pharmacy Intern

## 2024-08-30 NOTE — PROGRESS NOTES
[facility] Cardiology    Inpatient Progress Note      Chief Complaint/Reason for service:    HF         Subjective:       Fatigued, lethargic, sleepy    Past medical, surgical, social and family history reviewed in the patient's electronic medical record.         Objective:      Infusions:          Medications:    Current Facility-Administered Medications:     acetaminophen (TYLENOL) suppository 650 mg, 650 mg, Rectal, Q6H PRN, Russ Gorman PA-C, 650 mg at 08/25/24 0509    acetaminophen (TYLENOL) tablet 650 mg, 650 mg, Oral, Q6H PRN, Quynh Jovel B, APRN, 650 mg at 08/28/24 0136    amantadine (SYMMETREL) capsule 100 mg, 100 mg, Oral, Q12H, Catrina Mars APRN, 100 mg at 08/29/24 2037    apixaban (ELIQUIS) tablet 5 mg, 5 mg, Oral, Q12H, Katerin Barney MD, 5 mg at 08/30/24 0937    atorvastatin (LIPITOR) tablet 80 mg, 80 mg, Oral, Nightly, Katerin Barney MD, 80 mg at 08/29/24 2037    dextrose (D50W) (25 g/50 mL) IV injection 25 g, 25 g, Intravenous, Q15 Min PRN, Blayne Haddad MD    dextrose (GLUTOSE) oral gel 15 g, 15 g, Oral, Q15 Min PRN, Blayne Haddad MD    empagliflozin (JARDIANCE) tablet 10 mg, 10 mg, Oral, Daily, Dion Hooker MD, 10 mg at 08/30/24 0937    glucagon (GLUCAGEN) injection 1 mg, 1 mg, Intramuscular, Q15 Min PRN, Blayne Haddad MD    hydrALAZINE (APRESOLINE) injection 10 mg, 10 mg, Intravenous, Q6H PRN, Quynh Jovel, APRN, 10 mg at 08/26/24 0218    insulin glargine (LANTUS, SEMGLEE) injection 10 Units, 10 Units, Subcutaneous, Daily, Blayne Haddad MD, 10 Units at 08/30/24 0924    Insulin Lispro (humaLOG) injection 2-7 Units, 2-7 Units, Subcutaneous, 4x Daily AC & at Bedtime, Blayne Haddad MD, 2 Units at 08/29/24 1718    nebivolol (BYSTOLIC) tablet 10 mg, 10 mg, Oral, Q24H, Dion Hooker MD, 10 mg at 08/29/24 0834    nitroglycerin (NITROSTAT) SL tablet 0.4 mg, 0.4 mg, Sublingual, Q5 Min PRN,  Olayinka, May, APRN    pantoprazole (PROTONIX) EC tablet 40 mg, 40 mg, Oral, Q AM, Katerin Barney MD, 40 mg at 08/30/24 0631    sacubitril-valsartan (ENTRESTO) 49-51 MG tablet 1 tablet, 1 tablet, Oral, Q12H, Dion Hooker MD, 1 tablet at 08/29/24 2037    Vital Sign Min/Max for last 24 hours  Temp  Min: 97.5 °F (36.4 °C)  Max: 98.7 °F (37.1 °C)   BP  Min: 102/70  Max: 131/97   Pulse  Min: 81  Max: 94   Resp  Min: 16  Max: 18   SpO2  Min: 89 %  Max: 97 %   No data recorded      Intake/Output Summary (Last 24 hours) at 8/30/2024 1000  Last data filed at 8/29/2024 1800  Gross per 24 hour   Intake 525 ml   Output 550 ml   Net -25 ml           CONSTITUTIONAL: No acute distress    Labs/studies:  Available lab and imaging results were reviewed by myself today    Results for orders placed during the hospital encounter of 08/23/24    Adult Transthoracic Echo Limited W/ Cont if Necessary Per Protocol    Interpretation Summary    This was a limited echocardiogram to reassess left ventricular function    Left ventricular systolic function is severely decreased. Left ventricular ejection fraction appears to be 26 - 30%.    Left ventricular wall thickness is consistent with hypertrophy.    The previously noted LV thrombus appears much improved, there appears to be a smaller residual mural thrombus at the apex      Tele: Sinus rhythm         Assessment/Plan:         R M1 stenosis    T2DM    HTN    Morbid obesity with BMI of 45.0-49.9, adult    SCOTT    Hypertensive urgency    R/O Seizure    Acute respiratory failure    Acute stroke due to ischemia    Acute systolic heart failure    Left ventricular thrombus       ASSESSMENT:  LV thrombus  Echocardiogram shows decreased LVEF 36%, LV thrombus, indeterminate bubble study.   Acute HFrEF, severe exacerbation  New diagnosis.  Unclear etiology  Possible contributing causes include recent pneumonia (returned from cruise from [place], given antibiotics), positive  meth and marijuana and UDS  LVEF 36% with global hypokinesis on echo.   CVA secondary to right MCA occlusion   Status post thrombolytic therapy, TNK  Status post urgent mechanical thrombectomy, right MCA  Hypertension   Type 2 diabetes mellitus   ANA  Acute respiratory failure  Suspected sleep apnea   Substance abuse   UDS positive for THC, methamphetamines (possibly laced marijuana?, no known history of meth use per patient's )    PLAN:  Apixaban for LV thrombus  Continue Jardiance, Entresto, Bystolic  Not adding spironolactone yet due to borderline K+ with increased Entresto    Okay for discharge from a cardiology standpoint   Cardiac discharge medications reconciled in the discharge navigator, ordered [facility] pharmacy.  Can be switched to patient's [facility] pharmacy, if needed  Follow-up in the heart valve clinic in 1 week with one of the heart failure nurse practitioners and with Dr. Hooker or MICHELLE Churchill in the cardiology clinic in 6 to 8 weeks    Outpatient cardiac MRI ordered. Future cardiac CTA to rule out obstructive CAD  Sleep med referral/sleep study/outpatient SCOTT management

## 2024-08-30 NOTE — PLAN OF CARE
Goal Outcome Evaluation:                     Anticipated Discharge Disposition (SLP): inpatient rehabilitation facility, anticipate therapy at next level of care    SLP Diagnosis: cognitive-linguistic disorder, mild, moderate, dysarthria (08/30/24 1135)     SLP Swallowing Diagnosis: mild, oral dysphagia (08/30/24 1135)  Treatment Assessment (SLP): continued, improved, oral dysphagia, dysarthria, cognitive-linguistic disorder (08/30/24 1135)  Treatment Assessment Comments (SLP): Patient tolerated trials of soft, whole solids without difficulty. No s/s of aspiration with thin liquids or soft solids. Dysarthria and cognitive communication  improving. Will continue to address deficits in tx. (08/30/24 1135)  Plan for Continued Treatment (SLP): continue treatment per plan of care (08/30/24 1135)

## 2024-08-30 NOTE — PLAN OF CARE
Goal Outcome Evaluation:  Plan of Care Reviewed With: patient, spouse        Progress: improving  Outcome Evaluation: OT promoted oob act with pt difficult to arouse initially, however cga for supine to sit and sts, and cga for taking steps across room to chair. Pt completed static and dyn standing with cga and graded te to improve fx of LUE.      Anticipated Discharge Disposition (OT): inpatient rehabilitation facility

## 2024-08-30 NOTE — PLAN OF CARE
Goal Outcome Evaluation:   Patient alert and oriented x4, NIH 8. No reports of pain. Room air to 2L while sleeping, patient sleeps frequently. Normal sinus rhythm. Repeat echo completed this shift. Spouse at bedside. Up in chair intermittently.

## 2024-08-30 NOTE — NURSING NOTE
Patient drowsy at times but oriented x4, sleeping frequently - MD and stroke aware. Stroke, APRN for cards and hospitalist notified about lower SBP and holding BP med this AM, see orders. NIH 8. No reports of pain.     Discharge order placed by hospitalist. Cards office notified for mobile cardiac monitor prior to d/c. IV and tele removed. Discharge packet reviewed with patient and spouse at bedside. Report called to nurse at [facility]. Patient discharged out of [address] via wheelchair by PCT.

## 2024-09-03 NOTE — PAYOR COMM NOTE
"Auth# [id number]   24 Discharge Date    ALLI Polanco, RN  Utilization Review  Phone [phone]  Fax [phone]    [place] [address]           Keya Hamilton (34 y.o. Female)       Date of Birth   1990    Social Security Number       Address   [address] [place] [address]    Home Phone   [phone]    MRN   [id number]       Yarsanism   Non-Anabaptist    Marital Status                               Admission Date   24    Admission Type   Emergency    Admitting Provider   Aylin Lea MD    Attending Provider       Department, Room/Bed   [facility] 3F, S316/1       Discharge Date   2024    Discharge Disposition   Rehab Facility or Unit (DC - External)    Discharge Destination                                 Attending Provider: (none)   Allergies: Codeine    Isolation: None   Infection: None   Code Status: Prior    Ht: 160 cm (62.99\")   Wt: 123 kg (271 lb 2.7 oz)    Admission Cmt: None   Principal Problem: R M1 stenosis [I63.511]                   Active Insurance as of 2024       Primary Coverage       Payor Plan Insurance Group Employer/Plan Group    ANTHEM BLUE CROSS ANTHEM BLUE CROSS BLUE SHIELD PPO [id number]       Payor Plan Address Payor Plan Phone Number Payor Plan Fax Number Effective Dates    [address] [phone]  2021 - None Entered    [place] [address]         Subscriber Name Subscriber Birth Date Member ID       BRANDO HAMILTON 1989 [id number]                     Emergency Contacts        (Rel.) Home Phone Work Phone Mobile Phone    brando hamilton (Spouse) [phone]                 Discharge Summary        Aylin Lea MD at 24 1304          2024    [facility]  DISCHARGE SUMMARY    Patient Name: Keya Hamilton  : 1990  MRN: [id number]    Date of " Admission: 8/23/2024  8:21 PM  Date of Discharge:  8/30/2024  Primary Care Physician: Cuong Parks MD    Consults       Date and Time Order Name Status Description    8/24/2024  8:09 AM Inpatient Cardiology Consult Completed     8/23/2024  9:09 PM Inpatient Neurosurgery Consult Completed     8/23/2024  8:23 PM Inpatient Neurology Consult Stroke Completed             Hospital Course     Presenting Problem: acute L weakness     Active Hospital Problems    Diagnosis  POA    **R M1 stenosis [I63.511]  Yes    Acute systolic heart failure [I50.21]  Unknown    Left ventricular thrombus [I51.3]  Unknown    Acute stroke due to ischemia [I63.9]  Yes    T2DM [E11.9]  Yes    HTN [I10]  Yes    Morbid obesity with BMI of 45.0-49.9, adult [E66.01, Z68.42]  Not Applicable    SCOTT [G47.33]  Yes    Hypertensive urgency [I16.0]  Yes    R/O Seizure [R56.9]  Unknown    Acute respiratory failure [J96.00]  Unknown      Resolved Hospital Problems   No resolved problems to display.          Hospital Course:  Keya Hamilton is a 34 y.o. female with PMH of T2DM, right renal atrophy, HTN, morbid obesity, and SCOTT on PAP.  She presented to [facility] ED on 8/23/24 for evaluation of somnolence, dysarthria, left-hemiparesis, and rightward gaze deviation concerning for stroke.   NIH was 11 Imaging showed CT perfusion deficit in R MCA territory.      R MCA CVA  - believed cardioembolic from LV thrombus  -  Seen emergently by stroke team and got TNKase and was admitted to ICU  - MRI showed acute ischemic stroke in R putamen and head of caudate  --RAMU noted LV thrombus, left atrial cavity dilation, bubble study indeterminant EF 36%; Cardiology service followed throughout her stay  -  Intubated in ED , extubated 8/25/24  -  placed on heparin gtt which has been transitioned to Eliquis  - worked with PT OT and ST.  She is getting stronger, can now raise her LUE.    - She is tolerating food by mouth  - She is going to [facility]       LV thrombus    Cardiomyopathy, EF 26-36 (2 echos)  --eliquis,   - meds started for cardiomyopathy  - Lifevest considered but not ordered; pt borderline EF   - outpt cardiac MRI ordered   --patient noted heterozygous for factor V leiden, additional anticoag labs pending     Substance use- UDS + meth/THC  --cessation counseling      Somnolence   --amantadine started for this.  Family notes she is sleeping a lot. However, they report daily improvement.  She also tends to stay up late.     R renal atrophy - Cr is 1.1      Vaginal spotting, mild  - Has IUD.    - I explained this may happen in setting of Eliquis.   If it worsens, she may need to see GYN after DC        Discharge Follow Up Recommendations for outpatient labs/diagnostics:   - heart valve clinic in one week     - Neuro clinic in one month     - outpt sleep med referral and SCOTT management     Day of Discharge     HPI:   sleepy, explains she was up until 0300.  Partner says she is getting more alert daily and that her LUE movement is also improving.      Review of Systems  No pain   Did have some vaginal spotting today    Vital Signs:   Temp:  [97.5 °F (36.4 °C)-98.5 °F (36.9 °C)] 98.4 °F (36.9 °C)  Heart Rate:  [81-94] 89  Resp:  [16-20] 20  BP: (102-132)/(69-97) 132/95  Flow (L/min):  [3-4] 4      Physical Exam:  Gen:   up in chair w eyes closed but answrs questions and follows commands.  Visitor present.    Neuro: somnolent, oriented, clear speech, LUE lifts against gravity.    HEENT:  NC/AT   Neck:  Supple, no LAD  Heart RRR no murmur, rub, or gallop  Abd:  Soft, nontender, no rebound or guarding, pos BS  Extrem:  No c/c/e      Pertinent  and/or Most Recent Results     LAB RESULTS:      Lab 08/30/24  0934 08/26/24  0459 08/25/24  2112 08/25/24  1325 08/25/24  0542 08/24/24  2244 08/24/24  1929 08/24/24  1548 08/24/24  1143 08/24/24  0833 08/24/24  0611 08/24/24  0102 08/23/24 2055 08/23/24 2036   WBC 11.74* 15.37*  --   --  20.36*  --   --   --   --  16.61*  --   --   11.16*  --    HEMOGLOBIN 18.9* 15.6  --   --  14.7  --   --   --   --  14.9  --   --  16.1*  --    HEMATOCRIT 55.7* 47.0*  --   --  46.1  --   --   --   --  44.2  --   --  47.2*  --    PLATELETS 469* 346  --   --  374  --   --   --   --  340  --   --  417  --    NEUTROS ABS 7.68* 10.82*  --   --  16.06*  --   --   --   --  15.03*  --   --  7.05*  --    IMMATURE GRANS (ABS) 0.09* 0.07*  --   --  0.18*  --   --   --   --  0.12*  --   --  0.07*  --    LYMPHS ABS 2.73 3.30*  --   --  2.82  --   --   --   --  0.91  --   --  2.77  --    MONOS ABS 0.93* 1.06*  --   --  1.24*  --   --   --   --  0.51  --   --  0.85  --    EOS ABS 0.20 0.05  --   --  0.01  --   --   --   --  0.00  --   --  0.33  --    MCV 97.9* 101.1*  --   --  103.4*  --   --   --   --  97.8*  --   --  97.7*  --    PROCALCITONIN  --  0.21  --   --  0.15  --   --   --   --   --   --   --   --   --    LACTATE  --   --   --   --   --   --  1.9  --  4.2* 3.9* 3.4* 3.5*  --   --    PROTIME  --   --   --   --   --   --   --   --   --  14.9*  --   --   --  12.6*   APTT  --   --   --   --   --   --   --   --   --  23.2*  --   --  25.3  --    HEPARIN ANTI-XA  --  0.58 0.33 0.24* 0.10* 0.10*  --    < >  --  0.10*  --   --   --   --     < > = values in this interval not displayed.         Lab 08/30/24  1229 08/29/24  1331 08/28/24  1537 08/27/24  0358 08/26/24  0459 08/25/24  0542 08/24/24  0102   SODIUM 137 140 140 139 138 144 141   POTASSIUM 3.9 4.6 4.7 4.5 4.3 4.3 4.4   CHLORIDE 105 105 105 105 105 108* 105   CO2 19.0* 22.0 20.0* 20.0* 20.0* 22.0 20.0*   ANION GAP 13.0 13.0 15.0 14.0 13.0 14.0 16.0*   BUN 22* 22* 23* 23* 17 17 16   CREATININE 1.08* 1.03* 1.03* 0.99 0.92 1.15* 1.39*   EGFR 70.1 74.2 74.2 77.9 85.0 65.0 51.8*   GLUCOSE 140* 130* 168* 144* 106* 143* 206*   CALCIUM 9.2 9.7 10.0 9.4 9.3 9.0 9.1   MAGNESIUM  --   --   --   --  2.3 2.0  --    HEMOGLOBIN A1C  --   --   --   --   --   --  6.70*         Lab 08/30/24  1229 08/26/24  1800 08/24/24  0102  08/23/24 2055   TOTAL PROTEIN 6.8  --  6.4  --    ALBUMIN 3.9  --  3.7  --    GLOBULIN 2.9  --  2.7  --    ALT (SGPT) 36*  --  28 15   AST (SGOT) 43*  --  53* 18   BILIRUBIN 0.5  --  0.6  --    BETA 2 GLYCO 1 IGG  --  <9 <9  --    BETA 2 GLYCO 1 IGA  --  <9 <9  --    BETA 2 GLYCO 1 IGM  --  <9 <9  --    ALK PHOS 72  --  60  --          Lab 08/25/24  0542 08/24/24  0833 08/24/24  0102 08/23/24 2055 08/23/24 2036   PROBNP 1,011.0*  --   --   --   --    HSTROP T  --   --  73* 64*  --    PROTIME  --  14.9*  --   --  12.6*   INR  --  1.16*  --   --  1.1         Lab 08/24/24  0102   CHOLESTEROL 177   LDL CHOL 115*   HDL CHOL 31*   TRIGLYCERIDES 175*             Lab 08/25/24  0538 08/24/24  1751   PH, ARTERIAL 7.450 7.396   PCO2, ARTERIAL 36.2 39.0   PO2 ART 62.9* 83.2   FIO2 28 30   HCO3 ART 25.1 23.9   BASE EXCESS ART 1.4 -0.8*   CARBOXYHEMOGLOBIN 1.7 1.4     Brief Urine Lab Results  (Last result in the past 365 days)        Color   Clarity   Blood   Leuk Est   Nitrite   Protein   CREAT   Urine HCG        08/24/24 0124 Yellow   Clear   Negative   Negative   Negative   100 mg/dL (2+)           08/24/24 0124               Negative             Microbiology Results (last 10 days)       Procedure Component Value - Date/Time    Respiratory Panel PCR w/COVID-19(SARS-CoV-2) KAVEH/BRIAN/DOROTHEA/PAD/COR/VASYL In-House, NP Swab in UTM/VTM, 2 HR TAT - Swab, Nasopharynx [[id number]]  (Normal) Collected: 08/24/24 0109    Lab Status: Final result Specimen: Swab from Nasopharynx Updated: 08/24/24 0242     ADENOVIRUS, PCR Not Detected     Coronavirus 229E Not Detected     Coronavirus HKU1 Not Detected     Coronavirus NL63 Not Detected     Coronavirus OC43 Not Detected     COVID19 Not Detected     Human Metapneumovirus Not Detected     Human Rhinovirus/Enterovirus Not Detected     Influenza A PCR Not Detected     Influenza B PCR Not Detected     Parainfluenza Virus 1 Not Detected     Parainfluenza Virus 2 Not Detected     Parainfluenza Virus 3  Not Detected     Parainfluenza Virus 4 Not Detected     RSV, PCR Not Detected     Bordetella pertussis pcr Not Detected     Bordetella parapertussis PCR Not Detected     Chlamydophila pneumoniae PCR Not Detected     Mycoplasma pneumo by PCR Not Detected    Narrative:      In the setting of a positive respiratory panel with a viral infection PLUS a negative procalcitonin without other underlying concern for bacterial infection, consider observing off antibiotics or discontinuation of antibiotics and continue supportive care. If the respiratory panel is positive for atypical bacterial infection (Bordetella pertussis, Chlamydophila pneumoniae, or Mycoplasma pneumoniae), consider antibiotic de-escalation to target atypical bacterial infection.    MRSA Screen, PCR (Inpatient) - Swab, Nares [[id number]]  (Normal) Collected: 08/24/24 0109    Lab Status: Final result Specimen: Swab from Nares Updated: 08/24/24 0728     MRSA PCR Negative    Narrative:      The negative predictive value of this diagnostic test is high and should only be used to consider de-escalating anti-MRSA therapy. A positive result may indicate colonization with MRSA and must be correlated clinically.  MRSA Negative            Adult Transthoracic Echo Limited W/ Cont if Necessary Per Protocol    Result Date: 8/29/2024    This was a limited echocardiogram to reassess left ventricular function   Left ventricular systolic function is severely decreased. Left ventricular ejection fraction appears to be 26 - 30%.   Left ventricular wall thickness is consistent with hypertrophy.   The previously noted LV thrombus appears much improved, there appears to be a smaller residual mural thrombus at the apex     Cardiac Catheterization/Vascular Study    Addendum Date: 8/29/2024    Procedure-cerebral angiography note Preprocedural diagnosis Patient presents with respiratory failure Symptoms of cardiac failure Right-sided MCA syndrome NIH stroke scale 11-14 CT perfusion  showing holohemispheric deficit with marked hypertension Postprocedural diagnosis-same Procedures performed 1.  Right-sided common carotid artery angiography 2.  Right sided internal carotid cerebral angiogram 3.  Mechanical embolectomy right -sided distal M1 using 4 mm Trevo 1 pass with tiki 3 flow 4.  Follow-up angiogram right internal carotid 5.  Angio-Seal 8 Armenian right groin Angio-Seal to right groin-8 Armenian via Access site is right common femoral artery Surgeon: Lemuel Medina MD FAANS Assistants-none Complications none apparent Contrast Visipaque 320 Procedure in detail This patient was taken emergently after emergency verbal consent was obtained by her nurse practitioner staff, he was taken to the angiography suite and prepped and draped in the usual sterile manner.  Risk and benefits were explicitly explained.  After this point in time the patient was anesthetized at the right groin using local anesthetic agent lidocaine without epinephrine, micropuncture technique was used to assess and obtain right-sided common femoral access  and a 8 Armenian sheath was placed.  The patient had to be placed under general anesthesia secondary to respiratory failure At this point in time navigation of a 5 Armenian [employer] catheter into the above selected vessels was performed with little difficult after selective catheterization, diagnostic angiogram was performed using 8-10 cc of dilute Visipaque 320 contrast and approximately 5050 ratio.  The right common carotid followed by the right internal was selected with DSA angiogram demonstrating a large occlusion M1 dominant consistent with his perfusion deficit at this point time  with a flow gait 8 Armenian catheter microcatheter access to the distal M2 was performed followed by mechanical embolectomy with 1 passes of a 4 x 41 mm TR E VO and subsequent TICI 3 reperfusion No immediate complications noted mild to moderate basal spasm was noted post procedure After diagnostic  imaging was obtained independent review of these was performed at a separate workstation.  The patient tolerated the procedure well and given the access site being of adequate diameter a 8 Lao Angio-Seal was placed in the right groin. The patient improved on the table Findings Right-sided common carotid artery angiography demonstrates a normal age-related appearance of the carotid artery there is no evidence of significant flow-limiting stenosis by NASCET criteria.  There is some atherosclerotic change of the carotid bulb.  Antegrade flow appears within normal limits of the internal.  There does not appear to be plaque at the carotid that would explain the patient's distal emboli Right-sided internal carotid cerebral angiogram demonstrates a normal appearance of the right MCA trunk up until M1 and there is abrupt occlusion with retrograde filling of the entire right hemisphere with reasonable collateralization Right internal carotid artery injection status post mechanical embolectomy demonstrates reperfusion with TICI 3 flow, mild to moderate expected vasospasm and in the MCA and clinoid carotid is seen Final impression Successful catheter-based angiogram with successful right mechanical embolectomy of M1 branch with resultant TICI 3 flow as documented above No medic complications noted Patient will be in ICU for further workup of cardiac and respiratory failure.    Result Date: 8/29/2024  Procedure-cerebral angiography note Preprocedural diagnosis Patient presents with respiratory failure Symptoms of cardiac failure Right-sided MCA syndrome NIH stroke scale 11-14 CT perfusion showing holohemispheric deficit with marked hypertension Postprocedural diagnosis-same Procedures performed 1.  Right-sided common carotid artery angiography 2.  Right sided internal carotid cerebral angiogram 3.  Mechanical embolectomy left-sided distal M1 using 4 mm Trevo 1 pass with tiki 3 flow 4.  Follow-up angiogram right internal  carotid 5.  Angio-Seal 8 Nigerien right groin Angio-Seal to right groin-8 Nigerien via Access site is right common femoral artery Surgeon: Lemuel Medina MD FAANS Assistants-none Complications none apparent Contrast Visipaque 320 Procedure in detail This patient was taken emergently after emergency verbal consent was obtained by her nurse practitioner staff, he was taken to the angiography suite and prepped and draped in the usual sterile manner.  Risk and benefits were explicitly explained.  After this point in time the patient was anesthetized at the right groin using local anesthetic agent lidocaine without epinephrine, micropuncture technique was used to assess and obtain right-sided common femoral access  and a 8 Nigerien sheath was placed.  The patient had to be placed under general anesthesia secondary to respiratory failure At this point in time navigation of a 5 Nigerien [employer] catheter into the above selected vessels was performed with little difficult after selective catheterization, diagnostic angiogram was performed using 8-10 cc of dilute Visipaque 320 contrast and approximately 5050 ratio.  The right common carotid followed by the right internal was selected with DSA angiogram demonstrating a large occlusion M1 dominant consistent with his perfusion deficit at this point time  with a flow gait 8 Nigerien catheter microcatheter access to the distal M2 was performed followed by mechanical embolectomy with 1 passes of a 4 x 41 mm TR E VO and subsequent TICI 3 reperfusion No immediate complications noted mild to moderate basal spasm was noted post procedure After diagnostic imaging was obtained independent review of these was performed at a separate workstation.  The patient tolerated the procedure well and given the access site being of adequate diameter a 8 Nigerien Angio-Seal was placed in the right groin. The patient improved on the table Findings Right-sided common carotid artery angiography demonstrates a  normal age-related appearance of the carotid artery there is no evidence of significant flow-limiting stenosis by NASCET criteria.  There is some atherosclerotic change of the carotid bulb.  Antegrade flow appears within normal limits of the internal.  There does not appear to be plaque at the carotid that would explain the patient's distal emboli Right-sided internal carotid cerebral angiogram demonstrates a normal appearance of the left MCA trunk up until M1 and there is abrupt occlusion with retrograde filling of the entire right hemisphere with reasonable collateralization Right internal carotid artery injection status post mechanical embolectomy demonstrates reperfusion with TICI 3 flow, mild to moderate expected vasospasm and in the MCA and clinoid carotid is seen Final impression Successful catheter-based angiogram with successful right mechanical embolectomy of M1 branch with resultant TICI 3 flow as documented above No medic complications noted Patient will be in ICU for further workup of cardiac and respiratory failure.     CT Head Without Contrast    Result Date: 8/27/2024  CT HEAD WO CONTRAST Date of Exam: 8/27/2024 4:24 PM EDT Indication: drowsiness, stroke follow up. Comparison: August 24, 2024 Technique: Axial CT images were obtained of the head without contrast administration.  Automated exposure control and iterative construction methods were used. Findings: Evolving subacute infarcts within the right basal ganglia, right temporal, and occipital lobes are redemonstrated. There is no hemorrhagic transformation. There is minimal mass effect on the right lateral ventricle, but no midline shift. The basal cisterns appear patent. The calvarium appears intact. The paranasal sinuses and mastoid air cells are well-aerated.     Impression: Evolving right-sided subacute infarcts most prominent within right basal ganglia. No hemorrhagic transformation or acute herniation. Electronically Signed: Kali  MD Spencer  8/27/2024 4:39 PM EDT  Workstation ID: [id number]    XR Chest 1 View    Result Date: 8/26/2024  XR CHEST 1 VW Date of Exam: 8/26/2024 3:36 AM EDT Indication: resp insuff Comparison: 8/24/2024 at 0233 hours Findings: The endotracheal tube has been removed. There is interval increase in atelectasis versus infiltrates within the mid to lower lungs bilaterally. No pleural effusions are seen. The cardiac silhouette and mediastinum are stable. Stable cardiomegaly is noted.     Impression: 1.Interval removal of the endotracheal tube. 2.Evidence for increasing atelectasis versus infiltrates within the mid to lower lungs bilaterally. Electronically Signed: Edouard Krishna MD  8/26/2024 7:21 AM EDT  Workstation ID: [id number]    CT Head Without Contrast    Result Date: 8/25/2024  CT HEAD WO CONTRAST Date of Exam: 8/24/2024 8:37 PM EDT Indication: Stroke, follow up stroke 24 Hours Post Thrombolytic Administration. Comparison: 8/24/2024 Technique: Axial CT images were obtained of the head without contrast administration.  Automated exposure control and iterative construction methods were used. Findings: Parenchyma:No acute intraparenchymal hemorrhage. Evolving right sided infarcts predominantly in the basal ganglia. There is some internal mild hyperdensity likely reflecting petechial hemorrhage. No overt hemorrhagic transformation. Normal parenchymal volume. No substantial white matter disease. No midline shift or herniation. Ventricles and extra axial spaces:Normal caliber of ventricles and sulci. No extra axial fluid collection seen. Other:Orbits are grossly intact. Scattered paranasal sinus mucosal thickening. Mastoid air cells are clear. Calvarium is intact. No substantial intracranial atherosclerotic calcification.     Impression: Evolving right-sided infarcts predominate of the basal ganglia. There is some minimal internal hyperdensity likely reflecting spared tissue or petechial hemorrhage. No overt  hemorrhagic transformation. Electronically Signed: Denis Gaines MD  8/25/2024 8:38 AM EDT  Workstation ID: [id number]    CT Head Without Contrast    Result Date: 8/24/2024  CT HEAD WO CONTRAST Date of Exam: 8/24/2024 4:29 AM EDT Indication: stroke  s/p MT. Comparison: 8/24/2024 MR brain Technique: Axial CT images were obtained of the head without contrast administration.  Automated exposure control and iterative construction methods were used. Findings: Parenchyma:No acute intraparenchymal hemorrhage. Known right-sided infarcts are better seen on prior MRI. Normal parenchymal volume. No substantial white matter disease. No midline shift or herniation. Ventricles and extra axial spaces:Normal caliber of ventricles and sulci. No extra axial fluid collection seen. Other:Orbits are grossly intact. Scattered paranasal sinus mucosal thickening. Mastoid air cells are clear. Calvarium is intact. No substantial intracranial atherosclerotic calcification.     Impression: Known right-sided infarcts are better seen on prior MRI. No evidence of hemorrhage. Electronically Signed: Denis Gaines MD  8/24/2024 9:09 AM EDT  Workstation ID: [id number]    Adult Transthoracic Echo Complete W/ Cont if Necessary Per Protocol (With Agitated Saline)    Result Date: 8/24/2024  Images from the original result were not included.   Left ventricular systolic function is moderately decreased. Calculated left ventricular EF = 36.7%  global hypokinesis.  LV cavity mildly dilated.  RV size and function normal   Left ventricular wall thickness is consistent with hypertrophy.   Left ventricular diastolic dysfunction is noted.   There is a thrombus present in the left ventricle in apex. 2.22 cm by 0.96 cm   The left atrial cavity is dilated.   Estimated right ventricular systolic pressure from tricuspid regurgitation is normal (<35 mmHg).   Indeterminate bubble study    MRI Brain Without Contrast    Result Date: 8/24/2024  MRI BRAIN WO CONTRAST  Date of Exam: 8/24/2024 3:36 AM EDT Indication: stroke.  Comparison: 8/23/2024. Technique:  Routine multiplanar/multisequence sequence images of the brain were obtained without contrast administration. Findings: There are patchy areas of diffusion restriction present within the periventricular white matter on the right as well as the right basal ganglia with tiny foci of diffusion restriction seen within the subcortical white matter of the right frontal lobe and  the right temporal lobe as well as the right occipital lobe with corresponding decreased signal on the ADC map consistent with recent or acute ischemia. FLAIR signal changes are seen corresponding to these regions which appears most pronounced within the right putamen. No additional significant FLAIR signal changes. Given the degree of signal intensity, some of the findings may be more subacute. No additional diffusion restriction identified. There is no evidence of acute or chronic intracranial hemorrhage. No mass effect or midline shift. No abnormal extra-axial collections. The major vascular flow voids appear intact. The basal ganglia, brainstem and cerebellum appear within normal limits. Calvarial and superficial soft tissue signal is within  normal limits. Orbits appear unremarkable. The paranasal sinuses and the mastoid air cells appear well aerated. Midline structures are intact.     Impression: Patchy areas of diffusion restriction present within the right periventricular white matter, the right basal ganglia and the right temporal lobe and the right occipital lobe consistent with recent or acute ischemia. Given the degree of signal intensity in presence of FLAIR signal change, findings likely more subacute. No evidence of hemorrhage. No significant mass effect or midline shift. Electronically Signed: Kayla Negron MD  8/24/2024 4:29 AM EDT  Workstation ID: [id number]    XR Chest 1 View    Result Date: 8/24/2024  XR CHEST 1 VW Date of Exam: 8/24/2024  2:36 AM EDT Indication: positioning of ETT Comparison: 8/23/2024. Findings: There has been retraction of the endotracheal tube with the tip in the mid trachea. Improved aeration of the lungs bilaterally. Mild interstitial opacities present with indistinctness of the pulmonary vasculature.. The heart appears enlarged. Stable small possible left-sided pleural effusion. No pneumothorax.     Impression: Retraction of the endotracheal tube with the tip in the mid trachea. Improved aeration of the lungs bilaterally with mild pulmonary edema pattern present. Electronically Signed: Kayla Negron MD  8/24/2024 2:50 AM EDT  Workstation ID: [id number]    XR Chest 1 View    Result Date: 8/23/2024  XR CHEST 1 VW Date of Exam: 8/23/2024 9:03 PM EDT Indication: Acute Stroke Protocol (onset < 12 hrs) Comparison: None available. Findings: Although the jose carlos is not well visualized, tip of the endotracheal tube appears to extend into the right mainstem bronchus. Cardiac silhouette appears enlarged. There are diffuse airspace opacities throughout both lungs.     Impression: 1.Tip of the endotracheal tube extends into the right mainstem bronchus. Recommend retraction to the mid thoracic trachea. I called this result to Dr. Lezama at 9:20 p.m. on 8/23/2024, and he was aware of this finding. 2.Diffuse bilateral airspace opacities, which may be due to pulmonary edema, atelectasis, and/or pneumonia. Electronically Signed: Latanya Evans  8/23/2024 9:32 PM EDT  Workstation ID: [id number]    CT Angiogram Head w AI Analysis of LVO    Result Date: 8/23/2024  CT CEREBRAL PERFUSION W WO CONTRAST, CT ANGIOGRAM NECK, CT ANGIOGRAM HEAD W AI ANALYSIS OF LVO Date of Exam: 8/23/2024 8:26 PM EDT Indication: Neuro Deficit, acute, Stroke suspected Neuro deficit, acute stroke suspected.  Comparison: Noncontrast CT of the head performed on the same date Technique: Axial CT images of the brain were obtained prior to and after the administration of 115 cc  Isovue-370 IV contrast . Core blood volume, core blood flow, mean transit time, and Tmax images were obtained utilizing the Rapid software protocol. A limited CT angiogram of the head was also performed to measure the blood vessel density. The radiation dose reduction device was turned on for each scan per the ALARA (As Low as Reasonably Achievable) protocol. Findings: CT perfusion demonstrates approximately 132 cc elevated Tmax greater than 6 seconds, 67 cc greater than 8 seconds and 32 cc greater than 10 seconds also with patchy involvement within the right cerebral hemisphere. These are within the MCA to the distribution. Involving the right cerebral hemisphere. No decrease cerebral blood flow or blood volume is noted. Noncontrast CT demonstrates a normal appearance of the right MCA territory distribution. Technique:  CTA of the head and neck was performed after the uneventful intravenous administration of iodinated contrast.  Reconstructed coronal and sagittal images were also obtained. In addition, a 3-D volume rendered image was created for interpretation. Automated exposure control and iterative reconstruction methods were used.  The arch of the aorta is normal. The common carotid arteries are normal. The extracranial internal carotid arteries are normal. 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 There is some distal reconstitution however there is minimal thready flow in the area of maximal stenosis. Some irregularity of flow within the branches of the left MCA. The anterior cerebral arteries appear normal. The vertebral arteries arise from the subclavian arteries. The right vertebral artery is dominant. No evidence of vertebral artery dissection. The vertebral arteries supply the basilar artery. The basilar artery tip is normal. The basilar artery terminates in bilateral posterior cerebral arteries which appear normal. The visualized lung apices are  clear. The airway is clear. The thyroid gland is normal. No cervical adenopathy. The infratemporal fossa is normal bilaterally. No intracranial mass or mass effect. No midline shift. No extra-axial mass or collection. Orbital and paranasal soft tissues are normal. The paranasal sinuses are clear. The mastoid air cells are aerated.     There is a very severe stenosis of the right M1 segment just proximal to the trifurcation with some distal reconstitution of flow. CT perfusion demonstrates slow flow in the right MCA territory with no decreased vertebral blood flow or blood volume noted. Neurosurgical consult recommended. Findings were discussed with Dr. Garcia at 9:03 p.m. on 8/23/2024 Electronically Signed: David Fernández MD  8/23/2024 9:05 PM EDT  Workstation ID: [id number]    CT Angiogram Neck    Result Date: 8/23/2024  CT CEREBRAL PERFUSION W WO CONTRAST, CT ANGIOGRAM NECK, CT ANGIOGRAM HEAD W AI ANALYSIS OF LVO Date of Exam: 8/23/2024 8:26 PM EDT Indication: Neuro Deficit, acute, Stroke suspected Neuro deficit, acute stroke suspected.  Comparison: Noncontrast CT of the head performed on the same date Technique: Axial CT images of the brain were obtained prior to and after the administration of 115 cc Isovue-370 IV contrast . Core blood volume, core blood flow, mean transit time, and Tmax images were obtained utilizing the Rapid software protocol. A limited CT angiogram of the head was also performed to measure the blood vessel density. The radiation dose reduction device was turned on for each scan per the ALARA (As Low as Reasonably Achievable) protocol. Findings: CT perfusion demonstrates approximately 132 cc elevated Tmax greater than 6 seconds, 67 cc greater than 8 seconds and 32 cc greater than 10 seconds also with patchy involvement within the right cerebral hemisphere. These are within the MCA to the distribution. Involving the right cerebral hemisphere. No decrease cerebral blood flow or blood volume is  noted. Noncontrast CT demonstrates a normal appearance of the right MCA territory distribution. Technique:  CTA of the head and neck was performed after the uneventful intravenous administration of iodinated contrast.  Reconstructed coronal and sagittal images were also obtained. In addition, a 3-D volume rendered image was created for interpretation. Automated exposure control and iterative reconstruction methods were used.  The arch of the aorta is normal. The common carotid arteries are normal. The extracranial internal carotid arteries are normal. 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 There is some distal reconstitution however there is minimal thready flow in the area of maximal stenosis. Some irregularity of flow within the branches of the left MCA. The anterior cerebral arteries appear normal. The vertebral arteries arise from the subclavian arteries. The right vertebral artery is dominant. No evidence of vertebral artery dissection. The vertebral arteries supply the basilar artery. The basilar artery tip is normal. The basilar artery terminates in bilateral posterior cerebral arteries which appear normal. The visualized lung apices are clear. The airway is clear. The thyroid gland is normal. No cervical adenopathy. The infratemporal fossa is normal bilaterally. No intracranial mass or mass effect. No midline shift. No extra-axial mass or collection. Orbital and paranasal soft tissues are normal. The paranasal sinuses are clear. The mastoid air cells are aerated.     There is a very severe stenosis of the right M1 segment just proximal to the trifurcation with some distal reconstitution of flow. CT perfusion demonstrates slow flow in the right MCA territory with no decreased vertebral blood flow or blood volume noted. Neurosurgical consult recommended. Findings were discussed with Dr. Garcia at 9:03 p.m. on 8/23/2024 Electronically Signed: David  MD Jolene  8/23/2024 9:05 PM EDT  Workstation ID: [id number]    CT CEREBRAL PERFUSION WITH & WITHOUT CONTRAST    Result Date: 8/23/2024  CT CEREBRAL PERFUSION W WO CONTRAST, CT ANGIOGRAM NECK, CT ANGIOGRAM HEAD W AI ANALYSIS OF LVO Date of Exam: 8/23/2024 8:26 PM EDT Indication: Neuro Deficit, acute, Stroke suspected Neuro deficit, acute stroke suspected.  Comparison: Noncontrast CT of the head performed on the same date Technique: Axial CT images of the brain were obtained prior to and after the administration of 115 cc Isovue-370 IV contrast . Core blood volume, core blood flow, mean transit time, and Tmax images were obtained utilizing the Rapid software protocol. A limited CT angiogram of the head was also performed to measure the blood vessel density. The radiation dose reduction device was turned on for each scan per the ALARA (As Low as Reasonably Achievable) protocol. Findings: CT perfusion demonstrates approximately 132 cc elevated Tmax greater than 6 seconds, 67 cc greater than 8 seconds and 32 cc greater than 10 seconds also with patchy involvement within the right cerebral hemisphere. These are within the MCA to the distribution. Involving the right cerebral hemisphere. No decrease cerebral blood flow or blood volume is noted. Noncontrast CT demonstrates a normal appearance of the right MCA territory distribution. Technique:  CTA of the head and neck was performed after the uneventful intravenous administration of iodinated contrast.  Reconstructed coronal and sagittal images were also obtained. In addition, a 3-D volume rendered image was created for interpretation. Automated exposure control and iterative reconstruction methods were used.  The arch of the aorta is normal. The common carotid arteries are normal. The extracranial internal carotid arteries are normal. 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 There is some distal reconstitution  however there is minimal thready flow in the area of maximal stenosis. Some irregularity of flow within the branches of the left MCA. The anterior cerebral arteries appear normal. The vertebral arteries arise from the subclavian arteries. The right vertebral artery is dominant. No evidence of vertebral artery dissection. The vertebral arteries supply the basilar artery. The basilar artery tip is normal. The basilar artery terminates in bilateral posterior cerebral arteries which appear normal. The visualized lung apices are clear. The airway is clear. The thyroid gland is normal. No cervical adenopathy. The infratemporal fossa is normal bilaterally. No intracranial mass or mass effect. No midline shift. No extra-axial mass or collection. Orbital and paranasal soft tissues are normal. The paranasal sinuses are clear. The mastoid air cells are aerated.     There is a very severe stenosis of the right M1 segment just proximal to the trifurcation with some distal reconstitution of flow. CT perfusion demonstrates slow flow in the right MCA territory with no decreased vertebral blood flow or blood volume noted. Neurosurgical consult recommended. Findings were discussed with Dr. Garcia at 9:03 p.m. on 8/23/2024 Electronically Signed: David Fernández MD  8/23/2024 9:05 PM EDT  Workstation ID: [id number]    CT Head Without Contrast Stroke Protocol    Result Date: 8/23/2024  CT HEAD WO CONTRAST STROKE PROTOCOL Date of Exam: 8/23/2024 8:25 PM EDT Indication: Neuro deficit, acute, stroke suspected Neuro Deficit, acute, Stroke suspected. Comparison: None available. Technique: Axial CT images were obtained of the head without contrast administration.  Reconstructed coronal images were also obtained. Automated exposure control and iterative construction methods were used. Scan Time: 8:22 p.m. Results discussed with   Colette stroke Navigator at 8:32 p.m. Findings: No acute intracranial hemorrhage.Intact gray-white differentiation.No  extra-axial fluid collection.No significant mass effect. No hydrocephalus. Brain volume appears age-appropriate. Mild scattered mucosal thickening in the paranasal sinuses.Mastoid air cells are essentially clear.Included globes and orbits appear unremarkable by CT. No acute or aggressive appearing bony or extracranial soft tissue process.     Impression: No acute intracranial finding. Electronically Signed: Medardo Wheat  8/23/2024 8:35 PM EDT  Workstation ID: [id number]             Results for orders placed during the hospital encounter of 08/23/24    Adult Transthoracic Echo Limited W/ Cont if Necessary Per Protocol    Interpretation Summary    This was a limited echocardiogram to reassess left ventricular function    Left ventricular systolic function is severely decreased. Left ventricular ejection fraction appears to be 26 - 30%.    Left ventricular wall thickness is consistent with hypertrophy.    The previously noted LV thrombus appears much improved, there appears to be a smaller residual mural thrombus at the apex    Discharge Details        Discharge Medications        New Medications        Instructions Start Date   acetaminophen 650 MG suppository  Commonly known as: TYLENOL   650 mg, Rectal, Every 4 Hours PRN      amantadine 100 MG capsule  Commonly known as: SYMMETREL   100 mg, Oral, Every 12 Hours Scheduled      apixaban 5 MG tablet tablet  Commonly known as: ELIQUIS   Take 1 tablet by mouth Every 12 (Twelve) Hours.      atorvastatin 80 MG tablet  Commonly known as: LIPITOR   80 mg, Oral, Nightly      empagliflozin 10 MG tablet tablet  Commonly known as: JARDIANCE   10 mg, Oral, Daily   Start Date: August 31, 2024     insulin glargine 100 UNIT/ML injection  Commonly known as: LANTUS, SEMGLEE   10 Units, Subcutaneous, Daily   Start Date: August 31, 2024     Insulin Lispro 100 UNIT/ML injection  Commonly known as: humaLOG   2-7 Units, Subcutaneous, 4 Times Daily Before Meals & Nightly      nebivolol 5  MG tablet  Commonly known as: BYSTOLIC   5 mg, Oral, Every 24 Hours Scheduled      sacubitril-valsartan 49-51 MG tablet  Commonly known as: ENTRESTO   1 tablet, Oral, Every 12 Hours Scheduled             Stop These Medications      lisinopril 40 MG tablet  Commonly known as: PRINIVIL,ZESTRIL              Allergies   Allergen Reactions    Codeine Hives         Discharge Disposition:  Rehab Facility or Unit (DC - External)    Diet:  Hospital:  Diet Order   Procedures    Diet: Cardiac, Diabetic; Healthy Heart (2-3 Na+); Consistent Carbohydrate; Texture: Soft to Chew (NDD 3); Soft to Chew: Chopped Meat; Fluid Consistency: Thin (IDDSI 0)            Activity:      Restrictions or Other Recommendations:  Reviewed increase risk of bleeding on DOAC        CODE STATUS:    Code Status and Medical Interventions: CPR (Attempt to Resuscitate); Full Support   Ordered at: 08/24/24 0808     Code Status (Patient has no pulse and is not breathing):    CPR (Attempt to Resuscitate)     Medical Interventions (Patient has pulse or is breathing):    Full Support       Future Appointments   Date Time Provider Department Center   9/4/2024  2:15 PM Neelima Hugo APRN [facility]   10/14/2024  9:30 AM Alissa Beasley APRN [facility]       Additional Instructions for the Follow-ups that You Need to Schedule       Ambulatory Referral to Sleep Medicine   As directed      Follow-up needed: Yes        Discharge Follow-up with PCP   As directed       Currently Documented PCP:    Cuong Parks MD    PCP Phone Number:    [phone]     Follow Up Details: after DC from rehab        Discharge Follow-up with Specialty: Follow up in the heart and valve clinic in 1 week   As directed      Specialty: Follow up in the heart and valve clinic in 1 week        Discharge Follow-up with Specialty: Follow up with Dr Hooker or MICHELLE Churchill, in the cardiology clinic in 6-8 weeks   As directed      Specialty: Follow up with Dr Hooker or Faustina  MICHELLE Reyes, in the cardiology clinic in 6-8 weeks        MRI Cardiac Function Complete With & Without Morphology   As directed      Exam reason: Dilated Cardiomyopathy   Pregnant?: Unknown   Release to patient: Routine Release                      Aylin Bustamante MD  08/30/24      Time Spent on Discharge:  I spent  40  minutes on this discharge activity which included: face-to-face encounter with the patient, reviewing the data in the system, coordination of the care with the nursing staff as well as consultants, documentation, and entering orders.            Electronically signed by Aylin Bustamante MD at 08/30/24 1348       Discharge Order (From admission, onward)       Start     Ordered    08/30/24 1303  Discharge patient  Once        Expected Discharge Date: 08/30/24   Expected Discharge Time: Afternoon   Discharge Disposition: Rehab Facility or Unit (DC - External)   Physician of Record for Attribution - Please select from Treatment Team: AYLIN BUSTAMANTE [[id number]]   Review needed by CMO to determine Physician of Record: No      Question Answer Comment   Physician of Record for Attribution - Please select from Treatment Team AYLIN BUSTAMANTE    Review needed by CMO to determine Physician of Record No        08/30/24 3027

## 2024-09-04 ENCOUNTER — OFFICE VISIT (OUTPATIENT)
Dept: CARDIOLOGY | Facility: HOSPITAL | Age: 34
End: 2024-09-04
Payer: COMMERCIAL

## 2024-09-04 VITALS
TEMPERATURE: 97.4 F | WEIGHT: 267 LBS | HEIGHT: 63 IN | DIASTOLIC BLOOD PRESSURE: 76 MMHG | SYSTOLIC BLOOD PRESSURE: 113 MMHG | BODY MASS INDEX: 47.31 KG/M2 | HEART RATE: 81 BPM | RESPIRATION RATE: 20 BRPM | OXYGEN SATURATION: 97 %

## 2024-09-04 DIAGNOSIS — I10 PRIMARY HYPERTENSION: Chronic | ICD-10-CM

## 2024-09-04 DIAGNOSIS — I63.9 ACUTE STROKE DUE TO ISCHEMIA: ICD-10-CM

## 2024-09-04 DIAGNOSIS — I51.3 LEFT VENTRICULAR THROMBUS: ICD-10-CM

## 2024-09-04 DIAGNOSIS — I50.22 CHRONIC HFREF (HEART FAILURE WITH REDUCED EJECTION FRACTION): Primary | ICD-10-CM

## 2024-09-04 PROCEDURE — 99214 OFFICE O/P EST MOD 30 MIN: CPT | Performed by: NURSE PRACTITIONER

## 2024-09-04 RX ORDER — SPIRONOLACTONE 25 MG/1
25 TABLET ORAL DAILY
Start: 2024-09-04

## 2024-09-06 NOTE — CASE MANAGEMENT/SOCIAL WORK
Continued Stay Note  [facility]     Patient Name: Keya Hamilton  MRN: [id number]  Today's Date: 9/6/2024    Admit Date: 8/23/2024    Plan: update   Discharge Plan       Row Name 09/06/24 1353       Plan    Plan update    Plan Comments Completed FMLA forms received from [facility] and mailed to spouse at home address listed in Epic.                   Discharge Codes    No documentation.                   Nicky Castellanos RN

## 2024-09-10 NOTE — PROGRESS NOTES
"Chief Complaint  Establish Care and Stroke    Subjective    History of Present Illness {CC  Problem List  Visit  Diagnosis   Encounters  Notes  Medications  Labs  Result Review Imaging  Media :23}       History of Present Illness   34-year-old female with a history of type 2 diabetes mellitus, right renal atrophy, morbid obesity, hypertension and obstructive sleep apnea presents today for ongoing evaluation of her recent MCA CVA and cardiomyopathy.  She presented to [facility] ED ED on 8/23/2024 with a stroke.  She got TNK ase.  MRI showed acute ischemic stroke in the right putamen and head of caudate.  RAMU showed an LV thrombus, left atrial cavity dilation bubble study indeterminant with an EF 36%.  She was intubated in the ED and extubated on 825.  She has transition to [facility].  She is currently at [facility] undergoing rehab and is scheduled to discharge home soon.  She was initiated on GDMT prior to discharge.  LifeVest was considered but not ordered.Urine drug screen was positive for meth and THC.  She is currently wearing cardiac event monitor to rule out A-fib.  She notes she is feeling well and currently denies chest pain, dyspnea, dizziness, presyncope or syncope.  Objective     Vital Signs:   Vitals:    09/04/24 1422 09/04/24 1424   BP: 110/75 113/76   BP Location: Left arm Left arm   Patient Position: Standing Sitting   Cuff Size: Adult Adult   Pulse: 83 81   Resp:  20   Temp:  97.4 °F (36.3 °C)   TempSrc:  Temporal   SpO2: 96% 97%   Weight:  121 kg (267 lb)   Height:  160 cm (62.99\")     Body mass index is 47.31 kg/m².  Physical Exam  Vitals and nursing note reviewed.   Constitutional:       Appearance: Normal appearance.   HENT:      Head: Normocephalic.   Eyes:      Pupils: Pupils are equal, round, and reactive to light.   Cardiovascular:      Rate and Rhythm: Normal rate and regular rhythm.      Pulses: Normal pulses.      Heart sounds: Normal heart sounds. No murmur " heard.  Pulmonary:      Effort: Pulmonary effort is normal.      Breath sounds: Normal breath sounds.   Abdominal:      General: Bowel sounds are normal.      Palpations: Abdomen is soft.   Musculoskeletal:         General: Normal range of motion.      Cervical back: Normal range of motion.      Right lower leg: No edema.      Left lower leg: No edema.   Skin:     General: Skin is warm and dry.      Capillary Refill: Capillary refill takes less than 2 seconds.   Neurological:      Mental Status: She is alert and oriented to person, place, and time.      Motor: Weakness (left upper extremity) present.   Psychiatric:         Mood and Affect: Mood normal.         Thought Content: Thought content normal.              Result Review  Data Reviewed:{ Labs  Result Review  Imaging  Med Tab  Media :23}     Labs [facility] 9/4/2024: WBC 9.9, RBC 5.29, hemoglobin 17.8, hematocrit 52.7, platelets 446, creatinine clearance 66.79, creatinine 1.00, sodium 139, potassium 4.2, chloride 107, carbon oxide 23, glucose 2 1, BUN 19, estimated , calcium 9.5,  Cardiac Catheterization/Vascular Study (08/23/2024 21:58)  Adult Transthoracic Echo Complete W/ Cont if Necessary Per Protocol (With Agitated Saline) (08/24/2024 08:34)  Adult Transthoracic Echo Limited W/ Cont if Necessary Per Protocol (08/29/2024 10:00)         Assessment and Plan {CC Problem List  Visit Diagnosis  ROS  Review (Popup)  Health Maintenance  Quality  BestPractice  Medications  SmartSets  SnapShot Encounters  Media :23}   1. Chronic HFrEF (heart failure with reduced ejection fraction)  Continue Jardiance Bystolic, Entresto  Will initiate Aldactone  Heart failure education today including signs and symptoms, the role of the heart failure center, daily weights, low sodium diet (less than 1500 mg per day), and daily physical activity. Reviewed HF Zones with patient and family.  Patient to continue current medications as previously ordered.   2.  Left ventricular thrombus  Continue Eliquis 5 mg twice daily    3. Acute stroke due to ischemia  currently in rehab at [facility]  S/p tnkase      4. Primary hypertension  Stable on Bystolic, Entresto  Continue to monitor          Follow Up {Instructions Charge Capture  Follow-up Communications :23}   Return in about 2 weeks (around 9/18/2024) for Office visit, HF.    Patient was given instructions and counseling regarding her condition or for health maintenance advice. Please see specific information pulled into the AVS if appropriate.  Patient was instructed to call the Heart and Valve Center with any questions, concerns, or worsening symptoms.

## 2024-09-18 ENCOUNTER — OFFICE VISIT (OUTPATIENT)
Dept: CARDIOLOGY | Facility: HOSPITAL | Age: 34
End: 2024-09-18
Payer: COMMERCIAL

## 2024-09-18 VITALS
SYSTOLIC BLOOD PRESSURE: 125 MMHG | HEIGHT: 63 IN | OXYGEN SATURATION: 98 % | BODY MASS INDEX: 47.43 KG/M2 | RESPIRATION RATE: 20 BRPM | WEIGHT: 267.7 LBS | DIASTOLIC BLOOD PRESSURE: 81 MMHG | TEMPERATURE: 96.6 F | HEART RATE: 82 BPM

## 2024-09-18 DIAGNOSIS — I10 PRIMARY HYPERTENSION: ICD-10-CM

## 2024-09-18 DIAGNOSIS — I63.9 ACUTE STROKE DUE TO ISCHEMIA: ICD-10-CM

## 2024-09-18 DIAGNOSIS — I51.3 LEFT VENTRICULAR THROMBUS: ICD-10-CM

## 2024-09-18 DIAGNOSIS — I50.22 CHRONIC HFREF (HEART FAILURE WITH REDUCED EJECTION FRACTION): Primary | ICD-10-CM

## 2024-09-18 RX ORDER — PANTOPRAZOLE SODIUM 20 MG/1
20 TABLET, DELAYED RELEASE ORAL DAILY
COMMUNITY
Start: 2024-09-05

## 2024-09-20 ENCOUNTER — HOSPITAL ENCOUNTER (OUTPATIENT)
Facility: HOSPITAL | Age: 34
Discharge: HOME | End: 2024-09-20
Admitting: INTERNAL MEDICINE
Payer: COMMERCIAL

## 2024-09-20 ENCOUNTER — OUTSIDE FACILITY SERVICE (OUTPATIENT)
Dept: CARDIOLOGY | Facility: CLINIC | Age: 34
End: 2024-09-20
Payer: COMMERCIAL

## 2024-09-20 PROCEDURE — 0 GADOBENATE DIMEGLUMINE 529 MG/ML SOLUTION: Performed by: INTERNAL MEDICINE

## 2024-09-20 PROCEDURE — A9577 INJ MULTIHANCE: HCPCS | Performed by: INTERNAL MEDICINE

## 2024-09-20 PROCEDURE — 75561 CARDIAC MRI FOR MORPH W/DYE: CPT

## 2024-09-20 RX ADMIN — GADOBENATE DIMEGLUMINE 35 ML: 529 INJECTION, SOLUTION INTRAVENOUS at 17:36

## 2024-09-25 ENCOUNTER — DOCUMENTATION (OUTPATIENT)
Dept: CARDIOLOGY | Facility: HOSPITAL | Age: 34
End: 2024-09-25
Payer: COMMERCIAL

## 2024-10-01 ENCOUNTER — TELEPHONE (OUTPATIENT)
Dept: CARDIOLOGY | Facility: CLINIC | Age: 34
End: 2024-10-01
Payer: COMMERCIAL

## 2024-10-01 NOTE — TELEPHONE ENCOUNTER
----- Message from Dion Hooker sent at 9/30/2024  5:16 PM EDT -----  Please let the patient know I reviewed her cardiac MRI results.  EF still low.  Thrombus is gone now.  Continue current cardiac medicines and keep follow-up in the cardiology clinic as currently scheduled in October.  We will repeat an echo at that time as well (with Lumason contrast to rule out LV thrombus again and to reassess EF).  ----- Message -----  From: Wilver Kate, Corinna Sandoval  Sent: 9/30/2024   2:13 PM EDT  To: Dion Hooker MD

## 2024-10-01 NOTE — TELEPHONE ENCOUNTER
Discussed results with patient. Pt agreeable to taking current cardiac medications, and she will keep her appointment in October.

## 2024-10-04 RX ORDER — SPIRONOLACTONE 25 MG/1
25 TABLET ORAL DAILY
Start: 2024-10-04

## 2024-10-14 ENCOUNTER — OFFICE VISIT (OUTPATIENT)
Dept: NEUROLOGY | Facility: CLINIC | Age: 34
End: 2024-10-14
Payer: COMMERCIAL

## 2024-10-14 VITALS
TEMPERATURE: 97.7 F | HEART RATE: 90 BPM | OXYGEN SATURATION: 96 % | HEIGHT: 63 IN | SYSTOLIC BLOOD PRESSURE: 132 MMHG | BODY MASS INDEX: 47.89 KG/M2 | WEIGHT: 270.3 LBS | DIASTOLIC BLOOD PRESSURE: 92 MMHG

## 2024-10-14 DIAGNOSIS — I63.511 ACUTE RIGHT ARTERIAL ISCHEMIC STROKE, MCA (MIDDLE CEREBRAL ARTERY): ICD-10-CM

## 2024-10-14 DIAGNOSIS — H53.8 BLURRED VISION, LEFT EYE: ICD-10-CM

## 2024-10-14 DIAGNOSIS — Z86.73 HISTORY OF STROKE: ICD-10-CM

## 2024-10-14 DIAGNOSIS — D68.59 HYPERCOAGULOPATHY: Primary | ICD-10-CM

## 2024-10-14 RX ORDER — LANOLIN ALCOHOL/MO/W.PET/CERES
1 CREAM (GRAM) TOPICAL DAILY
COMMUNITY
Start: 2024-10-04

## 2024-10-14 NOTE — PROGRESS NOTES
Stroke Clinic Office Visit     Encounter Date: 10/14/2024   Patient Name: Keya Hamilton  : 1990  MRN: [id number]   PCP: Cuong Parks MD  Referring Provider: MICHELLE Rodriguez     Chief Complaint Follow-up and Stroke    History of Present Illness  Keya Hamilton is a 34 y.o. female with recent history of acute right hemispheric stroke 2024 here today to establish care.    The patient arrived at [facility] ED via EMS with left-sided hemiparesis and NIH 11.  The patient underwent a complete stroke workup, CT negative for ICH, CTA H/N revealed a right M1 occlusion and CTP noted hypoperfusion in the right MCA territory as well.  Patient was deemed a candidate for IV TNK and taken to the Cath Lab for MET with a TICI 3 outcome.  Etiology, likely cardioembolic in the setting of left ventricular thrombus.  MRI I revealed AIS in the right [place]ter[place] and right head of caudate.  PMH: HLD, HTN, DM, obesity, marijuana use, on progesterone (MIRENA IUD). The patient was started on 5 mg Eliquis twice daily and 80 mg atorvastatin. Hypercoagulation panel, factor V Leiden diagnosed on .  The patient arrives today with no complaints.  She is taking Eliquis and atorvastatin with no complaints of muscle pain/spasm or unusual/prolonged episodes of bleeding.  Patient reports, she continues to monitor her stroke risk factors with her primary care physician and cardiology.Patient denies any other neurological symptoms, including: headache, vision changes, dysesthesias, loss of consciousness, seizure or new areas of motor weakness.     Subjective     Past Medical History:   Diagnosis Date    Diabetes mellitus     Hypertension     Sleep apnea     Stroke       Past Surgical History:   Procedure Laterality Date    INTERVENTIONAL RADIOLOGY PROCEDURE N/A 2024    Procedure: IR mechanical thrombectomy;  Surgeon: Lemuel Medina MD;  Location: [facility] INVASIVE LOCATION;  Service: Interventional  Radiology;  Laterality: N/A;     Family History   Problem Relation Age of Onset    Hypertension Mother     Hypertension Father     Hypertension Sister     Diabetes Sister     Stroke Maternal Aunt     Hypertension Maternal Grandmother     Diabetes Maternal Grandmother     Heart disease Maternal Grandmother     Hypertension Maternal Grandfather     Hypertension Paternal Grandmother     Hypertension Paternal Grandfather       Social History     Socioeconomic History    Marital status:    Tobacco Use    Smoking status: Former     Types: Cigarettes     Passive exposure: Past    Smokeless tobacco: Never   Vaping Use    Vaping status: Former    Substances: Nicotine, Flavoring    Devices: Disposable   Substance and Sexual Activity    Alcohol use: Not Currently    Drug use: Yes     Types: Marijuana     Comment: occasional    Sexual activity: Defer       Current Outpatient Medications:     amantadine (SYMMETREL) 100 MG capsule, Take 1 capsule by mouth Every 12 (Twelve) Hours., Disp: , Rfl:     apixaban (ELIQUIS) 5 MG tablet tablet, Take 1 tablet by mouth Every 12 (Twelve) Hours., Disp: 60 tablet, Rfl: 11    atorvastatin (LIPITOR) 80 MG tablet, Take 1 tablet by mouth Every Night., Disp: , Rfl:     empagliflozin (JARDIANCE) 10 MG tablet tablet, Take 1 tablet by mouth Daily., Disp: 30 tablet, Rfl: 11    nebivolol (BYSTOLIC) 5 MG tablet, Take 1 tablet by mouth Daily., Disp: 90 tablet, Rfl: 3    pantoprazole (PROTONIX) 20 MG EC tablet, Take 1 tablet by mouth Daily., Disp: , Rfl:     sacubitril-valsartan (ENTRESTO) 49-51 MG tablet, Take 1 tablet by mouth Every 12 (Twelve) Hours., Disp: 60 tablet, Rfl: 11    spironolactone (ALDACTONE) 25 MG tablet, Take 1 tablet by mouth Daily., Disp: , Rfl:     vitamin B-12 (CYANOCOBALAMIN) 1000 MCG tablet, Take 1 tablet by mouth Daily., Disp: , Rfl:     insulin glargine (LANTUS, SEMGLEE) 100 UNIT/ML injection, Inject 10 Units under the skin into the appropriate area as directed Daily.  "(Patient not taking: Reported on 10/14/2024), Disp: , Rfl:     Insulin Lispro (humaLOG) 100 UNIT/ML injection, Inject 2-7 Units under the skin into the appropriate area as directed 4 (Four) Times a Day Before Meals & at Bedtime. (Patient not taking: Reported on 10/14/2024), Disp: , Rfl:    Allergies   Allergen Reactions    Codeine Hives        Objective     Physical Exam:  Vitals:    10/14/24 0935   BP: 132/92   Pulse: 90   Temp: 97.7 °F (36.5 °C)   SpO2: 96%   Weight: 123 kg (270 lb 4.8 oz)   Height: 160 cm (62.99\")      Body mass index is 47.89 kg/m².     Physical Exam:  General Appearance: Alert  Eyes: Anicteric sclera  HEENT: no scleral injection   Lungs: respirations appear comfortable, no obvious increased work of breathing  Extremities: No cyanosis or fingernail clubbing   Skin: No rashes in exposed skin areas     Neurological Examination:   Mental status: Alert and oriented to person, place, and time. Speech with no dysarthria, able to name and repeat with no difficulty.    Cranial Nerves: Visual fields intact. Extraocular movements are intact with no nystagmus. Facial sensation intact. Face symmetrical. Hearing grossly intact. Palate movement is symmetric. Full shoulder shrug bilaterally. Tongue protrudes midline.   Sensory: Sensory exam to light touch in all four extremities distally is normal. Double simultaneous sensory stimulation shows no extinction  Motor: Normal tone throughout. Normal bulk. Pronator drift is absent.  Left upper extremity: 5/5 deltoid, tricep, bicep, interosseous, hand .   Right upper extremity: 5/5 deltoid, tricep, bicep, interosseous, hand .   Left lower extremity: 5/5 iliopsoas, knee extension/flexion, foot dorsi/plantarflexion.  Right lower extremity: 5/5 iliopsoas, knee extension/flexion, foot dorsi/plantarflexion.  Cerebellar: Finger-to-nose intact. Heel-to-shin intact. Rapid alternating movements are intact.   Gait: Normal.    NIHSS:     1a  Level of consciousness: " 0=alert; keenly responsive   1b. LOC questions:  0=Performs both tasks correctly   1c. LOC commands: 0=Answers both questions correctly   2.  Best Gaze: 0=normal   3.  Visual: 1=Partial hemianopia   4. Facial Palsy: 0=Normal symmetric movement   5a.  Motor left arm: 0=No drift, limb holds 90 (or 45) degrees for full 10 seconds   5b.  Motor right arm: 0=No drift, limb holds 90 (or 45) degrees for full 10 seconds   6a. motor left le=No drift, limb holds 90 (or 45) degrees for full 10 seconds   6b  Motor right le=No drift, limb holds 90 (or 45) degrees for full 10 seconds   7. Limb Ataxia: 0=Absent   8.  Sensory: 0=Normal; no sensory loss   9. Best Language:  0=No aphasia, normal   10. Dysarthria: 0=Normal   11. Extinction and Inattention: 0=No abnormality     Modified Cameron Score based on in-office assessment of alertness, orientation, and physical mobility. Further assessment with neurocognitive testing may be needed to elucidate full extent of cognitive abilities: 1 = No significant disability (Able to carry out all usual activities, despite some symptoms)    Over the past month…    Snoring:   Do you snore loudly (loud enough to be heard through closed doors or your bed partner elbows you for snoring at night)?    NO   Tired:   Do you often feel tired, fatigued or sleepy during the daytime (such as falling asleep during driving or talking to someone)?    No   Observed:   Has anyone observed you stop breathing or choking/gasping during your sleep?    No   Pressure:   Do you have or are you being treated for high blood pressure?    Yes   Body Mass Index:   Is the BMI > 35kg/m2 ?   BMI = 47.89   Yes   Age:   Older than 50?    yes   Neck Size:   Is your shirt collar > 16 inches/40 cm or larger? (measured around Miguel apple).   Neck Size =      unknown   Gender:   Male/Female?    Female   Total Score:      0-2 = SCOTT Low Risk    3-4 = SCOTT Intermediate Risk    5-8 = SCOTT High Risk  Or >2 + male gender  Or >2 + BMI  > 35kg/m2  Or > 2 + neck circumference 16 inches / 40cm       PHQ-9 Depression Screening  Little interest or pleasure in doing things? Not at allno   Feeling down, depressed, or hopeless? Not at allno   Trouble falling or staying asleep, or sleeping too much?     Feeling tired or having little energy?     Poor appetite or overeating?     Feeling bad about yourself - or that you are a failure or have let yourself or your family down?     Trouble concentrating on things, such as reading the newspaper or watching television?     Moving or speaking so slowly that other people could have noticed? Or the opposite - being so fidgety or restless that you have been moving around a lot more than usual?     Thoughts that you would be better off dead, or of hurting yourself in some way?     PHQ-9 Total Score     If you checked off any problems, how difficult have these problems made it for you to do your work, take care of things at home, or get along with other people?               Imaging Reviewed:   MRI Brain Without Contrast [[id number]] (Order [id number])  Order  Status: Final result     Study Notes     Brenda Bishop on 8/24/2024  3:38 AM EDT   Stroke     Appointment Information    PACS Images     Radiology Images  Study Result    Narrative & Impression   MRI BRAIN WO CONTRAST   Date of Exam: 8/24/2024 3:36 AM EDT   Indication: stroke.   Comparison: 8/23/2024.     Technique:  Routine multiplanar/multisequence sequence images of the brain were obtained without contrast administration.      Findings:  There are patchy areas of diffusion restriction present within the periventricular white matter on the right as well as the right basal ganglia with tiny foci of diffusion restriction seen within the subcortical white matter of the right frontal lobe and   the right temporal lobe as well as the right occipital lobe with corresponding decreased signal on the ADC map consistent with recent or acute ischemia. FLAIR signal changes  are seen corresponding to these regions which appears most pronounced within   the right putamen. No additional significant FLAIR signal changes. Given the degree of signal intensity, some of the findings may be more subacute. No additional diffusion restriction identified. There is no evidence of acute or chronic intracranial   hemorrhage. No mass effect or midline shift. No abnormal extra-axial collections. The major vascular flow voids appear intact. The basal ganglia, brainstem and cerebellum appear within normal limits. Calvarial and superficial soft tissue signal is within   normal limits. Orbits appear unremarkable. The paranasal sinuses and the mastoid air cells appear well aerated. Midline structures are intact.         IMPRESSION:  Impression:  Patchy areas of diffusion restriction present within the right periventricular white matter, the right basal ganglia and the right temporal lobe and the right occipital lobe consistent with recent or acute ischemia. Given the degree of signal intensity   in presence of FLAIR signal change, findings likely more subacute. No evidence of hemorrhage. No significant mass effect or midline shift.           Electronically Signed: Kayla Negron MD    8/24/2024 4:29 AM EDT    Workstation ID: [id number]     Laboratory Results:   Hemoglobin   Date Value Ref Range Status   08/30/2024 18.9 (H) 12.0 - 15.9 g/dL Final   06/17/2018 15.3 12.0 - 16.0 g/dL Final     Hematocrit   Date Value Ref Range Status   08/30/2024 55.7 (H) 34.0 - 46.6 % Final   06/17/2018 43.1 36.0 - 48.0 % Final     Platelets   Date Value Ref Range Status   08/30/2024 469 (H) 140 - 450 10*3/mm3 Final   06/17/2018 391 135 - 450 K/uL Final     Hemoglobin A1C   Date Value Ref Range Status   08/24/2024 6.70 (H) 4.80 - 5.60 % Final     LDL Cholesterol    Date Value Ref Range Status   08/24/2024 115 (H) 0 - 100 mg/dL Final     AST (SGOT)   Date Value Ref Range Status   08/30/2024 43 (H) 1 - 32 U/L Final     ALT (SGPT)    Date Value Ref Range Status   08/30/2024 36 (H) 1 - 33 U/L Final           Assessment / Plan    34 y.o. right-handed vasculopath female with R MCA M1 occlusion, s/p TNK and MET with TICI 3.    PMH: HLD, HTN, DM, obesity, marijuana use, on progesterone (MIRENA IUD), elevated liver enzymes.  Exam: Left upper extremity hemiparesis the patient presents.  CT negative for ICH, CTA H/N revealed a right M1 occlusion and CTP noted hypoperfusion in the right MCA territory as well.   MRI 8/23/2024 right basal ganglia and the right temporal lobe and the right occipital lobe consistent with recent or acute ischemia.   Hypercoagulation panel, factor V Leiden diagnosed on 8/24.  Etiology, likely cardioembolic in the setting of left ventricular thrombus.     Assessment and Plan  Diagnoses and all orders for this visit:    1. Hypercoagulopathy (Primary)  -     Ambulatory Referral to Hematology    2. Blurred vision, left eye  -     Ambulatory Referral to Ophthalmology    3. Acute right arterial ischemic stroke, MCA (middle cerebral artery)  -     Ambulatory Referral to Hematology  -     Ambulatory Referral to Ophthalmology    4. History of stroke    -Continue 5 mg Eliquis twice daily    -Continue 80 mg of Atorvastatin  -Exercise 30 minutes 3 - 4 times a day  -Heart and Brain Healthy diet, Consider a Mediterranean Diet.   -Continue tight control of blood sugars  -Journal BP at home and call PCP with persistent BP >140/90  -The patient was advised to follow up with her primary care physician for ongoing management and screening for hypertension, hypercholesterolemia, and diabetes.   -The patient was counseled on long-term blood pressure goal less than 120/80, long-term LDL goal less than 70, and long-term hemoglobin A1c goal less than 7.0% for stroke prevention. This was also printed for the patient in the after visit summary.  -The patient was counseled she experiences symptoms of acute onset unilateral arm, leg, or face  "weakness/numbness/tingling, unilateral facial droop, slurred speech/word finding difficulty, visual disturbance (\"curtain falling\" or visual field loss), or severe headache she should call 911 and present to the nearest emergency department immediately.    I spent 30 minutes caring for Keya on this date of service. This time includes time spent by me in the following activities:reviewing tests, performing a medically appropriate examination and/or evaluation , counseling and educating the patient/family/caregiver, ordering medications, tests, or procedures, and documenting information in the medical record    Follow Up  Return in about 3 months (around 1/14/2025).    MICHELLE Stout  [facility] NEURO STROKE     Part of this note may be an electronic transcription/translation of spoken language to printed text using the Dragon Dictation System.  "

## 2024-10-14 NOTE — PATIENT INSTRUCTIONS
-Continue Eliquis  -Continue 80 mg of Atorvastatin  -Exercise 15 min-Heart and Brain Healthy diet, Consider a Mediterranean Diet.   -Continue tight control of blood sugars  -Journal BP at home and call PCP with persistent BP >140/90  -Follow up Hematology  -Follow up with Neuro Ophthalmology

## 2024-10-16 ENCOUNTER — PATIENT ROUNDING (BHMG ONLY) (OUTPATIENT)
Dept: NEUROLOGY | Facility: CLINIC | Age: 34
End: 2024-10-16
Payer: COMMERCIAL

## 2024-10-16 NOTE — PROGRESS NOTES
October 16, 2024    Hello, may I speak with Keya Hamilton?    My name is Maame      I am  with [facility]  [address]  [facility] [address] [phone].    Before we get started may I verify your date of birth? 1990    I am calling to officially welcome you to our practice and ask about your recent visit. Is this a good time to talk? yes    Tell me about your visit with us. What things went well?  Patient said that everything went well. She thinks that everyone was friendly and professional and that she was seen promptly. Patient had no further recommendations and had a wonderful experience.        We're always looking for ways to make our patients' experiences even better. Do you have recommendations on ways we may improve?  no    Overall were you satisfied with your first visit to our practice? yes       I appreciate you taking the time to speak with me today. Is there anything else I can do for you? no      Thank you, and have a great day.

## 2024-10-17 ENCOUNTER — TELEPHONE (OUTPATIENT)
Dept: NEUROLOGY | Facility: CLINIC | Age: 34
End: 2024-10-17
Payer: COMMERCIAL

## 2024-10-17 NOTE — TELEPHONE ENCOUNTER
Provider: ERINN    Caller: STEVEN WITH [facility]     Phone Number: [phone]    Reason for Call: STEVEN WITH [facility] CALLED AND IS NEEDING ARE TURN CALL OR FAX REGARDING PTS' PLAN OF CARE. STEVEN STATES THAT OFFICE NOTE CAN BE FAXED INSTEAD OF A RETURN CALL IF NEEDED.  FAX#[phone]    PLEASE REVIEW AND ADVISE  THANK YOU   [Follow-Up Visit] : a follow-up [Pre-Treatment Visit] : a pre-treatment [FreeTextEntry2] : Breast Cancer

## 2024-10-21 NOTE — TELEPHONE ENCOUNTER
Please call the patient let her know her P2 Y12 for clotting inhibition is normal.  The patient does not have an underlying clotting disorder.

## 2024-10-28 NOTE — PROGRESS NOTES
[facility] Cardiology   [address], [address]  [place], [place], [address]    [phone]  [email or web address]           OUTPATIENT CLINIC FOLLOW UP NOTE    Patient Care Team:  Patient Care Team:  Cuong Parks MD as PCP - General (Family Medicine)  Faustina Reyes APRN as Nurse Practitioner (Cardiology)    Subjective:   Chief Complaint:   Chief Complaint   Patient presents with    Chronic HFrEF     8 week HFU         Keya Hamilton is a 34 y.o. female.  Cardiac focused, problem list:  Acute HFrEF  Severe exacerbation, new diagnosis 8/2024.  Possible contributing causes include recent pneumonia, positive meth and marijuana  Echocardiogram 8/24/2024:  LVEF 46% with global hypokinesis.   Limited echo, 8/29/2024:  LVEF 26-30%. LVH. Previously noted LV thrombus appears much improved, there appears to be a smaller residual mural thrombus at the apex.   LV thrombus  Echocardiogram, 8/24/2024:  LVEF 36.7%. LVH. Diastolic dysfunction noted. Thrombus present in LV 2.22 cm x 0.96 cm. Dilated LA. Indeterminate bubble study.   Right MCA syndrome  Presented to [facility] ED with left sided hemiparesis, dysarthria, somnolence, rightward gaze deviation, 8/23/2024. CT head unrevealing, CTP displayed right MCA perfusion deficit with M1 occlusion.  Status post TNK.   Status post urgent mechanical thrombectomy, 8/23/2024, Dr. Medina.   Cardiac event monitor, 10/16/2024:  Monitored for 23 days, No significant arrhythmia.  Patient triggered events associated with sinus rhythm.   Hypertension   Type 2 diabetes mellitus   Right renal atrophy  SCOTT on CPAP  Substance abuse   UDS positive for THC, methamphetamine (possibly laced marijuana).   Obesity    HPI:    Patient presents today for hospital follow up.     Patient was hospitalized at [facility] 8/2024 for acute right MCA stroke.  Status post TNK and urgent mechanical thrombectomy.  Echocardiogram at that time revealing severely decreased EF 26 to 30% and LV  thrombus.  Repeat limited echo prior to discharge showed much improvement in LV thrombus but EF remains low at 26 to 30%.  She was discharged on GDMT for HF.  Has been doing well from a cardiac standpoint since her hospitalization.  Denies chest pain, shortness of breath, palpitations, lower extremity edema, lightheadedness or syncope.  Continues to have left-sided weakness and blurred vision but improving.    Review of Systems:  As noted above in the HPI     PFSH:  Patient Active Problem List   Diagnosis    R M1 stenosis    T2DM    HTN    Morbid obesity with BMI of 45.0-49.9, adult    SCOTT    Hypertensive urgency    R/O Seizure    Acute respiratory failure    Acute stroke due to ischemia    Acute systolic heart failure    Left ventricular thrombus    Chronic HFrEF (heart failure with reduced ejection fraction)    Right renal atrophy         Current Outpatient Medications:     amantadine (SYMMETREL) 100 MG capsule, Take 1 capsule by mouth Every 12 (Twelve) Hours., Disp: , Rfl:     apixaban (ELIQUIS) 5 MG tablet tablet, Take 1 tablet by mouth Every 12 (Twelve) Hours., Disp: 60 tablet, Rfl: 11    atorvastatin (LIPITOR) 80 MG tablet, Take 1 tablet by mouth Every Night., Disp: , Rfl:     empagliflozin (JARDIANCE) 10 MG tablet tablet, Take 1 tablet by mouth Daily., Disp: 30 tablet, Rfl: 11    nebivolol (BYSTOLIC) 5 MG tablet, Take 1 tablet by mouth Daily., Disp: 90 tablet, Rfl: 3    pantoprazole (PROTONIX) 20 MG EC tablet, Take 1 tablet by mouth Daily., Disp: , Rfl:     sacubitril-valsartan (ENTRESTO) 49-51 MG tablet, Take 1 tablet by mouth Every 12 (Twelve) Hours., Disp: 60 tablet, Rfl: 11    spironolactone (ALDACTONE) 25 MG tablet, Take 1 tablet by mouth Daily., Disp: , Rfl:     vitamin B-12 (CYANOCOBALAMIN) 1000 MCG tablet, Take 1 tablet by mouth Daily., Disp: , Rfl:     metoprolol tartrate (LOPRESSOR) 50 MG tablet, Take 50 mg at Bedtime the Night Before Coronary CTA Appointment and In the Morning 1 Hour Prior to  "Coronary CTA Appointment. Do not take if heart rate less than 60., Disp: 2 tablet, Rfl: 0     reports that she has quit smoking. Her smoking use included cigarettes. She has a 2.5 pack-year smoking history. She has been exposed to tobacco smoke. She has never used smokeless tobacco.      Objective:   Physical exam:  /74 (BP Location: Left arm, Patient Position: Sitting, Cuff Size: Adult)   Pulse 80   Ht 160 cm (63\")   Wt 125 kg (274 lb 14.4 oz)   SpO2 98%   BMI 48.70 kg/m²   CONSTITUTIONAL: No acute distress  RESPIRATORY: Normal effort. Clear to auscultation bilaterally without wheezing or rales  CARDIOVASCULAR: Regular rate and rhythm with normal S1 and S2. Without murmur.  PERIPHERAL VASCULAR: No carotid bruit bilaterally.  Normal radial pulse. There is no lower extremity edema bilaterally.      Labs:    Lab Results   Component Value Date     (H) 08/24/2024     No components found for: \"LDLDIRECTC\"    Diagnostic Data:      ECG 12 Lead    Date/Time: 10/30/2024 11:02 AM  Performed by: Faustina Reyes APRN    Authorized by: Faustina Reyes APRN  Comparison: compared with previous ECG from 8/23/2024  Rhythm: sinus rhythm  Rate: normal  BPM: 80  Other findings: non-specific ST-T wave changes          Results for orders placed during the hospital encounter of 08/23/24    Adult Transthoracic Echo Limited W/ Cont if Necessary Per Protocol    Interpretation Summary    This was a limited echocardiogram to reassess left ventricular function    Left ventricular systolic function is severely decreased. Left ventricular ejection fraction appears to be 26 - 30%.    Left ventricular wall thickness is consistent with hypertrophy.    The previously noted LV thrombus appears much improved, there appears to be a smaller residual mural thrombus at the apex      Assessment and Plan:     Chronic HFrEF (heart failure with reduced ejection fraction)  -Newly diagnosed at time of CVA 8/2024.   -Possible contributing " causes include recent pneumonia, substance abuse (positive meth and marijuana on UDS)  -LVEF 26 to 30% on echocardiogram 8/2024.  -EF has remained low on cardiac MRI EF remained low on cardiac MRI 9/2024.  -Recommend repeat echocardiogram with Lumason at 90 days, no sooner than 11/25/2024, to rule out LV thrombus again and to reassess EF.  -If EF remains low, consider ICD placement.  Discussed with patient and  today.   -Repeat labs today including BMP, proBNP.   -Recommend ischemic evaluation with cardiac CTA.   -Continue nebivolol, Entresto, spironolactone, Jardiance    Left ventricular thrombus  -Seen on echo at time of CVA.  -Cardiac MRI 9/2024 showed resolution of LV thrombus.   -Continue apixaban.     CVA secondary to right MCA occlusion   -Status post TNK and mechanical thrombectomy.   -Following with neurology.   -Continue statin.     Primary hypertension   -Blood pressure stable.   -Continue     SCOTT  -Patient will need sleep medicine referral for evaluation.  Will discuss at next follow up.       - Return in about 3 months (around 1/30/2025) for Next scheduled follow up with Dr. Hooker.    Electronically signed by MICHELLE Do, 10/30/24, 11:05 AM EDT.

## 2024-10-30 ENCOUNTER — LAB (OUTPATIENT)
Facility: HOSPITAL | Age: 34
End: 2024-10-30
Payer: COMMERCIAL

## 2024-10-30 ENCOUNTER — OFFICE VISIT (OUTPATIENT)
Dept: CARDIOLOGY | Facility: CLINIC | Age: 34
End: 2024-10-30
Payer: COMMERCIAL

## 2024-10-30 VITALS
HEIGHT: 63 IN | BODY MASS INDEX: 48.71 KG/M2 | HEART RATE: 80 BPM | DIASTOLIC BLOOD PRESSURE: 74 MMHG | SYSTOLIC BLOOD PRESSURE: 132 MMHG | WEIGHT: 274.9 LBS | OXYGEN SATURATION: 98 %

## 2024-10-30 DIAGNOSIS — I50.22 CHRONIC HFREF (HEART FAILURE WITH REDUCED EJECTION FRACTION): Primary | ICD-10-CM

## 2024-10-30 DIAGNOSIS — I51.3 LEFT VENTRICULAR THROMBUS: ICD-10-CM

## 2024-10-30 DIAGNOSIS — I10 PRIMARY HYPERTENSION: Chronic | ICD-10-CM

## 2024-10-30 DIAGNOSIS — I50.22 CHRONIC HFREF (HEART FAILURE WITH REDUCED EJECTION FRACTION): ICD-10-CM

## 2024-10-30 DIAGNOSIS — I63.9 ACUTE STROKE DUE TO ISCHEMIA: ICD-10-CM

## 2024-10-30 PROBLEM — N26.1 RIGHT RENAL ATROPHY: Status: ACTIVE | Noted: 2024-10-30

## 2024-10-30 PROCEDURE — 83880 ASSAY OF NATRIURETIC PEPTIDE: CPT

## 2024-10-30 PROCEDURE — 80048 BASIC METABOLIC PNL TOTAL CA: CPT

## 2024-10-30 PROCEDURE — 36415 COLL VENOUS BLD VENIPUNCTURE: CPT

## 2024-10-30 RX ORDER — METOPROLOL TARTRATE 25 MG/1
50 TABLET, FILM COATED ORAL ONCE
OUTPATIENT
Start: 2024-10-30

## 2024-10-30 RX ORDER — SODIUM CHLORIDE 0.9 % (FLUSH) 0.9 %
10 SYRINGE (ML) INJECTION AS NEEDED
OUTPATIENT
Start: 2024-10-30

## 2024-10-30 RX ORDER — METOPROLOL TARTRATE 50 MG
TABLET ORAL
Qty: 2 TABLET | Refills: 0 | Status: SHIPPED | OUTPATIENT
Start: 2024-10-30

## 2024-10-30 RX ORDER — METOPROLOL TARTRATE 50 MG
50 TABLET ORAL
OUTPATIENT
Start: 2024-10-30

## 2024-10-30 RX ORDER — IVABRADINE 5 MG/1
15 TABLET, FILM COATED ORAL ONCE
OUTPATIENT
Start: 2024-10-30 | End: 2024-10-30

## 2024-10-30 RX ORDER — METOPROLOL TARTRATE 25 MG/1
25 TABLET, FILM COATED ORAL ONCE
OUTPATIENT
Start: 2024-10-30

## 2024-10-30 RX ORDER — METOPROLOL TARTRATE 25 MG/1
100 TABLET, FILM COATED ORAL ONCE
OUTPATIENT
Start: 2024-10-30

## 2024-10-30 RX ORDER — METOPROLOL TARTRATE 1 MG/ML
5 INJECTION, SOLUTION INTRAVENOUS
OUTPATIENT
Start: 2024-10-30

## 2024-10-30 RX ORDER — METOPROLOL TARTRATE 25 MG/1
200 TABLET, FILM COATED ORAL ONCE
OUTPATIENT
Start: 2024-10-30 | End: 2024-10-30

## 2024-10-30 RX ORDER — SODIUM CHLORIDE 0.9 % (FLUSH) 0.9 %
10 SYRINGE (ML) INJECTION EVERY 12 HOURS SCHEDULED
OUTPATIENT
Start: 2024-10-30

## 2024-10-30 RX ORDER — NITROGLYCERIN 0.4 MG/1
0.4 TABLET SUBLINGUAL
OUTPATIENT
Start: 2024-10-30 | End: 2024-10-30

## 2024-10-30 RX ORDER — NITROGLYCERIN 0.4 MG/1
0.8 TABLET SUBLINGUAL
OUTPATIENT
Start: 2024-10-30

## 2024-10-30 RX ORDER — METOPROLOL TARTRATE 25 MG/1
150 TABLET, FILM COATED ORAL ONCE
OUTPATIENT
Start: 2024-10-30

## 2024-10-31 LAB
ANION GAP SERPL CALCULATED.3IONS-SCNC: 12 MMOL/L (ref 5–15)
BUN SERPL-MCNC: 12 MG/DL (ref 6–20)
BUN/CREAT SERPL: 10.6 (ref 7–25)
CALCIUM SPEC-SCNC: 9.2 MG/DL (ref 8.6–10.5)
CHLORIDE SERPL-SCNC: 105 MMOL/L (ref 98–107)
CO2 SERPL-SCNC: 21 MMOL/L (ref 22–29)
CREAT SERPL-MCNC: 1.13 MG/DL (ref 0.57–1)
EGFRCR SERPLBLD CKD-EPI 2021: 66.4 ML/MIN/1.73
GLUCOSE SERPL-MCNC: 188 MG/DL (ref 65–99)
NT-PROBNP SERPL-MCNC: 52.6 PG/ML (ref 0–450)
POTASSIUM SERPL-SCNC: 4 MMOL/L (ref 3.5–5.2)
SODIUM SERPL-SCNC: 138 MMOL/L (ref 136–145)

## 2024-11-20 ENCOUNTER — OFFICE VISIT (OUTPATIENT)
Dept: CARDIOLOGY | Facility: HOSPITAL | Age: 34
End: 2024-11-20
Payer: COMMERCIAL

## 2024-11-20 VITALS
OXYGEN SATURATION: 94 % | HEIGHT: 63 IN | TEMPERATURE: 97.1 F | RESPIRATION RATE: 20 BRPM | DIASTOLIC BLOOD PRESSURE: 78 MMHG | SYSTOLIC BLOOD PRESSURE: 125 MMHG | BODY MASS INDEX: 48.55 KG/M2 | HEART RATE: 77 BPM | WEIGHT: 274 LBS

## 2024-11-20 DIAGNOSIS — I10 PRIMARY HYPERTENSION: ICD-10-CM

## 2024-11-20 DIAGNOSIS — I50.22 CHRONIC HFREF (HEART FAILURE WITH REDUCED EJECTION FRACTION): Primary | ICD-10-CM

## 2024-11-20 DIAGNOSIS — I63.9 ACUTE STROKE DUE TO ISCHEMIA: ICD-10-CM

## 2024-11-20 DIAGNOSIS — I51.3 LEFT VENTRICULAR THROMBUS: ICD-10-CM

## 2024-11-20 NOTE — PROGRESS NOTES
"Chief Complaint  Congestive Heart Failure and Follow-up    Subjective    History of Present Illness {  Problem List  Visit  Diagnosis   Encounters  Notes  Medications  Labs  Result Review Imaging  Media :23}       History of Present Illness   34-year-old female with a history of type 2 diabetes mellitus, right renal atrophy, morbid obesity, hypertension and obstructive sleep apnea presents today for ongoing evaluation of her recent MCA CVA and cardiomyopathy.  She presented to [facility] ED ED on 8/23/2024 with a stroke.  She got TNK ase.  MRI showed acute ischemic stroke in the right putamen and head of caudate.  RAMU showed an LV thrombus, left atrial cavity dilation bubble study indeterminant with an EF 36%.  She was intubated in the ED and extubated on 8/25.  She has transitioned to [facility].  She recently discharged home from [facility] and is participating in outpatient occupational therapy.  She was initiated on GDMT prior to discharge.  LifeVest was considered but not ordered.Urine drug screen was positive for meth and THC.  Wore a cardiac event monitor and that showed no afib.   She notes she is feeling well and currently denies chest pain, dyspnea, dizziness, presyncope or syncope.  Objective     Vital Signs:   Vitals:    11/20/24 1011   BP: 125/78   BP Location: Left arm   Patient Position: Sitting   Cuff Size: Adult   Pulse: 77   Resp: 20   Temp: 97.1 °F (36.2 °C)   TempSrc: Temporal   SpO2: 94%   Weight: 124 kg (274 lb)   Height: 160 cm (62.99\")     Body mass index is 48.55 kg/m².  Physical Exam  Vitals and nursing note reviewed.   Constitutional:       Appearance: Normal appearance.   HENT:      Head: Normocephalic.   Eyes:      Pupils: Pupils are equal, round, and reactive to light.   Cardiovascular:      Rate and Rhythm: Normal rate and regular rhythm.      Pulses: Normal pulses.      Heart sounds: Normal heart sounds. No murmur heard.  Pulmonary:      Effort: Pulmonary " effort is normal.      Breath sounds: Normal breath sounds.   Abdominal:      General: Bowel sounds are normal.      Palpations: Abdomen is soft.   Musculoskeletal:         General: Normal range of motion.      Cervical back: Normal range of motion.      Right lower leg: No edema.      Left lower leg: No edema.   Skin:     General: Skin is warm and dry.      Capillary Refill: Capillary refill takes less than 2 seconds.   Neurological:      Mental Status: She is alert and oriented to person, place, and time.      Motor: Weakness (left upper extremity) present.   Psychiatric:         Mood and Affect: Mood normal.         Thought Content: Thought content normal.              Result Review  Data Reviewed:{ Labs  Result Review  Imaging  Med Tab  Media :23}     Labs [facility] 9/4/2024: WBC 9.9, RBC 5.29, hemoglobin 17.8, hematocrit 52.7, platelets 446, creatinine clearance 66.79, creatinine 1.00, sodium 139, potassium 4.2, chloride 107, carbon oxide 23, glucose 2 1, BUN 19, estimated , calcium 9.5,  Cardiac Catheterization/Vascular Study (08/23/2024 21:58)  Adult Transthoracic Echo Complete W/ Cont if Necessary Per Protocol (With Agitated Saline) (08/24/2024 08:34)  Adult Transthoracic Echo Limited W/ Cont if Necessary Per Protocol (08/29/2024 10:00)         Assessment and Plan {CC Problem List  Visit Diagnosis  ROS  Review (Popup)  Health Maintenance  Quality  BestPractice  Medications  SmartSets  SnapShot Encounters  Media :23}   1. Chronic HFrEF (heart failure with reduced ejection fraction)  Continue Jardiance Bystolic, Entresto.aldactone   Heart failure education today including signs and symptoms, the role of the heart failure center, daily weights, low sodium diet (less than 1500 mg per day), and daily physical activity. Reviewed HF Zones with patient and family.  Patient to continue current medications as previously ordered.   Upcoming echo next week  2. Left ventricular  thrombus  Continue Eliquis 5 mg twice daily    3. Acute stroke due to ischemia  Continue outpatient physical and occupational therapy   S/p tnkase      4. Primary hypertension  Stable on Bystolic, Entresto  Continue to monitor          Follow Up {Instructions Charge Capture  Follow-up Communications :23}   Return if symptoms worsen or fail to improve.    Patient was given instructions and counseling regarding her condition or for health maintenance advice. Please see specific information pulled into the AVS if appropriate.  Patient was instructed to call the [facility] with any questions, concerns, or worsening symptoms.

## 2024-11-27 ENCOUNTER — HOSPITAL ENCOUNTER (OUTPATIENT)
Facility: HOSPITAL | Age: 34
Discharge: HOME | End: 2024-11-27
Payer: COMMERCIAL

## 2024-11-27 VITALS
SYSTOLIC BLOOD PRESSURE: 145 MMHG | BODY MASS INDEX: 48.44 KG/M2 | HEIGHT: 63 IN | DIASTOLIC BLOOD PRESSURE: 94 MMHG | WEIGHT: 273.37 LBS

## 2024-11-27 VITALS
BODY MASS INDEX: 48.2 KG/M2 | OXYGEN SATURATION: 98 % | DIASTOLIC BLOOD PRESSURE: 56 MMHG | HEART RATE: 65 BPM | RESPIRATION RATE: 18 BRPM | HEIGHT: 63 IN | WEIGHT: 272.05 LBS | SYSTOLIC BLOOD PRESSURE: 112 MMHG

## 2024-11-27 DIAGNOSIS — I51.3 LEFT VENTRICULAR THROMBUS: ICD-10-CM

## 2024-11-27 DIAGNOSIS — I50.22 CHRONIC HFREF (HEART FAILURE WITH REDUCED EJECTION FRACTION): ICD-10-CM

## 2024-11-27 LAB
ASCENDING AORTA: 3.3 CM
B-HCG UR QL: NEGATIVE
BH CV ECHO MEAS - AO MAX PG: 5.7 MMHG
BH CV ECHO MEAS - AO MEAN PG: 3 MMHG
BH CV ECHO MEAS - AO ROOT DIAM: 4 CM
BH CV ECHO MEAS - AO V2 MAX: 119 CM/SEC
BH CV ECHO MEAS - AO V2 VTI: 21 CM
BH CV ECHO MEAS - AVA(I,D): 3 CM2
BH CV ECHO MEAS - EDV(CUBED): 185.2 ML
BH CV ECHO MEAS - EDV(MOD-SP2): 230 ML
BH CV ECHO MEAS - EDV(MOD-SP4): 255 ML
BH CV ECHO MEAS - EF(MOD-BP): 31.7 %
BH CV ECHO MEAS - EF(MOD-SP2): 33.5 %
BH CV ECHO MEAS - EF(MOD-SP4): 31 %
BH CV ECHO MEAS - EF_3D-VOL: 31 %
BH CV ECHO MEAS - ESV(CUBED): 91.1 ML
BH CV ECHO MEAS - ESV(MOD-SP2): 153 ML
BH CV ECHO MEAS - ESV(MOD-SP4): 176 ML
BH CV ECHO MEAS - FS: 21.1 %
BH CV ECHO MEAS - IVS/LVPW: 1 CM
BH CV ECHO MEAS - IVSD: 1.1 CM
BH CV ECHO MEAS - LA DIMENSION: 3.6 CM
BH CV ECHO MEAS - LAT PEAK E' VEL: 5.9 CM/SEC
BH CV ECHO MEAS - LV DIASTOLIC VOL/BSA (35-75): 115.3 CM2
BH CV ECHO MEAS - LV MASS(C)D: 256.7 GRAMS
BH CV ECHO MEAS - LV MAX PG: 2.8 MMHG
BH CV ECHO MEAS - LV MEAN PG: 1 MMHG
BH CV ECHO MEAS - LV SYSTOLIC VOL/BSA (12-30): 79.6 CM2
BH CV ECHO MEAS - LV V1 MAX: 84.4 CM/SEC
BH CV ECHO MEAS - LV V1 VTI: 15.1 CM
BH CV ECHO MEAS - LVIDD: 5.7 CM
BH CV ECHO MEAS - LVIDS: 4.5 CM
BH CV ECHO MEAS - LVOT AREA: 4.2 CM2
BH CV ECHO MEAS - LVOT DIAM: 2.3 CM
BH CV ECHO MEAS - LVPWD: 1.1 CM
BH CV ECHO MEAS - MED PEAK E' VEL: 5.7 CM/SEC
BH CV ECHO MEAS - MV A MAX VEL: 61.8 CM/SEC
BH CV ECHO MEAS - MV DEC SLOPE: 276 CM/SEC2
BH CV ECHO MEAS - MV DEC TIME: 0.22 SEC
BH CV ECHO MEAS - MV E MAX VEL: 69.7 CM/SEC
BH CV ECHO MEAS - MV E/A: 1.13
BH CV ECHO MEAS - MV MAX PG: 2.03 MMHG
BH CV ECHO MEAS - MV MEAN PG: 1 MMHG
BH CV ECHO MEAS - MV P1/2T: 76.2 MSEC
BH CV ECHO MEAS - MV V2 VTI: 19.9 CM
BH CV ECHO MEAS - MVA(P1/2T): 2.9 CM2
BH CV ECHO MEAS - MVA(VTI): 3.2 CM2
BH CV ECHO MEAS - PA ACC TIME: 0.09 SEC
BH CV ECHO MEAS - RAP SYSTOLE: 3 MMHG
BH CV ECHO MEAS - SV(LVOT): 62.7 ML
BH CV ECHO MEAS - SV(MOD-SP2): 77 ML
BH CV ECHO MEAS - SV(MOD-SP4): 79 ML
BH CV ECHO MEAS - SVI(LVOT): 28.4 ML/M2
BH CV ECHO MEAS - SVI(MOD-SP2): 34.8 ML/M2
BH CV ECHO MEAS - SVI(MOD-SP4): 35.7 ML/M2
BH CV ECHO MEAS - TAPSE (>1.6): 1.85 CM
BH CV ECHO MEASUREMENTS AVERAGE E/E' RATIO: 12.02
BH CV XLRA - RV BASE: 2.9 CM
BH CV XLRA - RV LENGTH: 7.7 CM
BH CV XLRA - RV MID: 2.6 CM
BH CV XLRA - TDI S': 10.2 CM/SEC
CREAT BLDA-MCNC: 1.1 MG/DL (ref 0.6–1.3)
EXPIRATION DATE: NORMAL
INTERNAL NEGATIVE CONTROL: NORMAL
INTERNAL POSITIVE CONTROL: NORMAL
Lab: NORMAL

## 2024-11-27 PROCEDURE — 82565 ASSAY OF CREATININE: CPT | Performed by: HOSPITALIST

## 2024-11-27 PROCEDURE — 81025 URINE PREGNANCY TEST: CPT | Performed by: INTERNAL MEDICINE

## 2024-11-27 PROCEDURE — 25010000002 SULFUR HEXAFLUORIDE MICROSPH 60.7-25 MG RECONSTITUTED SUSPENSION: Performed by: HOSPITALIST

## 2024-11-27 PROCEDURE — 25510000001 IOPAMIDOL PER 1 ML: Performed by: HOSPITALIST

## 2024-11-27 PROCEDURE — 93306 TTE W/DOPPLER COMPLETE: CPT

## 2024-11-27 PROCEDURE — 75574 CT ANGIO HRT W/3D IMAGE: CPT

## 2024-11-27 PROCEDURE — 93306 TTE W/DOPPLER COMPLETE: CPT | Performed by: INTERNAL MEDICINE

## 2024-11-27 RX ORDER — IVABRADINE 5 MG/1
15 TABLET, FILM COATED ORAL ONCE
Status: DISCONTINUED | OUTPATIENT
Start: 2024-11-27 | End: 2024-11-28 | Stop reason: HOSPADM

## 2024-11-27 RX ORDER — SODIUM CHLORIDE 0.9 % (FLUSH) 0.9 %
10 SYRINGE (ML) INJECTION AS NEEDED
Status: DISCONTINUED | OUTPATIENT
Start: 2024-11-27 | End: 2024-11-28 | Stop reason: HOSPADM

## 2024-11-27 RX ORDER — METOPROLOL TARTRATE 1 MG/ML
5 INJECTION, SOLUTION INTRAVENOUS
Status: DISCONTINUED | OUTPATIENT
Start: 2024-11-27 | End: 2024-11-28 | Stop reason: HOSPADM

## 2024-11-27 RX ORDER — SODIUM CHLORIDE 0.9 % (FLUSH) 0.9 %
10 SYRINGE (ML) INJECTION EVERY 12 HOURS SCHEDULED
Status: DISCONTINUED | OUTPATIENT
Start: 2024-11-27 | End: 2024-11-28 | Stop reason: HOSPADM

## 2024-11-27 RX ORDER — METOPROLOL TARTRATE 25 MG/1
50 TABLET, FILM COATED ORAL
Status: DISCONTINUED | OUTPATIENT
Start: 2024-11-27 | End: 2024-11-28 | Stop reason: HOSPADM

## 2024-11-27 RX ORDER — IOPAMIDOL 755 MG/ML
100 INJECTION, SOLUTION INTRAVASCULAR
Status: COMPLETED | OUTPATIENT
Start: 2024-11-27 | End: 2024-11-27

## 2024-11-27 RX ORDER — METOPROLOL TARTRATE 100 MG/1
100 TABLET ORAL ONCE
Status: DISCONTINUED | OUTPATIENT
Start: 2024-11-27 | End: 2024-11-28 | Stop reason: HOSPADM

## 2024-11-27 RX ORDER — METOPROLOL TARTRATE 25 MG/1
50 TABLET, FILM COATED ORAL ONCE
Status: DISCONTINUED | OUTPATIENT
Start: 2024-11-27 | End: 2024-11-28 | Stop reason: HOSPADM

## 2024-11-27 RX ORDER — NITROGLYCERIN 0.4 MG/1
0.4 TABLET SUBLINGUAL
Status: COMPLETED | OUTPATIENT
Start: 2024-11-27 | End: 2024-11-27

## 2024-11-27 RX ORDER — NITROGLYCERIN 0.4 MG/1
0.8 TABLET SUBLINGUAL
Status: COMPLETED | OUTPATIENT
Start: 2024-11-27 | End: 2024-11-27

## 2024-11-27 RX ORDER — METOPROLOL TARTRATE 25 MG/1
25 TABLET, FILM COATED ORAL ONCE
Status: DISCONTINUED | OUTPATIENT
Start: 2024-11-27 | End: 2024-11-28 | Stop reason: HOSPADM

## 2024-11-27 RX ORDER — METOPROLOL TARTRATE 100 MG/1
200 TABLET ORAL ONCE
Status: DISCONTINUED | OUTPATIENT
Start: 2024-11-27 | End: 2024-11-28 | Stop reason: HOSPADM

## 2024-11-27 RX ADMIN — NITROGLYCERIN 0.8 MG: 0.4 TABLET SUBLINGUAL at 12:20

## 2024-11-27 RX ADMIN — IOPAMIDOL 70 ML: 755 INJECTION, SOLUTION INTRAVENOUS at 12:29

## 2024-11-27 RX ADMIN — SULFUR HEXAFLUORIDE 2 ML: KIT at 11:46

## 2024-12-04 ENCOUNTER — PATIENT MESSAGE (OUTPATIENT)
Dept: CARDIOLOGY | Facility: CLINIC | Age: 34
End: 2024-12-04
Payer: COMMERCIAL

## 2024-12-04 DIAGNOSIS — I50.21 ACUTE SYSTOLIC HEART FAILURE: Primary | ICD-10-CM

## 2024-12-04 DIAGNOSIS — I50.22 CHRONIC HFREF (HEART FAILURE WITH REDUCED EJECTION FRACTION): ICD-10-CM

## 2024-12-04 DIAGNOSIS — R94.30 LOW LEFT VENTRICULAR EJECTION FRACTION: ICD-10-CM

## 2024-12-05 ENCOUNTER — TELEPHONE (OUTPATIENT)
Dept: CARDIOLOGY | Facility: HOSPITAL | Age: 34
End: 2024-12-05

## 2024-12-05 NOTE — TELEPHONE ENCOUNTER
Caller: Keya Hamilton    Relationship to patient: Self    Best call back number: [phone]    Chief complaint: WOULD LIKE TO DISCUSS RESULTS FROM DR. ARREAGA WITH KEVYN HURT    Type of visit: FU    Requested date: NEXT AVAILABLE    If rescheduling, when is the original appointment: N/A     Additional notes:PLEASE CALL BACK TO ADVISE, THANK YOU.

## 2024-12-11 ENCOUNTER — OFFICE VISIT (OUTPATIENT)
Dept: CARDIOLOGY | Facility: HOSPITAL | Age: 34
End: 2024-12-11
Payer: COMMERCIAL

## 2024-12-11 VITALS
WEIGHT: 268.25 LBS | RESPIRATION RATE: 20 BRPM | DIASTOLIC BLOOD PRESSURE: 76 MMHG | HEIGHT: 63 IN | SYSTOLIC BLOOD PRESSURE: 120 MMHG | TEMPERATURE: 97 F | BODY MASS INDEX: 47.53 KG/M2 | HEART RATE: 81 BPM | OXYGEN SATURATION: 96 %

## 2024-12-11 DIAGNOSIS — I51.3 LEFT VENTRICULAR THROMBUS: ICD-10-CM

## 2024-12-11 DIAGNOSIS — G47.33 OSA (OBSTRUCTIVE SLEEP APNEA): Chronic | ICD-10-CM

## 2024-12-11 DIAGNOSIS — I10 PRIMARY HYPERTENSION: ICD-10-CM

## 2024-12-11 DIAGNOSIS — I63.9 ACUTE STROKE DUE TO ISCHEMIA: ICD-10-CM

## 2024-12-11 DIAGNOSIS — I50.22 CHRONIC HFREF (HEART FAILURE WITH REDUCED EJECTION FRACTION): Primary | ICD-10-CM

## 2024-12-11 RX ORDER — NEBIVOLOL 10 MG/1
10 TABLET ORAL DAILY
Qty: 30 TABLET | Refills: 3 | Status: SHIPPED | OUTPATIENT
Start: 2024-12-11

## 2024-12-12 NOTE — PROGRESS NOTES
Chief Complaint  Congestive Heart Failure and Follow-up    Subjective    History of Present Illness {CC  Problem List  Visit  Diagnosis   Encounters  Notes  Medications  Labs  Result Review Imaging  Media :23}       History of Present Illness   34-year-old female with a history of type 2 diabetes mellitus, right renal atrophy, morbid obesity, hypertension and obstructive sleep apnea presents today for ongoing evaluation of her recent MCA CVA and cardiomyopathy.  She presented to [facility] ED ED on 8/23/2024 with a stroke.  She got TNK ase.  MRI showed acute ischemic stroke in the right putamen and head of caudate.  RAMU showed an LV thrombus, left atrial cavity dilation bubble study indeterminant with an EF 36%.  She was intubated in the ED and extubated on 8/25.  She has transitioned to Eliquis.  She was initiated on GDMT prior to discharge.  LifeVest was considered but not ordered.Urine drug screen was positive for meth and THC.  Wore a cardiac event monitor and that showed no afib.   She notes she is feeling well and currently denies chest pain, dyspnea, dizziness, presyncope or syncope.  She reports that she started Wegovy and has lost 10 pounds.  She is currently working out at the gym multiple days a week and is very motivated to improve her health.  Recent CT angiogram showed a coronary calcium score of 0.  Echo 11/27/2024 to reassess EF showed an EF of 31 to 35% with mild concentric hypertrophy and no evidence of LV thrombus.  Previous echo 8/29/2024 was 26 to 30%.  Patient has appointment to establish with EP 12/23/2024 to discuss possible ICD implant.  Patient would like to continue working out at the gym and discussed with EP possibly deferring ICD a little while longer. Patient is requesting referral to the sleep center.  Patient has known sleep apnea and notes she has been intolerant to CPAP in the past.  She would like to be reevaluated to determine if she can possibly wear CPAP  "mask.  Objective     Vital Signs:   Vitals:    12/11/24 0855   BP: 120/76   BP Location: Left arm   Patient Position: Sitting   Cuff Size: Adult   Pulse: 81   Resp: 20   Temp: 97 °F (36.1 °C)   TempSrc: Temporal   SpO2: 96%   Weight: 122 kg (268 lb 4 oz)   Height: 160 cm (63\")     Body mass index is 47.52 kg/m².  Physical Exam  Vitals and nursing note reviewed.   Constitutional:       Appearance: Normal appearance.   HENT:      Head: Normocephalic.   Eyes:      Pupils: Pupils are equal, round, and reactive to light.   Cardiovascular:      Rate and Rhythm: Normal rate and regular rhythm.      Pulses: Normal pulses.      Heart sounds: Normal heart sounds. No murmur heard.  Pulmonary:      Effort: Pulmonary effort is normal.      Breath sounds: Normal breath sounds.   Abdominal:      General: Bowel sounds are normal.      Palpations: Abdomen is soft.   Musculoskeletal:         General: Normal range of motion.      Cervical back: Normal range of motion.      Right lower leg: No edema.      Left lower leg: No edema.   Skin:     General: Skin is warm and dry.      Capillary Refill: Capillary refill takes less than 2 seconds.   Neurological:      Mental Status: She is alert and oriented to person, place, and time.      Motor: Weakness (left upper extremity) present.   Psychiatric:         Mood and Affect: Mood normal.         Thought Content: Thought content normal.              Result Review  Data Reviewed:{ Labs  Result Review  Imaging  Med Tab  Media :23}     Labs [facility] 9/4/2024: WBC 9.9, RBC 5.29, hemoglobin 17.8, hematocrit 52.7, platelets 446, creatinine clearance 66.79, creatinine 1.00, sodium 139, potassium 4.2, chloride 107, carbon oxide 23, glucose 2 1, BUN 19, estimated , calcium 9.5,  Cardiac Catheterization/Vascular Study (08/23/2024 21:58)  Adult Transthoracic Echo Complete W/ Cont if Necessary Per Protocol (With Agitated Saline) (08/24/2024 08:34)  Adult Transthoracic Echo Limited W/ Cont " if Necessary Per Protocol (08/29/2024 10:00)         Assessment and Plan {CC Problem List  Visit Diagnosis  ROS  Review (Popup)  Health Maintenance  Quality  BestPractice  Medications  SmartSets  SnapShot Encounters  Media :23}   1. Chronic HFrEF (heart failure with reduced ejection fraction)  Continue Jardiance, [email or web address].[email or web address]   Increase Bystolic to 10 mg daily  Upcoming appointment with EP 12/23/2024 to discuss possible ICD implant no evidence of thrombus on recent echo  Heart failure education today including signs and symptoms, the role of the heart failure center, daily weights, low sodium diet (less than 1500 mg per day), and daily physical activity. Reviewed HF Zones with patient and family.  Patient to continue current medications as previously ordered.     2. Left ventricular thrombus  Continue Eliquis 5 mg twice daily  No evidence of thrombus on recent echo    3. Acute stroke due to ischemia  Continue outpatient physical and occupational therapy   S/p tnkase      4. Primary hypertension  Stable on  Entresto  Increase bystolic to 10 mg daily  Continue to monitor    5.SCOTT  Ambulatory referral to sleep center       Follow Up {Instructions Charge Capture  Follow-up Communications :23}   Return if symptoms worsen or fail to improve.    Patient was given instructions and counseling regarding her condition or for health maintenance advice. Please see specific information pulled into the AVS if appropriate.  Patient was instructed to call the [facility] with any questions, concerns, or worsening symptoms.

## 2024-12-18 ENCOUNTER — CONSULT (OUTPATIENT)
Dept: ONCOLOGY | Facility: CLINIC | Age: 34
End: 2024-12-18
Payer: COMMERCIAL

## 2024-12-18 VITALS
BODY MASS INDEX: 45.07 KG/M2 | RESPIRATION RATE: 20 BRPM | OXYGEN SATURATION: 95 % | WEIGHT: 264 LBS | SYSTOLIC BLOOD PRESSURE: 117 MMHG | HEART RATE: 88 BPM | DIASTOLIC BLOOD PRESSURE: 75 MMHG | HEIGHT: 64 IN | TEMPERATURE: 97.1 F

## 2024-12-18 DIAGNOSIS — I63.9 ACUTE STROKE DUE TO ISCHEMIA: Primary | ICD-10-CM

## 2024-12-18 RX ORDER — ERGOCALCIFEROL 1.25 MG/1
CAPSULE, LIQUID FILLED ORAL
COMMUNITY
Start: 2024-12-11

## 2024-12-18 NOTE — LETTER
December 18, 2024     Cuong Parks MD  [address]  [address]  [place] [address]    Patient: Keya Hamilton   YOB: 1990   Date of Visit: 12/18/2024     Dear Cuong Parks MD:       Thank you for referring Keya Hamilton to me for evaluation. Below are the relevant portions of my assessment and plan of care.    If you have questions, please do not hesitate to call me. I look forward to following Keya along with you.         Sincerely,        Ale Cao MD        CC: MICHELLE Stout Arvinda, MD  12/18/24 1222  Sign when Signing Visit  ID: 34 y.o. year old female from [place] [id number]    PCP: Cuong Parks MD    REFERRING PHYSICIAN: MICHELLE Stout    Reason for Consultation: Stroke in a patient with heterozygote factor V Leiden mutation    Dear Dr. Parks and Ms. Montez    It is a pleasure to meet Ms. Hamilton today.  She is a very pleasant 34-year-old lady who presents today for consultation for a stroke which required an embolectomy.  At that time it was noted that she had a significant drop in her EF that did not seem to be caused by an ischemic process.  A RAMU revealed a left ventricular thrombus with left atrial cavity dilation.  I was asked to see her to rule out an hypercoagulable state.        Past Medical History:   Diagnosis Date   • CHF (congestive heart failure)    • Chronic kidney disease    • Congenital heart disease    • Deep vein thrombosis    • Diabetes mellitus    • Hyperlipidemia    • Hypertension    • Sleep apnea    • Stroke        Past Surgical History:   Procedure Laterality Date   • INTERVENTIONAL RADIOLOGY PROCEDURE N/A 8/23/2024    Procedure: IR mechanical thrombectomy;  Surgeon: Lemuel Medina MD;  Location: [facility] INVASIVE LOCATION;  Service: Interventional Radiology;  Laterality: N/A;       Social History     Socioeconomic History   • Marital status:    Tobacco Use   • Smoking status: Former      Current packs/day: 0.50     Average packs/day: 0.5 packs/day for 5.0 years (2.5 ttl pk-yrs)     Types: Cigarettes     Passive exposure: Past   • Smokeless tobacco: Never   • Tobacco comments:     Quit  smoking 9 years ago    Vaping Use   • Vaping status: Former   • Substances: Nicotine, Flavoring   • Devices: Disposable   Substance and Sexual Activity   • Alcohol use: Yes     Comment: ocassionally a wine cooler or simular   • Drug use: Yes     Types: Marijuana     Comment: occasional   • Sexual activity: Yes     Partners: Male     Birth control/protection: I.U.D.       Family History   Problem Relation Age of Onset   • Hypertension Mother    • Hypertension Father    • Hypertension Sister    • Diabetes Sister    • Stroke Maternal Aunt    • Hypertension Maternal Grandmother    • Diabetes Maternal Grandmother    • Heart disease Maternal Grandmother    • Arrhythmia Maternal Grandmother    • Hypertension Maternal Grandfather    • Hypertension Paternal Grandmother    • Hypertension Paternal Grandfather        Review of Systems:    16 point review of systems was performed and reviewed and scanned into the EMR    Review of Systems - Oncology      Current Outpatient Medications:   •  apixaban (ELIQUIS) 5 MG tablet tablet, Take 1 tablet by mouth Every 12 (Twelve) Hours., Disp: 60 tablet, Rfl: 11  •  atorvastatin (LIPITOR) 80 MG tablet, Take 1 tablet by mouth Every Night., Disp: , Rfl:   •  empagliflozin (JARDIANCE) 10 MG tablet tablet, Take 1 tablet by mouth Daily., Disp: 30 tablet, Rfl: 11  •  Fluconazole (DIFLUCAN PO), Take  by mouth., Disp: , Rfl:   •  nebivolol (Bystolic) 10 MG tablet, Take 1 tablet by mouth Daily., Disp: 30 tablet, Rfl: 3  •  pantoprazole (PROTONIX) 20 MG EC tablet, Take 1 tablet by mouth Daily., Disp: , Rfl:   •  sacubitril-valsartan (ENTRESTO) 49-51 MG tablet, Take 1 tablet by mouth Every 12 (Twelve) Hours., Disp: 60 tablet, Rfl: 11  •  spironolactone (ALDACTONE) 25 MG tablet, Take 1 tablet by mouth  Daily., Disp: , Rfl:   •  vitamin B-12 (CYANOCOBALAMIN) 1000 MCG tablet, Take 1 tablet by mouth Daily., Disp: , Rfl:   •  vitamin D (ERGOCALCIFEROL) 1.25 MG (37158 UT) capsule capsule, , Disp: , Rfl:          Allergies   Allergen Reactions   • Codeine Hives and Unknown (See Comments)   • Aspirin Other (See Comments)     One kidney so avoid NSAID         ECOG score: 0           Objective    Vitals:    12/18/24 1057   BP: 117/75   Pulse: 88   Resp: 20   Temp: 97.1 °F (36.2 °C)   SpO2: 95%     Body mass index is 46.03 kg/m².  Body surface area is 2.19 meters squared.        12/18/24  1057   Weight: 120 kg (264 lb)     Pain Score    12/18/24 1057   PainSc: 0-No pain          Physical Exam    General: well appearing, in no acute distress  HEENT: sclera anicteric, oropharynx clear, neck is supple  Lymphatics: no cervical, supraclavicular, or axillary adenopathy  Cardiovascular: regular rate and rhythm, no murmurs, rubs or gallops  Lungs: clear to auscultation bilaterally  Abdomen: soft, nontender, nondistended.  No palpable organomegaly  Extremities: no lower extremity edema  Skin: no rashes, lesions, bruising, or petechiae  Msk:  Shows no weakness of the large muscle groups  Psych: Mood is stable        Lab Results   Component Value Date    GLUCOSE 188 (H) 10/30/2024    BUN 12 10/30/2024    CREATININE 1.10 11/27/2024     10/30/2024    K 4.0 10/30/2024     10/30/2024    CO2 21.0 (L) 10/30/2024    CALCIUM 9.2 10/30/2024    PROTEINTOT 6.8 08/30/2024    ALBUMIN 3.9 08/30/2024    BILITOT 0.5 08/30/2024    ALKPHOS 72 08/30/2024    AST 43 (H) 08/30/2024    ALT 36 (H) 08/30/2024       Lab Results   Component Value Date    HGB 18.9 (H) 08/30/2024    HCT 55.7 (H) 08/30/2024    MCV 97.9 (H) 08/30/2024     (H) 08/30/2024    WBC 11.74 (H) 08/30/2024    NEUTROABS 7.68 (H) 08/30/2024    LYMPHSABS 2.73 08/30/2024    MONOSABS 0.93 (H) 08/30/2024    EOSABS 0.20 08/30/2024    BASOSABS 0.11 08/30/2024       NM kidney with  flow and function with diuretic    Result Date: 12/17/2024  Normal functioning left kidney. Nonfunctioning right kidney. Images reviewed, interpreted, and dictated by JOSE Vizcaino MD    CT Angiogram Coronary    Result Date: 12/2/2024  Impression: No significant noncardiac findings within the field-of-view. Please refer to CT Angiogram Coronary   Cardiology Interp report for information on cardiac structures. Electronically Signed: Lincoln Roman MD  12/2/2024 10:22 AM EST  Workstation ID: [id number]    CT Angiogram Coronary-Cardiology Interpretation    Result Date: 11/27/2024  Minimal (1-24%) 3 vessel disease. CAD RADS 1 The total calcium score is 0 indicating absence of calcified plaque in the coronary tree. No non-atherosclerotic coronary abnormalities Please refer to the radiology report for information on non-cardiac structures.   RECOMMENDATION: Risk factor and lifestyle modification as well as medical optimization for primary prevention of obstructive CAD.    Ultrasound renal limited    Result Date: 10/29/2024  Small right kidney.  Otherwise, morphologically normal kidneys bilaterally. Images reviewed, interpreted, and dictated by Lorrie Adair MD    MRI CARDIAC W WO CONTRAST    Result Date: 9/23/2024  1. Mildly dilated left ventricle with severely reduced global systolic function (EF 30%).  Eccentric hypertrophy with global hypokinesis. 2. No CMR evidence for LV scar (fibrosis or infarction), which does not point towards a specific etiology of cardiomyopathy.  No LV thrombus. 3. Normal sized right ventricle with mildly reduced global systolic function (EF 44%). 4. No significant valvular abnormalities. CRITICAL RESULT: No. COMMUNICATION: Per this written report. Drafted by Cesario Michelle MD on 9/23/2024 8:28 AM Final report signed by Cesario Michelle MD on 9/23/2024 9:05 AM    Cardiac Catheterization/Vascular Study    Addendum Date: 8/29/2024    Procedure-cerebral angiography note Preprocedural diagnosis  Patient presents with respiratory failure Symptoms of cardiac failure Right-sided MCA syndrome NIH stroke scale 11-14 CT perfusion showing holohemispheric deficit with marked hypertension Postprocedural diagnosis-same Procedures performed 1.  Right-sided common carotid artery angiography 2.  Right sided internal carotid cerebral angiogram 3.  Mechanical embolectomy right -sided distal M1 using 4 mm Trevo 1 pass with tiki 3 flow 4.  Follow-up angiogram right internal carotid 5.  Angio-Seal 8 Wallisian right groin Angio-Seal to right groin-8 Wallisian via Access site is right common femoral artery Surgeon: Lemuel Medina MD FAANS Assistants-none Complications none apparent Contrast Visipaque 320 Procedure in detail This patient was taken emergently after emergency verbal consent was obtained by her nurse practitioner staff, he was taken to the angiography suite and prepped and draped in the usual sterile manner.  Risk and benefits were explicitly explained.  After this point in time the patient was anesthetized at the right groin using local anesthetic agent lidocaine without epinephrine, micropuncture technique was used to assess and obtain right-sided common femoral access  and a 8 Wallisian sheath was placed.  The patient had to be placed under general anesthesia secondary to respiratory failure At this point in time navigation of a 5 Wallisian [employer] catheter into the above selected vessels was performed with little difficult after selective catheterization, diagnostic angiogram was performed using 8-10 cc of dilute Visipaque 320 contrast and approximately 5050 ratio.  The right common carotid followed by the right internal was selected with DSA angiogram demonstrating a large occlusion M1 dominant consistent with his perfusion deficit at this point time  with a flow gait 8 Wallisian catheter microcatheter access to the distal M2 was performed followed by mechanical embolectomy with 1 passes of a 4 x 41 mm TR E VO and  subsequent TICI 3 reperfusion No immediate complications noted mild to moderate basal spasm was noted post procedure After diagnostic imaging was obtained independent review of these was performed at a separate workstation.  The patient tolerated the procedure well and given the access site being of adequate diameter a 8 [place] Angio-Seal was placed in the right groin. The patient improved on the table Findings Right-sided common carotid artery angiography demonstrates a normal age-related appearance of the carotid artery there is no evidence of significant flow-limiting stenosis by NASCET criteria.  There is some atherosclerotic change of the carotid bulb.  Antegrade flow appears within normal limits of the internal.  There does not appear to be plaque at the carotid that would explain the patient's distal emboli Right-sided internal carotid cerebral angiogram demonstrates a normal appearance of the right MCA trunk up until M1 and there is abrupt occlusion with retrograde filling of the entire right hemisphere with reasonable collateralization Right internal carotid artery injection status post mechanical embolectomy demonstrates reperfusion with TICI 3 flow, mild to moderate expected vasospasm and in the MCA and clinoid carotid is seen Final impression Successful catheter-based angiogram with successful right mechanical embolectomy of M1 branch with resultant TICI 3 flow as documented above No medic complications noted Patient will be in ICU for further workup of cardiac and respiratory failure.    CT Head Without Contrast    Result Date: 8/27/2024  Impression: Evolving right-sided subacute infarcts most prominent within right basal ganglia. No hemorrhagic transformation or acute herniation. Electronically Signed: Kali Palmer MD  8/27/2024 4:39 PM EDT  Workstation ID: [id number]    XR Chest 1 View    Result Date: 8/26/2024  Impression: 1.Interval removal of the endotracheal tube. 2.Evidence for increasing  atelectasis versus infiltrates within the mid to lower lungs bilaterally. Electronically Signed: Edouard Krishna MD  8/26/2024 7:21 AM EDT  Workstation ID: [id number]    CT Head Without Contrast    Result Date: 8/25/2024  Impression: Evolving right-sided infarcts predominate of the basal ganglia. There is some minimal internal hyperdensity likely reflecting spared tissue or petechial hemorrhage. No overt hemorrhagic transformation. Electronically Signed: Denis Gaines MD  8/25/2024 8:38 AM EDT  Workstation ID: [id number]    CT Head Without Contrast    Result Date: 8/24/2024  Impression: Known right-sided infarcts are better seen on prior MRI. No evidence of hemorrhage. Electronically Signed: Denis Gaines MD  8/24/2024 9:09 AM EDT  Workstation ID: [id number]    MRI Brain Without Contrast    Result Date: 8/24/2024  Impression: Patchy areas of diffusion restriction present within the right periventricular white matter, the right basal ganglia and the right temporal lobe and the right occipital lobe consistent with recent or acute ischemia. Given the degree of signal intensity in presence of FLAIR signal change, findings likely more subacute. No evidence of hemorrhage. No significant mass effect or midline shift. Electronically Signed: Kayla Negron MD  8/24/2024 4:29 AM EDT  Workstation ID: [id number]    XR Chest 1 View    Result Date: 8/24/2024  Impression: Retraction of the endotracheal tube with the tip in the mid trachea. Improved aeration of the lungs bilaterally with mild pulmonary edema pattern present. Electronically Signed: Kayla Negron MD  8/24/2024 2:50 AM EDT  Workstation ID: [id number]    XR Chest 1 View    Result Date: 8/23/2024  Impression: 1.Tip of the endotracheal tube extends into the right mainstem bronchus. Recommend retraction to the mid thoracic trachea. I called this result to Dr. Lezama at 9:20 p.m. on 8/23/2024, and he was aware of this finding. 2.Diffuse bilateral airspace opacities, which may  be due to pulmonary edema, atelectasis, and/or pneumonia. Electronically Signed: Latanya Evans  8/23/2024 9:32 PM EDT  Workstation ID: [id number]    CT Angiogram Head w AI Analysis of LVO    Result Date: 8/23/2024  There is a very severe stenosis of the right M1 segment just proximal to the trifurcation with some distal reconstitution of flow. CT perfusion demonstrates slow flow in the right MCA territory with no decreased vertebral blood flow or blood volume noted. Neurosurgical consult recommended. Findings were discussed with Dr. Garcia at 9:03 p.m. on 8/23/2024 Electronically Signed: David Fernández MD  8/23/2024 9:05 PM EDT  Workstation ID: [id number]    CT Angiogram Neck    Result Date: 8/23/2024  There is a very severe stenosis of the right M1 segment just proximal to the trifurcation with some distal reconstitution of flow. CT perfusion demonstrates slow flow in the right MCA territory with no decreased vertebral blood flow or blood volume noted. Neurosurgical consult recommended. Findings were discussed with Dr. Garcia at 9:03 p.m. on 8/23/2024 Electronically Signed: David Fernández MD  8/23/2024 9:05 PM EDT  Workstation ID: [id number]    CT CEREBRAL PERFUSION WITH & WITHOUT CONTRAST    Result Date: 8/23/2024  There is a very severe stenosis of the right M1 segment just proximal to the trifurcation with some distal reconstitution of flow. CT perfusion demonstrates slow flow in the right MCA territory with no decreased vertebral blood flow or blood volume noted. Neurosurgical consult recommended. Findings were discussed with Dr. Garcia at 9:03 p.m. on 8/23/2024 Electronically Signed: David Fernández MD  8/23/2024 9:05 PM EDT  Workstation ID: [id number]    CT Head Without Contrast Stroke Protocol    Result Date: 8/23/2024  Impression: No acute intracranial finding. Electronically Signed: Medardo Wheat  8/23/2024 8:35 PM EDT  Workstation ID: [id number]        Assessment     1.  Embolic stroke with LV thrombus secondary to  significant drop in EF.  The drop in EF seems to be nonischemic.  She is currently on Eliquis.  She does have underlying factor V Leiden mutation though it is a heterozygote.  I do not feel that this played any significant role in what is going on with her.  The other thrombophilia workup was negative.  I feel this is strictly related to her drop in her ejection fraction causing a LV thrombus resulting in stroke.  I will defer to cardiology regarding duration of Eliquis.  From my standpoint I suspect she is going to have to be on it indefinitely.          Thank you for allowing me to participate in the care of this patient.    Yours sincerely,    Ale Cao MD  [facility]  Hematology and Oncology         No orders of the defined types were placed in this encounter.            Statement Selected

## 2024-12-18 NOTE — PROGRESS NOTES
ID: 34 y.o. year old female from [facility] [id number]    PCP: Cuong Parks MD    REFERRING PHYSICIAN: MICHELLE Stout    Reason for Consultation: Stroke in a patient with heterozygote factor V Leiden mutation    Dear Dr. Parks and Ms. Montez    It is a pleasure to meet Ms. Hamilton today.  She is a very pleasant 34-year-old lady who presents today for consultation for a stroke which required an embolectomy.  At that time it was noted that she had a significant drop in her EF that did not seem to be caused by an ischemic process.  A RAMU revealed a left ventricular thrombus with left atrial cavity dilation.  I was asked to see her to rule out an hypercoagulable state.        Past Medical History:   Diagnosis Date    CHF (congestive heart failure)     Chronic kidney disease     Congenital heart disease     Deep vein thrombosis     Diabetes mellitus     Hyperlipidemia     Hypertension     Sleep apnea     Stroke        Past Surgical History:   Procedure Laterality Date    INTERVENTIONAL RADIOLOGY PROCEDURE N/A 8/23/2024    Procedure: IR mechanical thrombectomy;  Surgeon: Lemuel Medina MD;  Location: [facility] INVASIVE LOCATION;  Service: Interventional Radiology;  Laterality: N/A;       Social History     Socioeconomic History    Marital status:    Tobacco Use    Smoking status: Former     Current packs/day: 0.50     Average packs/day: 0.5 packs/day for 5.0 years (2.5 ttl pk-yrs)     Types: Cigarettes     Passive exposure: Past    Smokeless tobacco: Never    Tobacco comments:     Quit  smoking 9 years ago    Vaping Use    Vaping status: Former    Substances: Nicotine, Flavoring    Devices: Disposable   Substance and Sexual Activity    Alcohol use: Yes     Comment: ocassionally a wine cooler or simular    Drug use: Yes     Types: Marijuana     Comment: occasional    Sexual activity: Yes     Partners: Male     Birth control/protection: I.U.D.       Family History   Problem Relation Age of Onset     Hypertension Mother     Hypertension Father     Hypertension Sister     Diabetes Sister     Stroke Maternal Aunt     Hypertension Maternal Grandmother     Diabetes Maternal Grandmother     Heart disease Maternal Grandmother     Arrhythmia Maternal Grandmother     Hypertension Maternal Grandfather     Hypertension Paternal Grandmother     Hypertension Paternal Grandfather        Review of Systems:    16 point review of systems was performed and reviewed and scanned into the EMR    Review of Systems - Oncology      Current Outpatient Medications:     apixaban (ELIQUIS) 5 MG tablet tablet, Take 1 tablet by mouth Every 12 (Twelve) Hours., Disp: 60 tablet, Rfl: 11    atorvastatin (LIPITOR) 80 MG tablet, Take 1 tablet by mouth Every Night., Disp: , Rfl:     empagliflozin (JARDIANCE) 10 MG tablet tablet, Take 1 tablet by mouth Daily., Disp: 30 tablet, Rfl: 11    Fluconazole (DIFLUCAN PO), Take  by mouth., Disp: , Rfl:     nebivolol (Bystolic) 10 MG tablet, Take 1 tablet by mouth Daily., Disp: 30 tablet, Rfl: 3    pantoprazole (PROTONIX) 20 MG EC tablet, Take 1 tablet by mouth Daily., Disp: , Rfl:     sacubitril-valsartan (ENTRESTO) 49-51 MG tablet, Take 1 tablet by mouth Every 12 (Twelve) Hours., Disp: 60 tablet, Rfl: 11    spironolactone (ALDACTONE) 25 MG tablet, Take 1 tablet by mouth Daily., Disp: , Rfl:     vitamin B-12 (CYANOCOBALAMIN) 1000 MCG tablet, Take 1 tablet by mouth Daily., Disp: , Rfl:     vitamin D (ERGOCALCIFEROL) 1.25 MG (30126 UT) capsule capsule, , Disp: , Rfl:          Allergies   Allergen Reactions    Codeine Hives and Unknown (See Comments)    Aspirin Other (See Comments)     One kidney so avoid NSAID         ECOG score: 0           Objective     Vitals:    12/18/24 1057   BP: 117/75   Pulse: 88   Resp: 20   Temp: 97.1 °F (36.2 °C)   SpO2: 95%     Body mass index is 46.03 kg/m².  Body surface area is 2.19 meters squared.        12/18/24  1057   Weight: 120 kg (264 lb)     Pain Score    12/18/24 1057    PainSc: 0-No pain          Physical Exam    General: well appearing, in no acute distress  HEENT: sclera anicteric, oropharynx clear, neck is supple  Lymphatics: no cervical, supraclavicular, or axillary adenopathy  Cardiovascular: regular rate and rhythm, no murmurs, rubs or gallops  Lungs: clear to auscultation bilaterally  Abdomen: soft, nontender, nondistended.  No palpable organomegaly  Extremities: no lower extremity edema  Skin: no rashes, lesions, bruising, or petechiae  Msk:  Shows no weakness of the large muscle groups  Psych: Mood is stable        Lab Results   Component Value Date    GLUCOSE 188 (H) 10/30/2024    BUN 12 10/30/2024    CREATININE 1.10 11/27/2024     10/30/2024    K 4.0 10/30/2024     10/30/2024    CO2 21.0 (L) 10/30/2024    CALCIUM 9.2 10/30/2024    PROTEINTOT 6.8 08/30/2024    ALBUMIN 3.9 08/30/2024    BILITOT 0.5 08/30/2024    ALKPHOS 72 08/30/2024    AST 43 (H) 08/30/2024    ALT 36 (H) 08/30/2024       Lab Results   Component Value Date    HGB 18.9 (H) 08/30/2024    HCT 55.7 (H) 08/30/2024    MCV 97.9 (H) 08/30/2024     (H) 08/30/2024    WBC 11.74 (H) 08/30/2024    NEUTROABS 7.68 (H) 08/30/2024    LYMPHSABS 2.73 08/30/2024    MONOSABS 0.93 (H) 08/30/2024    EOSABS 0.20 08/30/2024    BASOSABS 0.11 08/30/2024       NM kidney with flow and function with diuretic    Result Date: 12/17/2024  Normal functioning left kidney. Nonfunctioning right kidney. Images reviewed, interpreted, and dictated by JOSE Vizcaino MD    CT Angiogram Coronary    Result Date: 12/2/2024  Impression: No significant noncardiac findings within the field-of-view. Please refer to CT Angiogram Coronary   Cardiology Interp report for information on cardiac structures. Electronically Signed: Lincoln Roman MD  12/2/2024 10:22 AM EST  Workstation ID: [id number]    CT Angiogram Coronary-Cardiology Interpretation    Result Date: 11/27/2024  Minimal (1-24%) 3 vessel disease. CAD RADS 1 The total  calcium score is 0 indicating absence of calcified plaque in the coronary tree. No non-atherosclerotic coronary abnormalities Please refer to the radiology report for information on non-cardiac structures.   RECOMMENDATION: Risk factor and lifestyle modification as well as medical optimization for primary prevention of obstructive CAD.    Ultrasound renal limited    Result Date: 10/29/2024  Small right kidney.  Otherwise, morphologically normal kidneys bilaterally. Images reviewed, interpreted, and dictated by Lorrie Adair MD    MRI CARDIAC W WO CONTRAST    Result Date: 9/23/2024  1. Mildly dilated left ventricle with severely reduced global systolic function (EF 30%).  Eccentric hypertrophy with global hypokinesis. 2. No CMR evidence for LV scar (fibrosis or infarction), which does not point towards a specific etiology of cardiomyopathy.  No LV thrombus. 3. Normal sized right ventricle with mildly reduced global systolic function (EF 44%). 4. No significant valvular abnormalities. CRITICAL RESULT: No. COMMUNICATION: Per this written report. Drafted by Cesario Michelle MD on 9/23/2024 8:28 AM Final report signed by Cesario Michelle MD on 9/23/2024 9:05 AM    Cardiac Catheterization/Vascular Study    Addendum Date: 8/29/2024    Procedure-cerebral angiography note Preprocedural diagnosis Patient presents with respiratory failure Symptoms of cardiac failure Right-sided MCA syndrome NIH stroke scale 11-14 CT perfusion showing holohemispheric deficit with marked hypertension Postprocedural diagnosis-same Procedures performed 1.  Right-sided common carotid artery angiography 2.  Right sided internal carotid cerebral angiogram 3.  Mechanical embolectomy right -sided distal M1 using 4 mm Trevo 1 pass with tiki 3 flow 4.  Follow-up angiogram right internal carotid 5.  Angio-Seal 8 Montserratian right groin Angio-Seal to right groin-8 [place]serratian via Access site is right common femoral artery Surgeon: Lemuel Medina MD FAANS  Assistants-none Complications none apparent Contrast Visipaque 320 Procedure in detail This patient was taken emergently after emergency verbal consent was obtained by her nurse practitioner staff, he was taken to the angiography suite and prepped and draped in the usual sterile manner.  Risk and benefits were explicitly explained.  After this point in time the patient was anesthetized at the right groin using local anesthetic agent lidocaine without epinephrine, micropuncture technique was used to assess and obtain right-sided common femoral access  and a 8 Irish sheath was placed.  The patient had to be placed under general anesthesia secondary to respiratory failure At this point in time navigation of a 5 Irish Panzura catheter into the above selected vessels was performed with little difficult after selective catheterization, diagnostic angiogram was performed using 8-10 cc of dilute Visipaque 320 contrast and approximately 5050 ratio.  The right common carotid followed by the right internal was selected with DSA angiogram demonstrating a large occlusion M1 dominant consistent with his perfusion deficit at this point time  with a flow gait 8 Irish catheter microcatheter access to the distal M2 was performed followed by mechanical embolectomy with 1 passes of a 4 x 41 mm TR E VO and subsequent TICI 3 reperfusion No immediate complications noted mild to moderate basal spasm was noted post procedure After diagnostic imaging was obtained independent review of these was performed at a separate workstation.  The patient tolerated the procedure well and given the access site being of adequate diameter a 8 Irish Angio-Seal was placed in the right groin. The patient improved on the table Findings Right-sided common carotid artery angiography demonstrates a normal age-related appearance of the carotid artery there is no evidence of significant flow-limiting stenosis by NASCET criteria.  There is some  atherosclerotic change of the carotid bulb.  Antegrade flow appears within normal limits of the internal.  There does not appear to be plaque at the carotid that would explain the patient's distal emboli Right-sided internal carotid cerebral angiogram demonstrates a normal appearance of the right MCA trunk up until M1 and there is abrupt occlusion with retrograde filling of the entire right hemisphere with reasonable collateralization Right internal carotid artery injection status post mechanical embolectomy demonstrates reperfusion with TICI 3 flow, mild to moderate expected vasospasm and in the MCA and clinoid carotid is seen Final impression Successful catheter-based angiogram with successful right mechanical embolectomy of M1 branch with resultant TICI 3 flow as documented above No medic complications noted Patient will be in ICU for further workup of cardiac and respiratory failure.    CT Head Without Contrast    Result Date: 8/27/2024  Impression: Evolving right-sided subacute infarcts most prominent within right basal ganglia. No hemorrhagic transformation or acute herniation. Electronically Signed: Kali Palmer MD  8/27/2024 4:39 PM EDT  Workstation ID: [id number]    XR Chest 1 View    Result Date: 8/26/2024  Impression: 1.Interval removal of the endotracheal tube. 2.Evidence for increasing atelectasis versus infiltrates within the mid to lower lungs bilaterally. Electronically Signed: Edouard Krishna MD  8/26/2024 7:21 AM EDT  Workstation ID: [id number]    CT Head Without Contrast    Result Date: 8/25/2024  Impression: Evolving right-sided infarcts predominate of the basal ganglia. There is some minimal internal hyperdensity likely reflecting spared tissue or petechial hemorrhage. No overt hemorrhagic transformation. Electronically Signed: Denis Gaines MD  8/25/2024 8:38 AM EDT  Workstation ID: [id number]    CT Head Without Contrast    Result Date: 8/24/2024  Impression: Known right-sided infarcts are  better seen on prior MRI. No evidence of hemorrhage. Electronically Signed: Denis Gaines MD  8/24/2024 9:09 AM EDT  Workstation ID: [id number]    MRI Brain Without Contrast    Result Date: 8/24/2024  Impression: Patchy areas of diffusion restriction present within the right periventricular white matter, the right basal ganglia and the right temporal lobe and the right occipital lobe consistent with recent or acute ischemia. Given the degree of signal intensity in presence of FLAIR signal change, findings likely more subacute. No evidence of hemorrhage. No significant mass effect or midline shift. Electronically Signed: Kayla Negron MD  8/24/2024 4:29 AM EDT  Workstation ID: [id number]    XR Chest 1 View    Result Date: 8/24/2024  Impression: Retraction of the endotracheal tube with the tip in the mid trachea. Improved aeration of the lungs bilaterally with mild pulmonary edema pattern present. Electronically Signed: Kayla Negron MD  8/24/2024 2:50 AM EDT  Workstation ID: [id number]    XR Chest 1 View    Result Date: 8/23/2024  Impression: 1.Tip of the endotracheal tube extends into the right mainstem bronchus. Recommend retraction to the mid thoracic trachea. I called this result to Dr. Lezama at 9:20 p.m. on 8/23/2024, and he was aware of this finding. 2.Diffuse bilateral airspace opacities, which may be due to pulmonary edema, atelectasis, and/or pneumonia. Electronically Signed: Latanya Evans  8/23/2024 9:32 PM EDT  Workstation ID: [id number]    CT Angiogram Head w AI Analysis of LVO    Result Date: 8/23/2024  There is a very severe stenosis of the right M1 segment just proximal to the trifurcation with some distal reconstitution of flow. CT perfusion demonstrates slow flow in the right MCA territory with no decreased vertebral blood flow or blood volume noted. Neurosurgical consult recommended. Findings were discussed with Dr. Garcia at 9:03 p.m. on 8/23/2024 Electronically Signed: David Fernández MD  8/23/2024 9:05  PM EDT  Workstation ID: [id number]    CT Angiogram Neck    Result Date: 8/23/2024  There is a very severe stenosis of the right M1 segment just proximal to the trifurcation with some distal reconstitution of flow. CT perfusion demonstrates slow flow in the right MCA territory with no decreased vertebral blood flow or blood volume noted. Neurosurgical consult recommended. Findings were discussed with Dr. Garcia at 9:03 p.m. on 8/23/2024 Electronically Signed: David Fernández MD  8/23/2024 9:05 PM EDT  Workstation ID: [id number]    CT CEREBRAL PERFUSION WITH & WITHOUT CONTRAST    Result Date: 8/23/2024  There is a very severe stenosis of the right M1 segment just proximal to the trifurcation with some distal reconstitution of flow. CT perfusion demonstrates slow flow in the right MCA territory with no decreased vertebral blood flow or blood volume noted. Neurosurgical consult recommended. Findings were discussed with Dr. Garcia at 9:03 p.m. on 8/23/2024 Electronically Signed: David Fernández MD  8/23/2024 9:05 PM EDT  Workstation ID: [id number]    CT Head Without Contrast Stroke Protocol    Result Date: 8/23/2024  Impression: No acute intracranial finding. Electronically Signed: Medardo Wheat  8/23/2024 8:35 PM EDT  Workstation ID: [id number]        Assessment      1.  Embolic stroke with LV thrombus secondary to significant drop in EF.  The drop in EF seems to be nonischemic.  She is currently on Eliquis.  She does have underlying factor V Leiden mutation though it is a heterozygote.  I do not feel that this played any significant role in what is going on with her.  The other thrombophilia workup was negative.  I feel this is strictly related to her drop in her ejection fraction causing a LV thrombus resulting in stroke.  I will defer to cardiology regarding duration of Eliquis.  From my standpoint I suspect she is going to have to be on it indefinitely.          Thank you for allowing me to participate in the care of this  patient.    Yours sincerely,    Ale Cao MD  [facility]  Hematology and Oncology         No orders of the defined types were placed in this encounter.

## 2024-12-23 ENCOUNTER — PATIENT ROUNDING (BHMG ONLY) (OUTPATIENT)
Dept: CARDIOLOGY | Facility: CLINIC | Age: 34
End: 2024-12-23
Payer: COMMERCIAL

## 2024-12-23 ENCOUNTER — OFFICE VISIT (OUTPATIENT)
Dept: CARDIOLOGY | Facility: CLINIC | Age: 34
End: 2024-12-23
Payer: COMMERCIAL

## 2024-12-23 VITALS
SYSTOLIC BLOOD PRESSURE: 118 MMHG | HEIGHT: 63 IN | BODY MASS INDEX: 47.09 KG/M2 | DIASTOLIC BLOOD PRESSURE: 92 MMHG | OXYGEN SATURATION: 96 % | HEART RATE: 70 BPM | WEIGHT: 265.8 LBS

## 2024-12-23 DIAGNOSIS — I50.22 CHRONIC HFREF (HEART FAILURE WITH REDUCED EJECTION FRACTION): Primary | ICD-10-CM

## 2024-12-23 DIAGNOSIS — I51.3 LEFT VENTRICULAR THROMBUS: ICD-10-CM

## 2024-12-23 DIAGNOSIS — I50.21 ACUTE SYSTOLIC HEART FAILURE: Primary | ICD-10-CM

## 2024-12-23 DIAGNOSIS — I63.9 ACUTE STROKE DUE TO ISCHEMIA: ICD-10-CM

## 2024-12-23 DIAGNOSIS — I50.22 CHRONIC HFREF (HEART FAILURE WITH REDUCED EJECTION FRACTION): ICD-10-CM

## 2024-12-23 NOTE — PROGRESS NOTES
Cardiac Electrophysiology Outpatient Consult Note            [facility]    Consult Note     Keya Hamilton  [id number]  12/23/2024  [phone]     Primary Care Physician: Cuong Parks MD    Referred By: Faustina Reyes APRN    Subjective     Chief Complaint:   Diagnoses and all orders for this visit:    1. Chronic HFrEF (heart failure with reduced ejection fraction) (Primary)    2. Left ventricular thrombus    3. Acute stroke due to ischemia      Chief Complaint   Patient presents with    Chronic HFrEF (heart failure with reduced ejection fraction)       History of Present Illness:   Keya Hamilton is a 34 y.o. female who presents to my electrophysiology clinic for evaluation of above.      Past Medical History:   Past Medical History:   Diagnosis Date    CHF (congestive heart failure)     Chronic kidney disease     Congenital heart disease     Deep vein thrombosis     Diabetes mellitus     Hyperlipidemia     Hypertension     Sleep apnea     Stroke        Past Surgical History:   Past Surgical History:   Procedure Laterality Date    INTERVENTIONAL RADIOLOGY PROCEDURE N/A 8/23/2024    Procedure: IR mechanical thrombectomy;  Surgeon: Lemuel Medina MD;  Location: [facility] INVASIVE LOCATION;  Service: Interventional Radiology;  Laterality: N/A;       Family History:   Family History   Problem Relation Age of Onset    Hypertension Mother     Hypertension Father     Hypertension Sister     Diabetes Sister     Stroke Maternal Aunt     Hypertension Maternal Grandmother     Diabetes Maternal Grandmother     Heart disease Maternal Grandmother     Arrhythmia Maternal Grandmother     Hypertension Maternal Grandfather     Hypertension Paternal Grandmother     Hypertension Paternal Grandfather        Social History:   Social History     Socioeconomic History    Marital status:    Tobacco Use    Smoking status: Former     Current packs/day: 0.50  "    Average packs/day: 0.5 packs/day for 10.0 years (5.0 ttl pk-yrs)     Types: Cigarettes     Passive exposure: Past    Smokeless tobacco: Never    Tobacco comments:     Quit  smoking 9 years ago    Vaping Use    Vaping status: Former    Substances: Nicotine, Flavoring    Devices: Disposable   Substance and Sexual Activity    Alcohol use: Not Currently     Comment: ocassionally a wine cooler or simular    Drug use: Yes     Types: Marijuana     Comment: occasional    Sexual activity: Yes     Partners: Male     Birth control/protection: I.U.D.       Medications:     Current Outpatient Medications:     apixaban (ELIQUIS) 5 MG tablet tablet, Take 1 tablet by mouth Every 12 (Twelve) Hours., Disp: 60 tablet, Rfl: 11    atorvastatin (LIPITOR) 80 MG tablet, Take 1 tablet by mouth Every Night., Disp: , Rfl:     empagliflozin (JARDIANCE) 10 MG tablet tablet, Take 1 tablet by mouth Daily., Disp: 30 tablet, Rfl: 11    Fluconazole (DIFLUCAN PO), Take  by mouth., Disp: , Rfl:     nebivolol (Bystolic) 10 MG tablet, Take 1 tablet by mouth Daily., Disp: 30 tablet, Rfl: 3    pantoprazole (PROTONIX) 20 MG EC tablet, Take 1 tablet by mouth Daily., Disp: , Rfl:     sacubitril-valsartan (ENTRESTO) 49-51 MG tablet, Take 1 tablet by mouth Every 12 (Twelve) Hours., Disp: 60 tablet, Rfl: 11    spironolactone (ALDACTONE) 25 MG tablet, Take 1 tablet by mouth Daily., Disp: , Rfl:     vitamin B-12 (CYANOCOBALAMIN) 1000 MCG tablet, Take 1 tablet by mouth Daily., Disp: , Rfl:     vitamin D (ERGOCALCIFEROL) 1.25 MG (04597 UT) capsule capsule, , Disp: , Rfl:     Allergies:   Allergies   Allergen Reactions    Codeine Hives and Unknown (See Comments)    Aspirin Other (See Comments)     One kidney so avoid NSAID       Objective   Vital Signs:   Vitals:    12/23/24 1048   BP: 118/92   BP Location: Right arm   Patient Position: Sitting   Pulse: 70   SpO2: 96%   Weight: 121 kg (265 lb 12.8 oz)   Height: 160 cm (63\")       PHYSICAL EXAM  General " "appearance: Awake, alert, cooperative  Head: Normocephalic, without obvious abnormality, atraumatic  Eyes: Conjunctivae/corneas clear, EOMs intact  Neck: no adenopathy, no carotid bruit, no JVD, and thyroid: not enlarged  Lungs: clear to auscultation bilaterally and no rhonchi or crackles\", ' symmetric  Heart: regular rate and rhythm, S1, S2 normal, no murmur, click, rub or gallop  Abdomen: Soft, non-tender, bowel sounds normal,  no organomegaly  Extremities: extremities normal, atraumatic, no cyanosis or edema  Skin: Skin color, turgor normal, no rashes or lesions  Neurologic: Grossly normal     Lab Results   Component Value Date    GLUCOSE 188 (H) 10/30/2024    CALCIUM 9.2 10/30/2024     10/30/2024    K 4.0 10/30/2024    CO2 21.0 (L) 10/30/2024     10/30/2024    BUN 12 10/30/2024    CREATININE 1.10 11/27/2024    BCR 10.6 10/30/2024    ANIONGAP 12.0 10/30/2024     Lab Results   Component Value Date    WBC 11.74 (H) 08/30/2024    HGB 18.9 (H) 08/30/2024    HCT 55.7 (H) 08/30/2024    MCV 97.9 (H) 08/30/2024     (H) 08/30/2024     Lab Results   Component Value Date    INR 1.16 (H) 08/24/2024    INR 1.1 08/23/2024    PROTIME 14.9 (H) 08/24/2024    PROTIME 12.6 (L) 08/23/2024     No results found for: \"TSH\", \"O1LVMQC\", \"E4EMZHD\", \"THYROIDAB\"    Cardiac Testing:      I personally viewed and interpreted the patient's EKG/Telemetry/lab data    Procedures    Tobacco Cessation: N/A  Obstructive Sleep Apnea Screening: Completed    Advance Care Planning   ACP discussion was declined by the patient. Patient does not have an advance directive, declines further assistance.       Assessment & Plan    Diagnoses and all orders for this visit:    1. Chronic HFrEF (heart failure with reduced ejection fraction) (Primary)    2. Left ventricular thrombus    3. Acute stroke due to ischemia         Diagnosis Plan   1. Chronic HFrEF (heart failure with reduced ejection fraction)  Stable chronic systolic heart " failure.    Here for consideration for an ICD.    Lengthy conversation with her about the benefits of a defibrillator.  She is at this time not ready to proceed.  She wishes to wait little longer and see if her ejection fraction improves still.  I do not think that this is unreasonable.  She is aware that there is some small risk of sudden cardiac death between now and then however she accepts this and I think this is a reasonable decision.    She has a follow-up with Dr. Piña coming up in May.  Will get an echocardiogram in advance of his visit with her.    She can come back and see me in an as needed fashion should her ejection fraction be under 35% and should she wish to proceed with a defibrillator.      2. Left ventricular thrombus  Factor V Leiden as well.  Lifelong anticoagulation is recommended by her hematology team.      3. Acute stroke due to ischemia  Cardioembolic documented LV thrombus        Body mass index is 47.08 kg/m².    I spent 45 minutes in consultation with this patient which included more than 65% of this time in direct face-to-face counseling, physical examination and discussion of my assessment and findings and this shared decision making with the patient.  The remainder of the time not spent face-to-face was performing one, some or all of the following actions: preparing to see the patient (e.g. reviewing tests, prior clinicians' notes, etc), ordering medications, tests or procedures, coordination of care, discussion of the plan with other healthcare providers, documenting clinical information in epic as well as independently interpreting results and communication of these results to the patient family and/or caregiver(s).  Please note that this explicitly excludes time spent on other separate billable services such as performing procedures or test interpretation, when applicable.    Follow Up:       Thank you for allowing me to participate in the care of your patient. Please to not  hesitate to contact me with additional questions or concerns.      Chau Lozano, DO, FACC, RS  Cardiac Electrophysiologist  [facility] / [facility]

## 2024-12-23 NOTE — PROGRESS NOTES
December 23, 2024    Hello, may I speak with Keya Hamilton?    My name is Rohini      I am  with [facility] CARDIOLOG[facility]  [address]  [facility] [address] [phone].    Before we get started may I verify your date of birth? 1990    I am calling to officially welcome you to our practice and ask about your recent visit. Is this a good time to talk? yes    Tell me about your visit with us. What things went well?  everything . Very happy with Dr. Lozano       We're always looking for ways to make our patients' experiences even better. Do you have recommendations on ways we may improve?  no    Overall were you satisfied with your first visit to our practice? yes       I appreciate you taking the time to speak with me today. Is there anything else I can do for you? no      Thank you, and have a great day.

## 2025-01-15 ENCOUNTER — OFFICE VISIT (OUTPATIENT)
Dept: NEUROLOGY | Facility: CLINIC | Age: 35
End: 2025-01-15
Payer: COMMERCIAL

## 2025-01-15 VITALS
TEMPERATURE: 97.3 F | HEIGHT: 63 IN | SYSTOLIC BLOOD PRESSURE: 114 MMHG | OXYGEN SATURATION: 98 % | WEIGHT: 263 LBS | HEART RATE: 75 BPM | DIASTOLIC BLOOD PRESSURE: 60 MMHG | BODY MASS INDEX: 46.6 KG/M2

## 2025-01-15 DIAGNOSIS — E78.5 HYPERLIPIDEMIA, UNSPECIFIED HYPERLIPIDEMIA TYPE: ICD-10-CM

## 2025-01-15 DIAGNOSIS — Z72.0 NICOTINE ABUSE: ICD-10-CM

## 2025-01-15 DIAGNOSIS — Z86.73 HISTORY OF CVA (CEREBROVASCULAR ACCIDENT): Primary | ICD-10-CM

## 2025-01-15 DIAGNOSIS — E66.01 MORBID OBESITY WITH BMI OF 45.0-49.9, ADULT: Chronic | ICD-10-CM

## 2025-01-15 DIAGNOSIS — I10 PRIMARY HYPERTENSION: Chronic | ICD-10-CM

## 2025-01-15 RX ORDER — SEMAGLUTIDE 0.5 MG/.5ML
INJECTION, SOLUTION SUBCUTANEOUS
COMMUNITY
Start: 2025-01-14

## 2025-01-21 NOTE — PROGRESS NOTES
Stroke Clinic Office Visit     Encounter Date: 01/15/25  Patient Name: Keya Hamilton  : 1990  MRN: [id number]   PCP: Cuong Parks MD  Referring Provider: No ref. provider found     Chief Complaint Follow-up    History of Present Illness  Keya Hamilton is a 34 y.o. female with recent history of acute right hemispheric stroke 2024 here today to establish care.    The patient arrived at [facility] ED via EMS with left-sided hemiparesis and NIH 11.  The patient underwent a complete stroke workup, CT negative for ICH, CTA H/N revealed a right M1 occlusion and CTP noted hypoperfusion in the right MCA territory as well.  Patient was deemed a candidate for IV TNK and taken to the Cath Lab for MET with a TICI 3 outcome.  Etiology, likely cardioembolic in the setting of left ventricular thrombus.  MRI I revealed AIS in the right [place] and right head of caudate.  PMH: HLD, HTN, DM, obesity, marijuana use, on progesterone (MIRENA IUD). The patient was started on 5 mg Eliquis twice daily and 80 mg atorvastatin. Hypercoagulation panel, factor V Leiden diagnosed on .  The patient arrives today with no complaints.  She is taking Eliquis and atorvastatin with no complaints of muscle pain/spasm or unusual/prolonged episodes of bleeding.  Patient reports, she continues to monitor her stroke risk factors with her primary care physician and cardiology.Patient denies any other neurological symptoms, including: headache, vision changes, dysesthesias, loss of consciousness, seizure or new areas of motor weakness.     Clinic Visit 1/15/25: Patient presents today for clinic follow-up appointment. She reports no new stroke-like symptoms. Patients left sided weakness has continued to improve. She does continue to have blurred vision worse in her left eye. Patient has a neuro-ophthalmology appointment with Dr. Frankel at  on . She has not yet been cleared to drive. I have suggested that she may go to   [employer] for their driving testing program for clearance, however the patient is currently seeking driving clearance via a program with vocational rehab. Concerning symptoms surrounding her stroke, only additional complaint is concerning her toes on left foot appear more spread out than right foot. This is new for the patient. It is possible some residual weakness of accessory muscles or compensatory mechanism is underlying this, but the patient has no difficulty ambulating due to this. She is continuing to work with physical therapy. Patient does need to have some teeth extracted. I have advised that per AHA guidelines, she sound wait at least 6 months post-stroke for non-urgent procedures. I agree with cardiology concerning holding Eliquis and have advised holding for no more than 48 hours and resume eliquis the night of the procedure. Discussed Hematology opinion that LV thrombus and stroke etiology is likely due to low ejection fraction rather than Factor V. Currently the patient is awaiting further future evaluation of EF with cardiology prior to deciding on defibrillator placement. She continues her Eliquis BID and Statin without new complaints. No additional concerns today.     Subjective     Past Medical History:   Diagnosis Date    CHF (congestive heart failure)     Chronic kidney disease     Congenital heart disease     Deep vein thrombosis     Diabetes mellitus     Hyperlipidemia     Hypertension     Sleep apnea     Stroke       Past Surgical History:   Procedure Laterality Date    INTERVENTIONAL RADIOLOGY PROCEDURE N/A 8/23/2024    Procedure: IR mechanical thrombectomy;  Surgeon: Lemuel Medina MD;  Location: [facility] INVASIVE LOCATION;  Service: Interventional Radiology;  Laterality: N/A;     Family History   Problem Relation Age of Onset    Hypertension Mother     Hypertension Father     Hypertension Sister     Diabetes Sister     Stroke Maternal Aunt     Hypertension Maternal Grandmother      Diabetes Maternal Grandmother     Heart disease Maternal Grandmother     Arrhythmia Maternal Grandmother     Hypertension Maternal Grandfather     Hypertension Paternal Grandmother     Hypertension Paternal Grandfather       Social History     Socioeconomic History    Marital status:    Tobacco Use    Smoking status: Former     Current packs/day: 0.50     Average packs/day: 0.5 packs/day for 10.0 years (5.0 ttl pk-yrs)     Types: Cigarettes     Passive exposure: Past    Smokeless tobacco: Never    Tobacco comments:     Quit  smoking 9 years ago    Vaping Use    Vaping status: Former    Substances: Nicotine, Flavoring    Devices: Disposable   Substance and Sexual Activity    Alcohol use: Not Currently     Comment: ocassionally a wine cooler or simular    Drug use: Yes     Types: Marijuana     Comment: occasional    Sexual activity: Yes     Partners: Male     Birth control/protection: I.U.D.       Current Outpatient Medications:     apixaban (ELIQUIS) 5 MG tablet tablet, Take 1 tablet by mouth Every 12 (Twelve) Hours., Disp: 60 tablet, Rfl: 11    atorvastatin (LIPITOR) 80 MG tablet, Take 1 tablet by mouth Every Night., Disp: , Rfl:     empagliflozin (JARDIANCE) 10 MG tablet tablet, Take 1 tablet by mouth Daily., Disp: 30 tablet, Rfl: 11    Fluconazole (DIFLUCAN PO), Take  by mouth., Disp: , Rfl:     nebivolol (Bystolic) 10 MG tablet, Take 1 tablet by mouth Daily., Disp: 30 tablet, Rfl: 3    pantoprazole (PROTONIX) 20 MG EC tablet, Take 1 tablet by mouth Daily., Disp: , Rfl:     sacubitril-valsartan (ENTRESTO) 49-51 MG tablet, Take 1 tablet by mouth Every 12 (Twelve) Hours., Disp: 60 tablet, Rfl: 11    spironolactone (ALDACTONE) 25 MG tablet, Take 1 tablet by mouth Daily., Disp: , Rfl:     vitamin B-12 (CYANOCOBALAMIN) 1000 MCG tablet, Take 1 tablet by mouth Daily., Disp: , Rfl:     vitamin D (ERGOCALCIFEROL) 1.25 MG (52948 UT) capsule capsule, , Disp: , Rfl:     Wegovy 0.5 MG/0.5ML solution auto-injector, ,  "Disp: , Rfl:    Allergies   Allergen Reactions    Codeine Hives and Unknown (See Comments)    Aspirin Other (See Comments)     One kidney so avoid NSAID        Objective     Physical Exam:  Vitals:    01/15/25 1330   BP: 114/60   Pulse: 75   Temp: 97.3 °F (36.3 °C)   SpO2: 98%   Weight: 119 kg (263 lb)   Height: 160 cm (62.99\")      Body mass index is 46.6 kg/m².     Physical Exam:  General Appearance: Alert  Eyes: Anicteric sclera  HEENT: no scleral injection   Lungs: respirations appear comfortable, no obvious increased work of breathing  Extremities: No cyanosis or fingernail clubbing   Skin: No rashes in exposed skin areas     Neurological Examination:   Mental status: Alert and oriented to person, place, and time. Speech with no dysarthria, able to name and repeat with no difficulty.    Cranial Nerves: Visual fields intact. Extraocular movements are intact with no nystagmus. Facial sensation intact. Face symmetrical. Hearing grossly intact. Palate movement is symmetric. Full shoulder shrug bilaterally. Tongue protrudes midline.   Sensory: Sensory exam to light touch in all four extremities distally is normal. Double simultaneous sensory stimulation shows no extinction  Motor: Normal tone throughout. Normal bulk. Pronator drift is absent.  Left upper extremity: 5/5 deltoid, tricep, bicep, interosseous, hand .   Right upper extremity: 5/5 deltoid, tricep, bicep, interosseous, hand .   Left lower extremity: 5/5 iliopsoas, knee extension/flexion, foot dorsi/plantarflexion.  Right lower extremity: 5/5 iliopsoas, knee extension/flexion, foot dorsi/plantarflexion.  Cerebellar: Finger-to-nose intact. Heel-to-shin intact. Rapid alternating movements are intact.   Gait: Normal.    NIHSS:     1a  Level of consciousness: 0=alert; keenly responsive   1b. LOC questions:  0=Performs both tasks correctly   1c. LOC commands: 0=Answers both questions correctly   2.  Best Gaze: 0=normal   3.  Visual: 1=Partial hemianopia "   4. Facial Palsy: 0=Normal symmetric movement   5a.  Motor left arm: 0=No drift, limb holds 90 (or 45) degrees for full 10 seconds   5b.  Motor right arm: 0=No drift, limb holds 90 (or 45) degrees for full 10 seconds   6a. motor left le=No drift, limb holds 90 (or 45) degrees for full 10 seconds   6b  Motor right le=No drift, limb holds 90 (or 45) degrees for full 10 seconds   7. Limb Ataxia: 0=Absent   8.  Sensory: 0=Normal; no sensory loss   9. Best Language:  0=No aphasia, normal   10. Dysarthria: 0=Normal   11. Extinction and Inattention: 0=No abnormality     Modified Tonio Score based on in-office assessment of alertness, orientation, and physical mobility. Further assessment with neurocognitive testing may be needed to elucidate full extent of cognitive abilities: 1 = No significant disability (Able to carry out all usual activities, despite some symptoms)    Over the past month…    Snoring:   Do you snore loudly (loud enough to be heard through closed doors or your bed partner elbows you for snoring at night)?    NO   Tired:   Do you often feel tired, fatigued or sleepy during the daytime (such as falling asleep during driving or talking to someone)?    No   Observed:   Has anyone observed you stop breathing or choking/gasping during your sleep?    No   Pressure:   Do you have or are you being treated for high blood pressure?    Yes   Body Mass Index:   Is the BMI > 35kg/m2 ?   BMI = 47.89   Yes   Age:   Older than 50?    yes   Neck Size:   Is your shirt collar > 16 inches/40 cm or larger? (measured around Miguel apple).   Neck Size =      unknown   Gender:   Male/Female?    Female   Total Score:   3   0-2 = SCOTT Low Risk    3-4 = SCOTT Intermediate Risk    5-8 = SCOTT High Risk  Or >2 + male gender  Or >2 + BMI > 35kg/m2  Or > 2 + neck circumference 16 inches / 40cm       PHQ-9 Depression Screening  Little interest or pleasure in doing things? Not at allno   Feeling down, depressed, or hopeless? Several  daysno   Trouble falling or staying asleep, or sleeping too much?     Feeling tired or having little energy?     Poor appetite or overeating?     Feeling bad about yourself - or that you are a failure or have let yourself or your family down?     Trouble concentrating on things, such as reading the newspaper or watching television?     Moving or speaking so slowly that other people could have noticed? Or the opposite - being so fidgety or restless that you have been moving around a lot more than usual?     Thoughts that you would be better off dead, or of hurting yourself in some way?     PHQ-9 Total Score     If you checked off any problems, how difficult have these problems made it for you to do your work, take care of things at home, or get along with other people?          SAHIL Fall Risk Clinician Key Questions   Have you fallen in the past year?: No  Do you feel unsteady with walking?: No  Are you worried about falling?: No  Stay Idependant Patient Questions   Patient Fall Risk Assessment Score : 0  Fall Risk Category  Fall Risk Category: Low    Imaging Reviewed:   MRI Brain Without Contrast [[id number]] (Order [id number])  Order  Status: Final result     Study Notes     Brenda Bishop on 8/24/2024  3:38 AM EDT   Stroke     Appointment Information    PACS Images     Radiology Images  Study Result    Narrative & Impression   MRI BRAIN WO CONTRAST   Date of Exam: 8/24/2024 3:36 AM EDT   Indication: stroke.   Comparison: 8/23/2024.     Technique:  Routine multiplanar/multisequence sequence images of the brain were obtained without contrast administration.      Findings:  There are patchy areas of diffusion restriction present within the periventricular white matter on the right as well as the right basal ganglia with tiny foci of diffusion restriction seen within the subcortical white matter of the right frontal lobe and   the right temporal lobe as well as the right occipital lobe with corresponding decreased  signal on the ADC map consistent with recent or acute ischemia. FLAIR signal changes are seen corresponding to these regions which appears most pronounced within   the right putamen. No additional significant FLAIR signal changes. Given the degree of signal intensity, some of the findings may be more subacute. No additional diffusion restriction identified. There is no evidence of acute or chronic intracranial   hemorrhage. No mass effect or midline shift. No abnormal extra-axial collections. The major vascular flow voids appear intact. The basal ganglia, brainstem and cerebellum appear within normal limits. Calvarial and superficial soft tissue signal is within   normal limits. Orbits appear unremarkable. The paranasal sinuses and the mastoid air cells appear well aerated. Midline structures are intact.         IMPRESSION:  Impression:  Patchy areas of diffusion restriction present within the right periventricular white matter, the right basal ganglia and the right temporal lobe and the right occipital lobe consistent with recent or acute ischemia. Given the degree of signal intensity   in presence of FLAIR signal change, findings likely more subacute. No evidence of hemorrhage. No significant mass effect or midline shift.           Electronically Signed: Kayla Negron MD    8/24/2024 4:29 AM EDT    Workstation ID: [id number]     Laboratory Results:   Hemoglobin   Date Value Ref Range Status   08/30/2024 18.9 (H) 12.0 - 15.9 g/dL Final   06/17/2018 15.3 12.0 - 16.0 g/dL Final     Hematocrit   Date Value Ref Range Status   08/30/2024 55.7 (H) 34.0 - 46.6 % Final   06/17/2018 43.1 36.0 - 48.0 % Final     Platelets   Date Value Ref Range Status   08/30/2024 469 (H) 140 - 450 10*3/mm3 Final   06/17/2018 391 135 - 450 K/uL Final     Hemoglobin A1C   Date Value Ref Range Status   08/24/2024 6.70 (H) 4.80 - 5.60 % Final     LDL Cholesterol    Date Value Ref Range Status   08/24/2024 115 (H) 0 - 100 mg/dL Final     AST (SGOT)  "  Date Value Ref Range Status   08/30/2024 43 (H) 1 - 32 U/L Final     ALT (SGPT)   Date Value Ref Range Status   08/30/2024 36 (H) 1 - 33 U/L Final           Assessment / Plan       Assessment and Plan    1. History of CVA   MRI + infarct right basal ganglia, right temporal lobe and the right occipital lobe  R MCA M1 occlusion, s/p TNK and MET with TICI 3.  -cardioembolic etiology 2/2 reduced EF  -Continue 5 mg Eliquis twice daily    -Reduce secondary stroke risks as below.   -Follow with  Neuro-opthomology as scheduled March 7  -No driving until clearance via driving exam   -The patient was counseled she experiences symptoms of acute onset unilateral arm, leg, or face weakness/numbness/tingling, unilateral facial droop, slurred speech/word finding difficulty, visual disturbance (\"curtain falling\" or visual field loss), or severe headache she should call 911 and present to the nearest emergency department immediately.    # Hyperlipidemia   - LDL on 8/24/24 = 115  - Goal LDL < 70  -Continue 80 mg of Atorvastatin    # Hypertension   - BP toady 114/60. May normalize BP goals. long-term blood pressure goal less than 120/80  - Recommend daily BP log book  -Patient to follow with Cardiology as scheduled    #Marijuana use  - reported occasional THC use   - Cessation counseling and education provided     #Nicotine product use via vaping  -Reduced, but Daily vaping  -Cessation counseling and education provide.   -May utilize nicotine patches for assistance if needed. Will continue to discuss     #Morbid obesity  -BMI 46.6  -Complicates all aspects of care  -Encourage increase activity and exercise as tolerated  -Mediterranean Diet recommended for secondary stroke prevention     Discussed the importance of medication compliance Atorvastatin 80mg nightly and Eliquis 5mg twice daily and lifestyle modifications adequate control of blood pressure, adequate control of cholesterol (goal LDL <70), adequate control of glucose " (<140, A1c goal <7), smoking cessation, increased physical activity, and implementation of healthy diet to help reduce the risk of future cerebrovascular events.  Also discussed the signs symptoms that would warrant the patient return back to the emergency department including unilateral weakness, unilateral numbness, visual disturbances, loss of balance, speech difficulties, and/or a sudden severe headache.  Patient acknowledged understanding.     Follow Up  Follow with Neuro Stroke in 3 months     Kofi Murphy PA-C  [facility] [place] [facility]O [facility]     Part of this note may be an electronic transcription/translation of spoken language to printed text using the Dragon Dictation System.

## 2025-03-11 ENCOUNTER — OFFICE VISIT (OUTPATIENT)
Dept: SLEEP MEDICINE | Age: 35
End: 2025-03-11
Payer: COMMERCIAL

## 2025-03-11 VITALS
TEMPERATURE: 98.5 F | WEIGHT: 248 LBS | HEIGHT: 63 IN | HEART RATE: 85 BPM | DIASTOLIC BLOOD PRESSURE: 78 MMHG | SYSTOLIC BLOOD PRESSURE: 110 MMHG | BODY MASS INDEX: 43.94 KG/M2 | OXYGEN SATURATION: 97 %

## 2025-03-11 DIAGNOSIS — R06.83 SNORING: ICD-10-CM

## 2025-03-11 DIAGNOSIS — G47.19 EXCESSIVE DAYTIME SLEEPINESS: ICD-10-CM

## 2025-03-11 DIAGNOSIS — G47.33 OSA (OBSTRUCTIVE SLEEP APNEA): Chronic | ICD-10-CM

## 2025-03-11 DIAGNOSIS — E66.01 OBESITY, MORBID, BMI 40.0-49.9: ICD-10-CM

## 2025-03-11 NOTE — PROGRESS NOTES
Keya Hamilton is a 34 y.o. female.   Chief Complaint   Patient presents with    Snoring    Witnessed Apnea    Morning Headaches    Dry Mouth    Unable To Stay Asleep    Unable To Fall Asleep       HPI     34 y.o. female seen in consultation at the request of Neelima Hugo APRN for evaluation of the above.     She has a history of a prior diagnosis of sleep apnea which she received in [place] in 2018.  I do not have these records but she was placed on PAP therapy and used this for some period of time but felt as though the device was uncomfortable and she tolerated poorly.  She was actually on BiPAP therapy according to the patient.    Any event, she has lost significant amounts of weight since then with the assistance of [employer].  She has gone from 315 pounds to 248 pounds at this point.  She is still interested in losing more weight.    Her symptoms of sleep apnea have decreased with her weight.  She notes decreased loudness of snoring.  She notes increasing daytime functionality.  She still has a morning headache and she still feels drowsy during the day just not as severely so.    She averages around 8-10 hours of sleep per night and is bed from around 11 PM until 9 AM.  It takes her 30 minutes to initiate sleep and she typically does not nap later in the day.    She denies any RLS type symptoms.  Significant parasomnias have not been described.  Her medical history is significant for hypertension, type 2 diabetes mellitus, a history of heart failure with reduced EF and prior CVA.    Transfer Scale is: 4/24    The patient's relevant past medical, surgical, family, and social history reviewed and updated in Epic as appropriate.    Current medications are:   Current Outpatient Medications:     apixaban (ELIQUIS) 5 MG tablet tablet, Take 1 tablet by mouth Every 12 (Twelve) Hours., Disp: 60 tablet, Rfl: 11    atorvastatin (LIPITOR) 80 MG tablet, Take 1 tablet by mouth Every Night., Disp: , Rfl:      "empagliflozin (JARDIANCE) 10 MG tablet tablet, Take 1 tablet by mouth Daily., Disp: 30 tablet, Rfl: 11    Fluconazole (DIFLUCAN PO), Take  by mouth., Disp: , Rfl:     nebivolol (Bystolic) 10 MG tablet, Take 1 tablet by mouth Daily., Disp: 30 tablet, Rfl: 3    pantoprazole (PROTONIX) 20 MG EC tablet, Take 1 tablet by mouth Daily., Disp: , Rfl:     sacubitril-valsartan (ENTRESTO) 49-51 MG tablet, Take 1 tablet by mouth Every 12 (Twelve) Hours., Disp: 60 tablet, Rfl: 11    spironolactone (ALDACTONE) 25 MG tablet, Take 1 tablet by mouth Daily., Disp: , Rfl:     vitamin B-12 (CYANOCOBALAMIN) 1000 MCG tablet, Take 1 tablet by mouth Daily., Disp: , Rfl:     vitamin D (ERGOCALCIFEROL) 1.25 MG (33844 UT) capsule capsule, , Disp: , Rfl:     Wegovy 0.5 MG/0.5ML solution auto-injector, , Disp: , Rfl: .    Review of Systems    Review of Systems  ROS documented in patient questionnaire ×14 systems.  Reviewed with patient.  Otherwise negative except as noted in HPI.    Physical Exam    Blood pressure 110/78, pulse 85, temperature 98.5 °F (36.9 °C), temperature source Oral, height 160 cm (63\"), weight 112 kg (248 lb), SpO2 97%. Body mass index is 43.93 kg/m².    Physical Exam  Vitals and nursing note reviewed.   Constitutional:       Appearance: Normal appearance. She is well-developed.   HENT:      Head: Normocephalic and atraumatic.      Nose: Nose normal.      Mouth/Throat:      Mouth: Mucous membranes are moist.      Pharynx: Oropharynx is clear. No oropharyngeal exudate.      Comments: Class IV airway  Eyes:      General: No scleral icterus.     Conjunctiva/sclera: Conjunctivae normal.   Neck:      Thyroid: No thyromegaly.      Trachea: No tracheal deviation.   Cardiovascular:      Rate and Rhythm: Normal rate and regular rhythm.      Heart sounds: No murmur heard.     No friction rub. No gallop.   Pulmonary:      Effort: Pulmonary effort is normal. No respiratory distress.      Breath sounds: No wheezing or rales. "   Musculoskeletal:         General: No deformity. Normal range of motion.   Skin:     General: Skin is warm and dry.      Findings: No rash.   Neurological:      Mental Status: She is alert and oriented to person, place, and time.   Psychiatric:         Behavior: Behavior normal.         Thought Content: Thought content normal.         DATA:    Reviewed 12/11/2024 note from MICHELLE Ibarra    Reviewed echocardiogram dated 11/27/2024 which reveals an EF of 31-35%.  Repeat is pending.        ASSESSMENT:    Problem List Items Addressed This Visit          Pulmonary Problems    SCOTT (Chronic)    Relevant Orders    Home Sleep Study       Other    Obesity, morbid, BMI 40.0-49.9     Other Visit Diagnoses         Snoring        Relevant Orders    Home Sleep Study      Excessive daytime sleepiness        Relevant Orders    Home Sleep Study            34-year-old female referred by MICHELLE Ibarra to the sleep center.  She presents with multiple signs and symptoms of obstructive sleep apnea including snoring, witnessed apneas, daytime somnolence, morning headaches, dry mouth.  She has an elevated BMI and reduced posterior pharyngeal space.  She has a prior diagnosis of SCOTT treated in [place] with suboptimal tolerance of BiPAP therapy per the patient.  Comorbidities include hypertension, diabetes mellitus, and a history of HFrEF and stroke.    She has lost significant amounts of weight since her prior diagnosis in 2018 and I proposed a home sleep apnea test to reestablish the diagnosis and then reinitiation of PAP therapy and assessment of efficacy.    PLAN:    - Home sleep apnea testing.  - Diagnostic process and potential treatment options discussed and questions/concerns addressed.  - Long-term cardiovascular and metabolic risks of untreated obstructive sleep apnea reviewed with the patient.  - The importance of long-term healthy weight loss discussed.  - Patient was agreeable to a trial of CPAP therapy on an  initial trial basis if recommended after testing complete.  - Sleep center follow-up.    I have reviewed the results of my evaluation and impression and discussed my recommendations in detail with the patient.    Signed by  Alexey Atkins MD    March 11, 2025      CC: Cuong Parks MD Hollan, Tracy L, MICHELLE

## 2025-03-20 DIAGNOSIS — R06.83 SNORING: ICD-10-CM

## 2025-03-20 DIAGNOSIS — G47.33 OSA (OBSTRUCTIVE SLEEP APNEA): Primary | ICD-10-CM

## 2025-03-20 DIAGNOSIS — G47.19 EXCESSIVE DAYTIME SLEEPINESS: ICD-10-CM

## 2025-04-16 ENCOUNTER — OFFICE VISIT (OUTPATIENT)
Dept: NEUROLOGY | Facility: CLINIC | Age: 35
End: 2025-04-16
Payer: COMMERCIAL

## 2025-04-16 VITALS
SYSTOLIC BLOOD PRESSURE: 110 MMHG | OXYGEN SATURATION: 97 % | HEART RATE: 57 BPM | HEIGHT: 63 IN | DIASTOLIC BLOOD PRESSURE: 68 MMHG | BODY MASS INDEX: 42.52 KG/M2 | WEIGHT: 240 LBS | TEMPERATURE: 98 F

## 2025-04-16 DIAGNOSIS — G47.33 OSA (OBSTRUCTIVE SLEEP APNEA): Chronic | ICD-10-CM

## 2025-04-16 DIAGNOSIS — D68.51 FACTOR V LEIDEN: ICD-10-CM

## 2025-04-16 DIAGNOSIS — E78.2 MIXED HYPERLIPIDEMIA: Primary | ICD-10-CM

## 2025-04-16 DIAGNOSIS — I69.30 SEQUELAE, POST-STROKE: ICD-10-CM

## 2025-04-16 DIAGNOSIS — I10 PRIMARY HYPERTENSION: Chronic | ICD-10-CM

## 2025-04-16 DIAGNOSIS — F41.9 ANXIETY: ICD-10-CM

## 2025-04-16 PROBLEM — E78.5 HYPERLIPIDEMIA LDL GOAL <70: Status: ACTIVE | Noted: 2025-04-16

## 2025-04-16 PROCEDURE — 99214 OFFICE O/P EST MOD 30 MIN: CPT | Performed by: NURSE PRACTITIONER

## 2025-04-16 NOTE — PROGRESS NOTES
Follow Up Office Visit      Encounter Date: 2025   Patient Name: Keya Hamilton  : 1990   MRN: [id number]   PCP: Cuong Parks MD    Chief Complaint:    Chief Complaint   Patient presents with    Follow-up       History of Present Illness:  Keya Hamilton is a 34 y.o. female with recent history of acute right hemispheric stroke 2024 here today to establish care.    The patient arrived at [facility] ED via EMS with left-sided hemiparesis and NIH 11.  The patient underwent a complete stroke workup, CT negative for ICH, CTA H/N revealed a right M1 occlusion and CTP noted hypoperfusion in the right MCA territory as well.  Patient was deemed a candidate for IV TNK and taken to the Cath Lab for MET with a TICI 3 outcome.  Etiology, likely cardioembolic in the setting of left ventricular thrombus.  MRI I revealed AIS in the right Putter[place] and right head of caudate.  PMH: HLD, HTN, DM, obesity, marijuana use, on progesterone (MIRENA IUD). The patient was started on 5 mg Eliquis twice daily and 80 mg atorvastatin. Hypercoagulation panel, factor V Leiden diagnosed on .  The patient arrives today with no complaints.  She is taking Eliquis and atorvastatin with no complaints of muscle pain/spasm or unusual/prolonged episodes of bleeding.  Patient reports, she continues to monitor her stroke risk factors with her primary care physician and cardiology.Patient denies any other neurological symptoms, including: headache, vision changes, dysesthesias, loss of consciousness, seizure or new areas of motor weakness.      Clinic Visit 1/15/25 with Kofi Murphy: Patient presents today for clinic follow-up appointment. She reports no new stroke-like symptoms. Patients left sided weakness has continued to improve. She does continue to have blurred vision worse in her left eye. Patient has a neuro-ophthalmology appointment with Dr. Frankel at  on . She has not yet been cleared to drive. I  "have suggested that she may go to [facility] for their driving testing program for clearance, however the patient is currently seeking driving clearance via a program with vocational rehab. Concerning symptoms surrounding her stroke, only additional complaint is concerning her toes on left foot appear more spread out than right foot. This is new for the patient. It is possible some residual weakness of accessory muscles or compensatory mechanism is underlying this, but the patient has no difficulty ambulating due to this. She is continuing to work with physical therapy. Patient does need to have some teeth extracted. I have advised that per AHA guidelines, she sound wait at least 6 months post-stroke for non-urgent procedures. I agree with cardiology concerning holding Eliquis and have advised holding for no more than 48 hours and resume eliquis the night of the procedure. Discussed Hematology opinion that LV thrombus and stroke etiology is likely due to low ejection fraction rather than Factor V. Currently the patient is awaiting further future evaluation of EF with cardiology prior to deciding on defibrillator placement. She continues her Eliquis BID and Statin without new complaints. No additional concerns today.     Clinic Visit 4/16/2025: Patient presents today for clinic follow-up accompanied by her significant other.  She has been doing well, denies any new or worsening strokelike symptoms.  She specifically denies any headaches, no dizziness, no speech disturbance.  The left sided weakness and paresthesias have improved - she continues to have some \"slowness,\" in her left hand.  She tells me that her blurred vision in her left eye has greatly improved.  She was evaluated by neuro-ophthalmology in March who did not detect any hemianopic defect except for mild nasal depression in the right eye, no concern for IIH.  She also had a normal eye exam.  Her gaze is normal, VFF today-she asks about driving which I " feel is appropriate given the above.  I asked her to start with short, local drives during daylight hours with another person.  She has completed PT/OT.  She recently had a tooth that had abscessed in addition to her wisdom teeth removed and has resumed her Eliquis.  Tolerating Eliquis well, no signs and symptoms of bleeding.  She has an upcoming echocardiogram next week to reevaluate her EF and potentially consider a defibrillator in the future.  She also has a sleep study that is pending.  Overall she tells me she has been doing well, watching her diet and going to the gym regularly.  She does mention that she has been feeling increasingly anxious lately.  She has been diagnosed with anxiety in the past but was never on any medications for this.  She is interested in starting medications +/- counseling, I will refer her to psychiatry.      Subjective        I have reviewed and the following portions of the patient's history were updated as appropriate: past family history, past medical history, past social history, past surgical history and problem list.    Medications:     Current Outpatient Medications:     apixaban (ELIQUIS) 5 MG tablet tablet, Take 1 tablet by mouth Every 12 (Twelve) Hours., Disp: 60 tablet, Rfl: 11    atorvastatin (LIPITOR) 80 MG tablet, Take 1 tablet by mouth Every Night., Disp: , Rfl:     empagliflozin (JARDIANCE) 10 MG tablet tablet, Take 1 tablet by mouth Daily., Disp: 30 tablet, Rfl: 11    pantoprazole (PROTONIX) 20 MG EC tablet, Take 1 tablet by mouth Daily., Disp: , Rfl:     sacubitril-valsartan (ENTRESTO) 49-51 MG tablet, Take 1 tablet by mouth Every 12 (Twelve) Hours., Disp: 60 tablet, Rfl: 11    Semaglutide-Weight Management 1.7 MG/0.75ML solution auto-injector, , Disp: , Rfl:     spironolactone (ALDACTONE) 25 MG tablet, Take 1 tablet by mouth Daily., Disp: , Rfl:     vitamin B-12 (CYANOCOBALAMIN) 1000 MCG tablet, Take 1 tablet by mouth Daily., Disp: , Rfl:     vitamin D  "(ERGOCALCIFEROL) 1.25 MG (29862 UT) capsule capsule, , Disp: , Rfl:     nebivolol (BYSTOLIC) 10 MG tablet, Take 1 tablet by mouth once daily, Disp: 30 tablet, Rfl: 0    Allergies:   Allergies   Allergen Reactions    Codeine Hives and Unknown (See Comments)    Aspirin Other (See Comments)     One kidney so avoid NSAID       Objective     Physical Exam:   Vital Signs:   Vitals:    04/16/25 1507   BP: 110/68   Pulse: 57   Temp: 98 °F (36.7 °C)   SpO2: 97%   Weight: 109 kg (240 lb)   Height: 160 cm (62.99\")     Body mass index is 42.52 kg/m².    Physical Exam  Vitals reviewed.   Constitutional:       Appearance: Normal appearance.   HENT:      Head: Normocephalic and atraumatic.   Eyes:      General: Lids are normal.      Extraocular Movements: Extraocular movements intact.      Pupils: Pupils are equal, round, and reactive to light.   Cardiovascular:      Rate and Rhythm: Normal rate. Bradycardia present.   Pulmonary:      Effort: Pulmonary effort is normal. No respiratory distress.   Musculoskeletal:         General: No swelling. Normal range of motion.      Cervical back: Normal range of motion.   Skin:     General: Skin is warm and dry.   Neurological:      Mental Status: She is alert and oriented to person, place, and time.      Cranial Nerves: Cranial nerve deficit present.      Sensory: Sensory deficit present.      Motor: Motor strength is normal.No weakness.   Psychiatric:         Mood and Affect: Mood normal.         Speech: Speech normal.         Behavior: Behavior normal.       Neurological Exam  Mental Status  Alert. Oriented to person, place, and time. Speech is normal. Language is fluent with no aphasia. Attention and concentration are normal.    Cranial Nerves  CN II: Visual fields full to confrontation.  CN III, IV, VI: Extraocular movements intact bilaterally. Normal lids and orbits bilaterally. Pupils equal round and reactive to light bilaterally.  CN V:  Right: Facial sensation is normal.  Left: " Diminished sensation of the entire left side of the face.  CN VII:  Right: There is no facial weakness.  Left: There is central facial weakness.  CN XI: Shoulder shrug strength is normal.  CN XII: Tongue midline without atrophy or fasciculations.    Motor  Normal muscle bulk throughout. No fasciculations present. Normal muscle tone. No abnormal involuntary movements. Strength is 5/5 throughout all four extremities.    Sensory  Light touch abnormality:   Slightly diminished in the left face and hand.    Coordination  Right: Finger-to-nose normal. Rapid alternating movement normal.Left: Finger-to-nose normal. Rapid alternating movement normal.    Gait  Casual gait is normal including stance, stride, and arm swing.       Procedures      NIH Stroke Scale  Time: 18:57 EDT  Person Administering Scale: MICHELLE Muniz    1a  Level of consciousness: 0=alert; keenly responsive   1b. LOC questions:  0=Answers both questions correctly   1c. LOC commands: 0=Performs both tasks correctly   2.  Best Gaze: 0=normal   3.  Visual: 0=No visual loss   4. Facial Palsy: 1=Minor paralysis (flattened nasolabial fold, asymmetric on smiling)   5a.  Motor left arm: 0=No drift, limb holds 90 (or 45) degrees for full 10 seconds   5b.  Motor right arm: 0=No drift, limb holds 90 (or 45) degrees for full 10 seconds   6a. motor left le=No drift, limb holds 90 (or 45) degrees for full 10 seconds   6b  Motor right le=No drift, limb holds 90 (or 45) degrees for full 10 seconds   7. Limb Ataxia: 0=Absent   8.  Sensory: 1=Mild to moderate sensory loss; patient feels pinprick is less sharp or is dull on the affected side; there is a loss of superficial pain with pinprick but patient is aware She is being touched   9. Best Language:  0=No aphasia, normal   10. Dysarthria: 0=Normal   11. Extinction and Inattention: 0=No abnormality    Total:   2     PHQ-2 Depression Screening  Little interest or pleasure in doing things? Not at all    Feeling down, depressed, or hopeless? Nearly every day   PHQ-2 Total Score 3       Results Reviewed:     WBC   Date Value Ref Range Status   02/25/2025 9.04 3.70 - 10.30 10*3/uL Final     RBC   Date Value Ref Range Status   02/25/2025 4.51 3.90 - 5.20 10*6/uL Final     Hemoglobin   Date Value Ref Range Status   02/25/2025 15.3 11.2 - 15.7 g/dL Final     Hematocrit   Date Value Ref Range Status   02/25/2025 44.2 34.0 - 45.0 % Final     MCV   Date Value Ref Range Status   02/25/2025 98 79 - 98 fL Final     MCH   Date Value Ref Range Status   02/25/2025 33.9 (H) 26.0 - 32.0 pg Final     MCHC   Date Value Ref Range Status   02/25/2025 34.6 30.7 - 35.5 g/dL Final     RDW   Date Value Ref Range Status   02/25/2025 11.4 (L) 11.5 - 14.5 % Final     RDW-SD   Date Value Ref Range Status   08/30/2024 47.0 37.0 - 54.0 fl Final     MPV   Date Value Ref Range Status   02/25/2025 8.7 (L) 8.8 - 12.5 fL Final     Platelets   Date Value Ref Range Status   02/25/2025 346 155 - 369 10*3/uL Final     Neutrophil Rel %   Date Value Ref Range Status   06/17/2018 47.7 % Final     Neutrophil %   Date Value Ref Range Status   08/30/2024 65.4 42.7 - 76.0 % Final     Lymphocyte Rel %   Date Value Ref Range Status   06/17/2018 41.5 % Final     Lymphocyte %   Date Value Ref Range Status   08/30/2024 23.3 19.6 - 45.3 % Final     Monocyte Rel %   Date Value Ref Range Status   06/17/2018 7.4 % Final     Monocyte %   Date Value Ref Range Status   08/30/2024 7.9 5.0 - 12.0 % Final     Eosinophil Rel %   Date Value Ref Range Status   06/17/2018 2.7 % Final     Eosinophil %   Date Value Ref Range Status   08/30/2024 1.7 0.3 - 6.2 % Final     Basophil Rel %   Date Value Ref Range Status   06/17/2018 0.7 % Final     Basophil %   Date Value Ref Range Status   08/30/2024 0.9 0.0 - 1.5 % Final     Immature Grans %   Date Value Ref Range Status   08/30/2024 0.8 (H) 0.0 - 0.5 % Final     Neutrophils Absolute   Date Value Ref Range Status   06/17/2018 4.7 1.7  - 7.7 K/uL Final     Neutrophils, Absolute   Date Value Ref Range Status   08/30/2024 7.68 (H) 1.70 - 7.00 10*3/mm3 Final     Lymphocytes Absolute   Date Value Ref Range Status   06/17/2018 4.1 1.0 - 5.1 K/uL Final     Lymphocytes, Absolute   Date Value Ref Range Status   08/30/2024 2.73 0.70 - 3.10 10*3/mm3 Final     Monocytes Absolute   Date Value Ref Range Status   06/17/2018 0.7 0.0 - 1.3 K/uL Final     Monocytes, Absolute   Date Value Ref Range Status   08/30/2024 0.93 (H) 0.10 - 0.90 10*3/mm3 Final     Eosinophils Absolute   Date Value Ref Range Status   06/17/2018 0.3 0.0 - 0.6 K/uL Final     Eosinophils, Absolute   Date Value Ref Range Status   08/30/2024 0.20 0.00 - 0.40 10*3/mm3 Final     Basophils Absolute   Date Value Ref Range Status   06/17/2018 0.1 0.0 - 0.2 K/uL Final     Basophils, Absolute   Date Value Ref Range Status   08/30/2024 0.11 0.00 - 0.20 10*3/mm3 Final     Immature Grans, Absolute   Date Value Ref Range Status   08/30/2024 0.09 (H) 0.00 - 0.05 10*3/mm3 Final     nRBC   Date Value Ref Range Status   02/25/2025 0 <=0.0 per 100 WBCs Final   08/30/2024 0.0 0.0 - 0.2 /100 WBC Final     Lab Results   Component Value Date    GLUCOSE 188 (H) 10/30/2024    BUN 12 10/30/2024    CREATININE 1.10 11/27/2024     10/30/2024    K 4.0 10/30/2024     10/30/2024    CALCIUM 9.2 10/30/2024    PROTEINTOT 6.8 08/30/2024    ALBUMIN 3.9 08/30/2024    ALT 36 (H) 08/30/2024    AST 43 (H) 08/30/2024    ALKPHOS 72 08/30/2024    BILITOT 0.5 08/30/2024    GLOB 2.9 08/30/2024    AGRATIO 1.3 08/30/2024    BCR 10.6 10/30/2024    ANIONGAP 12.0 10/30/2024    EGFR 66.4 10/30/2024     Lipid Panel          8/24/2024    01:02   Lipid Panel   Total Cholesterol 177    Triglycerides 175    HDL Cholesterol 31    VLDL Cholesterol 31    LDL Cholesterol  115    LDL/HDL Ratio 3.58      A1c 6.7    MRI BRAIN WO CONTRAST   Date of Exam: 8/24/2024 3:36 AM EDT   Indication: stroke.   Comparison: 8/23/2024.     Technique:   Routine multiplanar/multisequence sequence images of the brain were obtained without contrast administration.      Findings:  There are patchy areas of diffusion restriction present within the periventricular white matter on the right as well as the right basal ganglia with tiny foci of diffusion restriction seen within the subcortical white matter of the right frontal lobe and   the right temporal lobe as well as the right occipital lobe with corresponding decreased signal on the ADC map consistent with recent or acute ischemia. FLAIR signal changes are seen corresponding to these regions which appears most pronounced within   the right putamen. No additional significant FLAIR signal changes. Given the degree of signal intensity, some of the findings may be more subacute. No additional diffusion restriction identified. There is no evidence of acute or chronic intracranial   hemorrhage. No mass effect or midline shift. No abnormal extra-axial collections. The major vascular flow voids appear intact. The basal ganglia, brainstem and cerebellum appear within normal limits. Calvarial and superficial soft tissue signal is within   normal limits. Orbits appear unremarkable. The paranasal sinuses and the mastoid air cells appear well aerated. Midline structures are intact.         IMPRESSION:  Impression:  Patchy areas of diffusion restriction present within the right periventricular white matter, the right basal ganglia and the right temporal lobe and the right occipital lobe consistent with recent or acute ischemia. Given the degree of signal intensity   in presence of FLAIR signal change, findings likely more subacute. No evidence of hemorrhage. No significant mass effect or midline shift.    No new imaging available    Assessment / Plan      Assessment/Plan:   Diagnoses and all orders for this visit:    Sequela post-stroke        Factor V Leiden        Low EF -41%        Left ventricular thrombus  -Right basal  ganglia, right temporal lobe and right occipital lobe infarcts; suspected etiology due cardioembolic/low EF  - Question whether or not left ventricular thrombus was secondary to low EF rather than factor V  -Continue Eliquis 5 mg twice daily  -Continue atorvastatin 80 mg daily, needs lipid panel recheck  -Follows with cardiology, considering a defibrillator in the future.  Upcoming appointment with  next month  - Needs a repeat lipid panel, ordered, continue atorvastatin  - Evaluated by neuro Optho in March, no hemianopic defect except for mild nasal depression in the right eye, no concern for IIH  -Recent normal eye exam in January  -Okay to drive, recommended patient start with short, local drives accompanied by another person  - No therapy needs at this time  - Ambulatory referral to psychiatry; patient interested in counseling and medication for depression/anxiety     # Hyperlipidemia   - LDL on 8/24/24 = 115  - Goal LDL < 70  -Repeat lipid panel ordered  -Continue 80 mg of Atorvastatin     # Hypertension   - BP toady 110/68. May normalize BP goals. long-term blood pressure goal less than 130/80  -Patient to follow with Cardiology as scheduled     #Morbid obesity  -BMI 46.6  -Complicates all aspects of care  -Encourage increase activity and exercise as tolerated  -Mediterranean Diet recommended for secondary stroke prevention    Discussed the importance of medication compliance Atorvastatin 80mg nightly and Eliquis 5mg twice daily and lifestyle modifications adequate control of blood pressure, adequate control of cholesterol (goal LDL <70), increased physical activity, and implementation of healthy diet to help reduce the risk of future cerebrovascular events.  Also discussed the signs symptoms that would warrant the patient return back to the emergency department including unilateral weakness, unilateral numbness, visual disturbances, loss of balance, speech difficulties, and/or a sudden severe headache.   Patient verbalized understanding.      Follow Up:   Return in about 3 months (around 7/16/2025).    MICHELLE Muniz  [facility]roke

## 2025-04-18 RX ORDER — NEBIVOLOL 10 MG/1
10 TABLET ORAL DAILY
Qty: 30 TABLET | Refills: 0 | Status: SHIPPED | OUTPATIENT
Start: 2025-04-18

## 2025-04-22 ENCOUNTER — HOSPITAL ENCOUNTER (OUTPATIENT)
Dept: CARDIOLOGY | Facility: HOSPITAL | Age: 35
Discharge: HOME | End: 2025-04-22
Admitting: INTERNAL MEDICINE
Payer: COMMERCIAL

## 2025-04-22 DIAGNOSIS — I50.21 ACUTE SYSTOLIC HEART FAILURE: ICD-10-CM

## 2025-04-22 DIAGNOSIS — I50.22 CHRONIC HFREF (HEART FAILURE WITH REDUCED EJECTION FRACTION): ICD-10-CM

## 2025-04-22 LAB
AORTIC DIMENSIONLESS INDEX: 0.75 (DI)
AV MEAN PRESS GRAD SYS DOP V1V2: 2 MMHG
AV VMAX SYS DOP: 109 CM/SEC
BH CV ECHO MEAS - AO MAX PG: 4.8 MMHG
BH CV ECHO MEAS - AO ROOT AREA (BSA CORRECTED): 1.9 CM2
BH CV ECHO MEAS - AO ROOT DIAM: 3.9 CM
BH CV ECHO MEAS - AO V2 VTI: 20.8 CM
BH CV ECHO MEAS - AVA(I,D): 2.5 CM2
BH CV ECHO MEAS - EDV(CUBED): 79.5 ML
BH CV ECHO MEAS - EDV(MOD-SP2): 129 ML
BH CV ECHO MEAS - EDV(MOD-SP4): 137 ML
BH CV ECHO MEAS - EF(MOD-SP2): 45 %
BH CV ECHO MEAS - EF(MOD-SP4): 39.6 %
BH CV ECHO MEAS - ESV(CUBED): 24.6 ML
BH CV ECHO MEAS - ESV(MOD-SP2): 71 ML
BH CV ECHO MEAS - ESV(MOD-SP4): 82.7 ML
BH CV ECHO MEAS - FS: 32.4 %
BH CV ECHO MEAS - IVS/LVPW: 0.91 CM
BH CV ECHO MEAS - IVSD: 1 CM
BH CV ECHO MEAS - LA DIMENSION: 3 CM
BH CV ECHO MEAS - LAT PEAK E' VEL: 8.2 CM/SEC
BH CV ECHO MEAS - LV DIASTOLIC VOL/BSA (35-75): 66.5 CM2
BH CV ECHO MEAS - LV MASS(C)D: 152.6 GRAMS
BH CV ECHO MEAS - LV MAX PG: 3.4 MMHG
BH CV ECHO MEAS - LV MEAN PG: 2 MMHG
BH CV ECHO MEAS - LV SYSTOLIC VOL/BSA (12-30): 40.1 CM2
BH CV ECHO MEAS - LV V1 MAX: 92.5 CM/SEC
BH CV ECHO MEAS - LV V1 VTI: 15.5 CM
BH CV ECHO MEAS - LVIDD: 4.3 CM
BH CV ECHO MEAS - LVIDS: 2.9 CM
BH CV ECHO MEAS - LVOT AREA: 3.4 CM2
BH CV ECHO MEAS - LVOT DIAM: 2.07 CM
BH CV ECHO MEAS - LVPWD: 1.1 CM
BH CV ECHO MEAS - MED PEAK E' VEL: 6.1 CM/SEC
BH CV ECHO MEAS - MV A MAX VEL: 63.4 CM/SEC
BH CV ECHO MEAS - MV DEC SLOPE: 229.5 CM/SEC2
BH CV ECHO MEAS - MV E MAX VEL: 62.6 CM/SEC
BH CV ECHO MEAS - MV E/A: 0.99
BH CV ECHO MEAS - MV MAX PG: 2.39 MMHG
BH CV ECHO MEAS - MV MEAN PG: 1.24 MMHG
BH CV ECHO MEAS - MV P1/2T: 97 MSEC
BH CV ECHO MEAS - MV V2 VTI: 21.9 CM
BH CV ECHO MEAS - MVA(P1/2T): 2.27 CM2
BH CV ECHO MEAS - MVA(VTI): 2.38 CM2
BH CV ECHO MEAS - PA ACC TIME: 0.15 SEC
BH CV ECHO MEAS - SV(LVOT): 52.2 ML
BH CV ECHO MEAS - SV(MOD-SP2): 58 ML
BH CV ECHO MEAS - SV(MOD-SP4): 54.3 ML
BH CV ECHO MEAS - SVI(LVOT): 25.3 ML/M2
BH CV ECHO MEAS - SVI(MOD-SP2): 28.1 ML/M2
BH CV ECHO MEAS - SVI(MOD-SP4): 26.3 ML/M2
BH CV ECHO MEAS - TAPSE (>1.6): 1.52 CM
BH CV ECHO MEASUREMENTS AVERAGE E/E' RATIO: 8.76
BH CV XLRA - RV BASE: 4.3 CM
BH CV XLRA - RV LENGTH: 5.6 CM
BH CV XLRA - RV MID: 3.7 CM
BH CV XLRA - TDI S': 7.6 CM/SEC
IVRT: 137 MS
LEFT ATRIUM VOLUME INDEX: 15.6 ML/M2
LV EF BIPLANE MOD: 41.2 %

## 2025-04-22 PROCEDURE — 93306 TTE W/DOPPLER COMPLETE: CPT

## 2025-04-22 PROCEDURE — 93306 TTE W/DOPPLER COMPLETE: CPT | Performed by: INTERNAL MEDICINE

## 2025-04-23 ENCOUNTER — TELEPHONE (OUTPATIENT)
Dept: NEUROLOGY | Facility: CLINIC | Age: 35
End: 2025-04-23

## 2025-04-23 NOTE — TELEPHONE ENCOUNTER
PATIENTS SPOUSE IS REQUESTING A LETTER TO BE UPLOADED TO [employer] STATING THAT ERINN CLEARED THE PATIENT TO DRIVE.    PLEASE REVIEW

## 2025-05-21 ENCOUNTER — OFFICE VISIT (OUTPATIENT)
Dept: CARDIOLOGY | Facility: CLINIC | Age: 35
End: 2025-05-21
Payer: COMMERCIAL

## 2025-05-21 VITALS
DIASTOLIC BLOOD PRESSURE: 76 MMHG | SYSTOLIC BLOOD PRESSURE: 110 MMHG | HEIGHT: 63 IN | HEART RATE: 85 BPM | OXYGEN SATURATION: 98 % | WEIGHT: 235 LBS | BODY MASS INDEX: 41.64 KG/M2

## 2025-05-21 DIAGNOSIS — I51.3 LEFT VENTRICULAR THROMBUS: ICD-10-CM

## 2025-05-21 DIAGNOSIS — I50.22 CHRONIC HFREF (HEART FAILURE WITH REDUCED EJECTION FRACTION): Primary | ICD-10-CM

## 2025-05-21 DIAGNOSIS — I63.9 ACUTE STROKE DUE TO ISCHEMIA: ICD-10-CM

## 2025-05-21 DIAGNOSIS — I10 PRIMARY HYPERTENSION: ICD-10-CM

## 2025-05-21 RX ORDER — NEBIVOLOL 10 MG/1
10 TABLET ORAL DAILY
Qty: 30 TABLET | Refills: 0 | Status: SHIPPED | OUTPATIENT
Start: 2025-05-21

## 2025-05-21 NOTE — PROGRESS NOTES
[facility] Cardiology   [address], [address]  [place], [place], [address]    [phone]  [email or web address]           OUTPATIENT CLINIC FOLLOW UP NOTE    Patient Care Team:  Patient Care Team:  Cuong Parks MD as PCP - General (Family Medicine)  Faustina Reyes APRN as Nurse Practitioner (Cardiology)  Alexey Atkins MD as Consulting Physician (Pulmonary Disease)  Dion Hooker MD as Consulting Physician (Cardiology)    Subjective:   Chief Complaint:   Chief Complaint   Patient presents with    Follow-up     3 month           Keya Hamilton is a 34 y.o. female.  Cardiac focused, problem list:  Acute HFrEF  Severe exacerbation, new diagnosis 8/2024.  Possible contributing causes include recent pneumonia, positive meth and marijuana  Echocardiogram 8/24/2024:  LVEF 46% with global hypokinesis.   Limited echo, 8/29/2024:  LVEF 26-30%. LVH. Previously noted LV thrombus appears much improved, there appears to be a smaller residual mural thrombus at the apex.  Cardiac MRI 9/2024: EF 30%, no fibrosis or scar, no LV thrombus  Cardiac CTA 11/2024, minimal CAD, CAC score 0  TTE 4/2025, EF 40 to 45%.  Decision was made to not proceed with ICD  LV thrombus  Echocardiogram, 8/24/2024:  LVEF 36.7%. LVH. Diastolic dysfunction noted. Thrombus present in LV 2.22 cm x 0.96 cm. Dilated LA. Indeterminate bubble study.   Right MCA occlusion, CVA  Presented to [facility] ED with left sided hemiparesis, dysarthria, somnolence, rightward gaze deviation, 8/23/2024. CT head unrevealing, CTP displayed right MCA perfusion deficit with M1 occlusion.  Status post TNK.   Status post urgent mechanical thrombectomy, 8/23/2024, Dr. Medina.   Cardiac event monitor, 10/16/2024:  Monitored for 23 days, No significant arrhythmia.  Patient triggered events associated with sinus rhythm.   Factor V Leiden and history of LV thrombus  As such, has been recommended lifelong anticoagulation  Hypertension    Type 2 diabetes mellitus   Right renal atrophy  SCOTT on CPAP  Substance abuse   UDS positive for THC, methamphetamine (possibly laced marijuana).   Off of nicotine and alcohol products as of spring 2025  Obesity    HPI:    Doing well from a cardiac standpoint.  Denies exertional chest pain, dyspnea, unusual swelling.  Off all nicotine products.  Quit alcohol.    Review of Systems:  As noted above in the HPI     PFSH:  Patient Active Problem List   Diagnosis    R M1 stenosis    T2DM    HTN    Obesity, morbid, BMI 40.0-49.9    SCOTT    Hypertensive urgency    R/O Seizure    Acute respiratory failure    Acute stroke due to ischemia    Acute systolic heart failure    Left ventricular thrombus    Chronic HFrEF (heart failure with reduced ejection fraction)    Right renal atrophy    Sequelae, post-stroke    Factor V Leiden    Hyperlipidemia LDL goal <70    Anxiety         Current Outpatient Medications:     apixaban (ELIQUIS) 5 MG tablet tablet, Take 1 tablet by mouth Every 12 (Twelve) Hours., Disp: 60 tablet, Rfl: 11    atorvastatin (LIPITOR) 80 MG tablet, Take 1 tablet by mouth Every Night., Disp: , Rfl:     empagliflozin (JARDIANCE) 10 MG tablet tablet, Take 1 tablet by mouth Daily., Disp: 30 tablet, Rfl: 11    nebivolol (BYSTOLIC) 10 MG tablet, Take 1 tablet by mouth once daily, Disp: 30 tablet, Rfl: 0    pantoprazole (PROTONIX) 20 MG EC tablet, Take 1 tablet by mouth Daily., Disp: , Rfl:     sacubitril-valsartan (ENTRESTO) 49-51 MG tablet, Take 1 tablet by mouth Every 12 (Twelve) Hours., Disp: 60 tablet, Rfl: 11    Semaglutide-Weight Management 1.7 MG/0.75ML solution auto-injector, , Disp: , Rfl:     spironolactone (ALDACTONE) 25 MG tablet, Take 1 tablet by mouth Daily., Disp: , Rfl:     vitamin B-12 (CYANOCOBALAMIN) 1000 MCG tablet, Take 1 tablet by mouth Daily., Disp: , Rfl:     vitamin D (ERGOCALCIFEROL) 1.25 MG (54391 UT) capsule capsule, , Disp: , Rfl:      reports that she has quit smoking. Her smoking use  "included cigarettes. She has a 5 pack-year smoking history. She has been exposed to tobacco smoke. She has never used smokeless tobacco.      Objective:   Physical exam:  /76 (BP Location: Right arm, Patient Position: Sitting, Cuff Size: Adult)   Pulse 85   Ht 160 cm (62.99\")   Wt 107 kg (235 lb)   SpO2 98%   BMI 41.64 kg/m²   CONSTITUTIONAL: No acute distress  CARDIOVASCULAR: Regular rate and rhythm with normal S1 and S2. Without murmur.  PERIPHERAL VASCULAR: No carotid bruit bilaterally.  Normal radial pulse.     Labs:    Lab Results   Component Value Date     (H) 08/24/2024     No components found for: \"LDLDIRECTC\"    Diagnostic Data:    Procedures    Results for orders placed during the hospital encounter of 04/22/25    Adult Transthoracic Echo Complete W/ Cont if Necessary Per Protocol    Interpretation Summary    Left ventricular systolic function is mildly decreased. Calculated left ventricular EF = 41.2%. Left ventricular ejection fraction appears to be 41 - 45%.    Left ventricular wall thickness is consistent with borderline concentric hypertrophy.    The right ventricular cavity is borderline dilated.      Assessment and Plan:     Chronic HFrEF   -Newly diagnosed at time of CVA 8/2024.   -Possible contributing causes included recent pneumonia, substance abuse (positive meth and marijuana on UDS)  -LVEF 25%, 8/2024.  LVEF 40 to 45%, 4/2025    -Continue nebivolol, Entresto, spironolactone, Jardiance    -Consider decreasing spironolactone to 12.5 mg once a day at next visit if clinically well her blood pressure remains borderline low.  Thereafter, would consider discontinuing spironolactone     Left ventricular thrombus  Factor V Leiden  -Continue apixaban for now.     Primary hypertension   -Blood pressure stable.   -Continue current related medicines with potential future changes as noted above        - Return in about 6 months (around 11/21/2025) for Follow up; either LAYO Reyes or LAYO" MICHELLE Alfonso, Next follow up with an ECG, if none in last year.

## 2025-06-06 ENCOUNTER — HOSPITAL ENCOUNTER (OUTPATIENT)
Dept: SLEEP MEDICINE | Facility: HOSPITAL | Age: 35
End: 2025-06-06
Payer: COMMERCIAL

## 2025-06-06 VITALS
DIASTOLIC BLOOD PRESSURE: 83 MMHG | HEIGHT: 63 IN | WEIGHT: 235 LBS | HEART RATE: 90 BPM | OXYGEN SATURATION: 96 % | BODY MASS INDEX: 41.64 KG/M2 | SYSTOLIC BLOOD PRESSURE: 134 MMHG

## 2025-06-06 DIAGNOSIS — G47.19 EXCESSIVE DAYTIME SLEEPINESS: ICD-10-CM

## 2025-06-06 DIAGNOSIS — R06.83 SNORING: ICD-10-CM

## 2025-06-06 DIAGNOSIS — G47.33 OSA (OBSTRUCTIVE SLEEP APNEA): ICD-10-CM

## 2025-06-06 PROCEDURE — 95810 POLYSOM 6/> YRS 4/> PARAM: CPT

## 2025-06-10 ENCOUNTER — TELEMEDICINE (OUTPATIENT)
Age: 35
End: 2025-06-10
Payer: COMMERCIAL

## 2025-06-10 VITALS — BODY MASS INDEX: 40.74 KG/M2 | WEIGHT: 230 LBS

## 2025-06-10 DIAGNOSIS — Z51.81 ENCOUNTER FOR THERAPEUTIC DRUG MONITORING: ICD-10-CM

## 2025-06-10 DIAGNOSIS — F33.1 MODERATE EPISODE OF RECURRENT MAJOR DEPRESSIVE DISORDER: ICD-10-CM

## 2025-06-10 DIAGNOSIS — F41.1 GAD (GENERALIZED ANXIETY DISORDER): Primary | ICD-10-CM

## 2025-06-10 RX ORDER — ESCITALOPRAM OXALATE 10 MG/1
TABLET ORAL
Qty: 53 TABLET | Refills: 0 | Status: SHIPPED | OUTPATIENT
Start: 2025-06-10 | End: 2025-08-09

## 2025-06-11 RX ORDER — SPIRONOLACTONE 25 MG/1
25 TABLET ORAL DAILY
Start: 2025-06-11

## 2025-06-16 DIAGNOSIS — G47.33 OSA (OBSTRUCTIVE SLEEP APNEA): Primary | Chronic | ICD-10-CM

## 2025-06-17 ENCOUNTER — TELEPHONE (OUTPATIENT)
Dept: SLEEP MEDICINE | Age: 35
End: 2025-06-17
Payer: COMMERCIAL

## 2025-06-18 NOTE — TELEPHONE ENCOUNTER
Patient'S  is understanding of the sleep study results and states Crystal is agreeable to PAP therapy. They would like to use [employer]. Orders have been faxed to DME. Patient's compliance appointment has also been made.

## 2025-06-30 ENCOUNTER — HOSPITAL ENCOUNTER (EMERGENCY)
Facility: HOSPITAL | Age: 35
Discharge: HOME | End: 2025-06-30
Attending: EMERGENCY MEDICINE | Admitting: EMERGENCY MEDICINE
Payer: COMMERCIAL

## 2025-06-30 VITALS
TEMPERATURE: 98.1 F | DIASTOLIC BLOOD PRESSURE: 92 MMHG | BODY MASS INDEX: 40.75 KG/M2 | HEIGHT: 63 IN | SYSTOLIC BLOOD PRESSURE: 121 MMHG | WEIGHT: 230 LBS | OXYGEN SATURATION: 94 % | HEART RATE: 82 BPM | RESPIRATION RATE: 18 BRPM

## 2025-06-30 DIAGNOSIS — K64.4 EXTERNAL HEMORRHOID, BLEEDING: Primary | ICD-10-CM

## 2025-06-30 PROCEDURE — 99282 EMERGENCY DEPT VISIT SF MDM: CPT

## 2025-06-30 NOTE — ED PROVIDER NOTES
[facility]    EMERGENCY DEPARTMENT ENCOUNTER      Pt Name: Keya Hamilton  MRN: [id number]  YOB: 1990  Date of evaluation: 6/30/2025  Provider: Ye Guevara MD    CHIEF COMPLAINT       Chief Complaint   Patient presents with    Rectal Pain         HISTORY OF PRESENT ILLNESS   Keya Hamilton is a 35 y.o. female who presents to the emergency department for evaluation of rectal pain and bleeding.  This has been going on for a couple of weeks.  She has associated constipation.  She has had similar symptoms in the past with hemorrhoids.  She has been using docusate, MiraLAX and states that her constipation is improving.  She has been using Preparation H but has not had much improvement in her hemorrhoids and intermittent bleeding.  She does not have any pain besides at her rectum.    REVIEW OF SYSTEMS     ROS:  A chief complaint appropriate review of systems was completed and is negative except as noted in the HPI.      PAST MEDICAL HISTORY     Past Medical History:   Diagnosis Date    CHF (congestive heart failure)     Chronic kidney disease     Congenital heart disease     Deep vein thrombosis     Diabetes mellitus     Hyperlipidemia     Hypertension     Sleep apnea     Stroke          SURGICAL HISTORY       Past Surgical History:   Procedure Laterality Date    INTERVENTIONAL RADIOLOGY PROCEDURE N/A 08/23/2024    Procedure: IR mechanical thrombectomy;  Surgeon: Lemuel Medina MD;  Location: [facility] INVASIVE LOCATION;  Service: Interventional Radiology;  Laterality: N/A;    WISDOM TOOTH EXTRACTION           CURRENT MEDICATIONS     No current facility-administered medications for this encounter.    Current Outpatient Medications:     apixaban (ELIQUIS) 5 MG tablet tablet, Take 1 tablet by mouth Every 12 (Twelve) Hours., Disp: 60 tablet, Rfl: 11    atorvastatin (LIPITOR) 80 MG tablet, Take 1 tablet by mouth Every Night., Disp: , Rfl:     empagliflozin (JARDIANCE) 10 MG tablet tablet,  Take 1 tablet by mouth Daily., Disp: 30 tablet, Rfl: 11    escitalopram (Lexapro) 10 MG tablet, Take 0.5 tablets by mouth Daily for 14 days, THEN 1 tablet Daily for 46 days., Disp: 53 tablet, Rfl: 0    nebivolol (BYSTOLIC) 10 MG tablet, Take 1 tablet by mouth once daily, Disp: 30 tablet, Rfl: 0    pantoprazole (PROTONIX) 20 MG EC tablet, Take 1 tablet by mouth Daily., Disp: , Rfl:     sacubitril-valsartan (ENTRESTO) 49-51 MG tablet, Take 1 tablet by mouth Every 12 (Twelve) Hours., Disp: 60 tablet, Rfl: 11    Semaglutide-Weight Management 1.7 MG/0.75ML solution auto-injector, , Disp: , Rfl:     spironolactone (ALDACTONE) 25 MG tablet, Take 1 tablet by mouth Daily., Disp: , Rfl:     vitamin B-12 (CYANOCOBALAMIN) 1000 MCG tablet, Take 1 tablet by mouth Daily., Disp: , Rfl:     vitamin D (ERGOCALCIFEROL) 1.25 MG (62916 UT) capsule capsule, , Disp: , Rfl:     ALLERGIES     Codeine and Aspirin    FAMILY HISTORY       Family History   Problem Relation Age of Onset    Hypertension Mother     Hypertension Father     Hypertension Sister     Diabetes Sister     Stroke Maternal Aunt     Hypertension Maternal Grandmother     Diabetes Maternal Grandmother     Heart disease Maternal Grandmother     Arrhythmia Maternal Grandmother     Hypertension Maternal Grandfather     Hypertension Paternal Grandmother     Hypertension Paternal Grandfather           SOCIAL HISTORY       Social History     Socioeconomic History    Marital status:    Tobacco Use    Smoking status: Former     Current packs/day: 0.50     Average packs/day: 0.5 packs/day for 10.0 years (5.0 ttl pk-yrs)     Types: Cigarettes     Passive exposure: Past    Smokeless tobacco: Never    Tobacco comments:     Quit  smoking 9 years ago    Vaping Use    Vaping status: Former    Substances: Nicotine, Flavoring    Devices: Disposable   Substance and Sexual Activity    Alcohol use: Not Currently     Comment: ocassionally a wine cooler or simular    Drug use: Yes      "Types: Marijuana     Comment: occasional    Sexual activity: Yes     Partners: Male     Birth control/protection: I.U.D.         PHYSICAL EXAM    (up to 7 for level 4, 8 or more for level 5)     Vitals:    06/30/25 0211   BP: (!) 142/104   Pulse: 80   Resp: 18   Temp: 98.1 °F (36.7 °C)   TempSrc: Oral   SpO2: 98%   Weight: 104 kg (230 lb)   Height: 160 cm (63\")       Physical Exam  Constitutional:       General: She is not in acute distress.  HENT:      Head: Normocephalic and atraumatic.   Eyes:      Conjunctiva/sclera: Conjunctivae normal.      Pupils: Pupils are equal, round, and reactive to light.   Cardiovascular:      Rate and Rhythm: Normal rate and regular rhythm.      Pulses: Normal pulses.   Pulmonary:      Effort: Pulmonary effort is normal. No respiratory distress.   Abdominal:      General: Abdomen is flat. There is no distension.      Tenderness: There is no abdominal tenderness.   Genitourinary:     Comments: There is a single nonthrombosed external hemorrhoid at the 4 o'clock position, no active bleeding at this time  Musculoskeletal:         General: No swelling or deformity. Normal range of motion.   Skin:     General: Skin is warm and dry.      Capillary Refill: Capillary refill takes less than 2 seconds.   Neurological:      General: No focal deficit present.      Mental Status: She is alert and oriented to person, place, and time.   Psychiatric:         Mood and Affect: Mood normal.         Behavior: Behavior normal.            DIAGNOSTIC RESULTS     EKG: All EKGs are interpreted by the Emergency Department Physician who either signs or Co-signs this chart in the absence of a cardiologist.    No orders to display         RADIOLOGY:   [x] Radiologist's Report Reviewed:  No orders to display       I ordered and independently reviewed the above noted radiographic studies.        LABS:  I independently interpreted all laboratory studies conducted during this ED visit.  The results of these studies " can be seen below and my independent interpretation in the ED course      EMERGENCY DEPARTMENT COURSE and DIFFERENTIAL DIAGNOSIS/MDM:   Vitals:  AS OF 02:27 EDT    BP - (!) 142/104  HR - 80  TEMP - 98.1 °F (36.7 °C) (Oral)  O2 SATS - 98%        Discussion below represents my analysis of pertinent findings related to patient's condition, differential diagnosis, treatment plan and final disposition.      Differential diagnosis:  The differential diagnosis associated with the patient's presentation includes: Internal or external hemorrhoid, thrombosed hemorrhoid, rectal fissure, malignancy      Independent interpretations (ECG/rhythm strip/X-ray/US/CT scan): See ED course      Additional sources:  Discussed/obtained information from independent historians:   [x] Spouse:   [] Parent:   [] Friend:   [] EMS:   [] Other:    External record review:  6/10/2025 reviewed most recent outpatient provider note, seen by psychiatry for evaluation of anxiety and depression      Patient's care impacted by:   [] Diabetes   [] Hypertension   [] Coronary Artery Disease   [] Cancer   [x] Other: Obesity    Care significantly affected by Social Determinants of Health (housing and economic circumstances, unemployment)    [] Yes     [x] No   If yes, Patient's care significantly limited by  Social Determinants of Health including:    [] Inadequate housing    [] Low income    [] Alcoholism and drug addiction in family    [] Problems related to primary support group    [] Unemployment    [] Problems related to employment    [] Other Social Determinants of Health:     I considered prescription management with:    [] Pain medication:   [] Antiviral:   [] Antibiotic:   [] Other:    Additional orders considered but not ordered:  The following testing was considered but ultimately not selected: Labs, CT scan    ED Course:           At this time patient's symptoms and physical exam findings are consistent with a nonthrombosed external hemorrhoid.   She will continue treatments with stool softeners, Preparation H with lidocaine jelly.  No other interventions indicated from the emergency room, will provide as needed follow-up with colorectal surgery should she not have improvement with these treatment modalities.    Prior to discharge from the emergency department I discussed with the patient and any family members present the current diagnosis, treatment plan, recommendations regarding follow-up with a primary care doctor or specialist, general emergency room return precautions as well as return precautions specific to their diagnosis and treatment.  The patient/family indicated understanding of these instructions.  Time was allotted to answer questions at that time and throughout the ED course.         PROCEDURES:  Procedures    CRITICAL CARE TIME        CONSULTS       FINAL IMPRESSION      1. External hemorrhoid, bleeding          DISPOSITION/PLAN     ED Disposition       ED Disposition   Discharge    Condition   Stable    Comment   --                 Comment: Please note this report has been produced using speech recognition software.      Ye Guevara MD  Attending Emergency Physician    No results found for this or any previous visit (from the past 24 hours).  Note: In addition to lab results from this visit, the labs listed above may include labs taken at another facility or during a different encounter within the last 24 hours. Please correlate lab times with ED admission and discharge times for further clarification of the services performed during this visit.                 Ye Guevara MD  06/30/25 0566

## 2025-06-30 NOTE — DISCHARGE INSTRUCTIONS
Recommend taking stool softener MiraLAX 1 capful (17 g) dissolved in 16 ounces of water 2 times daily to soften the stool.  Take bowel stimulant senna 1 tablet 1-2 times daily to stimulate bowel movement.    Use over-the-counter medication preparation H with lidocaine jelly.  This will help soothe the inflammation which is contributing to your hemorrhoid and also provide topical pain relief.  If these treatments resolve your symptoms you do not need to follow-up but if your symptoms do not get better or get worse you should follow-up in the colorectal surgery clinic, a referral to this clinic was placed and they should call you to schedule a follow-up appointment.  Return to the ER as needed for new or worsening symptoms

## 2025-07-07 RX ORDER — NEBIVOLOL 10 MG/1
10 TABLET ORAL DAILY
Qty: 30 TABLET | Refills: 0 | Status: SHIPPED | OUTPATIENT
Start: 2025-07-07

## 2025-07-08 ENCOUNTER — TELEPHONE (OUTPATIENT)
Dept: GASTROENTEROLOGY | Facility: CLINIC | Age: 35
End: 2025-07-08
Payer: COMMERCIAL

## 2025-07-16 ENCOUNTER — HOSPITAL ENCOUNTER (OUTPATIENT)
Dept: CARDIOLOGY | Facility: HOSPITAL | Age: 35
Discharge: HOME | End: 2025-07-16
Admitting: NURSE PRACTITIONER
Payer: COMMERCIAL

## 2025-07-16 ENCOUNTER — OFFICE VISIT (OUTPATIENT)
Dept: NEUROLOGY | Facility: CLINIC | Age: 35
End: 2025-07-16
Payer: COMMERCIAL

## 2025-07-16 VITALS
SYSTOLIC BLOOD PRESSURE: 130 MMHG | TEMPERATURE: 97.4 F | HEART RATE: 90 BPM | BODY MASS INDEX: 40.75 KG/M2 | OXYGEN SATURATION: 95 % | DIASTOLIC BLOOD PRESSURE: 82 MMHG | HEIGHT: 63 IN

## 2025-07-16 DIAGNOSIS — E78.2 MIXED HYPERLIPIDEMIA: ICD-10-CM

## 2025-07-16 DIAGNOSIS — I10 PRIMARY HYPERTENSION: ICD-10-CM

## 2025-07-16 DIAGNOSIS — Z86.73 HISTORY OF CVA (CEREBROVASCULAR ACCIDENT): Primary | ICD-10-CM

## 2025-07-16 DIAGNOSIS — Z51.81 ENCOUNTER FOR THERAPEUTIC DRUG MONITORING: ICD-10-CM

## 2025-07-16 DIAGNOSIS — D68.59 HYPERCOAGULOPATHY: ICD-10-CM

## 2025-07-16 PROCEDURE — 93005 ELECTROCARDIOGRAM TRACING: CPT | Performed by: NURSE PRACTITIONER

## 2025-07-16 RX ORDER — DOCUSATE SODIUM 100 MG/1
CAPSULE, LIQUID FILLED ORAL
COMMUNITY
Start: 2025-06-30

## 2025-07-16 NOTE — PATIENT INSTRUCTIONS
-Continue Eliquis 5 mg twice daily for secondary stroke prevention due to Factor V Leiden lifelong  -Continue Lipitor 80 mg nightly for cholesterol management.   -BP goal <130/80  -Heart healthy diet  -Increased activity   -911 for any new stroke like symptoms

## 2025-07-16 NOTE — PROGRESS NOTES
Follow Up Office Visit      Encounter Date: 2025   Patient Name: Keya Hamilton  : 1990   MRN: [id number]   PCP: Cuong Parks MD    Chief Complaint:    Chief Complaint   Patient presents with    Follow-up for management of and prevention of stroke        History of Present Illness: Keya Hamilton is a 35 y.o. female with history of acute right hemispheric stroke 2024.    The patient arrived at [facility] ED via EMS with left-sided hemiparesis and NIH 11.  The patient underwent a complete stroke workup, CT negative for ICH, CTA H/N revealed a right M1 occlusion and CTP noted hypoperfusion in the right MCA territory as well.  Patient was deemed a candidate for IV TNK and taken to the Cath Lab for MET with a TICI 3 outcome.  Etiology, likely cardioembolic in the setting of left ventricular thrombus.  MRI I revealed AIS in the right [place] and right head of caudate.  PMH: HLD, HTN, DM, obesity, marijuana use, on progesterone (MIRENA IUD). The patient was started on 5 mg Eliquis twice daily and 80 mg atorvastatin. Hypercoagulation panel, factor V Leiden diagnosed on .  The patient arrives today with no complaints.  She is taking Eliquis and atorvastatin with no complaints of muscle pain/spasm or unusual/prolonged episodes of bleeding.  Patient reports, she continues to monitor her stroke risk factors with her primary care physician and cardiology.Patient denies any other neurological symptoms, including: headache, vision changes, dysesthesias, loss of consciousness, seizure or new areas of motor weakness.      Clinic Visit 1/15/25 with Kofi Murphy: Patient presents today for clinic follow-up appointment. She reports no new stroke-like symptoms. Patients left sided weakness has continued to improve. She does continue to have blurred vision worse in her left eye. Patient has a neuro-ophthalmology appointment with Dr. Frankel at  on . She has not yet been cleared to  "drive. I have suggested that she may go to [facility] for their driving testing program for clearance, however the patient is currently seeking driving clearance via a program with vocational rehab. Concerning symptoms surrounding her stroke, only additional complaint is concerning her toes on left foot appear more spread out than right foot. This is new for the patient. It is possible some residual weakness of accessory muscles or compensatory mechanism is underlying this, but the patient has no difficulty ambulating due to this. She is continuing to work with physical therapy. Patient does need to have some teeth extracted. I have advised that per AHA guidelines, she sound wait at least 6 months post-stroke for non-urgent procedures. I agree with cardiology concerning holding Eliquis and have advised holding for no more than 48 hours and resume eliquis the night of the procedure. Discussed Hematology opinion that LV thrombus and stroke etiology is likely due to low ejection fraction rather than Factor V. Currently the patient is awaiting further future evaluation of EF with cardiology prior to deciding on defibrillator placement. She continues her Eliquis BID and Statin without new complaints. No additional concerns today.      Clinic Visit 4/16/2025: Patient presents today for clinic follow-up accompanied by her significant other.  She has been doing well, denies any new or worsening strokelike symptoms.  She specifically denies any headaches, no dizziness, no speech disturbance.  The left sided weakness and paresthesias have improved - she continues to have some \"slowness,\" in her left hand.  She tells me that her blurred vision in her left eye has greatly improved.  She was evaluated by neuro-ophthalmology in March who did not detect any hemianopic defect except for mild nasal depression in the right eye, no concern for IIH.  She also had a normal eye exam.  Her gaze is normal, VFF today-she asks about " driving which I feel is appropriate given the above.  I asked her to start with short, local drives during daylight hours with another person.  She has completed PT/OT.  She recently had a tooth that had abscessed in addition to her wisdom teeth removed and has resumed her Eliquis.  Tolerating Eliquis well, no signs and symptoms of bleeding.  She has an upcoming echocardiogram next week to reevaluate her EF and potentially consider a defibrillator in the future.  She also has a sleep study that is pending.  Overall she tells me she has been doing well, watching her diet and going to the gym regularly.  She does mention that she has been feeling increasingly anxious lately.  She has been diagnosed with anxiety in the past but was never on any medications for this.  She is interested in starting medications +/- counseling, I will refer her to psychiatry.    Clinic visit 7/16/2025: Patient presents to clinic today for routine follow up. She is accompanied by her . She reports she is doing well. No new or worsening stroke like symptoms. She does endorse some left  weakness and incoordination. She reports she feels almost back to her baseline. She has been taking her Eliquis and statin without issues or side effects.  We reviewed that anticoagulation will be lifelong due to her factor V Leiden along with her reduced ejection fraction.  She has been seeing psychiatry for her depression.  They ordered for her to get an EKG today to check her QTc.  She has no other questions or concerns at this time.    Subjective        I have reviewed and the following portions of the patient's history were updated as appropriate: past family history, past medical history, past social history, past surgical history and problem list.    Medications:     Current Outpatient Medications:     apixaban (ELIQUIS) 5 MG tablet tablet, Take 1 tablet by mouth Every 12 (Twelve) Hours., Disp: 60 tablet, Rfl: 11    atorvastatin (LIPITOR) 80  "MG tablet, Take 1 tablet by mouth Every Night., Disp: , Rfl:     docusate sodium (COLACE) 100 MG capsule, TAKE 1 CAPSULE BY MOUTH TWICE DAILY AS DIRECTED FOR CONSTIPATION, Disp: , Rfl:     empagliflozin (JARDIANCE) 10 MG tablet tablet, Take 1 tablet by mouth Daily., Disp: 30 tablet, Rfl: 11    escitalopram (Lexapro) 10 MG tablet, Take 0.5 tablets by mouth Daily for 14 days, THEN 1 tablet Daily for 46 days., Disp: 53 tablet, Rfl: 0    nebivolol (BYSTOLIC) 10 MG tablet, Take 1 tablet by mouth once daily, Disp: 30 tablet, Rfl: 0    pantoprazole (PROTONIX) 20 MG EC tablet, Take 1 tablet by mouth Daily., Disp: , Rfl:     sacubitril-valsartan (ENTRESTO) 49-51 MG tablet, Take 1 tablet by mouth Every 12 (Twelve) Hours., Disp: 60 tablet, Rfl: 11    Semaglutide-Weight Management 1.7 MG/0.75ML solution auto-injector, , Disp: , Rfl:     spironolactone (ALDACTONE) 25 MG tablet, Take 1 tablet by mouth Daily., Disp: , Rfl:     vitamin B-12 (CYANOCOBALAMIN) 1000 MCG tablet, Take 1 tablet by mouth Daily., Disp: , Rfl:     vitamin D (ERGOCALCIFEROL) 1.25 MG (90818 UT) capsule capsule, , Disp: , Rfl:     Allergies:   Allergies   Allergen Reactions    Codeine Hives and Unknown (See Comments)    Aspirin Other (See Comments)     One kidney so avoid NSAID       Objective     Physical Exam:   Vital Signs:   Vitals:    07/16/25 1449   BP: 130/82   Pulse: 90   Temp: 97.4 °F (36.3 °C)   SpO2: 95%   Height: 160 cm (62.99\")     Body mass index is 40.75 kg/m².    Physical Exam  Vitals and nursing note reviewed.   Constitutional:       General: She is awake. She is not in acute distress.     Appearance: Normal appearance. She is obese. She is not ill-appearing.      Comments: 35-year-old  female   HENT:      Head: Normocephalic and atraumatic.      Nose: Nose normal.      Mouth/Throat:      Mouth: Mucous membranes are moist.   Eyes:      General: Lids are normal.      Extraocular Movements: Extraocular movements intact.      Pupils: " Pupils are equal, round, and reactive to light.   Cardiovascular:      Rate and Rhythm: Normal rate and regular rhythm.      Pulses: Normal pulses.   Pulmonary:      Effort: Pulmonary effort is normal. No respiratory distress.   Skin:     General: Skin is warm and dry.   Neurological:      Mental Status: She is alert and oriented to person, place, and time.      Cranial Nerves: Cranial nerve deficit present.      Sensory: No sensory deficit.      Motor: Weakness present.      Coordination: Coordination normal.      Gait: Gait normal.   Psychiatric:         Mood and Affect: Mood normal.         Speech: Speech normal.         Behavior: Behavior normal.       Neurological Exam  Mental Status  Awake and alert. Oriented to person, place, time and situation. Oriented to person, place, and time. Speech is normal. Language is fluent with no aphasia. Attention and concentration are normal. Fund of knowledge is appropriate for level of education.    Cranial Nerves  CN II: Right visual acuity: Counts fingers. Left visual acuity: Counts fingers. Visual fields full to confrontation.  CN III, IV, VI: Extraocular movements intact bilaterally. Normal lids and orbits bilaterally. Pupils equal round and reactive to light bilaterally.  CN V: Facial sensation is normal.  CN VII:  Left: There is central facial weakness.  CN VIII: Hearing is normal to speech .    Motor  Normal muscle bulk throughout. No fasciculations present. Normal muscle tone. Strength is 5/5 in all four extremities except as noted.  LUE 4+/5 left  4+5. .    Sensory  Light touch is normal in upper and lower extremities. Reports occasional tingling in her left foot but not currently.     Coordination  Right: Finger-to-nose normal. Rapid alternating movement normal.Left: Finger-to-nose normal. Rapid alternating movement abnormality:    Gait   Normal gait.Casual gait is normal including stance, stride, and arm swing.       Modified Fajardo Score: 1        0  No  Symptoms    1 No significant disability. Able to carry out all usual activities, despite some symptoms.    2 Slight disability. Able to look after own affairs without assistance, but unable to carry out all previous activities.    3 Moderate disability. Requires some help, but able to walk unassisted.    4 Moderately severe disability. Unable to attend to own bodily needs without assistance, and unable to walk unassisted.    5 Severe disability. Requires constant nursing care and attention, bedridden, incontinent.    6 Dead      PHQ-9 Depression Screening  Little interest or pleasure in doing things? Nearly every day   Feeling down, depressed, or hopeless? More than half the days   PHQ-2 Total Score 5   Trouble falling or staying asleep, or sleeping too much?     Feeling tired or having little energy?     Poor appetite or overeating?     Feeling bad about yourself - or that you are a failure or have let yourself or your family down?     Trouble concentrating on things, such as reading the newspaper or watching television?     Moving or speaking so slowly that other people could have noticed? Or the opposite - being so fidgety or restless that you have been moving around a lot more than usual?       Thoughts that you would be better off dead, or of hurting yourself in some way?     PHQ-9 Total Score     If you checked off any problems, how difficult have these problems made it for you to do your work, take care of things at home, or get along with other people?              SAHIL Fall Risk Clinician Key Questions   Have you fallen in the past year?: No  Do you feel unsteady with walking?: No  Are you worried about falling?: No      Hemoglobin   Date Value Ref Range Status   02/25/2025 15.3 11.2 - 15.7 g/dL Final     Hematocrit   Date Value Ref Range Status   02/25/2025 44.2 34.0 - 45.0 % Final     Platelets   Date Value Ref Range Status   02/25/2025 346 155 - 369 10*3/uL Final     Hemoglobin A1C   Date Value Ref  Range Status   08/24/2024 6.70 (H) 4.80 - 5.60 % Final     LDL Cholesterol    Date Value Ref Range Status   08/24/2024 115 (H) 0 - 100 mg/dL Final     AST (SGOT)   Date Value Ref Range Status   08/30/2024 43 (H) 1 - 32 U/L Final     ALT (SGPT)   Date Value Ref Range Status   08/30/2024 36 (H) 1 - 33 U/L Final          Assessment / Plan      Assessment/Plan:   Diagnoses and all orders for this visit:     #Sequela post-stroke  #Factor V Leiden  #Low EF -41%  #Left ventricular thrombus  -Right basal ganglia, right temporal lobe and right occipital lobe infarcts; suspected etiology due cardioembolic/low EF  -Continue Eliquis 5 mg twice daily lifelong secondary to factor V Leiden and reduced ejection fraction  -Continue atorvastatin 80 mg daily. , goal less than 70.  Needs lipid panel recheck, this was ordered last visit but still not completed.  Patient reports that she will be getting this done with her PCP.  -Follows with cardiology, continue to follow up with Dr. Hooker  - No therapy needs at this time  -Heart healthy diet and increased activity.   -Patient struggled with depression post stroke and has been managed by Psychiatry with improvements in mood.   - Reviewed s/sxs of stroke and when to call 911  -Follow up in stroke clinic in one year.       # Hyperlipidemia   - LDL on 8/24/24 = 115  - Goal LDL < 70  -Continue 80 mg of Atorvastatin  -Patient prefers to follow up with PCP      # Hypertension   Blood pressure goal less than 130/80  -Patient to follow with Cardiology as scheduled     #Morbid obesity  -BMI 40  -Complicates all aspects of care  -Encourage increase activity and exercise as tolerated  -Mediterranean Diet recommended for secondary stroke prevention  -Patient has lost weight since starting Wegovy     Discussed the importance of medication compliance Atorvastatin 80mg nightly and Eliquis 5mg twice daily and lifestyle modifications adequate control of blood pressure, adequate control of  cholesterol (goal LDL <70), increased physical activity, and implementation of healthy diet to help reduce the risk of future cerebrovascular events.  Also discussed the signs symptoms that would warrant the patient return back to the emergency department including unilateral weakness, unilateral numbness, visual disturbances, loss of balance, speech difficulties, and/or a sudden severe headache.  Patient verbalized understanding.    Follow Up:   Return in about 1 year (around 7/16/2026).      MICHELLE Martinez  [facility]

## 2025-07-20 LAB
QT INTERVAL: 380 MS
QTC INTERVAL: 435 MS

## 2025-07-24 ENCOUNTER — OFFICE VISIT (OUTPATIENT)
Age: 35
End: 2025-07-24
Payer: COMMERCIAL

## 2025-07-24 VITALS
WEIGHT: 229.9 LBS | OXYGEN SATURATION: 95 % | HEIGHT: 63 IN | DIASTOLIC BLOOD PRESSURE: 80 MMHG | BODY MASS INDEX: 40.73 KG/M2 | SYSTOLIC BLOOD PRESSURE: 124 MMHG | HEART RATE: 83 BPM

## 2025-07-24 DIAGNOSIS — F41.1 GAD (GENERALIZED ANXIETY DISORDER): Primary | ICD-10-CM

## 2025-07-24 DIAGNOSIS — F33.1 MODERATE EPISODE OF RECURRENT MAJOR DEPRESSIVE DISORDER: ICD-10-CM

## 2025-07-24 RX ORDER — ESCITALOPRAM OXALATE 10 MG/1
15 TABLET ORAL DAILY
Qty: 135 TABLET | Refills: 0 | Status: SHIPPED | OUTPATIENT
Start: 2025-07-24

## 2025-07-24 NOTE — PROGRESS NOTES
"     Office  Follow Up Visit      Patient Name: Keya Hamilton  : 1990   MRN: [id number]     Referring Provider: Cuong Parks MD    Chief Complaint:       ICD-10-CM ICD-9-CM   1. BUD (generalized anxiety disorder)  F41.1 300.02   2. Moderate episode of recurrent major depressive disorder  F33.1 296.32         Keya Hamilton is a 35 y.o. female who is here today for follow up related to depression, anxiety.   History of Present Illness  She was last seen on 06/10/2025 for depression and anxiety, at which time she was prescribed Lexapro, initially at a low dose that was later increased to 10 mg. She reports that the medication has been somewhat effective, particularly in managing her social and situational anxiety. However, she continues to experience generalized anxiety, although it is less severe than before. Her mood has improved overall, but she still harbors thoughts of self-harm and thoughts of \"it would be easier if I wasn't here.\" She identifies stress, particularly related to job hunting and financial struggles, as triggers for her self-harm thoughts. She reports no worsening of symptoms since her last visit.  She rates her current level of anxiety as 4 out of 10 and her depression as 6 out of 10 on 0-10 scale with 10 been the worst.  Her partner has observed an increase in her assertiveness. She has been on Lexapro 10 mg for about a month, following a one to two-week period on a 5 mg dose. She believes that an increased dose of Lexapro would be beneficial. She reports no active plans for self-harm today .She is not experiencing any side effects from Lexapro.    Her sleep quality has improved since starting CPAP therapy last week, with her now sleeping approximately 7 hours per night and feeling more rested upon waking. Her appetite remains unchanged, and her weight has been stable.     She is on Eliquis due to factor V Leiden.    MEDICATIONS  Lexapro, Eliquis   "   Diagnosis:depression, anxiety  Medications: none  Therapy: none      Subjective      Review of Systems:   Review of Systems   Constitutional:  Positive for activity change and fatigue.   Psychiatric/Behavioral:  Positive for decreased concentration, dysphoric mood and suicidal ideas. The patient is nervous/anxious.    All other systems reviewed and are negative.      PHQ-9 Depression Screening  Little interest or pleasure in doing things? Nearly every day   Feeling down, depressed, or hopeless? More than half the days   PHQ-2 Total Score 5   Trouble falling or staying asleep, or sleeping too much? Several days   Feeling tired or having little energy? More than half the days   Poor appetite or overeating? Not at all   Feeling bad about yourself - or that you are a failure or have let yourself or your family down? Nearly every day   Trouble concentrating on things, such as reading the newspaper or watching television? More than half the days   Moving or speaking so slowly that other people could have noticed? Or the opposite - being so fidgety or restless that you have been moving around a lot more than usual? Not at all     Thoughts that you would be better off dead, or of hurting yourself in some way? More than half the days   PHQ-9 Total Score 15   If you checked off any problems, how difficult have these problems made it for you to do your work, take care of things at home, or get along with other people? Very difficult         BUD-7      Over the last two weeks, how often have you been bothered by the following problems?  Feeling nervous, anxious or on edge: More than half the days  Not being able to stop or control worrying: More than half the days  Worrying too much about different things: More than half the days  Trouble Relaxing: Nearly every day  Being so restless that it is hard to sit still: More than half the days  Becoming easily annoyed or irritable: More than half the days  Feeling afraid as if  something awful might happen: Several days  BUD 7 Total Score: 14  If you checked any problems, how difficult have these problems made it for you to do your work, take care of things at home, or get along with other people: Very difficult    Patient History:   The following portions of the patient's history were reviewed and updated as appropriate: allergies, current medications, past family history, past medical history, past social history, past surgical history and problem list.     Social History     Socioeconomic History    Marital status:    Tobacco Use    Smoking status: Former     Current packs/day: 0.50     Average packs/day: 0.5 packs/day for 10.0 years (5.0 ttl pk-yrs)     Types: Cigarettes     Passive exposure: Past    Smokeless tobacco: Never    Tobacco comments:     Quit  smoking 9 years ago    Vaping Use    Vaping status: Former    Substances: Nicotine, Flavoring    Devices: Disposable   Substance and Sexual Activity    Alcohol use: Not Currently     Comment: ocassionally a wine cooler or simular    Drug use: Yes     Types: Marijuana     Comment: occasional    Sexual activity: Yes     Partners: Male     Birth control/protection: I.U.D.       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      Substance Abuse History/Last use:              Alcohol: denies              Tobacco/Vape: quit vaping 2024              Illicit Drugs: MJ age 14-current (now uses a couple times per month)              Seizures:none     "          Caffeine: 1 Mt Dew    Medications:     Current Outpatient Medications:     apixaban (ELIQUIS) 5 MG tablet tablet, Take 1 tablet by mouth Every 12 (Twelve) Hours., Disp: 60 tablet, Rfl: 11    atorvastatin (LIPITOR) 80 MG tablet, Take 1 tablet by mouth Every Night., Disp: , Rfl:     docusate sodium (COLACE) 100 MG capsule, TAKE 1 CAPSULE BY MOUTH TWICE DAILY AS DIRECTED FOR CONSTIPATION, Disp: , Rfl:     empagliflozin (JARDIANCE) 10 MG tablet tablet, Take 1 tablet by mouth Daily., Disp: 30 tablet, Rfl: 11    escitalopram (Lexapro) 10 MG tablet, Take 0.5 tablets by mouth Daily for 14 days, THEN 1 tablet Daily for 46 days. (Patient taking differently: Take 0.5 tablets by mouth Daily for 14 days, THEN 1 tablet Daily for 46 days. PT reported taking x1 tablet daily. ), Disp: 53 tablet, Rfl: 0    nebivolol (BYSTOLIC) 10 MG tablet, Take 1 tablet by mouth once daily, Disp: 30 tablet, Rfl: 0    pantoprazole (PROTONIX) 20 MG EC tablet, Take 1 tablet by mouth Daily., Disp: , Rfl:     sacubitril-valsartan (ENTRESTO) 49-51 MG tablet, Take 1 tablet by mouth Every 12 (Twelve) Hours., Disp: 60 tablet, Rfl: 11    Semaglutide-Weight Management 1.7 MG/0.75ML solution auto-injector, , Disp: , Rfl:     spironolactone (ALDACTONE) 25 MG tablet, Take 1 tablet by mouth Daily., Disp: , Rfl:     vitamin B-12 (CYANOCOBALAMIN) 1000 MCG tablet, Take 1 tablet by mouth Daily., Disp: , Rfl:     vitamin D (ERGOCALCIFEROL) 1.25 MG (93530 UT) capsule capsule, , Disp: , Rfl:     Objective     Physical Exam  Vital Signs  Blood pressure is normal.  Vital Signs:   Vitals:    07/24/25 1520   BP: 124/80   Pulse: 83   SpO2: 95%   Weight: 104 kg (229 lb 14.4 oz)   Height: 160 cm (62.99\")     Body mass index is 40.74 kg/m².       Mental Status Exam:   MENTAL STATUS EXAM   General Appearance:  Cleanly groomed and dressed, obese and other  Other Comment:  Small tatoo upper R face  Eye Contact:  Good eye contact  Attitude:  Cooperative  Motor Activity:  " Normal gait, posture  Muscle Strength:  Normal  Speech:  Normal rate, tone, volume and monotone  Language:  Spontaneous and stereotypical  Mood and affect:  Flat and normal, pleasant  Hopelessness:  Denies  Loneliness: Denies  Thought Process:  Logical and goal-directed  Associations/ Thought Content:  No delusions  Hallucinations:  None  Suicidal Ideations:  Passive ideation  Homicidal Ideation:  Not present  Sensorium:  Alert and clear  Orientation:  Person, place, time and situation  Immediate Recall, Recent, and Remote Memory:  Intact  Attention Span/ Concentration:  Good  Fund of Knowledge:  Appropriate for age and educational level  Intellectual Functioning:  Average range  Insight:  Good  Judgement:  Good  Reliability:  Good  Impulse Control:  Good       @RESULASTCBCDIFFPANEL,TSH,LABLIPI,[id number],QGMGEHKA23,MG,FOLATE,PROLACTIN,CRPRESULT,CMP,[id number])@    Lab Results   Component Value Date    GLUCOSE 188 (H) 10/30/2024    BUN 12 10/30/2024    CREATININE 1.10 11/27/2024    EGFR 66.4 10/30/2024    BCR 10.6 10/30/2024    K 4.0 10/30/2024    CO2 21.0 (L) 10/30/2024    CALCIUM 9.2 10/30/2024    ALBUMIN 3.9 08/30/2024    BILITOT 0.5 08/30/2024    AST 43 (H) 08/30/2024    ALT 36 (H) 08/30/2024       Lab Results   Component Value Date    WBC 9.04 02/25/2025    HGB 15.3 02/25/2025    HCT 44.2 02/25/2025    MCV 98 02/25/2025     02/25/2025       Lab Results   Component Value Date    CHOL 177 08/24/2024    TRIG 175 (H) 08/24/2024    HDL 31 (L) 08/24/2024     (H) 08/24/2024       Latest Reference Range & Units 08/24/24 01:02   Hemoglobin A1C 4.80 - 5.60 % 6.70 (H)     Labs from [facility] 9/6/2024: Creatinine 0.9, sodium 139, potassium 4.0, chloride 108, carbon oxide 22, glucose 125, BUN 14, creatinine 0.9, estimated , calcium 9.6, glucose 186,  9/2/2024: Magnesium 2.2, alkaline phosphatase 57, AST 28, ALT 30, folate 5.4, free T43.7, TSH 1.77, vitamin B12 377, vitamin D 25-hydroxy  13.1  9/6/2024: WBC 8.7, RBC 4.89, hemoglobin 16.4, hematocrit 48.4, MCV 98.9, MCH 23.6, MCHC 33.9, platelets 368  Results  Results   EKG  Test Reason : med managment  Blood Pressure :   */*   mmHG  Vent. Rate :  79 BPM     Atrial Rate :  79 BPM     P-R Int : 176 ms          QRS Dur :  96 ms      QT Int : 380 ms       P-R-T Axes :  55 -23  79 degrees    QTcB Int : 435 ms     Normal sinus rhythm  Septal infarct , age undetermined  Abnormal ECG  When compared with ECG of 23-Aug-2024 20:51,  QRS voltage has decreased  Septal infarct is now present  Nonspecific T wave abnormality now evident in Anterior leads  QT has shortened  Confirmed by Jeni Kilgore ([id number]) on 7/20/2025 10:13:46 PM     Referred By:            Confirmed By: Jeni Kilgore                 Assessment / Plan      Visit Diagnosis/Orders Placed This Visit:  Diagnoses and all orders for this visit:    1. BUD (generalized anxiety disorder) (Primary)  -     escitalopram (Lexapro) 10 MG tablet; Take 1.5 tablets by mouth Daily.  Dispense: 135 tablet; Refill: 0    2. Moderate episode of recurrent major depressive disorder  -     escitalopram (Lexapro) 10 MG tablet; Take 1.5 tablets by mouth Daily.  Dispense: 135 tablet; Refill: 0       Assessment & Plan  1. Depression.  Her depression has shown improvement with the current treatment, but she continues to experience thoughts of self-harm. The dosage of Lexapro will be increased to 15 mg, which she will take as one and a half tablets. A prescription for this increased dosage will be sent to her pharmacy at [facility]. If her condition deteriorates before the next scheduled appointment, she is advised to contact us to arrange an earlier visit.    2. Anxiety.  Her anxiety levels have decreased since the last visit. She reports that her partner has noticed an improvement in her ability to express herself. The increased dosage of Lexapro to 15 mg is also expected to help manage her anxiety symptoms.    3. Factor V  Leipina.  She is currently on Eliquis for Factor V Leiden. No changes to this medication are necessary at this time.    Follow-up  A follow-up appointment is scheduled for 2 months from now.    Plan:    Mirena  Increase Lexapro to 15 mg daily labs and Sarwat reviewed   Would benefit from therapy  Vit D3 5000 IU daily  Labs and Sarwat reviewed    Continue supportive psychotherapy efforts and medications as indicated. Treatment and medication options discussed during today's visit. Patient ackowledged and verbally consented to continue with current treatment plan and was educated on the importance of compliance with treatment and follow-up appointments. Patient seems reasonably able to adhere to treatment plan.      Medication Considerations:  Discussed medication options and treatment plan of prescribed medication(s) as well as the risks, benefits, and potential side effects. Patient is agreeable to call the office with any worsening of symptoms or onset of side effects. Patient is agreeable to call 911 or go to the nearest ER should he/she begin having SI/HI.    Quality Measures:   Former smoker    I advised Keya Hamilton of the risks of tobacco use.     Follow Up:   Return in about 2 months (around 9/24/2025).      MICHELLE Naidu, [facility]    Patient or patient representative verbalized consent for the use of Ambient Listening during the visit with  MICHELLE Duncan for chart documentation. 7/24/2025  15:36 EDT

## 2025-07-31 LAB
1OH-MIDAZOLAM UR QL SCN: NOT DETECTED NG/MG CREAT
6MAM UR QL SCN: NEGATIVE NG/ML
7AMINOCLONAZEPAM/CREAT UR: NOT DETECTED NG/MG CREAT
A-OH ALPRAZ/CREAT UR: NOT DETECTED NG/MG CREAT
A-OH-TRIAZOLAM/CREAT UR CFM: NOT DETECTED NG/MG CREAT
ACP UR QL CFM: NOT DETECTED
ALPRAZ/CREAT UR CFM: NOT DETECTED NG/MG CREAT
AMPHETAMINES UR QL SCN: NEGATIVE NG/ML
APAP UR QL SCN: NEGATIVE UG/ML
BARBITURATES UR QL SCN: NEGATIVE NG/ML
BENZODIAZ SCN METH UR: NEGATIVE
BUPRENORPHINE UR QL SCN: NEGATIVE
BUPRENORPHINE/CREAT UR: NOT DETECTED NG/MG CREAT
CANNABINOIDS UR QL SCN: NEGATIVE NG/ML
CARISOPRODOL UR QL: NEGATIVE NG/ML
CLONAZEPAM/CREAT UR CFM: NOT DETECTED NG/MG CREAT
COCAINE+BZE UR QL SCN: NEGATIVE NG/ML
CREAT UR-MCNC: 120 MG/DL
D-METHORPHAN UR-MCNC: NOT DETECTED NG/ML
D-METHORPHAN+LEVORPHANOL UR QL: NOT DETECTED
DESALKYLFLURAZ/CREAT UR: NOT DETECTED NG/MG CREAT
DIAZEPAM/CREAT UR: NOT DETECTED NG/MG CREAT
ETHANOL UR QL SCN: NEGATIVE G/DL
ETHANOL UR QL SCN: NEGATIVE NG/ML
FENTANYL CTO UR SCN-MCNC: NEGATIVE NG/ML
FENTANYL/CREAT UR: NOT DETECTED NG/MG CREAT
FLUNITRAZEPAM UR QL SCN: NOT DETECTED NG/MG CREAT
GABAPENTIN UR-MCNC: NEGATIVE UG/ML
HALLUCINOGENS UR: NEGATIVE
HYPNOTICS UR QL SCN: NEGATIVE
KETAMINE UR QL: NOT DETECTED
LORAZEPAM/CREAT UR: NOT DETECTED NG/MG CREAT
MEPERIDINE UR QL SCN: NEGATIVE NG/ML
METHADONE UR QL SCN: NEGATIVE NG/ML
METHADONE+METAB UR QL SCN: NEGATIVE NG/ML
MIDAZOLAM/CREAT UR CFM: NOT DETECTED NG/MG CREAT
MISCELLANEOUS, UR: NEGATIVE
NORBUPRENORPHINE/CREAT UR: NOT DETECTED NG/MG CREAT
NORDIAZEPAM/CREAT UR: NOT DETECTED NG/MG CREAT
NORFENTANYL/CREAT UR: NOT DETECTED NG/MG CREAT
NORFLUNITRAZEPAM UR-MCNC: NOT DETECTED NG/MG CREAT
NORKETAMINE UR-MCNC: NOT DETECTED UG/ML
OPIATES UR SCN-MCNC: NEGATIVE NG/ML
OXAZEPAM/CREAT UR: NOT DETECTED NG/MG CREAT
OXYCODONE CTO UR SCN-MCNC: NEGATIVE NG/ML
PCP UR QL SCN: NEGATIVE NG/ML
PRESCRIBED MEDICATIONS: NORMAL
PROPOXYPH UR QL SCN: NEGATIVE NG/ML
TAPENTADOL CTO UR SCN-MCNC: NEGATIVE NG/ML
TEMAZEPAM/CREAT UR: NOT DETECTED NG/MG CREAT
TRAMADOL UR QL SCN: NEGATIVE NG/ML
ZALEPLON UR-MCNC: NOT DETECTED NG/ML
ZOLPIDEM PHENYL-4-CARB UR QL SCN: NOT DETECTED
ZOLPIDEM UR QL SCN: NOT DETECTED
ZOPICLONE-N-OXIDE UR-MCNC: NOT DETECTED NG/ML

## 2025-08-12 RX ORDER — SODIUM, POTASSIUM,MAG SULFATES 17.5-3.13G
1 SOLUTION, RECONSTITUTED, ORAL ORAL TAKE AS DIRECTED
Qty: 354 ML | Refills: 0 | Status: SHIPPED | OUTPATIENT
Start: 2025-08-12

## 2025-08-26 ENCOUNTER — OUTSIDE FACILITY SERVICE (OUTPATIENT)
Dept: GASTROENTEROLOGY | Facility: CLINIC | Age: 35
End: 2025-08-26
Payer: COMMERCIAL

## (undated) DEVICE — STPCK 3/WY HP M/RA W/OFF/HNDL 1050PSI STRL

## (undated) DEVICE — PROVUE RETRIEVER: Brand: TREVO NXT

## (undated) DEVICE — LEX NEURO ANGIOGRAPHY: Brand: MEDLINE INDUSTRIES, INC.

## (undated) DEVICE — ANGIO-SEAL VIP VASCULAR CLOSURE DEVICE: Brand: ANGIO-SEAL

## (undated) DEVICE — GUIDEWIRE WITH ICE™ HYDROPHILIC COATING: Brand: TRANSEND™ EX

## (undated) DEVICE — ROTATING HEMOSTATIC VALVE .096": Brand: RHV

## (undated) DEVICE — RADIFOCUS TORQUE DEVICE MULTI-TORQUE VISE: Brand: RADIFOCUS TORQUE DEVICE

## (undated) DEVICE — BALLOON GUIDE CATHETER: Brand: FLOWGATE2

## (undated) DEVICE — INTRO SHEATH ENGAGE W/50 GW .038 8F12

## (undated) DEVICE — CATH TEMPO 5F BER 100CM: Brand: TEMPO

## (undated) DEVICE — BLANKT WARM UNDER/BDY FUL/ACC A/ 90X206CM

## (undated) DEVICE — STPCK LP 1WY RA 200PSI

## (undated) DEVICE — RADIFOCUS GLIDEWIRE: Brand: GLIDEWIRE

## (undated) DEVICE — CATH MIC PHENOM .021 .018IN 160CM